# Patient Record
Sex: FEMALE | Race: WHITE | Employment: OTHER | URBAN - METROPOLITAN AREA
[De-identification: names, ages, dates, MRNs, and addresses within clinical notes are randomized per-mention and may not be internally consistent; named-entity substitution may affect disease eponyms.]

---

## 2017-05-12 ENCOUNTER — HOSPITAL ENCOUNTER (OUTPATIENT)
Dept: OTHER | Age: 72
Discharge: HOME | End: 2017-05-12
Attending: FAMILY MEDICINE | Admitting: FAMILY MEDICINE

## 2018-02-28 ENCOUNTER — INITIAL CONSULT (OUTPATIENT)
Dept: SURGERY | Age: 73
End: 2018-02-28

## 2018-02-28 VITALS
WEIGHT: 122 LBS | HEIGHT: 59 IN | BODY MASS INDEX: 24.6 KG/M2 | SYSTOLIC BLOOD PRESSURE: 100 MMHG | DIASTOLIC BLOOD PRESSURE: 60 MMHG

## 2018-02-28 DIAGNOSIS — K80.10 CHRONIC CALCULOUS CHOLECYSTITIS: Primary | ICD-10-CM

## 2018-02-28 PROCEDURE — 1090F PRES/ABSN URINE INCON ASSESS: CPT | Performed by: SURGERY

## 2018-02-28 PROCEDURE — 1123F ACP DISCUSS/DSCN MKR DOCD: CPT | Performed by: SURGERY

## 2018-02-28 PROCEDURE — G8400 PT W/DXA NO RESULTS DOC: HCPCS | Performed by: SURGERY

## 2018-02-28 PROCEDURE — G8484 FLU IMMUNIZE NO ADMIN: HCPCS | Performed by: SURGERY

## 2018-02-28 PROCEDURE — 3014F SCREEN MAMMO DOC REV: CPT | Performed by: SURGERY

## 2018-02-28 PROCEDURE — 1036F TOBACCO NON-USER: CPT | Performed by: SURGERY

## 2018-02-28 PROCEDURE — 4040F PNEUMOC VAC/ADMIN/RCVD: CPT | Performed by: SURGERY

## 2018-02-28 PROCEDURE — G8420 CALC BMI NORM PARAMETERS: HCPCS | Performed by: SURGERY

## 2018-02-28 PROCEDURE — G8427 DOCREV CUR MEDS BY ELIG CLIN: HCPCS | Performed by: SURGERY

## 2018-02-28 PROCEDURE — 99204 OFFICE O/P NEW MOD 45 MIN: CPT | Performed by: SURGERY

## 2018-02-28 PROCEDURE — 3017F COLORECTAL CA SCREEN DOC REV: CPT | Performed by: SURGERY

## 2018-02-28 RX ORDER — PANTOPRAZOLE SODIUM 40 MG/1
40 TABLET, DELAYED RELEASE ORAL DAILY
COMMUNITY

## 2018-03-02 NOTE — PRE-PROCEDURE INSTRUCTIONS
PRE OP INSTRUCTION SHEET   1. Do not eat or drink anything after 12 midnight  prior to surgery. This includes no water, chewing gum or mints. 2. Take the following pills will a small sip of water (see MAR)                                        3. Aspirin, Ibuprofen, Advil, Naproxen, Vitamin E, fish oil and other Anti-inflammatory products should be stopped for 5 days before surgery or as directed by your physician. 4. Check with your Doctor regarding stopping Plavix, Coumadin, Lovenox, Fragmin or other blood thinners   5. Do not smoke, and do not drink any alcoholic beverages 24 hours prior to surgery. This includes NA Beer. 6. You may brush your teeth and gargle the morning of surgery. DO NOT SWALLOW WATER   7. You MUST make arrangements for a responsible adult to take you home after your surgery. You will not be allowed to leave alone or drive yourself home. It is strongly suggested someone stay with you the first 24 hrs. Your surgery will be cancelled if you do not have a ride home. 8. A parent/legal guardian must accompany a child scheduled for surgery and plan to stay at the hospital until the child is discharged. Please do not bring other children with you. 9. Please wear simple, loose fitting clothing to the hospital.  Pauline Grumbling not bring valuables (money, credit cards, checkbooks, etc.) Do not wear any makeup (including no eye makeup) or nail polish on your fingers or toes. 10. DO NOT wear any jewelry or piercings on day of surgery. All body piercing jewelry must be removed. 11. If you have dentures,glasses, or contacts they will be removed before going to the OR; we will provide you a container. 12. Please see your family doctor/and cardiologist for a history & physical and/or concerning medications. Bring any test results/reports from your physician's office. Have history and labs faxed to [phone].  Remember to bring Blood Bank bracelet on the day of surgery. 14. If you have a Living Will and Durable Power of  for Healthcare, please bring in a copy. 13. Notify your Surgeon if you develop any illness between now and surgery  time, cough, cold, fever, sore throat, nausea, vomiting, etc.  Please notify your surgeon if you experience dizziness, shortness of breath or blurred vision between now & the time of your surgery   16. DO NOT shave your operative site 96 hours prior to surgery. For face & neck surgery, men may use an electric razor 48 hours prior to surgery. 17. Shower with _x__Antibacterial soap (x_chlorhexidine for total joint  Pt's) shower two times before surgery.(the morning of and the night before. 18. To provide excellent care visitors will be limited to one in the room at any given time.   Please call pre admission testing if you any further questions [phone] or [phone]

## 2018-03-06 ENCOUNTER — HOSPITAL ENCOUNTER (OUTPATIENT)
Dept: SURGERY | Age: 73
Discharge: HOME | End: 2018-03-06
Attending: SURGERY | Admitting: SURGERY

## 2018-03-06 VITALS
HEIGHT: 59 IN | SYSTOLIC BLOOD PRESSURE: 112 MMHG | DIASTOLIC BLOOD PRESSURE: 46 MMHG | TEMPERATURE: 97.2 F | BODY MASS INDEX: 24.6 KG/M2 | RESPIRATION RATE: 18 BRPM | OXYGEN SATURATION: 95 % | WEIGHT: 122 LBS | HEART RATE: 77 BPM

## 2018-03-06 DIAGNOSIS — K80.10 CHRONIC CALCULOUS CHOLECYSTITIS: Primary | ICD-10-CM

## 2018-03-06 LAB
ALBUMIN SERPL-MCNC: 4 G/DL (ref 3.4–5)
ALP BLD-CCNC: 137 U/L (ref 40–129)
ALT SERPL-CCNC: 9 U/L (ref 10–40)
AST SERPL-CCNC: 18 U/L (ref 15–37)
BILIRUB SERPL-MCNC: <0.2 MG/DL (ref 0–1)
BILIRUBIN DIRECT: <0.2 MG/DL (ref 0–0.3)
BILIRUBIN, INDIRECT: ABNORMAL MG/DL (ref 0–1)
TOTAL PROTEIN: 7.3 G/DL (ref 6.4–8.2)

## 2018-03-06 PROCEDURE — 47562 LAPAROSCOPIC CHOLECYSTECTOMY: CPT | Performed by: SURGERY

## 2018-03-06 RX ORDER — HEPARIN SODIUM 5000 [USP'U]/ML
5000 INJECTION, SOLUTION INTRAVENOUS; SUBCUTANEOUS EVERY 8 HOURS SCHEDULED
Status: DISCONTINUED | OUTPATIENT
Start: 2018-03-06 | End: 2018-03-07 | Stop reason: HOSPADM

## 2018-03-06 RX ORDER — SODIUM CHLORIDE, SODIUM LACTATE, POTASSIUM CHLORIDE, CALCIUM CHLORIDE 600; 310; 30; 20 MG/100ML; MG/100ML; MG/100ML; MG/100ML
INJECTION, SOLUTION INTRAVENOUS CONTINUOUS
Status: DISCONTINUED | OUTPATIENT
Start: 2018-03-06 | End: 2018-03-07 | Stop reason: HOSPADM

## 2018-03-06 RX ORDER — SODIUM CHLORIDE 0.9 % (FLUSH) 0.9 %
10 SYRINGE (ML) INJECTION EVERY 12 HOURS SCHEDULED
Status: DISCONTINUED | OUTPATIENT
Start: 2018-03-06 | End: 2018-03-07 | Stop reason: HOSPADM

## 2018-03-06 RX ORDER — OXYCODONE HYDROCHLORIDE AND ACETAMINOPHEN 5; 325 MG/1; MG/1
2 TABLET ORAL PRN
Status: COMPLETED | OUTPATIENT
Start: 2018-03-06 | End: 2018-03-06

## 2018-03-06 RX ORDER — LABETALOL HYDROCHLORIDE 5 MG/ML
5 INJECTION, SOLUTION INTRAVENOUS
Status: DISCONTINUED | OUTPATIENT
Start: 2018-03-06 | End: 2018-03-07 | Stop reason: HOSPADM

## 2018-03-06 RX ORDER — MEPERIDINE HYDROCHLORIDE 25 MG/ML
12.5 INJECTION INTRAMUSCULAR; INTRAVENOUS; SUBCUTANEOUS EVERY 5 MIN PRN
Status: DISCONTINUED | OUTPATIENT
Start: 2018-03-06 | End: 2018-03-07 | Stop reason: HOSPADM

## 2018-03-06 RX ORDER — HYDROMORPHONE HCL 110MG/55ML
0.5 PATIENT CONTROLLED ANALGESIA SYRINGE INTRAVENOUS EVERY 5 MIN PRN
Status: DISCONTINUED | OUTPATIENT
Start: 2018-03-06 | End: 2018-03-07 | Stop reason: HOSPADM

## 2018-03-06 RX ORDER — AMOXICILLIN 250 MG
2 CAPSULE ORAL DAILY
Qty: 30 TABLET | Refills: 1 | Status: SHIPPED | OUTPATIENT
Start: 2018-03-06

## 2018-03-06 RX ORDER — SODIUM CHLORIDE 0.9 % (FLUSH) 0.9 %
10 SYRINGE (ML) INJECTION PRN
Status: DISCONTINUED | OUTPATIENT
Start: 2018-03-06 | End: 2018-03-07 | Stop reason: HOSPADM

## 2018-03-06 RX ORDER — HYDROMORPHONE HCL 110MG/55ML
0.5 PATIENT CONTROLLED ANALGESIA SYRINGE INTRAVENOUS EVERY 10 MIN PRN
Status: DISCONTINUED | OUTPATIENT
Start: 2018-03-06 | End: 2018-03-07 | Stop reason: HOSPADM

## 2018-03-06 RX ORDER — OXYCODONE HYDROCHLORIDE AND ACETAMINOPHEN 5; 325 MG/1; MG/1
1 TABLET ORAL PRN
Status: COMPLETED | OUTPATIENT
Start: 2018-03-06 | End: 2018-03-06

## 2018-03-06 RX ORDER — DIPHENHYDRAMINE HYDROCHLORIDE 50 MG/ML
6.25 INJECTION INTRAMUSCULAR; INTRAVENOUS
Status: ACTIVE | OUTPATIENT
Start: 2018-03-06 | End: 2018-03-06

## 2018-03-06 RX ORDER — OXYCODONE HYDROCHLORIDE 5 MG/1
5-10 TABLET ORAL EVERY 4 HOURS PRN
Qty: 35 TABLET | Refills: 0 | Status: SHIPPED | OUTPATIENT
Start: 2018-03-06 | End: 2018-03-20

## 2018-03-06 RX ORDER — ONDANSETRON 2 MG/ML
4 INJECTION INTRAMUSCULAR; INTRAVENOUS EVERY 30 MIN PRN
Status: DISCONTINUED | OUTPATIENT
Start: 2018-03-06 | End: 2018-03-07 | Stop reason: HOSPADM

## 2018-03-06 RX ORDER — HYDRALAZINE HYDROCHLORIDE 20 MG/ML
5 INJECTION INTRAMUSCULAR; INTRAVENOUS EVERY 30 MIN PRN
Status: DISCONTINUED | OUTPATIENT
Start: 2018-03-06 | End: 2018-03-07 | Stop reason: HOSPADM

## 2018-03-06 RX ADMIN — SODIUM CHLORIDE, SODIUM LACTATE, POTASSIUM CHLORIDE, CALCIUM CHLORIDE: 600; 310; 30; 20 INJECTION, SOLUTION INTRAVENOUS at 10:49

## 2018-03-06 RX ADMIN — ONDANSETRON 4 MG: 2 INJECTION INTRAMUSCULAR; INTRAVENOUS at 13:14

## 2018-03-06 RX ADMIN — HEPARIN SODIUM 5000 UNITS: 5000 INJECTION, SOLUTION INTRAVENOUS; SUBCUTANEOUS at 10:49

## 2018-03-06 RX ADMIN — OXYCODONE HYDROCHLORIDE AND ACETAMINOPHEN 2 TABLET: 5; 325 TABLET ORAL at 13:14

## 2018-03-06 ASSESSMENT — PAIN - FUNCTIONAL ASSESSMENT: PAIN_FUNCTIONAL_ASSESSMENT: 0-10

## 2018-03-06 ASSESSMENT — PAIN SCALES - GENERAL
PAINLEVEL_OUTOF10: 2
PAINLEVEL_OUTOF10: 3
PAINLEVEL_OUTOF10: 3

## 2018-03-06 NOTE — ANESTHESIA PRE-OP
Department of Anesthesiology  Preprocedure Note       Name:  Guy Matias   Age:  74 y.o.  :  1945                                          MRN:  [id number]         Date:  3/6/2018      Surgeon:    Procedure:    Medications prior to admission:   Prior to Admission medications    Medication Sig Start Date End Date Taking? Authorizing Provider   pantoprazole (PROTONIX) 40 MG tablet Take 40 mg by mouth daily   Yes Historical Provider, MD   atenolol (TENORMIN) 50 MG tablet Take 50 mg by mouth 2 times daily   Yes Historical Provider, MD   B Complex Vitamins (B-COMPLEX/B-12) TABS Take 100 mg by mouth daily   Yes Historical Provider, MD   ondansetron (ZOFRAN) 4 MG tablet Take 1 tablet by mouth every 8 hours as needed for Nausea or Vomiting 18  Yes Swathi Garcia MD   gemfibrozil (LOPID) 600 MG tablet Take 600 mg by mouth 2 times daily (before meals). Yes Historical Provider, MD   paroxetine (PAXIL) 20 MG tablet Take 60 mg by mouth every morning. Yes Historical Provider, MD   Multiple Vitamins-Minerals (COMPLETE MULTIVITAMIN/MINERAL PO) Take  by mouth. Yes Historical Provider, MD       Current medications:    Current Outpatient Prescriptions   Medication Sig Dispense Refill    pantoprazole (PROTONIX) 40 MG tablet Take 40 mg by mouth daily      atenolol (TENORMIN) 50 MG tablet Take 50 mg by mouth 2 times daily      B Complex Vitamins (B-COMPLEX/B-12) TABS Take 100 mg by mouth daily      ondansetron (ZOFRAN) 4 MG tablet Take 1 tablet by mouth every 8 hours as needed for Nausea or Vomiting 12 tablet 0    gemfibrozil (LOPID) 600 MG tablet Take 600 mg by mouth 2 times daily (before meals).  paroxetine (PAXIL) 20 MG tablet Take 60 mg by mouth every morning.  Multiple Vitamins-Minerals (COMPLETE MULTIVITAMIN/MINERAL PO) Take  by mouth.          Current Facility-Administered Medications   Medication Dose Route Frequency Provider Last Rate Last Dose    lactated ringers infusion SURGERY      pt had bladder cancer    CATARACT REMOVAL WITH IMPLANT  8/26/11    RIGHT    OTHER SURGICAL HISTORY  05/08/2015    phacemilsification of cataract with intraocular lens implant left eye       Social History:    Social History   Substance Use Topics    Smoking status: Former Smoker    Smokeless tobacco: Never Used    Alcohol use No                                Counseling given: Not Answered      Vital Signs (Current):   Vitals:    03/02/18 1307 03/06/18 1025   BP:  133/65   Pulse:  57   Resp:  16   Temp:  98.9 °F (37.2 °C)   TempSrc:  Temporal   SpO2:  99%   Weight: 122 lb (55.3 kg)    Height: 4' 11\" (1.499 m)                                               BP Readings from Last 3 Encounters:   03/06/18 133/65   02/28/18 100/60   02/21/18 (!) 137/57       NPO Status: Time of last liquid consumption: 0000                        Time of last solid consumption: 0000                        Date of last liquid consumption: 03/05/18                        Date of last solid food consumption: 03/05/18    BMI:   Wt Readings from Last 3 Encounters:   03/02/18 122 lb (55.3 kg)   02/28/18 122 lb (55.3 kg)   02/21/18 124 lb (56.2 kg)     Body mass index is 24.64 kg/m². Anesthesia Evaluation  Patient summary reviewed and Nursing notes reviewed no history of anesthetic complications:   Airway: Mallampati: II     Neck ROM: full   Dental:          Pulmonary:                              Cardiovascular:    (+) hypertension:,                   Neuro/Psych:               GI/Hepatic/Renal:   (+) GERD:,           Endo/Other:    (+) malignancy/cancer. Abdominal:           Vascular:                                        Anesthesia Plan      general     ASA 3     (Medications & allergies reviewed  All available lab & EKG data reviewed)  Induction: intravenous. Anesthetic plan and risks discussed with patient. Plan discussed with CRNA.                   Hina Harmon MD   3/6/2018

## 2018-03-06 NOTE — PROGRESS NOTES
[facility]    HPI:  Patient is 74y.o. year old female seen at request of Karlee Urrutia MD.  She reports pain in right upper quadrant and midepigastric region. It is pressure-like and stabbing. It is described as severe. Other associated symptoms are bloating/abdominal distension and nausea. These symptoms have been present for  months . She has not been able to eat well. They are  made worse by eating. The pain does radiate to the back. Past Medical History:   Diagnosis Date    Acid reflux     Cancer ([facility] [address]     bladder cancer    Hyperlipidemia     Hypertension        Past Surgical History:   Procedure Laterality Date    BLADDER STONE REMOVAL      BLADDER SURGERY      pt had bladder cancer    CATARACT REMOVAL WITH IMPLANT  8/26/11    RIGHT    OTHER SURGICAL HISTORY  05/08/2015    phacemilsification of cataract with intraocular lens implant left eye       Current Outpatient Prescriptions on File Prior to Visit   Medication Sig Dispense Refill    atenolol (TENORMIN) 50 MG tablet Take 50 mg by mouth 2 times daily      B Complex Vitamins (B-COMPLEX/B-12) TABS Take 100 mg by mouth daily      ondansetron (ZOFRAN) 4 MG tablet Take 1 tablet by mouth every 8 hours as needed for Nausea or Vomiting 12 tablet 0    gemfibrozil (LOPID) 600 MG tablet Take 600 mg by mouth 2 times daily (before meals).  paroxetine (PAXIL) 20 MG tablet Take 60 mg by mouth every morning.  Multiple Vitamins-Minerals (COMPLETE MULTIVITAMIN/MINERAL PO) Take  by mouth. No current facility-administered medications on file prior to visit. Allergies   Allergen Reactions    Ciprofloxacin Hives       Social History     Social History    Marital status:      Spouse name: N/A    Number of children: N/A    Years of education: N/A     Occupational History    Not on file.      Social History Main Topics    Smoking status: Former Smoker    Smokeless tobacco: Never Used    Alcohol

## 2018-03-06 NOTE — ANESTHESIA POST-OP
Postoperative Anesthesia Note    Name:    Brendan King  MRN:      [id number]    Patient Vitals for the past 12 hrs:   BP Temp Temp src Pulse Resp SpO2   03/06/18 1402 (!) 112/46 - - 77 18 95 %   03/06/18 1333 (!) 108/37 97.2 °F (36.2 °C) Temporal 75 21 93 %   03/06/18 1318 (!) 116/55 - - 75 20 90 %   03/06/18 1304 122/65 - - 83 19 94 %   03/06/18 1248 (!) 147/60 - - 84 19 98 %   03/06/18 1243 (!) 143/53 - - 83 20 98 %   03/06/18 1238 (!) 139/50 - - 81 18 98 %   03/06/18 1233 (!) 172/50 97 °F (36.1 °C) Temporal 84 22 98 %   03/06/18 1025 133/65 98.9 °F (37.2 °C) Temporal 57 16 99 %        LABS:    CBC  Lab Results   Component Value Date/Time    WBC 6.5 02/21/2018 10:20 AM    HGB 10.9 (L) 02/21/2018 10:20 AM    HCT 33.7 (L) 02/21/2018 10:20 AM     (H) 02/21/2018 10:20 AM     RENAL  Lab Results   Component Value Date/Time     02/21/2018 10:20 AM    K 4.1 02/21/2018 10:20 AM     02/21/2018 10:20 AM    CO2 18 (L) 02/21/2018 10:20 AM    BUN 22 (H) 02/21/2018 10:20 AM    CREATININE 1.3 (H) 02/21/2018 10:20 AM    GLUCOSE 122 (H) 02/21/2018 10:20 AM     COAGS  Lab Results   Component Value Date/Time    PROTIME 12.1 08/15/2015 04:35 AM    INR 1.11 08/15/2015 04:35 AM       Intake & Output: In: 500 [I.V.:500]  Out: 200 [Urine:200]    Nausea & Vomiting:  No    Level of Consciousness:  Awake    Pain Assessment:  Adequate analgesia    Anesthesia Complications:  No apparent anesthetic complications    SUMMARY      Vital signs stable  OK to discharge from Stage I post anesthesia care.   Care transferred from Anesthesiology department on discharge from perioperative area

## 2018-03-21 ENCOUNTER — OFFICE VISIT (OUTPATIENT)
Dept: SURGERY | Age: 73
End: 2018-03-21

## 2018-03-21 VITALS
BODY MASS INDEX: 25.4 KG/M2 | DIASTOLIC BLOOD PRESSURE: 60 MMHG | WEIGHT: 126 LBS | HEIGHT: 59 IN | SYSTOLIC BLOOD PRESSURE: 130 MMHG

## 2018-03-21 DIAGNOSIS — Z09 SURGERY FOLLOW-UP EXAMINATION: Primary | ICD-10-CM

## 2018-03-21 PROCEDURE — 99024 POSTOP FOLLOW-UP VISIT: CPT | Performed by: SURGERY

## 2018-12-17 ENCOUNTER — APPOINTMENT (OUTPATIENT)
Dept: CT IMAGING | Age: 73
DRG: 699 | End: 2018-12-17
Payer: MEDICARE

## 2018-12-17 ENCOUNTER — HOSPITAL ENCOUNTER (INPATIENT)
Age: 73
LOS: 4 days | Discharge: HOME | DRG: 699 | End: 2018-12-21
Attending: EMERGENCY MEDICINE | Admitting: HOSPITALIST
Payer: MEDICARE

## 2018-12-17 DIAGNOSIS — N10 ACUTE PYELONEPHRITIS: Primary | ICD-10-CM

## 2018-12-17 PROBLEM — N13.30 HYDRONEPHROSIS: Status: ACTIVE | Noted: 2018-12-17

## 2018-12-17 LAB
A/G RATIO: 1.2 (ref 1.1–2.2)
ALBUMIN SERPL-MCNC: 4 G/DL (ref 3.4–5)
ALP BLD-CCNC: 135 U/L (ref 40–129)
ALT SERPL-CCNC: 8 U/L (ref 10–40)
ANION GAP SERPL CALCULATED.3IONS-SCNC: 10 MMOL/L (ref 3–16)
AST SERPL-CCNC: 18 U/L (ref 15–37)
BACTERIA: ABNORMAL /HPF
BASOPHILS ABSOLUTE: 0.1 K/UL (ref 0–0.2)
BASOPHILS RELATIVE PERCENT: 0.6 %
BILIRUB SERPL-MCNC: <0.2 MG/DL (ref 0–1)
BILIRUBIN URINE: ABNORMAL
BLOOD, URINE: ABNORMAL
BUN BLDV-MCNC: 24 MG/DL (ref 7–20)
CALCIUM SERPL-MCNC: 9.7 MG/DL (ref 8.3–10.6)
CHLORIDE BLD-SCNC: 104 MMOL/L (ref 99–110)
CLARITY: ABNORMAL
CO2: 21 MMOL/L (ref 21–32)
COLOR: ABNORMAL
CREAT SERPL-MCNC: 1.3 MG/DL (ref 0.6–1.2)
EOSINOPHILS ABSOLUTE: 0.3 K/UL (ref 0–0.6)
EOSINOPHILS RELATIVE PERCENT: 2.1 %
EPITHELIAL CELLS, UA: ABNORMAL /HPF
GFR AFRICAN AMERICAN: 48
GFR NON-AFRICAN AMERICAN: 40
GLOBULIN: 3.3 G/DL
GLUCOSE BLD-MCNC: 143 MG/DL (ref 70–99)
GLUCOSE URINE: NEGATIVE MG/DL
HCT VFR BLD CALC: 38.4 % (ref 36–48)
HEMOGLOBIN: 12.4 G/DL (ref 12–16)
KETONES, URINE: ABNORMAL MG/DL
LACTIC ACID: 1.6 MMOL/L (ref 0.4–2)
LEUKOCYTE ESTERASE, URINE: ABNORMAL
LIPASE: 90 U/L (ref 13–60)
LYMPHOCYTES ABSOLUTE: 1.1 K/UL (ref 1–5.1)
LYMPHOCYTES RELATIVE PERCENT: 8.8 %
MCH RBC QN AUTO: 29.1 PG (ref 26–34)
MCHC RBC AUTO-ENTMCNC: 32.3 G/DL (ref 31–36)
MCV RBC AUTO: 90.1 FL (ref 80–100)
MICROSCOPIC EXAMINATION: YES
MONOCYTES ABSOLUTE: 0.4 K/UL (ref 0–1.3)
MONOCYTES RELATIVE PERCENT: 3.5 %
NEUTROPHILS ABSOLUTE: 10.9 K/UL (ref 1.7–7.7)
NEUTROPHILS RELATIVE PERCENT: 85 %
NITRITE, URINE: POSITIVE
PDW BLD-RTO: 15 % (ref 12.4–15.4)
PH UA: 8.5
PLATELET # BLD: 322 K/UL (ref 135–450)
PMV BLD AUTO: 7.9 FL (ref 5–10.5)
POTASSIUM REFLEX MAGNESIUM: 4.9 MMOL/L (ref 3.5–5.1)
PROTEIN UA: 100 MG/DL
RBC # BLD: 4.26 M/UL (ref 4–5.2)
RBC UA: ABNORMAL /HPF (ref 0–2)
SODIUM BLD-SCNC: 135 MMOL/L (ref 136–145)
SPECIFIC GRAVITY UA: 1.02
TOTAL PROTEIN: 7.3 G/DL (ref 6.4–8.2)
URINE REFLEX TO CULTURE: YES
URINE TYPE: ABNORMAL
UROBILINOGEN, URINE: 1 E.U./DL
WBC # BLD: 12.9 K/UL (ref 4–11)
WBC UA: ABNORMAL /HPF (ref 0–5)

## 2018-12-17 PROCEDURE — 6360000002 HC RX W HCPCS: Performed by: NURSE PRACTITIONER

## 2018-12-17 PROCEDURE — 1200000000 HC SEMI PRIVATE

## 2018-12-17 PROCEDURE — 2580000003 HC RX 258: Performed by: NURSE PRACTITIONER

## 2018-12-17 PROCEDURE — 6370000000 HC RX 637 (ALT 250 FOR IP): Performed by: INTERNAL MEDICINE

## 2018-12-17 PROCEDURE — 83690 ASSAY OF LIPASE: CPT

## 2018-12-17 PROCEDURE — 6360000002 HC RX W HCPCS: Performed by: PHYSICIAN ASSISTANT

## 2018-12-17 PROCEDURE — 87086 URINE CULTURE/COLONY COUNT: CPT

## 2018-12-17 PROCEDURE — 87040 BLOOD CULTURE FOR BACTERIA: CPT

## 2018-12-17 PROCEDURE — 80053 COMPREHEN METABOLIC PANEL: CPT

## 2018-12-17 PROCEDURE — 81001 URINALYSIS AUTO W/SCOPE: CPT

## 2018-12-17 PROCEDURE — 96365 THER/PROPH/DIAG IV INF INIT: CPT

## 2018-12-17 PROCEDURE — 99285 EMERGENCY DEPT VISIT HI MDM: CPT

## 2018-12-17 PROCEDURE — 87186 SC STD MICRODIL/AGAR DIL: CPT

## 2018-12-17 PROCEDURE — 74176 CT ABD & PELVIS W/O CONTRAST: CPT

## 2018-12-17 PROCEDURE — 87077 CULTURE AEROBIC IDENTIFY: CPT

## 2018-12-17 PROCEDURE — 96361 HYDRATE IV INFUSION ADD-ON: CPT

## 2018-12-17 PROCEDURE — 96375 TX/PRO/DX INJ NEW DRUG ADDON: CPT

## 2018-12-17 PROCEDURE — 85025 COMPLETE CBC W/AUTO DIFF WBC: CPT

## 2018-12-17 PROCEDURE — 2580000003 HC RX 258: Performed by: PHYSICIAN ASSISTANT

## 2018-12-17 PROCEDURE — 6370000000 HC RX 637 (ALT 250 FOR IP): Performed by: PHYSICIAN ASSISTANT

## 2018-12-17 PROCEDURE — 83605 ASSAY OF LACTIC ACID: CPT

## 2018-12-17 RX ORDER — PAROXETINE HYDROCHLORIDE 20 MG/1
60 TABLET, FILM COATED ORAL EVERY MORNING
Status: DISCONTINUED | OUTPATIENT
Start: 2018-12-18 | End: 2018-12-21 | Stop reason: HOSPADM

## 2018-12-17 RX ORDER — PANTOPRAZOLE SODIUM 40 MG/1
40 TABLET, DELAYED RELEASE ORAL DAILY
Status: DISCONTINUED | OUTPATIENT
Start: 2018-12-18 | End: 2018-12-21 | Stop reason: HOSPADM

## 2018-12-17 RX ORDER — GEMFIBROZIL 600 MG/1
600 TABLET, FILM COATED ORAL
Status: DISCONTINUED | OUTPATIENT
Start: 2018-12-18 | End: 2018-12-21 | Stop reason: HOSPADM

## 2018-12-17 RX ORDER — 0.9 % SODIUM CHLORIDE 0.9 %
1000 INTRAVENOUS SOLUTION INTRAVENOUS ONCE
Status: COMPLETED | OUTPATIENT
Start: 2018-12-17 | End: 2018-12-17

## 2018-12-17 RX ORDER — SODIUM CHLORIDE 0.9 % (FLUSH) 0.9 %
10 SYRINGE (ML) INJECTION EVERY 12 HOURS SCHEDULED
Status: DISCONTINUED | OUTPATIENT
Start: 2018-12-17 | End: 2018-12-21 | Stop reason: HOSPADM

## 2018-12-17 RX ORDER — MORPHINE SULFATE 2 MG/ML
4 INJECTION, SOLUTION INTRAMUSCULAR; INTRAVENOUS ONCE
Status: COMPLETED | OUTPATIENT
Start: 2018-12-17 | End: 2018-12-17

## 2018-12-17 RX ORDER — ONDANSETRON 2 MG/ML
4 INJECTION INTRAMUSCULAR; INTRAVENOUS EVERY 6 HOURS PRN
Status: DISCONTINUED | OUTPATIENT
Start: 2018-12-17 | End: 2018-12-21 | Stop reason: HOSPADM

## 2018-12-17 RX ORDER — ONDANSETRON 2 MG/ML
4 INJECTION INTRAMUSCULAR; INTRAVENOUS ONCE
Status: COMPLETED | OUTPATIENT
Start: 2018-12-17 | End: 2018-12-17

## 2018-12-17 RX ORDER — ACETAMINOPHEN 160 MG/5ML
650 SOLUTION ORAL EVERY 6 HOURS PRN
Status: DISCONTINUED | OUTPATIENT
Start: 2018-12-17 | End: 2018-12-20

## 2018-12-17 RX ORDER — SODIUM CHLORIDE 9 MG/ML
INJECTION, SOLUTION INTRAVENOUS CONTINUOUS
Status: DISCONTINUED | OUTPATIENT
Start: 2018-12-17 | End: 2018-12-20

## 2018-12-17 RX ORDER — SODIUM CHLORIDE 0.9 % (FLUSH) 0.9 %
10 SYRINGE (ML) INJECTION PRN
Status: DISCONTINUED | OUTPATIENT
Start: 2018-12-17 | End: 2018-12-21 | Stop reason: HOSPADM

## 2018-12-17 RX ORDER — ATENOLOL 50 MG/1
50 TABLET ORAL 2 TIMES DAILY
Status: DISCONTINUED | OUTPATIENT
Start: 2018-12-17 | End: 2018-12-21 | Stop reason: HOSPADM

## 2018-12-17 RX ADMIN — CEFTRIAXONE SODIUM 1 G: 1 INJECTION, POWDER, FOR SOLUTION INTRAMUSCULAR; INTRAVENOUS at 16:07

## 2018-12-17 RX ADMIN — ENOXAPARIN SODIUM 30 MG: 100 INJECTION SUBCUTANEOUS at 18:47

## 2018-12-17 RX ADMIN — ATENOLOL 50 MG: 50 TABLET ORAL at 22:06

## 2018-12-17 RX ADMIN — SODIUM CHLORIDE 1000 ML: 9 INJECTION, SOLUTION INTRAVENOUS at 14:02

## 2018-12-17 RX ADMIN — ACETAMINOPHEN 650 MG: 650 SOLUTION ORAL at 18:47

## 2018-12-17 RX ADMIN — SODIUM CHLORIDE: 9 INJECTION, SOLUTION INTRAVENOUS at 18:48

## 2018-12-17 RX ADMIN — MORPHINE SULFATE 4 MG: 2 INJECTION, SOLUTION INTRAMUSCULAR; INTRAVENOUS at 14:01

## 2018-12-17 RX ADMIN — ONDANSETRON 4 MG: 2 INJECTION INTRAMUSCULAR; INTRAVENOUS at 14:01

## 2018-12-17 ASSESSMENT — PAIN SCALES - GENERAL: PAINLEVEL_OUTOF10: 8

## 2018-12-17 ASSESSMENT — ENCOUNTER SYMPTOMS
SHORTNESS OF BREATH: 0
DIARRHEA: 0
VOMITING: 0
BACK PAIN: 1
COUGH: 0
ABDOMINAL PAIN: 1
NAUSEA: 1

## 2018-12-17 NOTE — ED PROVIDER NOTES
Temporal 60 16 97 % 4' 8\" (1.422 m) 130 lb (59 kg)       Physical Exam   Constitutional: She appears well-developed and well-nourished. HENT:   Head: Normocephalic and atraumatic. Right Ear: External ear normal.   Left Ear: External ear normal.   Nose: Nose normal.   Eyes: Conjunctivae are normal.   Neck: Normal range of motion. Cardiovascular: Normal rate and regular rhythm. Pulmonary/Chest: Breath sounds normal.   Abdominal: Soft. Normal appearance and bowel sounds are normal. She exhibits no distension. There is tenderness in the right upper quadrant and right lower quadrant. There is CVA tenderness. Musculoskeletal: Normal range of motion. Neurological: She is alert. Skin: Skin is warm and dry. Psychiatric: She has a normal mood and affect. Her behavior is normal.   Nursing note and vitals reviewed.       DIAGNOSTIC RESULTS   LABS:    Labs Reviewed   CBC WITH AUTO DIFFERENTIAL - Abnormal; Notable for the following:        Result Value    WBC 12.9 (*)     Neutrophils # 10.9 (*)     All other components within normal limits    Narrative:     Performed at:  [facility] [place],  [employer] [facility]   Phone [phone]   COMPREHENSIVE METABOLIC PANEL W/ REFLEX TO MG FOR LOW K - Abnormal; Notable for the following:     Sodium 135 (*)     Glucose 143 (*)     BUN 24 (*)     CREATININE 1.3 (*)     GFR Non- 40 (*)     GFR African American 48 (*)     Alkaline Phosphatase 135 (*)     ALT 8 (*)     All other components within normal limits    Narrative:     Performed at:  [facility]) - [place] [address],  [employer] [facility]   Phone [phone]   LIPASE - Abnormal; Notable for the following:     Lipase 90.0 (*)     All other components within normal limits    Narrative:     Performed at:  [facility] [address],  [employer], [facility]   Phone [phone] [address] [facility] Unless otherwise noted below, none     Procedures    CRITICAL CARE TIME   N/A    CONSULTS:  IP CONSULT TO UROLOGY  IP CONSULT TO HOSPITALIST      EMERGENCY DEPARTMENT COURSE and DIFFERENTIALDIAGNOSIS/MDM:   Vitals:    Vitals:    12/17/18 1234 12/17/18 1327   BP: (!) 164/84 (!) 178/99   Pulse: 60 62   Resp: 16    Temp: 97 °F (36.1 °C)    TempSrc: Temporal    SpO2: 97% 97%   Weight: 130 lb (59 kg)    Height: 4' 8\" (1.422 m)        Patient was given thefollowing medications:  Medications   0.9 % sodium chloride bolus (1,000 mLs Intravenous New Bag 12/17/18 1402)   ondansetron (ZOFRAN) injection 4 mg (4 mg Intravenous Given 12/17/18 1401)   morphine (PF) injection 4 mg (4 mg Intravenous Given 12/17/18 1401)   cefTRIAXone (ROCEPHIN) 1 g IVPB in 50 mL D5W minibag (1 g Intravenous New Bag 12/17/18 1607)       Patient presented to the ED with complaints of right flank pain that started this morning. Physical exam did reveal left sided CVA tenderness. She has a history of ileal conduit and bladder cancer. CBC with leukocytosis of 12.9. CMP with elevated BUN of 24, creatinine of 1.3. Lipase was elevated at 90, which does appear to be a chronic elevation. UA was nitrite and leukocyte positive. Microscopic with 20-50 RBCs and 4+ bacteria. CT abdomen and pelvis was read as above. I did speak with urology who recommended admission for IV antibiotics and fluids. I then spoke with hospitalist who was agreeable to admission. I discussed treatment plan with patient, patient is agreeable and denies questions at this time. The patient tolerated their visit well. They were seen and evaluated by the attending physician, Dr. Rina Cat who agreed with the assessment and plan. The patientand / or the family were informed of the results of any tests, a time was given to answer questions, a plan was proposed and they agreed with plan. FINAL IMPRESSION      1.  Acute pyelonephritis          DISPOSITION/PLAN   DISPOSITION Decision To

## 2018-12-17 NOTE — PROGRESS NOTES
Administration of magnesium-containing products to a patient in severe renal failure (CrCl < 30 ml/min) is not recommended. Milk of Magnesia discontinued, other non-renal laxatives may be selected.   ELVIS Pozo.Ph.12/17/20186:10 PM

## 2018-12-17 NOTE — PROGRESS NOTES
Admission questions complete. Pt respirations are e/e. Pt temp is elevated and Dr. Steve Watson has been made aware. Pt denies any further needs.

## 2018-12-18 LAB
ANION GAP SERPL CALCULATED.3IONS-SCNC: 7 MMOL/L (ref 3–16)
BASOPHILS ABSOLUTE: 0 K/UL (ref 0–0.2)
BASOPHILS RELATIVE PERCENT: 0.3 %
BUN BLDV-MCNC: 25 MG/DL (ref 7–20)
CALCIUM SERPL-MCNC: 8.7 MG/DL (ref 8.3–10.6)
CHLORIDE BLD-SCNC: 110 MMOL/L (ref 99–110)
CO2: 20 MMOL/L (ref 21–32)
CREAT SERPL-MCNC: 1.6 MG/DL (ref 0.6–1.2)
EOSINOPHILS ABSOLUTE: 0.2 K/UL (ref 0–0.6)
EOSINOPHILS RELATIVE PERCENT: 1.2 %
GFR AFRICAN AMERICAN: 38
GFR NON-AFRICAN AMERICAN: 31
GLUCOSE BLD-MCNC: 99 MG/DL (ref 70–99)
HCT VFR BLD CALC: 33.2 % (ref 36–48)
HEMOGLOBIN: 10.6 G/DL (ref 12–16)
LYMPHOCYTES ABSOLUTE: 0.7 K/UL (ref 1–5.1)
LYMPHOCYTES RELATIVE PERCENT: 4.5 %
MCH RBC QN AUTO: 29.7 PG (ref 26–34)
MCHC RBC AUTO-ENTMCNC: 31.9 G/DL (ref 31–36)
MCV RBC AUTO: 93.1 FL (ref 80–100)
MONOCYTES ABSOLUTE: 0.8 K/UL (ref 0–1.3)
MONOCYTES RELATIVE PERCENT: 4.7 %
NEUTROPHILS ABSOLUTE: 14.8 K/UL (ref 1.7–7.7)
NEUTROPHILS RELATIVE PERCENT: 89.3 %
PDW BLD-RTO: 14.9 % (ref 12.4–15.4)
PLATELET # BLD: 218 K/UL (ref 135–450)
PMV BLD AUTO: 7.8 FL (ref 5–10.5)
POTASSIUM REFLEX MAGNESIUM: 4.6 MMOL/L (ref 3.5–5.1)
RBC # BLD: 3.57 M/UL (ref 4–5.2)
SODIUM BLD-SCNC: 137 MMOL/L (ref 136–145)
WBC # BLD: 16.5 K/UL (ref 4–11)

## 2018-12-18 PROCEDURE — 3E1K78Z IRRIGATION OF GENITOURINARY TRACT USING IRRIGATING SUBSTANCE, VIA NATURAL OR ARTIFICIAL OPENING: ICD-10-PCS | Performed by: UROLOGY

## 2018-12-18 PROCEDURE — 6370000000 HC RX 637 (ALT 250 FOR IP): Performed by: PHYSICIAN ASSISTANT

## 2018-12-18 PROCEDURE — 0T9B70Z DRAINAGE OF BLADDER WITH DRAINAGE DEVICE, VIA NATURAL OR ARTIFICIAL OPENING: ICD-10-PCS | Performed by: UROLOGY

## 2018-12-18 PROCEDURE — 6370000000 HC RX 637 (ALT 250 FOR IP): Performed by: INTERNAL MEDICINE

## 2018-12-18 PROCEDURE — 2580000003 HC RX 258: Performed by: PHYSICIAN ASSISTANT

## 2018-12-18 PROCEDURE — 36415 COLL VENOUS BLD VENIPUNCTURE: CPT

## 2018-12-18 PROCEDURE — 0T7B7ZZ DILATION OF BLADDER, VIA NATURAL OR ARTIFICIAL OPENING: ICD-10-PCS | Performed by: UROLOGY

## 2018-12-18 PROCEDURE — 99232 SBSQ HOSP IP/OBS MODERATE 35: CPT | Performed by: INTERNAL MEDICINE

## 2018-12-18 PROCEDURE — 6360000002 HC RX W HCPCS: Performed by: PHYSICIAN ASSISTANT

## 2018-12-18 PROCEDURE — 1200000000 HC SEMI PRIVATE

## 2018-12-18 PROCEDURE — 80048 BASIC METABOLIC PNL TOTAL CA: CPT

## 2018-12-18 PROCEDURE — 0TCB7ZZ EXTIRPATION OF MATTER FROM BLADDER, VIA NATURAL OR ARTIFICIAL OPENING: ICD-10-PCS | Performed by: UROLOGY

## 2018-12-18 PROCEDURE — 85025 COMPLETE CBC W/AUTO DIFF WBC: CPT

## 2018-12-18 RX ORDER — ZOLPIDEM TARTRATE 5 MG/1
5 TABLET ORAL NIGHTLY PRN
Status: DISCONTINUED | OUTPATIENT
Start: 2018-12-18 | End: 2018-12-21 | Stop reason: HOSPADM

## 2018-12-18 RX ORDER — OXYMETAZOLINE HYDROCHLORIDE 0.05 G/100ML
2 SPRAY NASAL 2 TIMES DAILY
Status: DISCONTINUED | OUTPATIENT
Start: 2018-12-18 | End: 2018-12-21 | Stop reason: HOSPADM

## 2018-12-18 RX ADMIN — ENOXAPARIN SODIUM 30 MG: 100 INJECTION SUBCUTANEOUS at 17:55

## 2018-12-18 RX ADMIN — CEFTRIAXONE SODIUM 1 G: 1 INJECTION, POWDER, FOR SOLUTION INTRAMUSCULAR; INTRAVENOUS at 17:16

## 2018-12-18 RX ADMIN — ACETAMINOPHEN 650 MG: 650 SOLUTION ORAL at 10:43

## 2018-12-18 RX ADMIN — ATENOLOL 50 MG: 50 TABLET ORAL at 09:11

## 2018-12-18 RX ADMIN — SODIUM CHLORIDE: 9 INJECTION, SOLUTION INTRAVENOUS at 14:22

## 2018-12-18 RX ADMIN — ACETAMINOPHEN 650 MG: 650 SOLUTION ORAL at 04:08

## 2018-12-18 RX ADMIN — Medication 2 SPRAY: at 16:35

## 2018-12-18 RX ADMIN — SODIUM CHLORIDE: 9 INJECTION, SOLUTION INTRAVENOUS at 04:11

## 2018-12-18 ASSESSMENT — PAIN DESCRIPTION - LOCATION: LOCATION: FLANK

## 2018-12-18 ASSESSMENT — PAIN DESCRIPTION - ONSET: ONSET: ON-GOING

## 2018-12-18 ASSESSMENT — PAIN SCALES - GENERAL
PAINLEVEL_OUTOF10: 5
PAINLEVEL_OUTOF10: 3
PAINLEVEL_OUTOF10: 3
PAINLEVEL_OUTOF10: 2

## 2018-12-18 ASSESSMENT — PAIN DESCRIPTION - PROGRESSION
CLINICAL_PROGRESSION: NOT CHANGED
CLINICAL_PROGRESSION: NOT CHANGED

## 2018-12-18 ASSESSMENT — PAIN DESCRIPTION - FREQUENCY: FREQUENCY: CONTINUOUS

## 2018-12-18 ASSESSMENT — PAIN DESCRIPTION - ORIENTATION: ORIENTATION: RIGHT

## 2018-12-18 ASSESSMENT — PAIN DESCRIPTION - PAIN TYPE: TYPE: ACUTE PAIN

## 2018-12-18 NOTE — H&P
palpable, equal bilaterally       Labs:     Recent Labs      12/17/18   1330   WBC  12.9*   HGB  12.4   HCT  38.4   PLT  322     Recent Labs      12/17/18   1330   NA  135*   K  4.9   CL  104   CO2  21   BUN  24*   CREATININE  1.3*   CALCIUM  9.7     Recent Labs      12/17/18   1330   AST  18   ALT  8*   BILITOT  <0.2   ALKPHOS  135*     No results for input(s): INR in the last 72 hours. No results for input(s): Amedeo Carlton in the last 72 hours. Urinalysis:      Lab Results   Component Value Date    NITRU POSITIVE 12/17/2018    WBCUA 3-5 12/17/2018    BACTERIA 4+ 12/17/2018    RBCUA 20-50 12/17/2018    BLOODU LARGE 12/17/2018    SPECGRAV 1.025 12/17/2018    GLUCOSEU Negative 12/17/2018    GLUCOSEU NEGATIVE 11/19/2011       Radiology:          CT ABDOMEN PELVIS WO CONTRAST   Final Result   There is severe bilateral hydroureteronephrosis to the level of an ileal   conduit in the right lower quadrant. This finding is chronic on the left and   progressive on the right, with perinephric stranding on the right that may   indicate concurrent pyelonephritis. In addition, the conduit is distended,   suggesting the possibility of stricture at the ostomy. Anastomotic   strictures are not excluded. ASSESSMENT:    Active Hospital Problems    Diagnosis Date Noted    Hydronephrosis [N13.30] 12/17/2018         PLAN:    1) bilateral hydronephrosis - Urology consulted, currently with urine in bag, pain improved. Repeat renal in am.  2) UTI - rocephin 1 gm IV qday, afebrile. 3) HTN - cpm  4) GERD - continue protonix    DVT Prophylaxis: lovenox  Diet: DIET GENERAL;  Diet NPO, After Midnight  Code Status: Full Code      Dispo - inpatient       Anahy Farah MD    Thank you Gerson Vaughan MD for the opportunity to be involved in this patient's care. If you have any questions or concerns please feel free to contact me at [phone].

## 2018-12-18 NOTE — CONSULTS
10 mL  10 mL Intravenous 2 times per day SRIKANTH Hager   Stopped at 12/18/18 0911    sodium chloride flush 0.9 % injection 10 mL  10 mL Intravenous PRN SRIKANTH Hager        ondansetron [facility]) injection 4 mg  4 mg Intravenous Q6H PRN SRIKANTH Hager        0.9 % sodium chloride infusion   Intravenous Continuous SRIKANTH Hager 100 mL/hr at 12/18/18 1422      pneumococcal 13-valent conjugate (PREVNAR) injection 0.5 mL  0.5 mL Intramuscular Once Iona Pearson MD        enoxaparin (LOVENOX) injection 30 mg  30 mg Subcutaneous Daily SRIKANTH Hager   30 mg at 12/17/18 1847    cefTRIAXone (ROCEPHIN) 1 g IVPB in 50 mL D5W minibag  1 g Intravenous Daily SRIKANTH Hager 100 mL/hr at 12/18/18 1716 1 g at 12/18/18 1716    acetaminophen (TYLENOL) 160 MG/5ML solution 650 mg  650 mg Oral Q6H PRN Iona Pearson MD   650 mg at 12/18/18 1043       Allergies   Allergen Reactions    Ciprofloxacin Hives       Family History   Problem Relation Age of Onset    Heart Disease Mother        Social History   Substance Use Topics    Smoking status: Former Smoker    Smokeless tobacco: Never Used    Alcohol use No         Review of Systems: A 12 point ROS was performed and was unremarkable unless listed in the history of present illness. I/O last 3 completed shifts: In: 7067 [P.O.:120;  I.V.:1285; IV Piggyback:1050]  Out: 9748 [Urine:1375]    Physical Exam:  Patient Vitals for the past 8 hrs:   BP Temp Temp src Pulse Resp SpO2   12/18/18 1323 (!) 125/59 98.6 °F (37 °C) Oral 66 18 92 %     Constitutional: pleasant female in NAD, with a obese body habitus   Eyes: pink conjunctivae, no ptosis  ENT: lips without cyanosis, normal appearance of ears and nose externally  Neck: no masses or lesions, trachea midline  Respiratory: normal respiratory movements without distress, no audible wheezes   Cardiovascular: regular rate and rhythm, lower extremities without edema   Abdomen: soft, NTND, no

## 2018-12-18 NOTE — PROGRESS NOTES
Shift assessment complete. Pt sitting up in bed. No complaints voiced. Illeosotmy continue to drain neftali red urine. Pt denies pain. Abdomen soft and non distended. VSS. No signs of distress noted. Will continue to monitor.

## 2018-12-18 NOTE — PLAN OF CARE
Problem: SAFETY  Goal: Free from accidental physical injury  Outcome: Ongoing      Problem: DAILY CARE  Goal: Daily care needs are met  Outcome: Ongoing      Problem: PAIN  Goal: Patient's pain/discomfort is manageable  Outcome: Ongoing      Problem: KNOWLEDGE DEFICIT  Goal: Patient/S.O. demonstrates understanding of disease process, treatment plan, medications, and discharge instructions.   Outcome: Ongoing

## 2018-12-18 NOTE — PROGRESS NOTES
LIPASE  90.0*   BILITOT  <0.2   ALKPHOS  135*     CULTURES    Urine cx: GN benjamin >100,000    Blood cx x2: pending     RADIOLOGY    CT ABDOMEN PELVIS WO CONTRAST 12/17/2018   Final Result   There is severe bilateral hydroureteronephrosis to the level of an ileal   conduit in the right lower quadrant. This finding is chronic on the left and   progressive on the right, with perinephric stranding on the right that may   indicate concurrent pyelonephritis. In addition, the conduit is distended,   suggesting the possibility of stricture at the ostomy. Anastomotic   strictures are not excluded. Assessment/Plan:    Bilateral hydronephrosis   Hx of Bladder Cancer s/p Radical Cystectomy with Ileal Conduit  DELMER  - Admit to Med Surg  - IVF, PRN pain meds  - Urology consulted, currently with urine in bag, pain improved  - Cr on admission: 1.3 --> repeat 1.6    Leukocytosis  - 12.9  - likely 2/2 above  - repeat 16.5    UTI- possible acute pyelonephritis  - pt w/ ileal conduit  - urine cx  - Rocephin 1 gm IV qday, afebrile. HTN   - stable  - cont Atenolol  - monitor    HLD  - cont Lopid    GERD   - continue protonix    DVT Prophylaxis: Lovenox  Diet: Diet NPO, After Midnight  Code Status: Full Code     Nilay Borjas PA-C 11:04 AM 12/18/2018   1. Bilateral hydronephrosis. Acute kidney injury. History of bladder cancer status post cystectomy and ileal conduit. Urology consult. Continue IV fluids. 2.  Possible UTI. Continue IV Rocephin. WILLY Sprague.

## 2018-12-18 NOTE — PROGRESS NOTES
Dr. Patel Canales at bedside. Orders to place wet to dry dressings over stoma and yates entrance into skin until ostomy nurse can eval tomorrow due to severe excoriation at site.  Orders placed for ostomy eval. Family at bedside with pt

## 2018-12-19 LAB
ANION GAP SERPL CALCULATED.3IONS-SCNC: 9 MMOL/L (ref 3–16)
BASOPHILS ABSOLUTE: 0 K/UL (ref 0–0.2)
BASOPHILS RELATIVE PERCENT: 0.4 %
BUN BLDV-MCNC: 17 MG/DL (ref 7–20)
CALCIUM SERPL-MCNC: 9.3 MG/DL (ref 8.3–10.6)
CHLORIDE BLD-SCNC: 110 MMOL/L (ref 99–110)
CO2: 21 MMOL/L (ref 21–32)
CREAT SERPL-MCNC: 1.1 MG/DL (ref 0.6–1.2)
EOSINOPHILS ABSOLUTE: 0.1 K/UL (ref 0–0.6)
EOSINOPHILS RELATIVE PERCENT: 2.2 %
GFR AFRICAN AMERICAN: 59
GFR NON-AFRICAN AMERICAN: 48
GLUCOSE BLD-MCNC: 98 MG/DL (ref 70–99)
HCT VFR BLD CALC: 33.9 % (ref 36–48)
HEMOGLOBIN: 10.9 G/DL (ref 12–16)
LYMPHOCYTES ABSOLUTE: 0.6 K/UL (ref 1–5.1)
LYMPHOCYTES RELATIVE PERCENT: 10.2 %
MCH RBC QN AUTO: 29.9 PG (ref 26–34)
MCHC RBC AUTO-ENTMCNC: 32.1 G/DL (ref 31–36)
MCV RBC AUTO: 93 FL (ref 80–100)
MONOCYTES ABSOLUTE: 0.6 K/UL (ref 0–1.3)
MONOCYTES RELATIVE PERCENT: 10 %
NEUTROPHILS ABSOLUTE: 4.7 K/UL (ref 1.7–7.7)
NEUTROPHILS RELATIVE PERCENT: 77.2 %
ORGANISM: ABNORMAL
ORGANISM: ABNORMAL
PDW BLD-RTO: 14.3 % (ref 12.4–15.4)
PLATELET # BLD: 200 K/UL (ref 135–450)
PMV BLD AUTO: 7.7 FL (ref 5–10.5)
POTASSIUM SERPL-SCNC: 4.7 MMOL/L (ref 3.5–5.1)
RBC # BLD: 3.64 M/UL (ref 4–5.2)
SODIUM BLD-SCNC: 140 MMOL/L (ref 136–145)
URINE CULTURE, ROUTINE: ABNORMAL
WBC # BLD: 6 K/UL (ref 4–11)

## 2018-12-19 PROCEDURE — 80048 BASIC METABOLIC PNL TOTAL CA: CPT

## 2018-12-19 PROCEDURE — 6370000000 HC RX 637 (ALT 250 FOR IP): Performed by: PHYSICIAN ASSISTANT

## 2018-12-19 PROCEDURE — 36415 COLL VENOUS BLD VENIPUNCTURE: CPT

## 2018-12-19 PROCEDURE — 6370000000 HC RX 637 (ALT 250 FOR IP): Performed by: HOSPITALIST

## 2018-12-19 PROCEDURE — 6370000000 HC RX 637 (ALT 250 FOR IP): Performed by: INTERNAL MEDICINE

## 2018-12-19 PROCEDURE — 99232 SBSQ HOSP IP/OBS MODERATE 35: CPT | Performed by: INTERNAL MEDICINE

## 2018-12-19 PROCEDURE — 85025 COMPLETE CBC W/AUTO DIFF WBC: CPT

## 2018-12-19 PROCEDURE — 1200000000 HC SEMI PRIVATE

## 2018-12-19 PROCEDURE — 2580000003 HC RX 258: Performed by: PHYSICIAN ASSISTANT

## 2018-12-19 PROCEDURE — 6360000002 HC RX W HCPCS: Performed by: PHYSICIAN ASSISTANT

## 2018-12-19 RX ORDER — DOCUSATE SODIUM 100 MG/1
100 CAPSULE, LIQUID FILLED ORAL 2 TIMES DAILY
Status: DISCONTINUED | OUTPATIENT
Start: 2018-12-19 | End: 2018-12-21 | Stop reason: HOSPADM

## 2018-12-19 RX ADMIN — SODIUM CHLORIDE: 9 INJECTION, SOLUTION INTRAVENOUS at 02:20

## 2018-12-19 RX ADMIN — ENOXAPARIN SODIUM 30 MG: 100 INJECTION SUBCUTANEOUS at 17:42

## 2018-12-19 RX ADMIN — Medication 2 SPRAY: at 08:21

## 2018-12-19 RX ADMIN — ATENOLOL 50 MG: 50 TABLET ORAL at 08:15

## 2018-12-19 RX ADMIN — PANTOPRAZOLE SODIUM 40 MG: 40 TABLET, DELAYED RELEASE ORAL at 08:15

## 2018-12-19 RX ADMIN — ZOLPIDEM TARTRATE 5 MG: 5 TABLET ORAL at 20:40

## 2018-12-19 RX ADMIN — Medication 10 ML: at 20:40

## 2018-12-19 RX ADMIN — ATENOLOL 50 MG: 50 TABLET ORAL at 20:40

## 2018-12-19 RX ADMIN — DOCUSATE SODIUM 100 MG: 100 CAPSULE, LIQUID FILLED ORAL at 20:40

## 2018-12-19 RX ADMIN — CEFTRIAXONE SODIUM 1 G: 1 INJECTION, POWDER, FOR SOLUTION INTRAMUSCULAR; INTRAVENOUS at 17:41

## 2018-12-19 RX ADMIN — ZOLPIDEM TARTRATE 5 MG: 5 TABLET ORAL at 01:27

## 2018-12-19 RX ADMIN — PAROXETINE HYDROCHLORIDE 60 MG: 20 TABLET, FILM COATED ORAL at 08:15

## 2018-12-19 ASSESSMENT — PAIN SCALES - GENERAL
PAINLEVEL_OUTOF10: 2
PAINLEVEL_OUTOF10: 0
PAINLEVEL_OUTOF10: 0

## 2018-12-19 ASSESSMENT — PAIN DESCRIPTION - PROGRESSION
CLINICAL_PROGRESSION: NOT CHANGED

## 2018-12-19 ASSESSMENT — PAIN DESCRIPTION - PAIN TYPE: TYPE: ACUTE PAIN

## 2018-12-19 NOTE — CARE COORDINATION
[facility] Wound Ostomy Continence Nurse  Consult Note       NAME:  Martin Vallejo Se RECORD NUMBER:  [id number]  AGE: 74 y.o. GENDER: female  : 1945  TODAY'S DATE:  2018    Subjective   Reason for WOCN Evaluation and Assessment: request to consult on patient with a Urostomy that has been present for 30 years due to Bladder Cancer. Dr Peyton Reyna placed catheter into stoma due to stenosis to assist with drainage. Amor Bruce is a 74 y.o. female referred by:   [x] Physician  [x] Nursing  [] Other:     Wound Identification:  Wound Type: Peristomal associated skin damage due to urinary leakage  Contributing Factors: incontinence of urine    Wound History: Urostomy, created 30 years ago per Dr Joshua Blanc at [facility] for bladder cancer. Stomal opening has stenosed and required Dr Peyton Reyna to insert Catheter into stomal opening to assist with drainage. Peristomal skin damage noted from urinary leakage. Current Wound Care Treatment:  Stoma powder, liquid film barrier, and adhesive wafer, and Urostomy appliance to urostomy area. Catheter through opening in pouch and sealed, catheter to Frank bag for collection.     Patient Goal of Care:  [x] Wound Healing  [] Odor Control  [] Palliative Care  [] Pain Control   [x] Other: Urine containment        PAST MEDICAL HISTORY        Diagnosis Date    Acid reflux     Cancer ([address]     bladder cancer    Hyperlipidemia     Hypertension     Kidney stone        PAST SURGICAL HISTORY    Past Surgical History:   Procedure Laterality Date    BLADDER STONE REMOVAL      BLADDER SURGERY      pt had bladder cancer    CATARACT REMOVAL WITH IMPLANT  11    RIGHT    CHOLECYSTECTOMY  2018    OTHER SURGICAL HISTORY  2015    phacemilsification of cataract with intraocular lens implant left eye       FAMILY HISTORY    Family History   Problem Relation Age of Onset    Heart Disease Mother        SOCIAL HISTORY    Social History   Substance Use Topics    Response to treatment:  Well tolerated by patient. Pain Assessment: denies discomfort      Plan   Plan of Care:     Urinary containment with an ostomy appliance and catheter thru pouch to Yates cath bag. Treatment to peristomal skin open wounds. Cleanse Peristomal skin/wound with NSS and pat dry. Apply stoma powder and dust off excess, seal with dabbing liquid film barrier wipe. Apply Hollihesive square with fenestration to go around catheter. Apply paste ring around catheter to hold in place and seal.  Apply 1 3/4\" ostomy wafer with slits to enlarge opening, slide around catheter. Attach 1 3/4\" ostomy pouch with small opening to feed the catheter thru to the outside of bag. Seal catheter to pouch with paste ring and strips of Adhesive drape like Tegaderm. Empty urine from ostomy pouch and catheter/yates each shift and as needed and document amount.      Specialty Bed Required : N/A   [] Low Air Loss   [x] Pressure Redistribution  [] Fluid Immersion  [] Bariatric  [] Total Pressure Relief  [] Other:     Current Diet: Diet NPO, After Midnight  Dietician consult:  Yes    Discharge Plan:  Placement for patient upon discharge: home with support    Patient appropriate for Outpatient [address]: No    Referrals:  []   [x] [employer] [facility]  [] Supplies  [] Other    Patient/Caregiver Teaching:  Level of patient/caregiver understanding able to:   [x] Indicates understanding       [x] Needs reinforcement  [] Unsuccessful      [] Verbal Understanding  [] Demonstrated understanding       [] No evidence of learning  [] Refused teaching         [] N/A    Will continue to follow,       Electronically signed by Harriett Villalobos RN, CWOCN on 12/19/2018 at 2:22 PM

## 2018-12-19 NOTE — PROGRESS NOTES
Pt requested Dr. Dario Tripp called to ask if NPO necessary for possible procedure. Called Dr Radha Sebastian office spoke with Clementina Favre. Dr. Radha Sebastian in [place] will contact us back. 12/19/2018 1430 Spoke with urology patient may eat, received diet order. Dr. Korey Mendez to round this afternoon on patient. Patient aware and tray ordered for patient.

## 2018-12-19 NOTE — PROGRESS NOTES
Assessment complete. Antonio is alert and oriented. Vital signs are per flowsheet. Respirations are easy and even at rest.  Antonio complains of pain in right flank at 2/10. Non-medication interventions were not necessary. Antonio is up in bed watching television, 2/4 side rails up, bed in lowest position, call light in easy reach, bed alarm on, and she denies any further needs. No other complications are noted, will continue to monitor throughout shift.

## 2018-12-19 NOTE — PLAN OF CARE
Problem: Falls - Risk of:  Goal: Will remain free from falls  Will remain free from falls   Outcome: Ongoing  Medium fall risk and pt using call light appropriately    Problem: Risk for Impaired Skin Integrity  Goal: Tissue integrity - skin and mucous membranes  Structural intactness and normal physiological function of skin and  mucous membranes.    Outcome: Ongoing      Problem: SAFETY  Goal: Free from accidental physical injury  Outcome: Ongoing      Problem: DAILY CARE  Goal: Daily care needs are met  Outcome: Ongoing      Problem: PAIN  Goal: Patient's pain/discomfort is manageable  Outcome: Ongoing      Problem: SKIN INTEGRITY  Goal: Skin integrity is maintained or improved  Outcome: Ongoing  Evaluate excoriation and changed dressing to RLQ abdomen    Problem: Pain:  Goal: Pain level will decrease  Pain level will decrease   Outcome: Ongoing  No c/o pain this AM

## 2018-12-20 ENCOUNTER — APPOINTMENT (OUTPATIENT)
Dept: ULTRASOUND IMAGING | Age: 73
DRG: 699 | End: 2018-12-20
Payer: MEDICARE

## 2018-12-20 LAB
ANION GAP SERPL CALCULATED.3IONS-SCNC: 10 MMOL/L (ref 3–16)
BUN BLDV-MCNC: 13 MG/DL (ref 7–20)
CALCIUM SERPL-MCNC: 8.9 MG/DL (ref 8.3–10.6)
CHLORIDE BLD-SCNC: 112 MMOL/L (ref 99–110)
CO2: 15 MMOL/L (ref 21–32)
CREAT SERPL-MCNC: 1.1 MG/DL (ref 0.6–1.2)
GFR AFRICAN AMERICAN: 59
GFR NON-AFRICAN AMERICAN: 48
GLUCOSE BLD-MCNC: 96 MG/DL (ref 70–99)
POTASSIUM SERPL-SCNC: 4.5 MMOL/L (ref 3.5–5.1)
SODIUM BLD-SCNC: 137 MMOL/L (ref 136–145)

## 2018-12-20 PROCEDURE — G0009 ADMIN PNEUMOCOCCAL VACCINE: HCPCS | Performed by: INTERNAL MEDICINE

## 2018-12-20 PROCEDURE — 6370000000 HC RX 637 (ALT 250 FOR IP): Performed by: INTERNAL MEDICINE

## 2018-12-20 PROCEDURE — 36415 COLL VENOUS BLD VENIPUNCTURE: CPT

## 2018-12-20 PROCEDURE — 76770 US EXAM ABDO BACK WALL COMP: CPT

## 2018-12-20 PROCEDURE — 90670 PCV13 VACCINE IM: CPT | Performed by: INTERNAL MEDICINE

## 2018-12-20 PROCEDURE — 6370000000 HC RX 637 (ALT 250 FOR IP): Performed by: HOSPITALIST

## 2018-12-20 PROCEDURE — 6370000000 HC RX 637 (ALT 250 FOR IP): Performed by: PHYSICIAN ASSISTANT

## 2018-12-20 PROCEDURE — 80048 BASIC METABOLIC PNL TOTAL CA: CPT

## 2018-12-20 PROCEDURE — 6360000002 HC RX W HCPCS: Performed by: PHYSICIAN ASSISTANT

## 2018-12-20 PROCEDURE — 1200000000 HC SEMI PRIVATE

## 2018-12-20 PROCEDURE — 99232 SBSQ HOSP IP/OBS MODERATE 35: CPT | Performed by: INTERNAL MEDICINE

## 2018-12-20 PROCEDURE — 2580000003 HC RX 258: Performed by: PHYSICIAN ASSISTANT

## 2018-12-20 PROCEDURE — 6360000002 HC RX W HCPCS: Performed by: INTERNAL MEDICINE

## 2018-12-20 PROCEDURE — 2500000003 HC RX 250 WO HCPCS: Performed by: INTERNAL MEDICINE

## 2018-12-20 PROCEDURE — 2580000003 HC RX 258: Performed by: INTERNAL MEDICINE

## 2018-12-20 RX ORDER — ACETAMINOPHEN 325 MG/1
650 TABLET ORAL EVERY 6 HOURS PRN
Status: DISCONTINUED | OUTPATIENT
Start: 2018-12-20 | End: 2018-12-21 | Stop reason: HOSPADM

## 2018-12-20 RX ADMIN — ACETAMINOPHEN 650 MG: 325 TABLET ORAL at 19:13

## 2018-12-20 RX ADMIN — Medication 2 SPRAY: at 22:00

## 2018-12-20 RX ADMIN — Medication 10 ML: at 08:23

## 2018-12-20 RX ADMIN — PNEUMOCOCCAL 13-VALENT CONJUGATE VACCINE 0.5 ML: 2.2; 2.2; 2.2; 2.2; 2.2; 4.4; 2.2; 2.2; 2.2; 2.2; 2.2; 2.2; 2.2 INJECTION, SUSPENSION INTRAMUSCULAR at 08:18

## 2018-12-20 RX ADMIN — ZOLPIDEM TARTRATE 5 MG: 5 TABLET ORAL at 22:01

## 2018-12-20 RX ADMIN — CEFTRIAXONE SODIUM 1 G: 1 INJECTION, POWDER, FOR SOLUTION INTRAMUSCULAR; INTRAVENOUS at 17:30

## 2018-12-20 RX ADMIN — ATENOLOL 50 MG: 50 TABLET ORAL at 22:01

## 2018-12-20 RX ADMIN — PAROXETINE HYDROCHLORIDE 60 MG: 20 TABLET, FILM COATED ORAL at 08:17

## 2018-12-20 RX ADMIN — Medication 2 SPRAY: at 08:23

## 2018-12-20 RX ADMIN — ATENOLOL 50 MG: 50 TABLET ORAL at 08:17

## 2018-12-20 RX ADMIN — GEMFIBROZIL 600 MG: 600 TABLET ORAL at 05:53

## 2018-12-20 RX ADMIN — ENOXAPARIN SODIUM 30 MG: 100 INJECTION SUBCUTANEOUS at 17:30

## 2018-12-20 RX ADMIN — SODIUM BICARBONATE: 84 INJECTION, SOLUTION INTRAVENOUS at 13:51

## 2018-12-20 RX ADMIN — DOCUSATE SODIUM 100 MG: 100 CAPSULE, LIQUID FILLED ORAL at 22:01

## 2018-12-20 RX ADMIN — SODIUM CHLORIDE: 9 INJECTION, SOLUTION INTRAVENOUS at 08:17

## 2018-12-20 RX ADMIN — PANTOPRAZOLE SODIUM 40 MG: 40 TABLET, DELAYED RELEASE ORAL at 08:17

## 2018-12-20 RX ADMIN — Medication 10 ML: at 22:01

## 2018-12-20 ASSESSMENT — PAIN SCALES - GENERAL
PAINLEVEL_OUTOF10: 3
PAINLEVEL_OUTOF10: 0

## 2018-12-20 NOTE — PROGRESS NOTES
Progress Note    Admit Date:  12/17/2018    Subjective:  Ms. Laith Perea states her pain is well controlled. No complaints at this time. Objective:   Patient Vitals for the past 4 hrs:   BP Temp Temp src Pulse Resp SpO2   12/20/18 0901 (!) 155/68 98.4 °F (36.9 °C) Oral 77 16 93 %       Intake/Output Summary (Last 24 hours) at 12/20/18 1019  Last data filed at 12/20/18 0212   Gross per 24 hour   Intake              720 ml   Output             1550 ml   Net             -830 ml       Physical Exam:  General appearance:  No apparent distress, appears stated age and cooperative. HEENT:  Normal cephalic, atraumatic without obvious deformity. Conjunctivae/corneas clear. Neck: Supple, with full range of motion. Trachea midline. Respiratory:  Normal respiratory effort. CTA, bilaterally w/o Rales/Wheezes/Rhonchi. RA  Cardiovascular:  Regular rate and rhythm with normal S1/S2 without murmurs, rubs or gallops. Abdomen: Soft, NT, non-distended with normal bowel sounds. RLQ urinary pouch with no clots  MSK:  No clubbing, cyanosis or edema bilaterally. Full ROM without deformity. Skin: Skin color, texture, turgor normal.  No rashes or lesions. Neurologic:  Neurovascularly intact without any focal sensory/motor deficits.  Cranial nerves: II-XII intact, grossly non-focal.  Psychiatric:  Alert and oriented, thought content appropriate, normal insight        Scheduled Meds:   docusate sodium  100 mg Oral BID    oxymetazoline  2 spray Each Nare BID    atenolol  50 mg Oral BID    gemfibrozil  600 mg Oral QAM AC    PARoxetine  60 mg Oral QAM    pantoprazole  40 mg Oral Daily    sodium chloride flush  10 mL Intravenous 2 times per day    enoxaparin  30 mg Subcutaneous Daily    cefTRIAXone (ROCEPHIN) IV  1 g Intravenous Daily       Continuous Infusions:   sodium chloride 100 mL/hr at 12/20/18 0817       PRN Meds:  zolpidem, sodium chloride flush, ondansetron, acetaminophen      Data:  CBC:   Recent Labs      12/17/18   1330 12/17/2018   Final Result   There is severe bilateral hydroureteronephrosis to the level of an ileal   conduit in the right lower quadrant. This finding is chronic on the left and   progressive on the right, with perinephric stranding on the right that may   indicate concurrent pyelonephritis. In addition, the conduit is distended,   suggesting the possibility of stricture at the ostomy. Anastomotic   strictures are not excluded. Assessment/Plan:    Bilateral hydronephrosis   Hx of Bladder Cancer s/p Radical Cystectomy with Ileal Conduit  DELMER  - Admit to Med Surg  - IVF, PRN pain meds  - Urology consulted, currently with urine in bag, pain improved  - Cr on admission: 1.3 --> repeat 1.6  - s/p insertion of indwelling bladder catheter with dilation of ostomy stricture & bladder irrigation  - wound ostomy to eval per urology; repeat labs today to monitor Cr  Repeat renal US today    Leukocytosis  - 12.9  - likely 2/2 above  - repeat 16.5    E. Coli and Klebsiella UTI- possible acute pyelonephritis  - pt w/ ileal conduit  - urine cx: growing Klebsiella and E. Coli >100,000 CFU/mL  - sensitive to Rocephin 1 gm IV qday, afebrile. HTN   - stable  - cont Atenolol  - monitor    HLD  - cont Lopid    GERD   - continue protonix    DVT Prophylaxis: Lovenox  Diet: DIET GENERAL;  Code Status: Full Code         WILLY Sprague.

## 2018-12-21 VITALS
WEIGHT: 137.3 LBS | SYSTOLIC BLOOD PRESSURE: 180 MMHG | HEIGHT: 56 IN | OXYGEN SATURATION: 93 % | HEART RATE: 86 BPM | BODY MASS INDEX: 30.89 KG/M2 | RESPIRATION RATE: 18 BRPM | DIASTOLIC BLOOD PRESSURE: 74 MMHG | TEMPERATURE: 97.6 F

## 2018-12-21 LAB
ANION GAP SERPL CALCULATED.3IONS-SCNC: 11 MMOL/L (ref 3–16)
BUN BLDV-MCNC: 12 MG/DL (ref 7–20)
CALCIUM SERPL-MCNC: 8.8 MG/DL (ref 8.3–10.6)
CHLORIDE BLD-SCNC: 106 MMOL/L (ref 99–110)
CO2: 23 MMOL/L (ref 21–32)
CREAT SERPL-MCNC: 1 MG/DL (ref 0.6–1.2)
GFR AFRICAN AMERICAN: >60
GFR NON-AFRICAN AMERICAN: 54
GLUCOSE BLD-MCNC: 92 MG/DL (ref 70–99)
POTASSIUM SERPL-SCNC: 4.1 MMOL/L (ref 3.5–5.1)
SODIUM BLD-SCNC: 140 MMOL/L (ref 136–145)

## 2018-12-21 PROCEDURE — 6370000000 HC RX 637 (ALT 250 FOR IP): Performed by: INTERNAL MEDICINE

## 2018-12-21 PROCEDURE — 36415 COLL VENOUS BLD VENIPUNCTURE: CPT

## 2018-12-21 PROCEDURE — 6370000000 HC RX 637 (ALT 250 FOR IP): Performed by: PHYSICIAN ASSISTANT

## 2018-12-21 PROCEDURE — 2580000003 HC RX 258: Performed by: PHYSICIAN ASSISTANT

## 2018-12-21 PROCEDURE — 2500000003 HC RX 250 WO HCPCS: Performed by: INTERNAL MEDICINE

## 2018-12-21 PROCEDURE — 2580000003 HC RX 258: Performed by: INTERNAL MEDICINE

## 2018-12-21 PROCEDURE — 99238 HOSP IP/OBS DSCHRG MGMT 30/<: CPT | Performed by: INTERNAL MEDICINE

## 2018-12-21 PROCEDURE — 80048 BASIC METABOLIC PNL TOTAL CA: CPT

## 2018-12-21 RX ORDER — AMOXICILLIN AND CLAVULANATE POTASSIUM 500; 125 MG/1; MG/1
1 TABLET, FILM COATED ORAL 3 TIMES DAILY
Qty: 15 TABLET | Refills: 0 | Status: SHIPPED | OUTPATIENT
Start: 2018-12-21 | End: 2018-12-26

## 2018-12-21 RX ADMIN — PANTOPRAZOLE SODIUM 40 MG: 40 TABLET, DELAYED RELEASE ORAL at 08:47

## 2018-12-21 RX ADMIN — SODIUM BICARBONATE: 84 INJECTION, SOLUTION INTRAVENOUS at 01:25

## 2018-12-21 RX ADMIN — GEMFIBROZIL 600 MG: 600 TABLET ORAL at 06:50

## 2018-12-21 RX ADMIN — Medication 2 SPRAY: at 08:49

## 2018-12-21 RX ADMIN — ATENOLOL 50 MG: 50 TABLET ORAL at 08:47

## 2018-12-21 RX ADMIN — DOCUSATE SODIUM 100 MG: 100 CAPSULE, LIQUID FILLED ORAL at 08:47

## 2018-12-21 RX ADMIN — Medication 10 ML: at 08:47

## 2018-12-21 RX ADMIN — PAROXETINE HYDROCHLORIDE 60 MG: 20 TABLET, FILM COATED ORAL at 08:47

## 2018-12-21 NOTE — PROGRESS NOTES
Progress Note    Admit Date:  12/17/2018    Subjective:  Ms. Steffanie Victor states her pain is well controlled. No complaints at this time. Objective:   No data found. Intake/Output Summary (Last 24 hours) at 12/21/18 0820  Last data filed at 12/21/18 0248   Gross per 24 hour   Intake              480 ml   Output             1777 ml   Net            -1297 ml       Physical Exam:  General appearance:  No apparent distress, appears stated age and cooperative. HEENT:  Normal cephalic, atraumatic without obvious deformity. Conjunctivae/corneas clear. Neck: Supple, with full range of motion. Trachea midline. Respiratory:  Normal respiratory effort. CTA, bilaterally w/o Rales/Wheezes/Rhonchi. RA  Cardiovascular:  Regular rate and rhythm with normal S1/S2 without murmurs, rubs or gallops. Abdomen: Soft, NT, non-distended with normal bowel sounds. RLQ urinary pouch with no clots  MSK:  No clubbing, cyanosis or edema bilaterally. Full ROM without deformity. Skin: Skin color, texture, turgor normal.  No rashes or lesions. Neurologic:  Neurovascularly intact without any focal sensory/motor deficits.  Cranial nerves: II-XII intact, grossly non-focal.  Psychiatric:  Alert and oriented, thought content appropriate, normal insight        Scheduled Meds:   docusate sodium  100 mg Oral BID    oxymetazoline  2 spray Each Nare BID    atenolol  50 mg Oral BID    gemfibrozil  600 mg Oral QAM AC    PARoxetine  60 mg Oral QAM    pantoprazole  40 mg Oral Daily    sodium chloride flush  10 mL Intravenous 2 times per day    enoxaparin  30 mg Subcutaneous Daily    cefTRIAXone (ROCEPHIN) IV  1 g Intravenous Daily       Continuous Infusions:   IV infusion builder 100 mL/hr at 12/21/18 0125       PRN Meds:  acetaminophen, zolpidem, sodium chloride flush, ondansetron      Data:  CBC:   Recent Labs      12/19/18   1201   WBC  6.0   HGB  10.9*   HCT  33.9*   MCV  93.0   PLT  200     BMP:   Recent Labs      12/19/18   1200  12/20/18

## 2018-12-21 NOTE — CARE COORDINATION
follow up calls. Follow Up  No future appointments.     Health Maintenance  Health Maintenance Due   Topic Date Due    Lipid screen  10/01/1983    DEXA (modify frequency per FRAX score)  10/01/2008       Tash Branch RN

## 2018-12-22 ENCOUNTER — CARE COORDINATION (OUTPATIENT)
Dept: CASE MANAGEMENT | Age: 73
End: 2018-12-22

## 2018-12-22 DIAGNOSIS — N13.30 HYDRONEPHROSIS, UNSPECIFIED HYDRONEPHROSIS TYPE: Primary | ICD-10-CM

## 2018-12-22 LAB
BLOOD CULTURE, ROUTINE: NORMAL
CULTURE, BLOOD 2: NORMAL

## 2018-12-22 PROCEDURE — 1111F DSCHRG MED/CURRENT MED MERGE: CPT

## 2019-01-01 ENCOUNTER — HOSPITAL ENCOUNTER (EMERGENCY)
Age: 74
Discharge: HOME | End: 2019-11-26
Attending: EMERGENCY MEDICINE
Payer: MEDICARE

## 2019-01-01 VITALS
HEIGHT: 56 IN | BODY MASS INDEX: 31.49 KG/M2 | SYSTOLIC BLOOD PRESSURE: 191 MMHG | OXYGEN SATURATION: 98 % | HEART RATE: 78 BPM | TEMPERATURE: 97.8 F | RESPIRATION RATE: 20 BRPM | WEIGHT: 140 LBS | DIASTOLIC BLOOD PRESSURE: 85 MMHG

## 2019-01-01 DIAGNOSIS — I10 ESSENTIAL HYPERTENSION: Primary | ICD-10-CM

## 2019-01-01 LAB
A/G RATIO: 0.9 (ref 1.1–2.2)
ALBUMIN SERPL-MCNC: 4 G/DL (ref 3.4–5)
ALP BLD-CCNC: 197 U/L (ref 40–129)
ALT SERPL-CCNC: 8 U/L (ref 10–40)
ANION GAP SERPL CALCULATED.3IONS-SCNC: 13 MMOL/L (ref 3–16)
AST SERPL-CCNC: 25 U/L (ref 15–37)
BASOPHILS ABSOLUTE: 0.1 K/UL (ref 0–0.2)
BASOPHILS RELATIVE PERCENT: 0.9 %
BILIRUB SERPL-MCNC: <0.2 MG/DL (ref 0–1)
BUN BLDV-MCNC: 20 MG/DL (ref 7–20)
CALCIUM SERPL-MCNC: 9.4 MG/DL (ref 8.3–10.6)
CHLORIDE BLD-SCNC: 104 MMOL/L (ref 99–110)
CO2: 21 MMOL/L (ref 21–32)
CREAT SERPL-MCNC: 1.1 MG/DL (ref 0.6–1.2)
EKG ATRIAL RATE: 80 BPM
EKG DIAGNOSIS: NORMAL
EKG P AXIS: 34 DEGREES
EKG P-R INTERVAL: 176 MS
EKG Q-T INTERVAL: 408 MS
EKG QRS DURATION: 120 MS
EKG QTC CALCULATION (BAZETT): 470 MS
EKG R AXIS: 72 DEGREES
EKG T AXIS: 164 DEGREES
EKG VENTRICULAR RATE: 80 BPM
EOSINOPHILS ABSOLUTE: 0.3 K/UL (ref 0–0.6)
EOSINOPHILS RELATIVE PERCENT: 4.7 %
GFR AFRICAN AMERICAN: 58
GFR NON-AFRICAN AMERICAN: 48
GLOBULIN: 4.4 G/DL
GLUCOSE BLD-MCNC: 110 MG/DL (ref 70–99)
HCT VFR BLD CALC: 36.1 % (ref 36–48)
HEMOGLOBIN: 11.6 G/DL (ref 12–16)
LYMPHOCYTES ABSOLUTE: 1.2 K/UL (ref 1–5.1)
LYMPHOCYTES RELATIVE PERCENT: 17.9 %
MCH RBC QN AUTO: 29 PG (ref 26–34)
MCHC RBC AUTO-ENTMCNC: 32 G/DL (ref 31–36)
MCV RBC AUTO: 90.6 FL (ref 80–100)
MONOCYTES ABSOLUTE: 0.7 K/UL (ref 0–1.3)
MONOCYTES RELATIVE PERCENT: 10.8 %
NEUTROPHILS ABSOLUTE: 4.5 K/UL (ref 1.7–7.7)
NEUTROPHILS RELATIVE PERCENT: 65.7 %
PDW BLD-RTO: 15 % (ref 12.4–15.4)
PLATELET # BLD: 321 K/UL (ref 135–450)
PMV BLD AUTO: 7.7 FL (ref 5–10.5)
POTASSIUM SERPL-SCNC: 5.3 MMOL/L (ref 3.5–5.1)
RBC # BLD: 3.99 M/UL (ref 4–5.2)
SODIUM BLD-SCNC: 138 MMOL/L (ref 136–145)
TOTAL PROTEIN: 8.4 G/DL (ref 6.4–8.2)
TROPONIN: <0.01 NG/ML
WBC # BLD: 6.8 K/UL (ref 4–11)

## 2019-01-01 PROCEDURE — 84484 ASSAY OF TROPONIN QUANT: CPT

## 2019-01-01 PROCEDURE — 93005 ELECTROCARDIOGRAM TRACING: CPT | Performed by: EMERGENCY MEDICINE

## 2019-01-01 PROCEDURE — 80053 COMPREHEN METABOLIC PANEL: CPT

## 2019-01-01 PROCEDURE — 99284 EMERGENCY DEPT VISIT MOD MDM: CPT

## 2019-01-01 PROCEDURE — 93010 ELECTROCARDIOGRAM REPORT: CPT | Performed by: INTERNAL MEDICINE

## 2019-01-01 PROCEDURE — 85025 COMPLETE CBC W/AUTO DIFF WBC: CPT

## 2019-01-01 PROCEDURE — 36415 COLL VENOUS BLD VENIPUNCTURE: CPT

## 2019-01-01 RX ORDER — DOXAZOSIN 2 MG/1
2 TABLET ORAL NIGHTLY
Qty: 30 TABLET | Refills: 0 | Status: ON HOLD | OUTPATIENT
Start: 2019-01-01 | End: 2020-01-01 | Stop reason: SDUPTHER

## 2019-01-01 RX ORDER — AMLODIPINE BESYLATE 10 MG/1
10 TABLET ORAL DAILY
Status: ON HOLD | COMMUNITY
End: 2020-01-01 | Stop reason: HOSPADM

## 2019-01-01 RX ORDER — PRIMIDONE 50 MG/1
100 TABLET ORAL DAILY
COMMUNITY

## 2019-01-01 RX ORDER — 0.9 % SODIUM CHLORIDE 0.9 %
1000 INTRAVENOUS SOLUTION INTRAVENOUS ONCE
Status: DISCONTINUED | OUTPATIENT
Start: 2019-01-01 | End: 2019-01-01 | Stop reason: HOSPADM

## 2019-01-01 ASSESSMENT — PAIN DESCRIPTION - PAIN TYPE: TYPE: ACUTE PAIN

## 2019-01-01 ASSESSMENT — PAIN DESCRIPTION - DESCRIPTORS: DESCRIPTORS: ACHING

## 2019-01-01 ASSESSMENT — PAIN SCALES - GENERAL: PAINLEVEL_OUTOF10: 3

## 2019-01-01 ASSESSMENT — PAIN DESCRIPTION - FREQUENCY: FREQUENCY: CONTINUOUS

## 2019-01-01 ASSESSMENT — PAIN DESCRIPTION - ONSET: ONSET: PROGRESSIVE

## 2019-01-01 ASSESSMENT — PAIN DESCRIPTION - ORIENTATION: ORIENTATION: LEFT

## 2019-01-01 ASSESSMENT — PAIN DESCRIPTION - LOCATION: LOCATION: FACE

## 2019-01-12 ENCOUNTER — CARE COORDINATION (OUTPATIENT)
Dept: CASE MANAGEMENT | Age: 74
End: 2019-01-12

## 2019-02-01 ENCOUNTER — CARE COORDINATION (OUTPATIENT)
Dept: CASE MANAGEMENT | Age: 74
End: 2019-02-01

## 2019-02-14 ENCOUNTER — CARE COORDINATION (OUTPATIENT)
Dept: CASE MANAGEMENT | Age: 74
End: 2019-02-14

## 2019-02-15 ENCOUNTER — CARE COORDINATION (OUTPATIENT)
Dept: CASE MANAGEMENT | Age: 74
End: 2019-02-15

## 2019-02-20 ENCOUNTER — APPOINTMENT (OUTPATIENT)
Dept: CT IMAGING | Age: 74
End: 2019-02-20
Payer: MEDICARE

## 2019-02-20 ENCOUNTER — HOSPITAL ENCOUNTER (EMERGENCY)
Age: 74
Discharge: HOME | End: 2019-02-21
Attending: EMERGENCY MEDICINE
Payer: MEDICARE

## 2019-02-20 DIAGNOSIS — N13.30 HYDRONEPHROSIS, BILATERAL: ICD-10-CM

## 2019-02-20 DIAGNOSIS — N39.0 ACUTE URINARY TRACT INFECTION: Primary | ICD-10-CM

## 2019-02-20 LAB
A/G RATIO: 0.9 (ref 1.1–2.2)
ALBUMIN SERPL-MCNC: 3.8 G/DL (ref 3.4–5)
ALP BLD-CCNC: 152 U/L (ref 40–129)
ALT SERPL-CCNC: 7 U/L (ref 10–40)
ANION GAP SERPL CALCULATED.3IONS-SCNC: 10 MMOL/L (ref 3–16)
AST SERPL-CCNC: 14 U/L (ref 15–37)
BACTERIA: ABNORMAL /HPF
BASOPHILS ABSOLUTE: 0.1 K/UL (ref 0–0.2)
BASOPHILS RELATIVE PERCENT: 1 %
BILIRUB SERPL-MCNC: <0.2 MG/DL (ref 0–1)
BILIRUBIN URINE: NEGATIVE
BLOOD, URINE: ABNORMAL
BUN BLDV-MCNC: 28 MG/DL (ref 7–20)
CALCIUM SERPL-MCNC: 10.3 MG/DL (ref 8.3–10.6)
CHLORIDE BLD-SCNC: 104 MMOL/L (ref 99–110)
CLARITY: CLEAR
CO2: 19 MMOL/L (ref 21–32)
COLOR: YELLOW
CREAT SERPL-MCNC: 1.4 MG/DL (ref 0.6–1.2)
EOSINOPHILS ABSOLUTE: 0.3 K/UL (ref 0–0.6)
EOSINOPHILS RELATIVE PERCENT: 3.3 %
GFR AFRICAN AMERICAN: 44
GFR NON-AFRICAN AMERICAN: 37
GLOBULIN: 4.2 G/DL
GLUCOSE BLD-MCNC: 125 MG/DL (ref 70–99)
GLUCOSE URINE: NEGATIVE MG/DL
HCT VFR BLD CALC: 35.8 % (ref 36–48)
HEMOGLOBIN: 11.8 G/DL (ref 12–16)
KETONES, URINE: NEGATIVE MG/DL
LEUKOCYTE ESTERASE, URINE: ABNORMAL
LIPASE: 93 U/L (ref 13–60)
LYMPHOCYTES ABSOLUTE: 1.1 K/UL (ref 1–5.1)
LYMPHOCYTES RELATIVE PERCENT: 11.7 %
MCH RBC QN AUTO: 30.3 PG (ref 26–34)
MCHC RBC AUTO-ENTMCNC: 32.8 G/DL (ref 31–36)
MCV RBC AUTO: 92.3 FL (ref 80–100)
MICROSCOPIC EXAMINATION: YES
MONOCYTES ABSOLUTE: 0.8 K/UL (ref 0–1.3)
MONOCYTES RELATIVE PERCENT: 8.8 %
MUCUS: ABNORMAL /LPF
NEUTROPHILS ABSOLUTE: 7.3 K/UL (ref 1.7–7.7)
NEUTROPHILS RELATIVE PERCENT: 75.2 %
NITRITE, URINE: NEGATIVE
PDW BLD-RTO: 14.3 % (ref 12.4–15.4)
PH UA: 8.5
PLATELET # BLD: 386 K/UL (ref 135–450)
PMV BLD AUTO: 7.8 FL (ref 5–10.5)
POTASSIUM REFLEX MAGNESIUM: 4.4 MMOL/L (ref 3.5–5.1)
PROTEIN UA: 100 MG/DL
RBC # BLD: 3.88 M/UL (ref 4–5.2)
RBC UA: >100 /HPF (ref 0–2)
SODIUM BLD-SCNC: 133 MMOL/L (ref 136–145)
SPECIFIC GRAVITY UA: 1.01
TOTAL PROTEIN: 8 G/DL (ref 6.4–8.2)
URINE REFLEX TO CULTURE: YES
URINE TYPE: ABNORMAL
UROBILINOGEN, URINE: 0.2 E.U./DL
WBC # BLD: 9.7 K/UL (ref 4–11)
WBC UA: ABNORMAL /HPF (ref 0–5)

## 2019-02-20 PROCEDURE — 80053 COMPREHEN METABOLIC PANEL: CPT

## 2019-02-20 PROCEDURE — 81001 URINALYSIS AUTO W/SCOPE: CPT

## 2019-02-20 PROCEDURE — 83690 ASSAY OF LIPASE: CPT

## 2019-02-20 PROCEDURE — 2580000003 HC RX 258: Performed by: PHYSICIAN ASSISTANT

## 2019-02-20 PROCEDURE — 96374 THER/PROPH/DIAG INJ IV PUSH: CPT

## 2019-02-20 PROCEDURE — 96361 HYDRATE IV INFUSION ADD-ON: CPT

## 2019-02-20 PROCEDURE — 6360000002 HC RX W HCPCS: Performed by: PHYSICIAN ASSISTANT

## 2019-02-20 PROCEDURE — 87086 URINE CULTURE/COLONY COUNT: CPT

## 2019-02-20 PROCEDURE — 85025 COMPLETE CBC W/AUTO DIFF WBC: CPT

## 2019-02-20 PROCEDURE — 74176 CT ABD & PELVIS W/O CONTRAST: CPT

## 2019-02-20 PROCEDURE — 99284 EMERGENCY DEPT VISIT MOD MDM: CPT

## 2019-02-20 RX ORDER — KETOROLAC TROMETHAMINE 30 MG/ML
15 INJECTION, SOLUTION INTRAMUSCULAR; INTRAVENOUS ONCE
Status: DISCONTINUED | OUTPATIENT
Start: 2019-02-20 | End: 2019-02-20

## 2019-02-20 RX ORDER — MORPHINE SULFATE 4 MG/ML
2 INJECTION, SOLUTION INTRAMUSCULAR; INTRAVENOUS ONCE
Status: COMPLETED | OUTPATIENT
Start: 2019-02-20 | End: 2019-02-20

## 2019-02-20 RX ORDER — 0.9 % SODIUM CHLORIDE 0.9 %
1000 INTRAVENOUS SOLUTION INTRAVENOUS ONCE
Status: COMPLETED | OUTPATIENT
Start: 2019-02-20 | End: 2019-02-20

## 2019-02-20 RX ADMIN — MORPHINE SULFATE 2 MG: 4 INJECTION INTRAVENOUS at 22:32

## 2019-02-20 RX ADMIN — SODIUM CHLORIDE 1000 ML: 9 INJECTION, SOLUTION INTRAVENOUS at 22:31

## 2019-02-20 ASSESSMENT — PAIN SCALES - GENERAL
PAINLEVEL_OUTOF10: 0
PAINLEVEL_OUTOF10: 8

## 2019-02-21 VITALS
WEIGHT: 130 LBS | TEMPERATURE: 98.4 F | BODY MASS INDEX: 29.25 KG/M2 | OXYGEN SATURATION: 100 % | DIASTOLIC BLOOD PRESSURE: 95 MMHG | HEART RATE: 87 BPM | SYSTOLIC BLOOD PRESSURE: 167 MMHG | HEIGHT: 56 IN | RESPIRATION RATE: 16 BRPM

## 2019-02-21 PROCEDURE — 6370000000 HC RX 637 (ALT 250 FOR IP): Performed by: PHYSICIAN ASSISTANT

## 2019-02-21 RX ORDER — CEFDINIR 300 MG/1
300 CAPSULE ORAL ONCE
Status: COMPLETED | OUTPATIENT
Start: 2019-02-21 | End: 2019-02-21

## 2019-02-21 RX ORDER — CEFDINIR 300 MG/1
300 CAPSULE ORAL 2 TIMES DAILY
Qty: 20 CAPSULE | Refills: 0 | Status: SHIPPED | OUTPATIENT
Start: 2019-02-21 | End: 2019-03-03

## 2019-02-21 RX ORDER — CEFDINIR 300 MG/1
300 CAPSULE ORAL EVERY 12 HOURS SCHEDULED
Status: DISCONTINUED | OUTPATIENT
Start: 2019-02-21 | End: 2019-02-21

## 2019-02-21 RX ADMIN — CEFDINIR 300 MG: 300 CAPSULE ORAL at 00:38

## 2019-02-22 LAB — URINE CULTURE, ROUTINE: NORMAL

## 2019-02-24 ASSESSMENT — ENCOUNTER SYMPTOMS
DIARRHEA: 0
CONSTIPATION: 0
CHEST TIGHTNESS: 0
FACIAL SWELLING: 0
NAUSEA: 0
SORE THROAT: 0
EYE PAIN: 0
BACK PAIN: 0
EYE REDNESS: 0
ABDOMINAL PAIN: 0
COUGH: 0
RHINORRHEA: 0
SHORTNESS OF BREATH: 0

## 2019-03-22 ENCOUNTER — CARE COORDINATION (OUTPATIENT)
Dept: CASE MANAGEMENT | Age: 74
End: 2019-03-22

## 2020-01-01 ENCOUNTER — APPOINTMENT (OUTPATIENT)
Dept: GENERAL RADIOLOGY | Age: 75
DRG: 233 | End: 2020-01-01
Attending: INTERNAL MEDICINE
Payer: MEDICARE

## 2020-01-01 ENCOUNTER — APPOINTMENT (OUTPATIENT)
Dept: GENERAL RADIOLOGY | Age: 75
DRG: 291 | End: 2020-01-01
Payer: MEDICARE

## 2020-01-01 ENCOUNTER — APPOINTMENT (OUTPATIENT)
Dept: CT IMAGING | Age: 75
DRG: 291 | End: 2020-01-01
Payer: MEDICARE

## 2020-01-01 ENCOUNTER — APPOINTMENT (OUTPATIENT)
Dept: INTERVENTIONAL RADIOLOGY/VASCULAR | Age: 75
DRG: 673 | End: 2020-01-01
Payer: MEDICARE

## 2020-01-01 ENCOUNTER — APPOINTMENT (OUTPATIENT)
Dept: CT IMAGING | Age: 75
End: 2020-01-01
Payer: MEDICARE

## 2020-01-01 ENCOUNTER — ANESTHESIA EVENT (OUTPATIENT)
Dept: OPERATING ROOM | Age: 75
DRG: 233 | End: 2020-01-01
Payer: MEDICARE

## 2020-01-01 ENCOUNTER — APPOINTMENT (OUTPATIENT)
Dept: VASCULAR LAB | Age: 75
DRG: 233 | End: 2020-01-01
Attending: INTERNAL MEDICINE
Payer: MEDICARE

## 2020-01-01 ENCOUNTER — APPOINTMENT (OUTPATIENT)
Dept: CT IMAGING | Age: 75
DRG: 233 | End: 2020-01-01
Attending: INTERNAL MEDICINE
Payer: MEDICARE

## 2020-01-01 ENCOUNTER — APPOINTMENT (OUTPATIENT)
Dept: GENERAL RADIOLOGY | Age: 75
DRG: 641 | End: 2020-01-01
Payer: MEDICARE

## 2020-01-01 ENCOUNTER — ANESTHESIA (OUTPATIENT)
Dept: OPERATING ROOM | Age: 75
DRG: 233 | End: 2020-01-01
Payer: MEDICARE

## 2020-01-01 ENCOUNTER — APPOINTMENT (OUTPATIENT)
Dept: GENERAL RADIOLOGY | Age: 75
DRG: 673 | End: 2020-01-01
Payer: MEDICARE

## 2020-01-01 ENCOUNTER — APPOINTMENT (OUTPATIENT)
Dept: ULTRASOUND IMAGING | Age: 75
DRG: 673 | End: 2020-01-01
Payer: MEDICARE

## 2020-01-01 ENCOUNTER — CARE COORDINATION (OUTPATIENT)
Dept: CASE MANAGEMENT | Age: 75
End: 2020-01-01

## 2020-01-01 ENCOUNTER — APPOINTMENT (OUTPATIENT)
Dept: GENERAL RADIOLOGY | Age: 75
DRG: 280 | End: 2020-01-01
Payer: MEDICARE

## 2020-01-01 ENCOUNTER — APPOINTMENT (OUTPATIENT)
Dept: CT IMAGING | Age: 75
DRG: 699 | End: 2020-01-01
Payer: MEDICARE

## 2020-01-01 ENCOUNTER — HOSPITAL ENCOUNTER (INPATIENT)
Dept: CARDIAC CATH/INVASIVE PROCEDURES | Age: 75
LOS: 10 days | DRG: 233 | End: 2020-10-24
Attending: INTERNAL MEDICINE | Admitting: INTERNAL MEDICINE
Payer: MEDICARE

## 2020-01-01 ENCOUNTER — HOSPITAL ENCOUNTER (INPATIENT)
Age: 75
LOS: 4 days | Discharge: HOME | DRG: 291 | End: 2020-09-24
Attending: STUDENT IN AN ORGANIZED HEALTH CARE EDUCATION/TRAINING PROGRAM | Admitting: INTERNAL MEDICINE
Payer: MEDICARE

## 2020-01-01 ENCOUNTER — HOSPITAL ENCOUNTER (EMERGENCY)
Age: 75
Discharge: HOME | End: 2020-06-11
Attending: EMERGENCY MEDICINE
Payer: MEDICARE

## 2020-01-01 ENCOUNTER — APPOINTMENT (OUTPATIENT)
Dept: INTERVENTIONAL RADIOLOGY/VASCULAR | Age: 75
DRG: 699 | End: 2020-01-01
Payer: MEDICARE

## 2020-01-01 ENCOUNTER — HOSPITAL ENCOUNTER (INPATIENT)
Age: 75
LOS: 2 days | Discharge: HOME | DRG: 699 | End: 2020-06-17
Attending: EMERGENCY MEDICINE | Admitting: INTERNAL MEDICINE
Payer: MEDICARE

## 2020-01-01 ENCOUNTER — HOSPITAL ENCOUNTER (INPATIENT)
Age: 75
LOS: 2 days | Discharge: ANOTHER HOSPITAL | DRG: 280 | End: 2020-10-14
Attending: EMERGENCY MEDICINE | Admitting: INTERNAL MEDICINE
Payer: MEDICARE

## 2020-01-01 ENCOUNTER — HOSPITAL ENCOUNTER (INPATIENT)
Age: 75
LOS: 9 days | Discharge: HOSPICE | DRG: 673 | End: 2020-10-09
Attending: EMERGENCY MEDICINE | Admitting: INTERNAL MEDICINE
Payer: MEDICARE

## 2020-01-01 ENCOUNTER — HOSPITAL ENCOUNTER (INPATIENT)
Age: 75
LOS: 1 days | Discharge: HOME | DRG: 641 | End: 2020-07-27
Attending: EMERGENCY MEDICINE | Admitting: INTERNAL MEDICINE
Payer: MEDICARE

## 2020-01-01 VITALS
TEMPERATURE: 98.2 F | HEART RATE: 112 BPM | WEIGHT: 118.83 LBS | OXYGEN SATURATION: 100 % | BODY MASS INDEX: 23.96 KG/M2 | RESPIRATION RATE: 24 BRPM | DIASTOLIC BLOOD PRESSURE: 80 MMHG | HEIGHT: 59 IN | SYSTOLIC BLOOD PRESSURE: 164 MMHG

## 2020-01-01 VITALS
RESPIRATION RATE: 18 BRPM | DIASTOLIC BLOOD PRESSURE: 64 MMHG | SYSTOLIC BLOOD PRESSURE: 134 MMHG | TEMPERATURE: 98.8 F | OXYGEN SATURATION: 95 % | BODY MASS INDEX: 26.93 KG/M2 | HEIGHT: 59 IN | HEART RATE: 97 BPM | WEIGHT: 133.6 LBS

## 2020-01-01 VITALS
RESPIRATION RATE: 15 BRPM | WEIGHT: 129.63 LBS | HEIGHT: 59 IN | TEMPERATURE: 98.5 F | SYSTOLIC BLOOD PRESSURE: 75 MMHG | HEART RATE: 106 BPM | OXYGEN SATURATION: 96 % | BODY MASS INDEX: 26.13 KG/M2 | DIASTOLIC BLOOD PRESSURE: 46 MMHG

## 2020-01-01 VITALS
HEIGHT: 59 IN | BODY MASS INDEX: 29.31 KG/M2 | RESPIRATION RATE: 16 BRPM | DIASTOLIC BLOOD PRESSURE: 76 MMHG | SYSTOLIC BLOOD PRESSURE: 172 MMHG | OXYGEN SATURATION: 93 % | TEMPERATURE: 98.1 F | WEIGHT: 145.4 LBS | HEART RATE: 80 BPM

## 2020-01-01 VITALS — SYSTOLIC BLOOD PRESSURE: 126 MMHG | DIASTOLIC BLOOD PRESSURE: 103 MMHG | OXYGEN SATURATION: 100 %

## 2020-01-01 VITALS
BODY MASS INDEX: 28.83 KG/M2 | WEIGHT: 143 LBS | OXYGEN SATURATION: 99 % | HEART RATE: 68 BPM | SYSTOLIC BLOOD PRESSURE: 128 MMHG | DIASTOLIC BLOOD PRESSURE: 78 MMHG | RESPIRATION RATE: 18 BRPM | HEIGHT: 59 IN | TEMPERATURE: 98.3 F

## 2020-01-01 VITALS
HEART RATE: 100 BPM | WEIGHT: 135.2 LBS | BODY MASS INDEX: 27.26 KG/M2 | RESPIRATION RATE: 18 BRPM | DIASTOLIC BLOOD PRESSURE: 53 MMHG | OXYGEN SATURATION: 97 % | SYSTOLIC BLOOD PRESSURE: 159 MMHG | TEMPERATURE: 97.8 F | HEIGHT: 59 IN

## 2020-01-01 VITALS
HEIGHT: 59 IN | HEART RATE: 84 BPM | WEIGHT: 142.3 LBS | RESPIRATION RATE: 19 BRPM | OXYGEN SATURATION: 99 % | BODY MASS INDEX: 28.69 KG/M2 | DIASTOLIC BLOOD PRESSURE: 71 MMHG | TEMPERATURE: 97.5 F | SYSTOLIC BLOOD PRESSURE: 166 MMHG

## 2020-01-01 VITALS
SYSTOLIC BLOOD PRESSURE: 93 MMHG | RESPIRATION RATE: 2 BRPM | OXYGEN SATURATION: 100 % | DIASTOLIC BLOOD PRESSURE: 46 MMHG

## 2020-01-01 LAB
A/G RATIO: 1 (ref 1.1–2.2)
A/G RATIO: 1 (ref 1.1–2.2)
A/G RATIO: 1.2 (ref 1.1–2.2)
A/G RATIO: 1.4 (ref 1.1–2.2)
A/G RATIO: 1.5 (ref 1.1–2.2)
A/G RATIO: 1.5 (ref 1.1–2.2)
ABO/RH: NORMAL
ACTIVATED CLOTTING TIME: 103 SEC (ref 99–130)
ACTIVATED CLOTTING TIME: 111 SEC (ref 99–130)
ACTIVATED CLOTTING TIME: 472 SEC (ref 99–130)
ACTIVATED CLOTTING TIME: 473 SEC (ref 99–130)
ACTIVATED CLOTTING TIME: 483 SEC (ref 99–130)
ALBUMIN SERPL-MCNC: 3.2 G/DL (ref 3.4–5)
ALBUMIN SERPL-MCNC: 3.3 G/DL (ref 3.4–5)
ALBUMIN SERPL-MCNC: 3.5 G/DL (ref 3.4–5)
ALBUMIN SERPL-MCNC: 3.6 G/DL (ref 3.4–5)
ALBUMIN SERPL-MCNC: 3.6 G/DL (ref 3.4–5)
ALBUMIN SERPL-MCNC: 3.7 G/DL (ref 3.4–5)
ALBUMIN SERPL-MCNC: 3.8 G/DL (ref 3.4–5)
ALBUMIN SERPL-MCNC: 3.9 G/DL (ref 3.4–5)
ALBUMIN SERPL-MCNC: 4 G/DL (ref 3.4–5)
ALBUMIN SERPL-MCNC: 4 G/DL (ref 3.4–5)
ALBUMIN SERPL-MCNC: 4.1 G/DL (ref 3.4–5)
ALBUMIN SERPL-MCNC: 4.4 G/DL (ref 3.4–5)
ALP BLD-CCNC: 103 U/L (ref 40–129)
ALP BLD-CCNC: 104 U/L (ref 40–129)
ALP BLD-CCNC: 115 U/L (ref 40–129)
ALP BLD-CCNC: 117 U/L (ref 40–129)
ALP BLD-CCNC: 120 U/L (ref 40–129)
ALP BLD-CCNC: 126 U/L (ref 40–129)
ALP BLD-CCNC: 130 U/L (ref 40–129)
ALP BLD-CCNC: 143 U/L (ref 40–129)
ALP BLD-CCNC: 97 U/L (ref 40–129)
ALT SERPL-CCNC: 11 U/L (ref 10–40)
ALT SERPL-CCNC: 7 U/L (ref 10–40)
ALT SERPL-CCNC: 9 U/L (ref 10–40)
ALT SERPL-CCNC: 9 U/L (ref 10–40)
ALT SERPL-CCNC: <5 U/L (ref 10–40)
AMORPHOUS: ABNORMAL /HPF
AMORPHOUS: ABNORMAL /HPF
ANION GAP SERPL CALCULATED.3IONS-SCNC: 10 MMOL/L (ref 3–16)
ANION GAP SERPL CALCULATED.3IONS-SCNC: 11 MMOL/L (ref 3–16)
ANION GAP SERPL CALCULATED.3IONS-SCNC: 12 MMOL/L (ref 3–16)
ANION GAP SERPL CALCULATED.3IONS-SCNC: 13 MMOL/L (ref 3–16)
ANION GAP SERPL CALCULATED.3IONS-SCNC: 14 MMOL/L (ref 3–16)
ANION GAP SERPL CALCULATED.3IONS-SCNC: 14 MMOL/L (ref 3–16)
ANION GAP SERPL CALCULATED.3IONS-SCNC: 15 MMOL/L (ref 3–16)
ANION GAP SERPL CALCULATED.3IONS-SCNC: 16 MMOL/L (ref 3–16)
ANION GAP SERPL CALCULATED.3IONS-SCNC: 16 MMOL/L (ref 3–16)
ANION GAP SERPL CALCULATED.3IONS-SCNC: 17 MMOL/L (ref 3–16)
ANION GAP SERPL CALCULATED.3IONS-SCNC: 20 MMOL/L (ref 3–16)
ANION GAP SERPL CALCULATED.3IONS-SCNC: 24 MMOL/L (ref 3–16)
ANION GAP SERPL CALCULATED.3IONS-SCNC: 8 MMOL/L (ref 3–16)
ANION GAP SERPL CALCULATED.3IONS-SCNC: 9 MMOL/L (ref 3–16)
ANISOCYTOSIS: ABNORMAL
ANTIBODY SCREEN: NORMAL
APTT: 105.6 SEC (ref 24.2–36.2)
APTT: 28.2 SEC (ref 24.2–36.2)
APTT: 31.8 SEC (ref 24.2–36.2)
APTT: 35.6 SEC (ref 24.2–36.2)
APTT: 43.1 SEC (ref 24.2–36.2)
AST SERPL-CCNC: 10 U/L (ref 15–37)
AST SERPL-CCNC: 10 U/L (ref 15–37)
AST SERPL-CCNC: 12 U/L (ref 15–37)
AST SERPL-CCNC: 13 U/L (ref 15–37)
AST SERPL-CCNC: 13 U/L (ref 15–37)
AST SERPL-CCNC: 19 U/L (ref 15–37)
AST SERPL-CCNC: 34 U/L (ref 15–37)
AST SERPL-CCNC: 8 U/L (ref 15–37)
AST SERPL-CCNC: 9 U/L (ref 15–37)
BACTERIA: ABNORMAL /HPF
BANDED NEUTROPHILS RELATIVE PERCENT: 56 % (ref 0–7)
BASE EXCESS ARTERIAL: -2 (ref -3–3)
BASE EXCESS ARTERIAL: -3 (ref -3–3)
BASE EXCESS ARTERIAL: -3.2 MMOL/L (ref -3–3)
BASE EXCESS ARTERIAL: -3.5 MMOL/L (ref -3–3)
BASE EXCESS ARTERIAL: -3.9 MMOL/L (ref -3–3)
BASE EXCESS ARTERIAL: -4 (ref -3–3)
BASE EXCESS ARTERIAL: -4 (ref -3–3)
BASE EXCESS ARTERIAL: -4.1 MMOL/L (ref -3–3)
BASE EXCESS ARTERIAL: -5 (ref -3–3)
BASE EXCESS ARTERIAL: -6 MMOL/L (ref -3–3)
BASE EXCESS ARTERIAL: 0 (ref -3–3)
BASE EXCESS ARTERIAL: 0 (ref -3–3)
BASE EXCESS ARTERIAL: 1 (ref -3–3)
BASE EXCESS ARTERIAL: 2 (ref -3–3)
BASE EXCESS ARTERIAL: 3 (ref -3–3)
BASOPHILS ABSOLUTE: 0 K/UL (ref 0–0.2)
BASOPHILS RELATIVE PERCENT: 0 %
BASOPHILS RELATIVE PERCENT: 0.1 %
BASOPHILS RELATIVE PERCENT: 0.2 %
BASOPHILS RELATIVE PERCENT: 0.3 %
BASOPHILS RELATIVE PERCENT: 0.3 %
BASOPHILS RELATIVE PERCENT: 0.4 %
BASOPHILS RELATIVE PERCENT: 0.4 %
BASOPHILS RELATIVE PERCENT: 0.5 %
BASOPHILS RELATIVE PERCENT: 0.6 %
BASOPHILS RELATIVE PERCENT: 0.6 %
BASOPHILS RELATIVE PERCENT: 0.7 %
BASOPHILS RELATIVE PERCENT: 0.7 %
BASOPHILS RELATIVE PERCENT: 0.8 %
BASOPHILS RELATIVE PERCENT: 0.9 %
BILIRUB SERPL-MCNC: <0.2 MG/DL (ref 0–1)
BILIRUBIN DIRECT: <0.2 MG/DL (ref 0–0.3)
BILIRUBIN URINE: ABNORMAL
BILIRUBIN URINE: NEGATIVE
BILIRUBIN, INDIRECT: ABNORMAL MG/DL (ref 0–1)
BLOOD BANK DISPENSE STATUS: NORMAL
BLOOD BANK PRODUCT CODE: NORMAL
BLOOD CULTURE, ROUTINE: ABNORMAL
BLOOD CULTURE, ROUTINE: ABNORMAL
BLOOD, URINE: ABNORMAL
BLOOD, URINE: ABNORMAL
BLOOD, URINE: NEGATIVE
BPU ID: NORMAL
BUN BLDV-MCNC: 10 MG/DL (ref 7–20)
BUN BLDV-MCNC: 100 MG/DL (ref 7–20)
BUN BLDV-MCNC: 11 MG/DL (ref 7–20)
BUN BLDV-MCNC: 12 MG/DL (ref 7–20)
BUN BLDV-MCNC: 13 MG/DL (ref 7–20)
BUN BLDV-MCNC: 13 MG/DL (ref 7–20)
BUN BLDV-MCNC: 14 MG/DL (ref 7–20)
BUN BLDV-MCNC: 14 MG/DL (ref 7–20)
BUN BLDV-MCNC: 15 MG/DL (ref 7–20)
BUN BLDV-MCNC: 17 MG/DL (ref 7–20)
BUN BLDV-MCNC: 17 MG/DL (ref 7–20)
BUN BLDV-MCNC: 18 MG/DL (ref 7–20)
BUN BLDV-MCNC: 19 MG/DL (ref 7–20)
BUN BLDV-MCNC: 21 MG/DL (ref 7–20)
BUN BLDV-MCNC: 22 MG/DL (ref 7–20)
BUN BLDV-MCNC: 22 MG/DL (ref 7–20)
BUN BLDV-MCNC: 23 MG/DL (ref 7–20)
BUN BLDV-MCNC: 24 MG/DL (ref 7–20)
BUN BLDV-MCNC: 24 MG/DL (ref 7–20)
BUN BLDV-MCNC: 29 MG/DL (ref 7–20)
BUN BLDV-MCNC: 30 MG/DL (ref 7–20)
BUN BLDV-MCNC: 31 MG/DL (ref 7–20)
BUN BLDV-MCNC: 32 MG/DL (ref 7–20)
BUN BLDV-MCNC: 34 MG/DL (ref 7–20)
BUN BLDV-MCNC: 40 MG/DL (ref 7–20)
BUN BLDV-MCNC: 42 MG/DL (ref 7–20)
BUN BLDV-MCNC: 43 MG/DL (ref 7–20)
BUN BLDV-MCNC: 7 MG/DL (ref 7–20)
BUN BLDV-MCNC: 9 MG/DL (ref 7–20)
BUN BLDV-MCNC: 9 MG/DL (ref 7–20)
BUN BLDV-MCNC: 92 MG/DL (ref 7–20)
C DIFF TOXIN/ANTIGEN: NORMAL
C DIFF TOXIN/ANTIGEN: NORMAL
CALCIUM IONIZED: 1.05 MMOL/L (ref 1.12–1.32)
CALCIUM IONIZED: 1.05 MMOL/L (ref 1.12–1.32)
CALCIUM IONIZED: 1.06 MMOL/L (ref 1.12–1.32)
CALCIUM IONIZED: 1.1 MMOL/L (ref 1.12–1.32)
CALCIUM IONIZED: 1.3 MMOL/L (ref 1.12–1.32)
CALCIUM IONIZED: 1.51 MMOL/L (ref 1.12–1.32)
CALCIUM IONIZED: 1.58 MMOL/L (ref 1.12–1.32)
CALCIUM SERPL-MCNC: 8 MG/DL (ref 8.3–10.6)
CALCIUM SERPL-MCNC: 8.2 MG/DL (ref 8.3–10.6)
CALCIUM SERPL-MCNC: 8.3 MG/DL (ref 8.3–10.6)
CALCIUM SERPL-MCNC: 8.5 MG/DL (ref 8.3–10.6)
CALCIUM SERPL-MCNC: 8.6 MG/DL (ref 8.3–10.6)
CALCIUM SERPL-MCNC: 8.6 MG/DL (ref 8.3–10.6)
CALCIUM SERPL-MCNC: 8.7 MG/DL (ref 8.3–10.6)
CALCIUM SERPL-MCNC: 8.8 MG/DL (ref 8.3–10.6)
CALCIUM SERPL-MCNC: 8.9 MG/DL (ref 8.3–10.6)
CALCIUM SERPL-MCNC: 9 MG/DL (ref 8.3–10.6)
CALCIUM SERPL-MCNC: 9.1 MG/DL (ref 8.3–10.6)
CALCIUM SERPL-MCNC: 9.1 MG/DL (ref 8.3–10.6)
CALCIUM SERPL-MCNC: 9.2 MG/DL (ref 8.3–10.6)
CALCIUM SERPL-MCNC: 9.3 MG/DL (ref 8.3–10.6)
CALCIUM SERPL-MCNC: 9.4 MG/DL (ref 8.3–10.6)
CALCIUM SERPL-MCNC: 9.4 MG/DL (ref 8.3–10.6)
CALCIUM SERPL-MCNC: 9.5 MG/DL (ref 8.3–10.6)
CALCIUM SERPL-MCNC: 9.6 MG/DL (ref 8.3–10.6)
CALPROTECTIN, FECAL: 109 UG/G
CAMPYLOBACTER JEJUNI/COLI PCR: NOT DETECTED
CAMPYLOBACTER UPSALIENSIS: NOT DETECTED
CARBOXYHEMOGLOBIN ARTERIAL: 0.1 % (ref 0–1.5)
CARBOXYHEMOGLOBIN ARTERIAL: 0.3 % (ref 0–1.5)
CARBOXYHEMOGLOBIN ARTERIAL: 0.3 % (ref 0–1.5)
CHLORIDE BLD-SCNC: 100 MMOL/L (ref 99–110)
CHLORIDE BLD-SCNC: 100 MMOL/L (ref 99–110)
CHLORIDE BLD-SCNC: 101 MMOL/L (ref 99–110)
CHLORIDE BLD-SCNC: 102 MMOL/L (ref 99–110)
CHLORIDE BLD-SCNC: 103 MMOL/L (ref 99–110)
CHLORIDE BLD-SCNC: 104 MMOL/L (ref 99–110)
CHLORIDE BLD-SCNC: 105 MMOL/L (ref 99–110)
CHLORIDE BLD-SCNC: 106 MMOL/L (ref 99–110)
CHLORIDE BLD-SCNC: 107 MMOL/L (ref 99–110)
CHLORIDE BLD-SCNC: 107 MMOL/L (ref 99–110)
CHLORIDE BLD-SCNC: 91 MMOL/L (ref 99–110)
CHLORIDE BLD-SCNC: 91 MMOL/L (ref 99–110)
CHLORIDE BLD-SCNC: 93 MMOL/L (ref 99–110)
CHLORIDE BLD-SCNC: 94 MMOL/L (ref 99–110)
CHLORIDE BLD-SCNC: 95 MMOL/L (ref 99–110)
CHLORIDE BLD-SCNC: 96 MMOL/L (ref 99–110)
CHLORIDE BLD-SCNC: 97 MMOL/L (ref 99–110)
CHLORIDE BLD-SCNC: 98 MMOL/L (ref 99–110)
CHLORIDE BLD-SCNC: 99 MMOL/L (ref 99–110)
CHLORIDE URINE RANDOM: 131 MMOL/L
CHLORIDE URINE RANDOM: 22 MMOL/L
CHOLESTEROL, TOTAL: 104 MG/DL (ref 0–199)
CHOLESTEROL, TOTAL: 145 MG/DL (ref 0–199)
CHP ED QC CHECK: YES
CLARITY: ABNORMAL
CLARITY: CLEAR
CO2: 13 MMOL/L (ref 21–32)
CO2: 14 MMOL/L (ref 21–32)
CO2: 16 MMOL/L (ref 21–32)
CO2: 17 MMOL/L (ref 21–32)
CO2: 18 MMOL/L (ref 21–32)
CO2: 18 MMOL/L (ref 21–32)
CO2: 19 MMOL/L (ref 21–32)
CO2: 20 MMOL/L (ref 21–32)
CO2: 21 MMOL/L (ref 21–32)
CO2: 22 MMOL/L (ref 21–32)
CO2: 23 MMOL/L (ref 21–32)
CO2: 24 MMOL/L (ref 21–32)
CO2: 25 MMOL/L (ref 21–32)
CO2: 27 MMOL/L (ref 21–32)
CO2: 27 MMOL/L (ref 21–32)
CO2: 28 MMOL/L (ref 21–32)
COLOR: ABNORMAL
COLOR: YELLOW
CREAT SERPL-MCNC: 1 MG/DL (ref 0.6–1.2)
CREAT SERPL-MCNC: 1.1 MG/DL (ref 0.6–1.2)
CREAT SERPL-MCNC: 1.3 MG/DL (ref 0.6–1.2)
CREAT SERPL-MCNC: 1.3 MG/DL (ref 0.6–1.2)
CREAT SERPL-MCNC: 1.4 MG/DL (ref 0.6–1.2)
CREAT SERPL-MCNC: 1.5 MG/DL (ref 0.6–1.2)
CREAT SERPL-MCNC: 1.6 MG/DL (ref 0.6–1.2)
CREAT SERPL-MCNC: 1.6 MG/DL (ref 0.6–1.2)
CREAT SERPL-MCNC: 1.7 MG/DL (ref 0.6–1.2)
CREAT SERPL-MCNC: 1.8 MG/DL (ref 0.6–1.2)
CREAT SERPL-MCNC: 1.8 MG/DL (ref 0.6–1.2)
CREAT SERPL-MCNC: 1.9 MG/DL (ref 0.6–1.2)
CREAT SERPL-MCNC: 2 MG/DL (ref 0.6–1.2)
CREAT SERPL-MCNC: 2.1 MG/DL (ref 0.6–1.2)
CREAT SERPL-MCNC: 2.2 MG/DL (ref 0.6–1.2)
CREAT SERPL-MCNC: 2.3 MG/DL (ref 0.6–1.2)
CREAT SERPL-MCNC: 2.3 MG/DL (ref 0.6–1.2)
CREAT SERPL-MCNC: 2.5 MG/DL (ref 0.6–1.2)
CREAT SERPL-MCNC: 2.5 MG/DL (ref 0.6–1.2)
CREAT SERPL-MCNC: 2.8 MG/DL (ref 0.6–1.2)
CREAT SERPL-MCNC: 2.9 MG/DL (ref 0.6–1.2)
CREAT SERPL-MCNC: 3 MG/DL (ref 0.6–1.2)
CREAT SERPL-MCNC: 3.1 MG/DL (ref 0.6–1.2)
CREAT SERPL-MCNC: 3.3 MG/DL (ref 0.6–1.2)
CREAT SERPL-MCNC: 3.4 MG/DL (ref 0.6–1.2)
CREAT SERPL-MCNC: 3.5 MG/DL (ref 0.6–1.2)
CREAT SERPL-MCNC: 3.6 MG/DL (ref 0.6–1.2)
CREAT SERPL-MCNC: 4 MG/DL (ref 0.6–1.2)
CREAT SERPL-MCNC: 4.3 MG/DL (ref 0.6–1.2)
CREAT SERPL-MCNC: 4.9 MG/DL (ref 0.6–1.2)
CREAT SERPL-MCNC: 6.9 MG/DL (ref 0.6–1.2)
CREAT SERPL-MCNC: 7.9 MG/DL (ref 0.6–1.2)
CREATININE URINE: 155.8 MG/DL (ref 28–259)
CREATININE URINE: 276.9 MG/DL (ref 28–259)
CULTURE, BLOOD 2: NORMAL
DESCRIPTION BLOOD BANK: NORMAL
E COLI SHIGELLA/ENTEROINVASIVE PCR: NOT DETECTED
EKG ATRIAL RATE: 107 BPM
EKG ATRIAL RATE: 107 BPM
EKG ATRIAL RATE: 375 BPM
EKG ATRIAL RATE: 66 BPM
EKG ATRIAL RATE: 69 BPM
EKG ATRIAL RATE: 82 BPM
EKG ATRIAL RATE: 86 BPM
EKG ATRIAL RATE: 94 BPM
EKG ATRIAL RATE: 99 BPM
EKG DIAGNOSIS: NORMAL
EKG P AXIS: 31 DEGREES
EKG P AXIS: 43 DEGREES
EKG P AXIS: 66 DEGREES
EKG P AXIS: 76 DEGREES
EKG P AXIS: 78 DEGREES
EKG P AXIS: 96 DEGREES
EKG P-R INTERVAL: 146 MS
EKG P-R INTERVAL: 152 MS
EKG P-R INTERVAL: 176 MS
EKG P-R INTERVAL: 178 MS
EKG P-R INTERVAL: 180 MS
EKG P-R INTERVAL: 182 MS
EKG P-R INTERVAL: 204 MS
EKG Q-T INTERVAL: 148 MS
EKG Q-T INTERVAL: 356 MS
EKG Q-T INTERVAL: 358 MS
EKG Q-T INTERVAL: 370 MS
EKG Q-T INTERVAL: 402 MS
EKG Q-T INTERVAL: 418 MS
EKG Q-T INTERVAL: 422 MS
EKG Q-T INTERVAL: 438 MS
EKG Q-T INTERVAL: 440 MS
EKG QRS DURATION: 126 MS
EKG QRS DURATION: 130 MS
EKG QRS DURATION: 132 MS
EKG QRS DURATION: 136 MS
EKG QRS DURATION: 42 MS
EKG QRS DURATION: 80 MS
EKG QRS DURATION: 84 MS
EKG QRS DURATION: 88 MS
EKG QRS DURATION: 88 MS
EKG QTC CALCULATION (BAZETT): 229 MS
EKG QTC CALCULATION (BAZETT): 459 MS
EKG QTC CALCULATION (BAZETT): 469 MS
EKG QTC CALCULATION (BAZETT): 475 MS
EKG QTC CALCULATION (BAZETT): 478 MS
EKG QTC CALCULATION (BAZETT): 493 MS
EKG QTC CALCULATION (BAZETT): 493 MS
EKG QTC CALCULATION (BAZETT): 500 MS
EKG QTC CALCULATION (BAZETT): 502 MS
EKG R AXIS: -5 DEGREES
EKG R AXIS: 0 DEGREES
EKG R AXIS: 10 DEGREES
EKG R AXIS: 16 DEGREES
EKG R AXIS: 2 DEGREES
EKG R AXIS: 23 DEGREES
EKG R AXIS: 35 DEGREES
EKG R AXIS: 36 DEGREES
EKG R AXIS: 43 DEGREES
EKG T AXIS: -1 DEGREES
EKG T AXIS: -32 DEGREES
EKG T AXIS: 157 DEGREES
EKG T AXIS: 157 DEGREES
EKG T AXIS: 179 DEGREES
EKG T AXIS: 195 DEGREES
EKG T AXIS: 238 DEGREES
EKG T AXIS: 42 DEGREES
EKG T AXIS: 62 DEGREES
EKG VENTRICULAR RATE: 107 BPM
EKG VENTRICULAR RATE: 107 BPM
EKG VENTRICULAR RATE: 145 BPM
EKG VENTRICULAR RATE: 69 BPM
EKG VENTRICULAR RATE: 71 BPM
EKG VENTRICULAR RATE: 82 BPM
EKG VENTRICULAR RATE: 86 BPM
EKG VENTRICULAR RATE: 94 BPM
EKG VENTRICULAR RATE: 99 BPM
EOSINOPHILS ABSOLUTE: 0 K/UL (ref 0–0.6)
EOSINOPHILS ABSOLUTE: 0.1 K/UL (ref 0–0.6)
EOSINOPHILS ABSOLUTE: 0.2 K/UL (ref 0–0.6)
EOSINOPHILS RELATIVE PERCENT: 0 %
EOSINOPHILS RELATIVE PERCENT: 0.1 %
EOSINOPHILS RELATIVE PERCENT: 0.2 %
EOSINOPHILS RELATIVE PERCENT: 1 %
EOSINOPHILS RELATIVE PERCENT: 1.2 %
EOSINOPHILS RELATIVE PERCENT: 1.4 %
EOSINOPHILS RELATIVE PERCENT: 1.6 %
EOSINOPHILS RELATIVE PERCENT: 2.3 %
EOSINOPHILS RELATIVE PERCENT: 2.5 %
EOSINOPHILS RELATIVE PERCENT: 2.5 %
EOSINOPHILS RELATIVE PERCENT: 2.8 %
EOSINOPHILS RELATIVE PERCENT: 3.2 %
EOSINOPHILS RELATIVE PERCENT: 3.5 %
EOSINOPHILS RELATIVE PERCENT: 3.7 %
EOSINOPHILS RELATIVE PERCENT: 3.8 %
EOSINOPHILS RELATIVE PERCENT: 4.1 %
EOSINOPHILS RELATIVE PERCENT: 4.3 %
EPITHELIAL CELLS, UA: ABNORMAL /HPF (ref 0–5)
ESTIMATED AVERAGE GLUCOSE: 116.9 MG/DL
ESTIMATED AVERAGE GLUCOSE: 119.8 MG/DL
FERRITIN: 34.6 NG/ML (ref 15–150)
GFR AFRICAN AMERICAN: 10
GFR AFRICAN AMERICAN: 12
GFR AFRICAN AMERICAN: 13
GFR AFRICAN AMERICAN: 15
GFR AFRICAN AMERICAN: 15
GFR AFRICAN AMERICAN: 16
GFR AFRICAN AMERICAN: 16
GFR AFRICAN AMERICAN: 18
GFR AFRICAN AMERICAN: 18
GFR AFRICAN AMERICAN: 19
GFR AFRICAN AMERICAN: 20
GFR AFRICAN AMERICAN: 23
GFR AFRICAN AMERICAN: 23
GFR AFRICAN AMERICAN: 25
GFR AFRICAN AMERICAN: 25
GFR AFRICAN AMERICAN: 26
GFR AFRICAN AMERICAN: 28
GFR AFRICAN AMERICAN: 29
GFR AFRICAN AMERICAN: 31
GFR AFRICAN AMERICAN: 33
GFR AFRICAN AMERICAN: 33
GFR AFRICAN AMERICAN: 35
GFR AFRICAN AMERICAN: 38
GFR AFRICAN AMERICAN: 38
GFR AFRICAN AMERICAN: 41
GFR AFRICAN AMERICAN: 44
GFR AFRICAN AMERICAN: 48
GFR AFRICAN AMERICAN: 48
GFR AFRICAN AMERICAN: 58
GFR AFRICAN AMERICAN: 6
GFR AFRICAN AMERICAN: 7
GFR AFRICAN AMERICAN: >60
GFR NON-AFRICAN AMERICAN: 10
GFR NON-AFRICAN AMERICAN: 11
GFR NON-AFRICAN AMERICAN: 12
GFR NON-AFRICAN AMERICAN: 13
GFR NON-AFRICAN AMERICAN: 13
GFR NON-AFRICAN AMERICAN: 14
GFR NON-AFRICAN AMERICAN: 15
GFR NON-AFRICAN AMERICAN: 15
GFR NON-AFRICAN AMERICAN: 16
GFR NON-AFRICAN AMERICAN: 19
GFR NON-AFRICAN AMERICAN: 19
GFR NON-AFRICAN AMERICAN: 21
GFR NON-AFRICAN AMERICAN: 21
GFR NON-AFRICAN AMERICAN: 22
GFR NON-AFRICAN AMERICAN: 23
GFR NON-AFRICAN AMERICAN: 24
GFR NON-AFRICAN AMERICAN: 26
GFR NON-AFRICAN AMERICAN: 27
GFR NON-AFRICAN AMERICAN: 27
GFR NON-AFRICAN AMERICAN: 29
GFR NON-AFRICAN AMERICAN: 31
GFR NON-AFRICAN AMERICAN: 31
GFR NON-AFRICAN AMERICAN: 34
GFR NON-AFRICAN AMERICAN: 36
GFR NON-AFRICAN AMERICAN: 40
GFR NON-AFRICAN AMERICAN: 40
GFR NON-AFRICAN AMERICAN: 48
GFR NON-AFRICAN AMERICAN: 5
GFR NON-AFRICAN AMERICAN: 54
GFR NON-AFRICAN AMERICAN: 6
GFR NON-AFRICAN AMERICAN: 9
GLOBULIN: 2.6 G/DL
GLOBULIN: 2.6 G/DL
GLOBULIN: 2.7 G/DL
GLOBULIN: 2.8 G/DL
GLOBULIN: 2.8 G/DL
GLOBULIN: 2.9 G/DL
GLOBULIN: 3.4 G/DL
GLOBULIN: 3.7 G/DL
GLUCOSE BLD-MCNC: 100 MG/DL (ref 70–99)
GLUCOSE BLD-MCNC: 100 MG/DL (ref 70–99)
GLUCOSE BLD-MCNC: 101 MG/DL (ref 70–99)
GLUCOSE BLD-MCNC: 102 MG/DL (ref 70–99)
GLUCOSE BLD-MCNC: 103 MG/DL (ref 70–99)
GLUCOSE BLD-MCNC: 103 MG/DL (ref 70–99)
GLUCOSE BLD-MCNC: 104 MG/DL (ref 70–99)
GLUCOSE BLD-MCNC: 106 MG/DL
GLUCOSE BLD-MCNC: 106 MG/DL (ref 70–99)
GLUCOSE BLD-MCNC: 107 MG/DL (ref 70–99)
GLUCOSE BLD-MCNC: 107 MG/DL (ref 70–99)
GLUCOSE BLD-MCNC: 111 MG/DL (ref 70–99)
GLUCOSE BLD-MCNC: 112 MG/DL (ref 70–99)
GLUCOSE BLD-MCNC: 113 MG/DL (ref 70–99)
GLUCOSE BLD-MCNC: 114 MG/DL (ref 70–99)
GLUCOSE BLD-MCNC: 114 MG/DL (ref 70–99)
GLUCOSE BLD-MCNC: 116 MG/DL (ref 70–99)
GLUCOSE BLD-MCNC: 117 MG/DL (ref 70–99)
GLUCOSE BLD-MCNC: 118 MG/DL (ref 70–99)
GLUCOSE BLD-MCNC: 121 MG/DL (ref 70–99)
GLUCOSE BLD-MCNC: 122 MG/DL (ref 70–99)
GLUCOSE BLD-MCNC: 123 MG/DL (ref 70–99)
GLUCOSE BLD-MCNC: 126 MG/DL (ref 70–99)
GLUCOSE BLD-MCNC: 128 MG/DL (ref 70–99)
GLUCOSE BLD-MCNC: 129 MG/DL (ref 70–99)
GLUCOSE BLD-MCNC: 130 MG/DL (ref 70–99)
GLUCOSE BLD-MCNC: 131 MG/DL (ref 70–99)
GLUCOSE BLD-MCNC: 134 MG/DL (ref 70–99)
GLUCOSE BLD-MCNC: 135 MG/DL (ref 70–99)
GLUCOSE BLD-MCNC: 138 MG/DL (ref 70–99)
GLUCOSE BLD-MCNC: 140 MG/DL (ref 70–99)
GLUCOSE BLD-MCNC: 143 MG/DL (ref 70–99)
GLUCOSE BLD-MCNC: 146 MG/DL (ref 70–99)
GLUCOSE BLD-MCNC: 147 MG/DL (ref 70–99)
GLUCOSE BLD-MCNC: 149 MG/DL (ref 70–99)
GLUCOSE BLD-MCNC: 149 MG/DL (ref 70–99)
GLUCOSE BLD-MCNC: 156 MG/DL (ref 70–99)
GLUCOSE BLD-MCNC: 156 MG/DL (ref 70–99)
GLUCOSE BLD-MCNC: 161 MG/DL (ref 70–99)
GLUCOSE BLD-MCNC: 163 MG/DL (ref 70–99)
GLUCOSE BLD-MCNC: 186 MG/DL (ref 70–99)
GLUCOSE BLD-MCNC: 186 MG/DL (ref 70–99)
GLUCOSE BLD-MCNC: 188 MG/DL (ref 70–99)
GLUCOSE BLD-MCNC: 189 MG/DL (ref 70–99)
GLUCOSE BLD-MCNC: 202 MG/DL (ref 70–99)
GLUCOSE BLD-MCNC: 204 MG/DL (ref 70–99)
GLUCOSE BLD-MCNC: 62 MG/DL (ref 70–99)
GLUCOSE BLD-MCNC: 79 MG/DL (ref 70–99)
GLUCOSE BLD-MCNC: 81 MG/DL (ref 70–99)
GLUCOSE BLD-MCNC: 86 MG/DL (ref 70–99)
GLUCOSE BLD-MCNC: 88 MG/DL (ref 70–99)
GLUCOSE BLD-MCNC: 89 MG/DL (ref 70–99)
GLUCOSE BLD-MCNC: 89 MG/DL (ref 70–99)
GLUCOSE BLD-MCNC: 90 MG/DL (ref 70–99)
GLUCOSE BLD-MCNC: 91 MG/DL (ref 70–99)
GLUCOSE BLD-MCNC: 92 MG/DL (ref 70–99)
GLUCOSE BLD-MCNC: 93 MG/DL (ref 70–99)
GLUCOSE BLD-MCNC: 95 MG/DL (ref 70–99)
GLUCOSE BLD-MCNC: 97 MG/DL (ref 70–99)
GLUCOSE BLD-MCNC: 98 MG/DL (ref 70–99)
GLUCOSE BLD-MCNC: 98 MG/DL (ref 70–99)
GLUCOSE BLD-MCNC: 99 MG/DL (ref 70–99)
GLUCOSE URINE: NEGATIVE MG/DL
HBA1C MFR BLD: 5.7 %
HBA1C MFR BLD: 5.8 %
HBV SURFACE AB TITR SER: <3.5 MIU/ML
HCO3 ARTERIAL: 19.7 MMOL/L (ref 21–29)
HCO3 ARTERIAL: 20.4 MMOL/L (ref 21–29)
HCO3 ARTERIAL: 20.6 MMOL/L (ref 21–29)
HCO3 ARTERIAL: 20.7 MMOL/L (ref 21–29)
HCO3 ARTERIAL: 20.8 MMOL/L (ref 21–29)
HCO3 ARTERIAL: 21.6 MMOL/L (ref 21–29)
HCO3 ARTERIAL: 21.6 MMOL/L (ref 21–29)
HCO3 ARTERIAL: 21.7 MMOL/L (ref 21–29)
HCO3 ARTERIAL: 23 MMOL/L (ref 21–29)
HCO3 ARTERIAL: 24 MMOL/L (ref 21–29)
HCO3 ARTERIAL: 24.2 MMOL/L (ref 21–29)
HCO3 ARTERIAL: 24.7 MMOL/L (ref 21–29)
HCO3 ARTERIAL: 25.8 MMOL/L (ref 21–29)
HCO3 ARTERIAL: 26.8 MMOL/L (ref 21–29)
HCO3 ARTERIAL: 27.4 MMOL/L (ref 21–29)
HCO3 ARTERIAL: 27.7 MMOL/L (ref 21–29)
HCO3 ARTERIAL: 28.4 MMOL/L (ref 21–29)
HCT VFR BLD CALC: 24.1 % (ref 36–48)
HCT VFR BLD CALC: 25.1 % (ref 36–48)
HCT VFR BLD CALC: 25.3 % (ref 36–48)
HCT VFR BLD CALC: 25.9 % (ref 36–48)
HCT VFR BLD CALC: 26.5 % (ref 36–48)
HCT VFR BLD CALC: 26.7 % (ref 36–48)
HCT VFR BLD CALC: 27 % (ref 36–48)
HCT VFR BLD CALC: 27.2 % (ref 36–48)
HCT VFR BLD CALC: 27.3 % (ref 36–48)
HCT VFR BLD CALC: 27.4 % (ref 36–48)
HCT VFR BLD CALC: 27.5 % (ref 36–48)
HCT VFR BLD CALC: 27.5 % (ref 36–48)
HCT VFR BLD CALC: 27.8 % (ref 36–48)
HCT VFR BLD CALC: 27.9 % (ref 36–48)
HCT VFR BLD CALC: 28.1 % (ref 36–48)
HCT VFR BLD CALC: 28.1 % (ref 36–48)
HCT VFR BLD CALC: 28.4 % (ref 36–48)
HCT VFR BLD CALC: 28.6 % (ref 36–48)
HCT VFR BLD CALC: 28.9 % (ref 36–48)
HCT VFR BLD CALC: 29 % (ref 36–48)
HCT VFR BLD CALC: 29.1 % (ref 36–48)
HCT VFR BLD CALC: 29.2 % (ref 36–48)
HCT VFR BLD CALC: 30.3 % (ref 36–48)
HCT VFR BLD CALC: 32.2 % (ref 36–48)
HCT VFR BLD CALC: 32.3 % (ref 36–48)
HCT VFR BLD CALC: 33.2 % (ref 36–48)
HCT VFR BLD CALC: 33.8 % (ref 36–48)
HDLC SERPL-MCNC: 44 MG/DL (ref 40–60)
HDLC SERPL-MCNC: 53 MG/DL (ref 40–60)
HEMOGLOBIN, ART, EXTENDED: 10 G/DL (ref 12–16)
HEMOGLOBIN, ART, EXTENDED: 10.2 G/DL (ref 12–16)
HEMOGLOBIN, ART, EXTENDED: 9.5 G/DL (ref 12–16)
HEMOGLOBIN, ART, EXTENDED: 9.6 G/DL (ref 12–16)
HEMOGLOBIN, ART, EXTENDED: 9.9 G/DL (ref 12–16)
HEMOGLOBIN: 10.1 G/DL (ref 12–16)
HEMOGLOBIN: 10.3 G/DL (ref 12–16)
HEMOGLOBIN: 10.5 G/DL (ref 12–16)
HEMOGLOBIN: 10.8 G/DL (ref 12–16)
HEMOGLOBIN: 6.1 GM/DL (ref 12–16)
HEMOGLOBIN: 6.2 GM/DL (ref 12–16)
HEMOGLOBIN: 6.3 GM/DL (ref 12–16)
HEMOGLOBIN: 6.9 GM/DL (ref 12–16)
HEMOGLOBIN: 7.7 G/DL (ref 12–16)
HEMOGLOBIN: 8.1 G/DL (ref 12–16)
HEMOGLOBIN: 8.2 G/DL (ref 12–16)
HEMOGLOBIN: 8.3 GM/DL (ref 12–16)
HEMOGLOBIN: 8.4 G/DL (ref 12–16)
HEMOGLOBIN: 8.5 G/DL (ref 12–16)
HEMOGLOBIN: 8.5 G/DL (ref 12–16)
HEMOGLOBIN: 8.6 G/DL (ref 12–16)
HEMOGLOBIN: 8.7 G/DL (ref 12–16)
HEMOGLOBIN: 8.7 G/DL (ref 12–16)
HEMOGLOBIN: 8.8 G/DL (ref 12–16)
HEMOGLOBIN: 8.9 G/DL (ref 12–16)
HEMOGLOBIN: 9.1 G/DL (ref 12–16)
HEMOGLOBIN: 9.1 G/DL (ref 12–16)
HEMOGLOBIN: 9.1 GM/DL (ref 12–16)
HEMOGLOBIN: 9.2 G/DL (ref 12–16)
HEMOGLOBIN: 9.2 GM/DL (ref 12–16)
HEMOGLOBIN: 9.3 G/DL (ref 12–16)
HEMOGLOBIN: 9.4 G/DL (ref 12–16)
HEMOGLOBIN: 9.4 G/DL (ref 12–16)
HEMOGLOBIN: 9.5 GM/DL (ref 12–16)
HEMOGLOBIN: 9.9 G/DL (ref 12–16)
HEPATITIS B CORE IGM ANTIBODY: NORMAL
HEPATITIS B SURFACE ANTIGEN INTERPRETATION: NORMAL
INR BLD: 1 (ref 0.86–1.14)
INR BLD: 1.09 (ref 0.86–1.14)
INR BLD: 1.16 (ref 0.86–1.14)
INR BLD: 1.2 (ref 0.86–1.14)
INR BLD: 1.22 (ref 0.86–1.14)
IRON SATURATION: 10 % (ref 15–50)
IRON SATURATION: 13 % (ref 15–50)
IRON: 31 UG/DL (ref 37–145)
IRON: 35 UG/DL (ref 37–145)
KETONES, URINE: ABNORMAL MG/DL
KETONES, URINE: ABNORMAL MG/DL
KETONES, URINE: NEGATIVE MG/DL
LACTATE: 1.05 MMOL/L (ref 0.4–2)
LACTATE: 1.44 MMOL/L (ref 0.4–2)
LACTATE: 1.95 MMOL/L (ref 0.4–2)
LACTATE: 2.02 MMOL/L (ref 0.4–2)
LACTATE: 2.63 MMOL/L (ref 0.4–2)
LACTATE: 3.4 MMOL/L (ref 0.4–2)
LACTATE: 3.5 MMOL/L (ref 0.4–2)
LACTATE: 3.73 MMOL/L (ref 0.4–2)
LACTATE: 3.74 MMOL/L (ref 0.4–2)
LACTIC ACID, SEPSIS: 3.7 MMOL/L (ref 0.4–1.9)
LACTIC ACID: 0.8 MMOL/L (ref 0.4–2)
LACTIC ACID: 3.8 MMOL/L (ref 0.4–2)
LACTIC ACID: 4.2 MMOL/L (ref 0.4–2)
LACTIC ACID: 4.9 MMOL/L (ref 0.4–2)
LACTIC ACID: 6.9 MMOL/L (ref 0.4–2)
LDL CHOLESTEROL CALCULATED: 32 MG/DL
LDL CHOLESTEROL CALCULATED: 68 MG/DL
LEUKOCYTE ESTERASE, URINE: ABNORMAL
LEUKOCYTE ESTERASE, URINE: NEGATIVE
LV EF: 55 %
LVEF MODALITY: NORMAL
LYMPHOCYTES ABSOLUTE: 0.4 K/UL (ref 1–5.1)
LYMPHOCYTES ABSOLUTE: 0.5 K/UL (ref 1–5.1)
LYMPHOCYTES ABSOLUTE: 0.6 K/UL (ref 1–5.1)
LYMPHOCYTES ABSOLUTE: 0.7 K/UL (ref 1–5.1)
LYMPHOCYTES ABSOLUTE: 0.7 K/UL (ref 1–5.1)
LYMPHOCYTES ABSOLUTE: 0.8 K/UL (ref 1–5.1)
LYMPHOCYTES ABSOLUTE: 1 K/UL (ref 1–5.1)
LYMPHOCYTES ABSOLUTE: 1.1 K/UL (ref 1–5.1)
LYMPHOCYTES RELATIVE PERCENT: 10.2 %
LYMPHOCYTES RELATIVE PERCENT: 11.6 %
LYMPHOCYTES RELATIVE PERCENT: 12.7 %
LYMPHOCYTES RELATIVE PERCENT: 13.2 %
LYMPHOCYTES RELATIVE PERCENT: 15.4 %
LYMPHOCYTES RELATIVE PERCENT: 15.8 %
LYMPHOCYTES RELATIVE PERCENT: 17.7 %
LYMPHOCYTES RELATIVE PERCENT: 17.7 %
LYMPHOCYTES RELATIVE PERCENT: 22.2 %
LYMPHOCYTES RELATIVE PERCENT: 22.5 %
LYMPHOCYTES RELATIVE PERCENT: 22.7 %
LYMPHOCYTES RELATIVE PERCENT: 3.5 %
LYMPHOCYTES RELATIVE PERCENT: 4.6 %
LYMPHOCYTES RELATIVE PERCENT: 5.3 %
LYMPHOCYTES RELATIVE PERCENT: 6.3 %
LYMPHOCYTES RELATIVE PERCENT: 8 %
LYMPHOCYTES RELATIVE PERCENT: 8 %
LYMPHOCYTES RELATIVE PERCENT: 8.4 %
LYMPHOCYTES RELATIVE PERCENT: 9.5 %
LYMPHOCYTES RELATIVE PERCENT: 9.7 %
LYMPHOCYTES RELATIVE PERCENT: 9.9 %
MACROCYTES: ABNORMAL
MAGNESIUM: 1.8 MG/DL (ref 1.8–2.4)
MAGNESIUM: 1.9 MG/DL (ref 1.8–2.4)
MAGNESIUM: 2 MG/DL (ref 1.8–2.4)
MAGNESIUM: 2.1 MG/DL (ref 1.8–2.4)
MAGNESIUM: 2.2 MG/DL (ref 1.8–2.4)
MAGNESIUM: 2.3 MG/DL (ref 1.8–2.4)
MAGNESIUM: 2.3 MG/DL (ref 1.8–2.4)
MAGNESIUM: 2.4 MG/DL (ref 1.8–2.4)
MCH RBC QN AUTO: 28.3 PG (ref 26–34)
MCH RBC QN AUTO: 28.4 PG (ref 26–34)
MCH RBC QN AUTO: 28.6 PG (ref 26–34)
MCH RBC QN AUTO: 28.8 PG (ref 26–34)
MCH RBC QN AUTO: 29.1 PG (ref 26–34)
MCH RBC QN AUTO: 29.2 PG (ref 26–34)
MCH RBC QN AUTO: 29.2 PG (ref 26–34)
MCH RBC QN AUTO: 29.3 PG (ref 26–34)
MCH RBC QN AUTO: 29.4 PG (ref 26–34)
MCH RBC QN AUTO: 29.4 PG (ref 26–34)
MCH RBC QN AUTO: 29.5 PG (ref 26–34)
MCH RBC QN AUTO: 29.6 PG (ref 26–34)
MCH RBC QN AUTO: 29.7 PG (ref 26–34)
MCH RBC QN AUTO: 29.8 PG (ref 26–34)
MCH RBC QN AUTO: 29.8 PG (ref 26–34)
MCH RBC QN AUTO: 30 PG (ref 26–34)
MCHC RBC AUTO-ENTMCNC: 31.1 G/DL (ref 31–36)
MCHC RBC AUTO-ENTMCNC: 31.3 G/DL (ref 31–36)
MCHC RBC AUTO-ENTMCNC: 31.5 G/DL (ref 31–36)
MCHC RBC AUTO-ENTMCNC: 31.5 G/DL (ref 31–36)
MCHC RBC AUTO-ENTMCNC: 31.6 G/DL (ref 31–36)
MCHC RBC AUTO-ENTMCNC: 31.8 G/DL (ref 31–36)
MCHC RBC AUTO-ENTMCNC: 31.8 G/DL (ref 31–36)
MCHC RBC AUTO-ENTMCNC: 31.9 G/DL (ref 31–36)
MCHC RBC AUTO-ENTMCNC: 31.9 G/DL (ref 31–36)
MCHC RBC AUTO-ENTMCNC: 32 G/DL (ref 31–36)
MCHC RBC AUTO-ENTMCNC: 32.1 G/DL (ref 31–36)
MCHC RBC AUTO-ENTMCNC: 32.2 G/DL (ref 31–36)
MCHC RBC AUTO-ENTMCNC: 32.3 G/DL (ref 31–36)
MCHC RBC AUTO-ENTMCNC: 32.4 G/DL (ref 31–36)
MCHC RBC AUTO-ENTMCNC: 32.5 G/DL (ref 31–36)
MCHC RBC AUTO-ENTMCNC: 32.6 G/DL (ref 31–36)
MCHC RBC AUTO-ENTMCNC: 32.8 G/DL (ref 31–36)
MCHC RBC AUTO-ENTMCNC: 32.9 G/DL (ref 31–36)
MCHC RBC AUTO-ENTMCNC: 32.9 G/DL (ref 31–36)
MCHC RBC AUTO-ENTMCNC: 33.2 G/DL (ref 31–36)
MCV RBC AUTO: 89.5 FL (ref 80–100)
MCV RBC AUTO: 89.6 FL (ref 80–100)
MCV RBC AUTO: 89.6 FL (ref 80–100)
MCV RBC AUTO: 89.9 FL (ref 80–100)
MCV RBC AUTO: 90.1 FL (ref 80–100)
MCV RBC AUTO: 90.2 FL (ref 80–100)
MCV RBC AUTO: 90.2 FL (ref 80–100)
MCV RBC AUTO: 90.3 FL (ref 80–100)
MCV RBC AUTO: 90.7 FL (ref 80–100)
MCV RBC AUTO: 90.8 FL (ref 80–100)
MCV RBC AUTO: 90.8 FL (ref 80–100)
MCV RBC AUTO: 90.9 FL (ref 80–100)
MCV RBC AUTO: 91 FL (ref 80–100)
MCV RBC AUTO: 91 FL (ref 80–100)
MCV RBC AUTO: 91.1 FL (ref 80–100)
MCV RBC AUTO: 91.3 FL (ref 80–100)
MCV RBC AUTO: 91.3 FL (ref 80–100)
MCV RBC AUTO: 91.4 FL (ref 80–100)
MCV RBC AUTO: 91.5 FL (ref 80–100)
MCV RBC AUTO: 91.7 FL (ref 80–100)
MCV RBC AUTO: 91.7 FL (ref 80–100)
MCV RBC AUTO: 91.8 FL (ref 80–100)
MCV RBC AUTO: 91.8 FL (ref 80–100)
MCV RBC AUTO: 92 FL (ref 80–100)
MCV RBC AUTO: 92 FL (ref 80–100)
MCV RBC AUTO: 92.4 FL (ref 80–100)
MCV RBC AUTO: 92.5 FL (ref 80–100)
MCV RBC AUTO: 92.7 FL (ref 80–100)
MCV RBC AUTO: 92.8 FL (ref 80–100)
METAMYELOCYTES RELATIVE PERCENT: 3 %
METHEMOGLOBIN ARTERIAL: 0.4 %
METHEMOGLOBIN ARTERIAL: 0.5 %
METHEMOGLOBIN ARTERIAL: 0.6 %
METHEMOGLOBIN ARTERIAL: 0.6 %
METHEMOGLOBIN ARTERIAL: 0.8 %
MICROSCOPIC EXAMINATION: YES
MONOCYTES ABSOLUTE: 0.2 K/UL (ref 0–1.3)
MONOCYTES ABSOLUTE: 0.3 K/UL (ref 0–1.3)
MONOCYTES ABSOLUTE: 0.4 K/UL (ref 0–1.3)
MONOCYTES ABSOLUTE: 0.5 K/UL (ref 0–1.3)
MONOCYTES ABSOLUTE: 0.6 K/UL (ref 0–1.3)
MONOCYTES ABSOLUTE: 0.7 K/UL (ref 0–1.3)
MONOCYTES ABSOLUTE: 0.7 K/UL (ref 0–1.3)
MONOCYTES RELATIVE PERCENT: 10.4 %
MONOCYTES RELATIVE PERCENT: 10.5 %
MONOCYTES RELATIVE PERCENT: 10.7 %
MONOCYTES RELATIVE PERCENT: 11.9 %
MONOCYTES RELATIVE PERCENT: 12.8 %
MONOCYTES RELATIVE PERCENT: 14 %
MONOCYTES RELATIVE PERCENT: 14.1 %
MONOCYTES RELATIVE PERCENT: 3.7 %
MONOCYTES RELATIVE PERCENT: 3.8 %
MONOCYTES RELATIVE PERCENT: 4 %
MONOCYTES RELATIVE PERCENT: 5.2 %
MONOCYTES RELATIVE PERCENT: 5.8 %
MONOCYTES RELATIVE PERCENT: 6.8 %
MONOCYTES RELATIVE PERCENT: 7.6 %
MONOCYTES RELATIVE PERCENT: 7.9 %
MONOCYTES RELATIVE PERCENT: 8 %
MONOCYTES RELATIVE PERCENT: 8.2 %
MONOCYTES RELATIVE PERCENT: 8.9 %
MONOCYTES RELATIVE PERCENT: 9.4 %
MONOCYTES RELATIVE PERCENT: 9.5 %
MONOCYTES RELATIVE PERCENT: 9.8 %
MUCUS: ABNORMAL /LPF
MUCUS: ABNORMAL /LPF
NEUTROPHILS ABSOLUTE: 10.8 K/UL (ref 1.7–7.7)
NEUTROPHILS ABSOLUTE: 2.4 K/UL (ref 1.7–7.7)
NEUTROPHILS ABSOLUTE: 2.7 K/UL (ref 1.7–7.7)
NEUTROPHILS ABSOLUTE: 2.7 K/UL (ref 1.7–7.7)
NEUTROPHILS ABSOLUTE: 2.8 K/UL (ref 1.7–7.7)
NEUTROPHILS ABSOLUTE: 2.9 K/UL (ref 1.7–7.7)
NEUTROPHILS ABSOLUTE: 3.1 K/UL (ref 1.7–7.7)
NEUTROPHILS ABSOLUTE: 3.4 K/UL (ref 1.7–7.7)
NEUTROPHILS ABSOLUTE: 3.8 K/UL (ref 1.7–7.7)
NEUTROPHILS ABSOLUTE: 3.8 K/UL (ref 1.7–7.7)
NEUTROPHILS ABSOLUTE: 3.9 K/UL (ref 1.7–7.7)
NEUTROPHILS ABSOLUTE: 4.1 K/UL (ref 1.7–7.7)
NEUTROPHILS ABSOLUTE: 5 K/UL (ref 1.7–7.7)
NEUTROPHILS ABSOLUTE: 5.3 K/UL (ref 1.7–7.7)
NEUTROPHILS ABSOLUTE: 5.9 K/UL (ref 1.7–7.7)
NEUTROPHILS ABSOLUTE: 6.2 K/UL (ref 1.7–7.7)
NEUTROPHILS ABSOLUTE: 6.3 K/UL (ref 1.7–7.7)
NEUTROPHILS ABSOLUTE: 7 K/UL (ref 1.7–7.7)
NEUTROPHILS ABSOLUTE: 8.2 K/UL (ref 1.7–7.7)
NEUTROPHILS RELATIVE PERCENT: 28 %
NEUTROPHILS RELATIVE PERCENT: 62 %
NEUTROPHILS RELATIVE PERCENT: 62.8 %
NEUTROPHILS RELATIVE PERCENT: 64.7 %
NEUTROPHILS RELATIVE PERCENT: 65 %
NEUTROPHILS RELATIVE PERCENT: 67.1 %
NEUTROPHILS RELATIVE PERCENT: 68.4 %
NEUTROPHILS RELATIVE PERCENT: 68.4 %
NEUTROPHILS RELATIVE PERCENT: 70.6 %
NEUTROPHILS RELATIVE PERCENT: 77.1 %
NEUTROPHILS RELATIVE PERCENT: 77.2 %
NEUTROPHILS RELATIVE PERCENT: 78.6 %
NEUTROPHILS RELATIVE PERCENT: 78.8 %
NEUTROPHILS RELATIVE PERCENT: 79.8 %
NEUTROPHILS RELATIVE PERCENT: 80.3 %
NEUTROPHILS RELATIVE PERCENT: 81.5 %
NEUTROPHILS RELATIVE PERCENT: 86 %
NEUTROPHILS RELATIVE PERCENT: 86.3 %
NEUTROPHILS RELATIVE PERCENT: 87.8 %
NEUTROPHILS RELATIVE PERCENT: 91.5 %
NEUTROPHILS RELATIVE PERCENT: 92.6 %
NITRITE, URINE: NEGATIVE
NITRITE, URINE: POSITIVE
NITRITE, URINE: POSITIVE
NUCLEATED RED BLOOD CELLS: 1 /100 WBC
NUCLEATED RED BLOOD CELLS: 1 /100 WBC
O2 CONTENT ARTERIAL: 13 ML/DL
O2 CONTENT ARTERIAL: 14 ML/DL
O2 SAT, ARTERIAL: 100 % (ref 93–100)
O2 SAT, ARTERIAL: 85 % (ref 93–100)
O2 SAT, ARTERIAL: 92 % (ref 93–100)
O2 SAT, ARTERIAL: 93 % (ref 93–100)
O2 SAT, ARTERIAL: 94.4 %
O2 SAT, ARTERIAL: 94.9 %
O2 SAT, ARTERIAL: 94.9 %
O2 SAT, ARTERIAL: 95 %
O2 SAT, ARTERIAL: 97 % (ref 93–100)
O2 SAT, ARTERIAL: 97.9 %
O2 SAT, ARTERIAL: 98 % (ref 93–100)
O2 THERAPY: ABNORMAL
OCCULT BLOOD SCREENING: ABNORMAL
ORGANISM: ABNORMAL
OSMOLALITY URINE: 311 MOSM/KG (ref 390–1070)
OVALOCYTES: ABNORMAL
PCO2 ARTERIAL: 32.4 MM HG (ref 35–45)
PCO2 ARTERIAL: 32.8 MMHG (ref 35–45)
PCO2 ARTERIAL: 34.6 MM HG (ref 35–45)
PCO2 ARTERIAL: 34.9 MMHG (ref 35–45)
PCO2 ARTERIAL: 35.2 MMHG (ref 35–45)
PCO2 ARTERIAL: 39.3 MMHG (ref 35–45)
PCO2 ARTERIAL: 39.5 MM HG (ref 35–45)
PCO2 ARTERIAL: 39.5 MMHG (ref 35–45)
PCO2 ARTERIAL: 39.7 MM HG (ref 35–45)
PCO2 ARTERIAL: 41 MM HG (ref 35–45)
PCO2 ARTERIAL: 41.1 MM HG (ref 35–45)
PCO2 ARTERIAL: 44.7 MM HG (ref 35–45)
PCO2 ARTERIAL: 44.8 MM HG (ref 35–45)
PCO2 ARTERIAL: 48.4 MM HG (ref 35–45)
PCO2 ARTERIAL: 53.8 MM HG (ref 35–45)
PCO2 ARTERIAL: 55.3 MM HG (ref 35–45)
PCO2 ARTERIAL: 55.7 MM HG (ref 35–45)
PDW BLD-RTO: 15.3 % (ref 12.4–15.4)
PDW BLD-RTO: 15.4 % (ref 12.4–15.4)
PDW BLD-RTO: 15.6 % (ref 12.4–15.4)
PDW BLD-RTO: 15.7 % (ref 12.4–15.4)
PDW BLD-RTO: 15.8 % (ref 12.4–15.4)
PDW BLD-RTO: 15.9 % (ref 12.4–15.4)
PDW BLD-RTO: 16 % (ref 12.4–15.4)
PDW BLD-RTO: 16.1 % (ref 12.4–15.4)
PDW BLD-RTO: 16.1 % (ref 12.4–15.4)
PDW BLD-RTO: 16.2 % (ref 12.4–15.4)
PDW BLD-RTO: 16.2 % (ref 12.4–15.4)
PDW BLD-RTO: 16.3 % (ref 12.4–15.4)
PDW BLD-RTO: 16.4 % (ref 12.4–15.4)
PDW BLD-RTO: 16.5 % (ref 12.4–15.4)
PDW BLD-RTO: 16.6 % (ref 12.4–15.4)
PERFORMED ON: ABNORMAL
PERFORMED ON: NORMAL
PH ARTERIAL: 7.28 (ref 7.35–7.45)
PH ARTERIAL: 7.3 (ref 7.35–7.45)
PH ARTERIAL: 7.31 (ref 7.35–7.45)
PH ARTERIAL: 7.32 (ref 7.35–7.45)
PH ARTERIAL: 7.33 (ref 7.35–7.45)
PH ARTERIAL: 7.33 (ref 7.35–7.45)
PH ARTERIAL: 7.34 (ref 7.35–7.45)
PH ARTERIAL: 7.34 (ref 7.35–7.45)
PH ARTERIAL: 7.36 (ref 7.35–7.45)
PH ARTERIAL: 7.38 (ref 7.35–7.45)
PH ARTERIAL: 7.38 (ref 7.35–7.45)
PH ARTERIAL: 7.39 (ref 7.35–7.45)
PH ARTERIAL: 7.39 (ref 7.35–7.45)
PH ARTERIAL: 7.4 (ref 7.35–7.45)
PH ARTERIAL: 7.41 (ref 7.35–7.45)
PH ARTERIAL: 7.42 (ref 7.35–7.45)
PH ARTERIAL: 7.48 (ref 7.35–7.45)
PH UA: 5.5 (ref 5–8)
PH UA: 6 (ref 5–8)
PH UA: 6.5 (ref 5–8)
PHOSPHORUS: 2.5 MG/DL (ref 2.5–4.9)
PHOSPHORUS: 2.7 MG/DL (ref 2.5–4.9)
PHOSPHORUS: 3.2 MG/DL (ref 2.5–4.9)
PHOSPHORUS: 3.5 MG/DL (ref 2.5–4.9)
PHOSPHORUS: 3.7 MG/DL (ref 2.5–4.9)
PHOSPHORUS: 3.8 MG/DL (ref 2.5–4.9)
PHOSPHORUS: 4 MG/DL (ref 2.5–4.9)
PHOSPHORUS: 4 MG/DL (ref 2.5–4.9)
PHOSPHORUS: 4.2 MG/DL (ref 2.5–4.9)
PHOSPHORUS: 4.4 MG/DL (ref 2.5–4.9)
PHOSPHORUS: 4.6 MG/DL (ref 2.5–4.9)
PHOSPHORUS: 5.3 MG/DL (ref 2.5–4.9)
PHOSPHORUS: 5.4 MG/DL (ref 2.5–4.9)
PHOSPHORUS: 6.3 MG/DL (ref 2.5–4.9)
PLATELET # BLD: 132 K/UL (ref 135–450)
PLATELET # BLD: 134 K/UL (ref 135–450)
PLATELET # BLD: 142 K/UL (ref 135–450)
PLATELET # BLD: 160 K/UL (ref 135–450)
PLATELET # BLD: 164 K/UL (ref 135–450)
PLATELET # BLD: 164 K/UL (ref 135–450)
PLATELET # BLD: 167 K/UL (ref 135–450)
PLATELET # BLD: 171 K/UL (ref 135–450)
PLATELET # BLD: 172 K/UL (ref 135–450)
PLATELET # BLD: 173 K/UL (ref 135–450)
PLATELET # BLD: 177 K/UL (ref 135–450)
PLATELET # BLD: 179 K/UL (ref 135–450)
PLATELET # BLD: 184 K/UL (ref 135–450)
PLATELET # BLD: 188 K/UL (ref 135–450)
PLATELET # BLD: 193 K/UL (ref 135–450)
PLATELET # BLD: 193 K/UL (ref 135–450)
PLATELET # BLD: 194 K/UL (ref 135–450)
PLATELET # BLD: 199 K/UL (ref 135–450)
PLATELET # BLD: 200 K/UL (ref 135–450)
PLATELET # BLD: 203 K/UL (ref 135–450)
PLATELET # BLD: 209 K/UL (ref 135–450)
PLATELET # BLD: 211 K/UL (ref 135–450)
PLATELET # BLD: 218 K/UL (ref 135–450)
PLATELET # BLD: 227 K/UL (ref 135–450)
PLATELET # BLD: 227 K/UL (ref 135–450)
PLATELET # BLD: 231 K/UL (ref 135–450)
PLATELET # BLD: 248 K/UL (ref 135–450)
PLATELET # BLD: 262 K/UL (ref 135–450)
PLATELET # BLD: 309 K/UL (ref 135–450)
PLATELET # BLD: 318 K/UL (ref 135–450)
PLATELET # BLD: 325 K/UL (ref 135–450)
PMV BLD AUTO: 7.1 FL (ref 5–10.5)
PMV BLD AUTO: 7.2 FL (ref 5–10.5)
PMV BLD AUTO: 7.3 FL (ref 5–10.5)
PMV BLD AUTO: 7.4 FL (ref 5–10.5)
PMV BLD AUTO: 7.5 FL (ref 5–10.5)
PMV BLD AUTO: 7.6 FL (ref 5–10.5)
PMV BLD AUTO: 7.7 FL (ref 5–10.5)
PMV BLD AUTO: 7.8 FL (ref 5–10.5)
PMV BLD AUTO: 7.8 FL (ref 5–10.5)
PMV BLD AUTO: 7.9 FL (ref 5–10.5)
PMV BLD AUTO: 7.9 FL (ref 5–10.5)
PMV BLD AUTO: 8 FL (ref 5–10.5)
PMV BLD AUTO: 8.1 FL (ref 5–10.5)
PMV BLD AUTO: 8.1 FL (ref 5–10.5)
PMV BLD AUTO: 8.2 FL (ref 5–10.5)
PMV BLD AUTO: 8.2 FL (ref 5–10.5)
PMV BLD AUTO: 8.5 FL (ref 5–10.5)
PMV BLD AUTO: 8.5 FL (ref 5–10.5)
PMV BLD AUTO: 8.8 FL (ref 5–10.5)
PMV BLD AUTO: 8.9 FL (ref 5–10.5)
PMV BLD AUTO: 9 FL (ref 5–10.5)
PMV BLD AUTO: 9.1 FL (ref 5–10.5)
PO2 ARTERIAL: 104.6 MM HG (ref 75–108)
PO2 ARTERIAL: 112.9 MMHG (ref 75–108)
PO2 ARTERIAL: 187.3 MM HG (ref 75–108)
PO2 ARTERIAL: 266.8 MM HG (ref 75–108)
PO2 ARTERIAL: 374.8 MM HG (ref 75–108)
PO2 ARTERIAL: 494.2 MM HG (ref 75–108)
PO2 ARTERIAL: 54.5 MM HG (ref 75–108)
PO2 ARTERIAL: 549.4 MM HG (ref 75–108)
PO2 ARTERIAL: 550.3 MM HG (ref 75–108)
PO2 ARTERIAL: 593.2 MM HG (ref 75–108)
PO2 ARTERIAL: 60.5 MM HG (ref 75–108)
PO2 ARTERIAL: 70.8 MM HG (ref 75–108)
PO2 ARTERIAL: 72.9 MMHG (ref 75–108)
PO2 ARTERIAL: 76 MMHG (ref 75–108)
PO2 ARTERIAL: 78.2 MMHG (ref 75–108)
PO2 ARTERIAL: 79.3 MMHG (ref 75–108)
PO2 ARTERIAL: 85.1 MM HG (ref 75–108)
POC ACT LR: 163 SEC
POC ACT LR: >400 SEC
POC HEMATOCRIT: 18 % (ref 36–48)
POC HEMATOCRIT: 18 % (ref 36–48)
POC HEMATOCRIT: 19 % (ref 36–48)
POC HEMATOCRIT: 20 % (ref 36–48)
POC HEMATOCRIT: 24 % (ref 36–48)
POC HEMATOCRIT: 27 % (ref 36–48)
POC HEMATOCRIT: 27 % (ref 36–48)
POC HEMATOCRIT: 28 % (ref 36–48)
POC POTASSIUM: 2.8 MMOL/L (ref 3.5–5.1)
POC POTASSIUM: 4.2 MMOL/L (ref 3.5–5.1)
POC POTASSIUM: 4.3 MMOL/L (ref 3.5–5.1)
POC POTASSIUM: 5.5 MMOL/L (ref 3.5–5.1)
POC POTASSIUM: 6 MMOL/L (ref 3.5–5.1)
POC POTASSIUM: 6.1 MMOL/L (ref 3.5–5.1)
POC POTASSIUM: 6.2 MMOL/L (ref 3.5–5.1)
POC POTASSIUM: 7.2 MMOL/L (ref 3.5–5.1)
POC SAMPLE TYPE: ABNORMAL
POC SODIUM: 128 MMOL/L (ref 136–145)
POC SODIUM: 128 MMOL/L (ref 136–145)
POC SODIUM: 129 MMOL/L (ref 136–145)
POC SODIUM: 131 MMOL/L (ref 136–145)
POC SODIUM: 131 MMOL/L (ref 136–145)
POC SODIUM: 133 MMOL/L (ref 136–145)
POC SODIUM: 134 MMOL/L (ref 136–145)
POTASSIUM REFLEX MAGNESIUM: 3.4 MMOL/L (ref 3.5–5.1)
POTASSIUM REFLEX MAGNESIUM: 4.4 MMOL/L (ref 3.5–5.1)
POTASSIUM REFLEX MAGNESIUM: 4.4 MMOL/L (ref 3.5–5.1)
POTASSIUM REFLEX MAGNESIUM: 4.5 MMOL/L (ref 3.5–5.1)
POTASSIUM REFLEX MAGNESIUM: 4.6 MMOL/L (ref 3.5–5.1)
POTASSIUM REFLEX MAGNESIUM: 4.9 MMOL/L (ref 3.5–5.1)
POTASSIUM REFLEX MAGNESIUM: 5.4 MMOL/L (ref 3.5–5.1)
POTASSIUM REFLEX MAGNESIUM: 5.6 MMOL/L (ref 3.5–5.1)
POTASSIUM REFLEX MAGNESIUM: 5.7 MMOL/L (ref 3.5–5.1)
POTASSIUM SERPL-SCNC: 3.3 MMOL/L (ref 3.5–5.1)
POTASSIUM SERPL-SCNC: 3.4 MMOL/L (ref 3.5–5.1)
POTASSIUM SERPL-SCNC: 3.4 MMOL/L (ref 3.5–5.1)
POTASSIUM SERPL-SCNC: 3.5 MMOL/L (ref 3.5–5.1)
POTASSIUM SERPL-SCNC: 3.6 MMOL/L (ref 3.5–5.1)
POTASSIUM SERPL-SCNC: 3.7 MMOL/L (ref 3.5–5.1)
POTASSIUM SERPL-SCNC: 3.8 MMOL/L (ref 3.5–5.1)
POTASSIUM SERPL-SCNC: 3.8 MMOL/L (ref 3.5–5.1)
POTASSIUM SERPL-SCNC: 3.9 MMOL/L (ref 3.5–5.1)
POTASSIUM SERPL-SCNC: 4 MMOL/L (ref 3.5–5.1)
POTASSIUM SERPL-SCNC: 4 MMOL/L (ref 3.5–5.1)
POTASSIUM SERPL-SCNC: 4.1 MMOL/L (ref 3.5–5.1)
POTASSIUM SERPL-SCNC: 4.2 MMOL/L (ref 3.5–5.1)
POTASSIUM SERPL-SCNC: 4.2 MMOL/L (ref 3.5–5.1)
POTASSIUM SERPL-SCNC: 4.3 MMOL/L (ref 3.5–5.1)
POTASSIUM SERPL-SCNC: 4.4 MMOL/L (ref 3.5–5.1)
POTASSIUM SERPL-SCNC: 4.5 MMOL/L (ref 3.5–5.1)
POTASSIUM SERPL-SCNC: 4.6 MMOL/L (ref 3.5–5.1)
POTASSIUM SERPL-SCNC: 4.7 MMOL/L (ref 3.5–5.1)
POTASSIUM SERPL-SCNC: 4.8 MMOL/L (ref 3.5–5.1)
POTASSIUM SERPL-SCNC: 4.9 MMOL/L (ref 3.5–5.1)
POTASSIUM SERPL-SCNC: 5.7 MMOL/L (ref 3.5–5.1)
POTASSIUM SERPL-SCNC: 6.1 MMOL/L (ref 3.5–5.1)
POTASSIUM, UR: 12.1 MMOL/L
POTASSIUM, UR: 21 MMOL/L
PRO-BNP: 5200 PG/ML (ref 0–449)
PRO-BNP: ABNORMAL PG/ML (ref 0–449)
PRO-BNP: ABNORMAL PG/ML (ref 0–449)
PROTEIN UA: 100 MG/DL
PROTEIN UA: 30 MG/DL
PROTEIN UA: NEGATIVE MG/DL
PROTHROMBIN TIME: 11.6 SEC (ref 10–13.2)
PROTHROMBIN TIME: 12.7 SEC (ref 10–13.2)
PROTHROMBIN TIME: 13.5 SEC (ref 10–13.2)
PROTHROMBIN TIME: 14 SEC (ref 10–13.2)
PROTHROMBIN TIME: 14.1 SEC (ref 10–13.2)
RBC # BLD: 2.62 M/UL (ref 4–5.2)
RBC # BLD: 2.7 M/UL (ref 4–5.2)
RBC # BLD: 2.8 M/UL (ref 4–5.2)
RBC # BLD: 2.89 M/UL (ref 4–5.2)
RBC # BLD: 2.9 M/UL (ref 4–5.2)
RBC # BLD: 2.92 M/UL (ref 4–5.2)
RBC # BLD: 2.94 M/UL (ref 4–5.2)
RBC # BLD: 2.97 M/UL (ref 4–5.2)
RBC # BLD: 2.99 M/UL (ref 4–5.2)
RBC # BLD: 3 M/UL (ref 4–5.2)
RBC # BLD: 3 M/UL (ref 4–5.2)
RBC # BLD: 3.02 M/UL (ref 4–5.2)
RBC # BLD: 3.04 M/UL (ref 4–5.2)
RBC # BLD: 3.05 M/UL (ref 4–5.2)
RBC # BLD: 3.07 M/UL (ref 4–5.2)
RBC # BLD: 3.11 M/UL (ref 4–5.2)
RBC # BLD: 3.12 M/UL (ref 4–5.2)
RBC # BLD: 3.12 M/UL (ref 4–5.2)
RBC # BLD: 3.14 M/UL (ref 4–5.2)
RBC # BLD: 3.15 M/UL (ref 4–5.2)
RBC # BLD: 3.19 M/UL (ref 4–5.2)
RBC # BLD: 3.2 M/UL (ref 4–5.2)
RBC # BLD: 3.23 M/UL (ref 4–5.2)
RBC # BLD: 3.37 M/UL (ref 4–5.2)
RBC # BLD: 3.55 M/UL (ref 4–5.2)
RBC # BLD: 3.59 M/UL (ref 4–5.2)
RBC # BLD: 3.68 M/UL (ref 4–5.2)
RBC # BLD: 3.7 M/UL (ref 4–5.2)
RBC UA: ABNORMAL /HPF (ref 0–4)
RENAL EPITHELIAL, UA: ABNORMAL /HPF (ref 0–1)
REPORT: NORMAL
SALMONELLA PCR: NOT DETECTED
SARS-COV-2, NAAT: NOT DETECTED
SCHISTOCYTES: ABNORMAL
SHIGA TOXIN I: NOT DETECTED
SHIGA TOXIN II: NOT DETECTED
SODIUM BLD-SCNC: 128 MMOL/L (ref 136–145)
SODIUM BLD-SCNC: 128 MMOL/L (ref 136–145)
SODIUM BLD-SCNC: 130 MMOL/L (ref 136–145)
SODIUM BLD-SCNC: 131 MMOL/L (ref 136–145)
SODIUM BLD-SCNC: 132 MMOL/L (ref 136–145)
SODIUM BLD-SCNC: 133 MMOL/L (ref 136–145)
SODIUM BLD-SCNC: 134 MMOL/L (ref 136–145)
SODIUM BLD-SCNC: 135 MMOL/L (ref 136–145)
SODIUM BLD-SCNC: 136 MMOL/L (ref 136–145)
SODIUM BLD-SCNC: 137 MMOL/L (ref 136–145)
SODIUM BLD-SCNC: 137 MMOL/L (ref 136–145)
SODIUM BLD-SCNC: 139 MMOL/L (ref 136–145)
SODIUM URINE: 126 MMOL/L
SODIUM URINE: 65 MMOL/L
SODIUM URINE: 70 MMOL/L
SPECIFIC GRAVITY UA: 1.01 (ref 1–1.03)
SPECIFIC GRAVITY UA: 1.02 (ref 1–1.03)
SPECIFIC GRAVITY UA: 1.02 (ref 1–1.03)
T4 FREE: 1.2 NG/DL (ref 0.9–1.8)
TCO2 ARTERIAL: 20.9 MMOL/L
TCO2 ARTERIAL: 21.5 MMOL/L
TCO2 ARTERIAL: 21.7 MMOL/L
TCO2 ARTERIAL: 21.7 MMOL/L
TCO2 ARTERIAL: 22 MMOL/L
TCO2 ARTERIAL: 22.9 MMOL/L
TCO2 ARTERIAL: 23 MMOL/L
TCO2 ARTERIAL: 23 MMOL/L
TCO2 ARTERIAL: 25 MMOL/L
TCO2 ARTERIAL: 25 MMOL/L
TCO2 ARTERIAL: 26 MMOL/L
TCO2 ARTERIAL: 26 MMOL/L
TCO2 ARTERIAL: 27 MMOL/L
TCO2 ARTERIAL: 28 MMOL/L
TCO2 ARTERIAL: 29 MMOL/L
TCO2 ARTERIAL: 29 MMOL/L
TCO2 ARTERIAL: 30 MMOL/L
TOTAL IRON BINDING CAPACITY: 244 UG/DL (ref 260–445)
TOTAL IRON BINDING CAPACITY: 348 UG/DL (ref 260–445)
TOTAL PROTEIN: 5.7 G/DL (ref 6.4–8.2)
TOTAL PROTEIN: 6.1 G/DL (ref 6.4–8.2)
TOTAL PROTEIN: 6.3 G/DL (ref 6.4–8.2)
TOTAL PROTEIN: 6.5 G/DL (ref 6.4–8.2)
TOTAL PROTEIN: 6.6 G/DL (ref 6.4–8.2)
TOTAL PROTEIN: 6.7 G/DL (ref 6.4–8.2)
TOTAL PROTEIN: 6.9 G/DL (ref 6.4–8.2)
TOTAL PROTEIN: 7.3 G/DL (ref 6.4–8.2)
TOTAL PROTEIN: 7.5 G/DL (ref 6.4–8.2)
TRIGL SERPL-MCNC: 122 MG/DL (ref 0–150)
TRIGL SERPL-MCNC: 142 MG/DL (ref 0–150)
TROPONIN: 0.32 NG/ML
TROPONIN: 0.38 NG/ML
TROPONIN: 0.54 NG/ML
TROPONIN: <0.01 NG/ML
TSH REFLEX: 1.45 UIU/ML (ref 0.27–4.2)
TSH SERPL DL<=0.05 MIU/L-ACNC: 1.86 UIU/ML (ref 0.27–4.2)
UREA NITROGEN, UR: 112 MG/DL (ref 800–1666)
URINE CULTURE, ROUTINE: ABNORMAL
URINE CULTURE, ROUTINE: NORMAL
URINE REFLEX TO CULTURE: YES
URINE REFLEX TO CULTURE: YES
URINE TYPE: ABNORMAL
UROBILINOGEN, URINE: 0.2 E.U./DL
VANCOMYCIN RANDOM: 10.9 UG/ML
VLDLC SERPL CALC-MCNC: 24 MG/DL
VLDLC SERPL CALC-MCNC: 28 MG/DL
WBC # BLD: 11.3 K/UL (ref 4–11)
WBC # BLD: 11.6 K/UL (ref 4–11)
WBC # BLD: 11.7 K/UL (ref 4–11)
WBC # BLD: 13 K/UL (ref 4–11)
WBC # BLD: 13.8 K/UL (ref 4–11)
WBC # BLD: 13.9 K/UL (ref 4–11)
WBC # BLD: 3.6 K/UL (ref 4–11)
WBC # BLD: 3.7 K/UL (ref 4–11)
WBC # BLD: 3.8 K/UL (ref 4–11)
WBC # BLD: 3.9 K/UL (ref 4–11)
WBC # BLD: 4 K/UL (ref 4–11)
WBC # BLD: 4.2 K/UL (ref 4–11)
WBC # BLD: 4.3 K/UL (ref 4–11)
WBC # BLD: 4.3 K/UL (ref 4–11)
WBC # BLD: 4.4 K/UL (ref 4–11)
WBC # BLD: 4.5 K/UL (ref 4–11)
WBC # BLD: 4.8 K/UL (ref 4–11)
WBC # BLD: 4.9 K/UL (ref 4–11)
WBC # BLD: 5 K/UL (ref 4–11)
WBC # BLD: 5 K/UL (ref 4–11)
WBC # BLD: 6.4 K/UL (ref 4–11)
WBC # BLD: 6.9 K/UL (ref 4–11)
WBC # BLD: 7.2 K/UL (ref 4–11)
WBC # BLD: 7.2 K/UL (ref 4–11)
WBC # BLD: 7.4 K/UL (ref 4–11)
WBC # BLD: 8 K/UL (ref 4–11)
WBC # BLD: 8.9 K/UL (ref 4–11)
WBC UA: >100 /HPF (ref 0–5)
WBC UA: >100 /HPF (ref 0–5)
WBC UA: ABNORMAL /HPF (ref 0–5)
WHITE BLOOD CELLS (WBC), STOOL: NORMAL

## 2020-01-01 PROCEDURE — 2580000003 HC RX 258: Performed by: INTERNAL MEDICINE

## 2020-01-01 PROCEDURE — 93880 EXTRACRANIAL BILAT STUDY: CPT

## 2020-01-01 PROCEDURE — 6370000000 HC RX 637 (ALT 250 FOR IP): Performed by: INTERNAL MEDICINE

## 2020-01-01 PROCEDURE — 6360000002 HC RX W HCPCS

## 2020-01-01 PROCEDURE — 71045 X-RAY EXAM CHEST 1 VIEW: CPT

## 2020-01-01 PROCEDURE — 99232 SBSQ HOSP IP/OBS MODERATE 35: CPT | Performed by: NURSE PRACTITIONER

## 2020-01-01 PROCEDURE — 94660 CPAP INITIATION&MGMT: CPT

## 2020-01-01 PROCEDURE — 6370000000 HC RX 637 (ALT 250 FOR IP): Performed by: THORACIC SURGERY (CARDIOTHORACIC VASCULAR SURGERY)

## 2020-01-01 PROCEDURE — 85027 COMPLETE CBC AUTOMATED: CPT

## 2020-01-01 PROCEDURE — 6360000002 HC RX W HCPCS: Performed by: INTERNAL MEDICINE

## 2020-01-01 PROCEDURE — 99232 SBSQ HOSP IP/OBS MODERATE 35: CPT | Performed by: INTERNAL MEDICINE

## 2020-01-01 PROCEDURE — 93010 ELECTROCARDIOGRAM REPORT: CPT | Performed by: INTERNAL MEDICINE

## 2020-01-01 PROCEDURE — 94761 N-INVAS EAR/PLS OXIMETRY MLT: CPT

## 2020-01-01 PROCEDURE — 87086 URINE CULTURE/COLONY COUNT: CPT

## 2020-01-01 PROCEDURE — 85730 THROMBOPLASTIN TIME PARTIAL: CPT

## 2020-01-01 PROCEDURE — 94669 MECHANICAL CHEST WALL OSCILL: CPT

## 2020-01-01 PROCEDURE — C1729 CATH, DRAINAGE: HCPCS | Performed by: THORACIC SURGERY (CARDIOTHORACIC VASCULAR SURGERY)

## 2020-01-01 PROCEDURE — 6370000000 HC RX 637 (ALT 250 FOR IP): Performed by: NURSE PRACTITIONER

## 2020-01-01 PROCEDURE — 36592 COLLECT BLOOD FROM PICC: CPT

## 2020-01-01 PROCEDURE — 94640 AIRWAY INHALATION TREATMENT: CPT

## 2020-01-01 PROCEDURE — 83630 LACTOFERRIN FECAL (QUAL): CPT

## 2020-01-01 PROCEDURE — 97166 OT EVAL MOD COMPLEX 45 MIN: CPT

## 2020-01-01 PROCEDURE — 36415 COLL VENOUS BLD VENIPUNCTURE: CPT

## 2020-01-01 PROCEDURE — 5A1D70Z PERFORMANCE OF URINARY FILTRATION, INTERMITTENT, LESS THAN 6 HOURS PER DAY: ICD-10-PCS | Performed by: INTERNAL MEDICINE

## 2020-01-01 PROCEDURE — 85025 COMPLETE CBC W/AUTO DIFF WBC: CPT

## 2020-01-01 PROCEDURE — 86706 HEP B SURFACE ANTIBODY: CPT

## 2020-01-01 PROCEDURE — 80053 COMPREHEN METABOLIC PANEL: CPT

## 2020-01-01 PROCEDURE — 80069 RENAL FUNCTION PANEL: CPT

## 2020-01-01 PROCEDURE — 97110 THERAPEUTIC EXERCISES: CPT

## 2020-01-01 PROCEDURE — 93458 L HRT ARTERY/VENTRICLE ANGIO: CPT

## 2020-01-01 PROCEDURE — 84295 ASSAY OF SERUM SODIUM: CPT

## 2020-01-01 PROCEDURE — 6370000000 HC RX 637 (ALT 250 FOR IP): Performed by: PHYSICIAN ASSISTANT

## 2020-01-01 PROCEDURE — 2700000000 HC OXYGEN THERAPY PER DAY

## 2020-01-01 PROCEDURE — 2060000000 HC ICU INTERMEDIATE R&B

## 2020-01-01 PROCEDURE — 3600000018 HC SURGERY OHS ADDTL 15MIN: Performed by: THORACIC SURGERY (CARDIOTHORACIC VASCULAR SURGERY)

## 2020-01-01 PROCEDURE — 2100000000 HC CCU R&B

## 2020-01-01 PROCEDURE — 2500000003 HC RX 250 WO HCPCS: Performed by: THORACIC SURGERY (CARDIOTHORACIC VASCULAR SURGERY)

## 2020-01-01 PROCEDURE — 2580000003 HC RX 258: Performed by: PHYSICIAN ASSISTANT

## 2020-01-01 PROCEDURE — 93971 EXTREMITY STUDY: CPT

## 2020-01-01 PROCEDURE — C1751 CATH, INF, PER/CENT/MIDLINE: HCPCS

## 2020-01-01 PROCEDURE — 6360000002 HC RX W HCPCS: Performed by: THORACIC SURGERY (CARDIOTHORACIC VASCULAR SURGERY)

## 2020-01-01 PROCEDURE — 1200000000 HC SEMI PRIVATE

## 2020-01-01 PROCEDURE — 06BP4ZZ EXCISION OF RIGHT SAPHENOUS VEIN, PERCUTANEOUS ENDOSCOPIC APPROACH: ICD-10-PCS | Performed by: THORACIC SURGERY (CARDIOTHORACIC VASCULAR SURGERY)

## 2020-01-01 PROCEDURE — 80048 BASIC METABOLIC PNL TOTAL CA: CPT

## 2020-01-01 PROCEDURE — 87324 CLOSTRIDIUM AG IA: CPT

## 2020-01-01 PROCEDURE — 74176 CT ABD & PELVIS W/O CONTRAST: CPT

## 2020-01-01 PROCEDURE — 94729 DIFFUSING CAPACITY: CPT

## 2020-01-01 PROCEDURE — 2580000003 HC RX 258: Performed by: THORACIC SURGERY (CARDIOTHORACIC VASCULAR SURGERY)

## 2020-01-01 PROCEDURE — 84484 ASSAY OF TROPONIN QUANT: CPT

## 2020-01-01 PROCEDURE — 6360000002 HC RX W HCPCS: Performed by: HOSPITALIST

## 2020-01-01 PROCEDURE — 33518 CABG ARTERY-VEIN TWO: CPT | Performed by: THORACIC SURGERY (CARDIOTHORACIC VASCULAR SURGERY)

## 2020-01-01 PROCEDURE — 83735 ASSAY OF MAGNESIUM: CPT

## 2020-01-01 PROCEDURE — 6360000002 HC RX W HCPCS: Performed by: PHYSICIAN ASSISTANT

## 2020-01-01 PROCEDURE — 97162 PT EVAL MOD COMPLEX 30 MIN: CPT

## 2020-01-01 PROCEDURE — 33533 CABG ARTERIAL SINGLE: CPT | Performed by: THORACIC SURGERY (CARDIOTHORACIC VASCULAR SURGERY)

## 2020-01-01 PROCEDURE — 2500000003 HC RX 250 WO HCPCS

## 2020-01-01 PROCEDURE — 90935 HEMODIALYSIS ONE EVALUATION: CPT

## 2020-01-01 PROCEDURE — B24BZZ4 ULTRASONOGRAPHY OF HEART WITH AORTA, TRANSESOPHAGEAL: ICD-10-PCS | Performed by: THORACIC SURGERY (CARDIOTHORACIC VASCULAR SURGERY)

## 2020-01-01 PROCEDURE — 82803 BLOOD GASES ANY COMBINATION: CPT

## 2020-01-01 PROCEDURE — 80061 LIPID PANEL: CPT

## 2020-01-01 PROCEDURE — C1769 GUIDE WIRE: HCPCS

## 2020-01-01 PROCEDURE — 87150 DNA/RNA AMPLIFIED PROBE: CPT

## 2020-01-01 PROCEDURE — 93005 ELECTROCARDIOGRAM TRACING: CPT | Performed by: INTERNAL MEDICINE

## 2020-01-01 PROCEDURE — 02HV33Z INSERTION OF INFUSION DEVICE INTO SUPERIOR VENA CAVA, PERCUTANEOUS APPROACH: ICD-10-PCS | Performed by: INTERNAL MEDICINE

## 2020-01-01 PROCEDURE — 99222 1ST HOSP IP/OBS MODERATE 55: CPT | Performed by: SURGERY

## 2020-01-01 PROCEDURE — 82330 ASSAY OF CALCIUM: CPT

## 2020-01-01 PROCEDURE — 82436 ASSAY OF URINE CHLORIDE: CPT

## 2020-01-01 PROCEDURE — C1887 CATHETER, GUIDING: HCPCS

## 2020-01-01 PROCEDURE — 83550 IRON BINDING TEST: CPT

## 2020-01-01 PROCEDURE — 99024 POSTOP FOLLOW-UP VISIT: CPT | Performed by: THORACIC SURGERY (CARDIOTHORACIC VASCULAR SURGERY)

## 2020-01-01 PROCEDURE — 2580000003 HC RX 258: Performed by: HOSPITALIST

## 2020-01-01 PROCEDURE — 84100 ASSAY OF PHOSPHORUS: CPT

## 2020-01-01 PROCEDURE — 99291 CRITICAL CARE FIRST HOUR: CPT | Performed by: SURGERY

## 2020-01-01 PROCEDURE — 05HN33Z INSERTION OF INFUSION DEVICE INTO LEFT INTERNAL JUGULAR VEIN, PERCUTANEOUS APPROACH: ICD-10-PCS | Performed by: THORACIC SURGERY (CARDIOTHORACIC VASCULAR SURGERY)

## 2020-01-01 PROCEDURE — 83880 ASSAY OF NATRIURETIC PEPTIDE: CPT

## 2020-01-01 PROCEDURE — 93306 TTE W/DOPPLER COMPLETE: CPT

## 2020-01-01 PROCEDURE — P9016 RBC LEUKOCYTES REDUCED: HCPCS

## 2020-01-01 PROCEDURE — 99223 1ST HOSP IP/OBS HIGH 75: CPT | Performed by: INTERNAL MEDICINE

## 2020-01-01 PROCEDURE — 02100Z9 BYPASS CORONARY ARTERY, ONE ARTERY FROM LEFT INTERNAL MAMMARY, OPEN APPROACH: ICD-10-PCS | Performed by: THORACIC SURGERY (CARDIOTHORACIC VASCULAR SURGERY)

## 2020-01-01 PROCEDURE — 2580000003 HC RX 258: Performed by: ANESTHESIOLOGY

## 2020-01-01 PROCEDURE — 94726 PLETHYSMOGRAPHY LUNG VOLUMES: CPT

## 2020-01-01 PROCEDURE — 99239 HOSP IP/OBS DSCHRG MGMT >30: CPT | Performed by: INTERNAL MEDICINE

## 2020-01-01 PROCEDURE — 3700000001 HC ADD 15 MINUTES (ANESTHESIA): Performed by: THORACIC SURGERY (CARDIOTHORACIC VASCULAR SURGERY)

## 2020-01-01 PROCEDURE — 82570 ASSAY OF URINE CREATININE: CPT

## 2020-01-01 PROCEDURE — 99222 1ST HOSP IP/OBS MODERATE 55: CPT | Performed by: INTERNAL MEDICINE

## 2020-01-01 PROCEDURE — 99233 SBSQ HOSP IP/OBS HIGH 50: CPT | Performed by: INTERNAL MEDICINE

## 2020-01-01 PROCEDURE — 99221 1ST HOSP IP/OBS SF/LOW 40: CPT | Performed by: INTERNAL MEDICINE

## 2020-01-01 PROCEDURE — P9047 ALBUMIN (HUMAN), 25%, 50ML: HCPCS

## 2020-01-01 PROCEDURE — 93005 ELECTROCARDIOGRAM TRACING: CPT | Performed by: EMERGENCY MEDICINE

## 2020-01-01 PROCEDURE — 6370000000 HC RX 637 (ALT 250 FOR IP): Performed by: EMERGENCY MEDICINE

## 2020-01-01 PROCEDURE — 81001 URINALYSIS AUTO W/SCOPE: CPT

## 2020-01-01 PROCEDURE — 2709999900 HC NON-CHARGEABLE SUPPLY: Performed by: SURGERY

## 2020-01-01 PROCEDURE — 97535 SELF CARE MNGMENT TRAINING: CPT

## 2020-01-01 PROCEDURE — 97530 THERAPEUTIC ACTIVITIES: CPT

## 2020-01-01 PROCEDURE — 97116 GAIT TRAINING THERAPY: CPT

## 2020-01-01 PROCEDURE — 6370000000 HC RX 637 (ALT 250 FOR IP): Performed by: ANESTHESIOLOGY

## 2020-01-01 PROCEDURE — 84133 ASSAY OF URINE POTASSIUM: CPT

## 2020-01-01 PROCEDURE — 87077 CULTURE AEROBIC IDENTIFY: CPT

## 2020-01-01 PROCEDURE — 99285 EMERGENCY DEPT VISIT HI MDM: CPT

## 2020-01-01 PROCEDURE — 87186 SC STD MICRODIL/AGAR DIL: CPT

## 2020-01-01 PROCEDURE — 97168 OT RE-EVAL EST PLAN CARE: CPT

## 2020-01-01 PROCEDURE — C9290 INJ, BUPIVACAINE LIPOSOME: HCPCS | Performed by: THORACIC SURGERY (CARDIOTHORACIC VASCULAR SURGERY)

## 2020-01-01 PROCEDURE — 99238 HOSP IP/OBS DSCHRG MGMT 30/<: CPT | Performed by: INTERNAL MEDICINE

## 2020-01-01 PROCEDURE — 6360000002 HC RX W HCPCS: Performed by: ANESTHESIOLOGY

## 2020-01-01 PROCEDURE — 36556 INSERT NON-TUNNEL CV CATH: CPT

## 2020-01-01 PROCEDURE — 2500000003 HC RX 250 WO HCPCS: Performed by: SURGERY

## 2020-01-01 PROCEDURE — P9035 PLATELET PHERES LEUKOREDUCED: HCPCS

## 2020-01-01 PROCEDURE — 85014 HEMATOCRIT: CPT

## 2020-01-01 PROCEDURE — 96374 THER/PROPH/DIAG INJ IV PUSH: CPT

## 2020-01-01 PROCEDURE — 86850 RBC ANTIBODY SCREEN: CPT

## 2020-01-01 PROCEDURE — 71046 X-RAY EXAM CHEST 2 VIEWS: CPT

## 2020-01-01 PROCEDURE — 86900 BLOOD TYPING SEROLOGIC ABO: CPT

## 2020-01-01 PROCEDURE — 2709999900 HC NON-CHARGEABLE SUPPLY: Performed by: THORACIC SURGERY (CARDIOTHORACIC VASCULAR SURGERY)

## 2020-01-01 PROCEDURE — 2580000003 HC RX 258: Performed by: NURSE PRACTITIONER

## 2020-01-01 PROCEDURE — 99153 MOD SED SAME PHYS/QHP EA: CPT

## 2020-01-01 PROCEDURE — 3600000014 HC SURGERY LEVEL 4 ADDTL 15MIN: Performed by: SURGERY

## 2020-01-01 PROCEDURE — 44005 FREEING OF BOWEL ADHESION: CPT | Performed by: SURGERY

## 2020-01-01 PROCEDURE — 87040 BLOOD CULTURE FOR BACTERIA: CPT

## 2020-01-01 PROCEDURE — 2000000000 HC ICU R&B

## 2020-01-01 PROCEDURE — 021109W BYPASS CORONARY ARTERY, TWO ARTERIES FROM AORTA WITH AUTOLOGOUS VENOUS TISSUE, OPEN APPROACH: ICD-10-PCS | Performed by: THORACIC SURGERY (CARDIOTHORACIC VASCULAR SURGERY)

## 2020-01-01 PROCEDURE — 2720000010 HC SURG SUPPLY STERILE: Performed by: THORACIC SURGERY (CARDIOTHORACIC VASCULAR SURGERY)

## 2020-01-01 PROCEDURE — B2111ZZ FLUOROSCOPY OF MULTIPLE CORONARY ARTERIES USING LOW OSMOLAR CONTRAST: ICD-10-PCS | Performed by: INTERNAL MEDICINE

## 2020-01-01 PROCEDURE — 96375 TX/PRO/DX INJ NEW DRUG ADDON: CPT

## 2020-01-01 PROCEDURE — 6370000000 HC RX 637 (ALT 250 FOR IP): Performed by: HOSPITALIST

## 2020-01-01 PROCEDURE — 83605 ASSAY OF LACTIC ACID: CPT

## 2020-01-01 PROCEDURE — 82947 ASSAY GLUCOSE BLOOD QUANT: CPT

## 2020-01-01 PROCEDURE — 93005 ELECTROCARDIOGRAM TRACING: CPT | Performed by: STUDENT IN AN ORGANIZED HEALTH CARE EDUCATION/TRAINING PROGRAM

## 2020-01-01 PROCEDURE — 94770 HC ETCO2 MONITOR DAILY: CPT

## 2020-01-01 PROCEDURE — 84443 ASSAY THYROID STIM HORMONE: CPT

## 2020-01-01 PROCEDURE — 83935 ASSAY OF URINE OSMOLALITY: CPT

## 2020-01-01 PROCEDURE — U0002 COVID-19 LAB TEST NON-CDC: HCPCS

## 2020-01-01 PROCEDURE — 2580000003 HC RX 258: Performed by: EMERGENCY MEDICINE

## 2020-01-01 PROCEDURE — 2500000003 HC RX 250 WO HCPCS: Performed by: ANESTHESIOLOGY

## 2020-01-01 PROCEDURE — 3600000004 HC SURGERY LEVEL 4 BASE: Performed by: SURGERY

## 2020-01-01 PROCEDURE — 6360000002 HC RX W HCPCS: Performed by: STUDENT IN AN ORGANIZED HEALTH CARE EDUCATION/TRAINING PROGRAM

## 2020-01-01 PROCEDURE — 02HV33Z INSERTION OF INFUSION DEVICE INTO SUPERIOR VENA CAVA, PERCUTANEOUS APPROACH: ICD-10-PCS | Performed by: RADIOLOGY

## 2020-01-01 PROCEDURE — 3700000000 HC ANESTHESIA ATTENDED CARE: Performed by: THORACIC SURGERY (CARDIOTHORACIC VASCULAR SURGERY)

## 2020-01-01 PROCEDURE — 6360000002 HC RX W HCPCS: Performed by: SURGERY

## 2020-01-01 PROCEDURE — 85610 PROTHROMBIN TIME: CPT

## 2020-01-01 PROCEDURE — 84300 ASSAY OF URINE SODIUM: CPT

## 2020-01-01 PROCEDURE — 2580000003 HC RX 258

## 2020-01-01 PROCEDURE — 6360000002 HC RX W HCPCS: Performed by: NURSE PRACTITIONER

## 2020-01-01 PROCEDURE — 77001 FLUOROGUIDE FOR VEIN DEVICE: CPT

## 2020-01-01 PROCEDURE — 94060 EVALUATION OF WHEEZING: CPT

## 2020-01-01 PROCEDURE — 5A1221Z PERFORMANCE OF CARDIAC OUTPUT, CONTINUOUS: ICD-10-PCS | Performed by: THORACIC SURGERY (CARDIOTHORACIC VASCULAR SURGERY)

## 2020-01-01 PROCEDURE — 80076 HEPATIC FUNCTION PANEL: CPT

## 2020-01-01 PROCEDURE — 87449 NOS EACH ORGANISM AG IA: CPT

## 2020-01-01 PROCEDURE — 99284 EMERGENCY DEPT VISIT MOD MDM: CPT

## 2020-01-01 PROCEDURE — 85347 COAGULATION TIME ACTIVATED: CPT

## 2020-01-01 PROCEDURE — 84540 ASSAY OF URINE/UREA-N: CPT

## 2020-01-01 PROCEDURE — 36430 TRANSFUSION BLD/BLD COMPNT: CPT

## 2020-01-01 PROCEDURE — 7100000011 HC PHASE II RECOVERY - ADDTL 15 MIN

## 2020-01-01 PROCEDURE — 99024 POSTOP FOLLOW-UP VISIT: CPT | Performed by: SURGERY

## 2020-01-01 PROCEDURE — 02L70CK OCCLUSION OF LEFT ATRIAL APPENDAGE WITH EXTRALUMINAL DEVICE, OPEN APPROACH: ICD-10-PCS | Performed by: THORACIC SURGERY (CARDIOTHORACIC VASCULAR SURGERY)

## 2020-01-01 PROCEDURE — 92953 TEMPORARY EXTERNAL PACING: CPT

## 2020-01-01 PROCEDURE — 83540 ASSAY OF IRON: CPT

## 2020-01-01 PROCEDURE — 6370000000 HC RX 637 (ALT 250 FOR IP)

## 2020-01-01 PROCEDURE — 36573 INSJ PICC RS&I 5 YR+: CPT

## 2020-01-01 PROCEDURE — 6360000002 HC RX W HCPCS: Performed by: RADIOLOGY

## 2020-01-01 PROCEDURE — P9045 ALBUMIN (HUMAN), 5%, 250 ML: HCPCS | Performed by: THORACIC SURGERY (CARDIOTHORACIC VASCULAR SURGERY)

## 2020-01-01 PROCEDURE — 0JH63XZ INSERTION OF TUNNELED VASCULAR ACCESS DEVICE INTO CHEST SUBCUTANEOUS TISSUE AND FASCIA, PERCUTANEOUS APPROACH: ICD-10-PCS | Performed by: INTERNAL MEDICINE

## 2020-01-01 PROCEDURE — 6370000000 HC RX 637 (ALT 250 FOR IP): Performed by: STUDENT IN AN ORGANIZED HEALTH CARE EDUCATION/TRAINING PROGRAM

## 2020-01-01 PROCEDURE — G0328 FECAL BLOOD SCRN IMMUNOASSAY: HCPCS

## 2020-01-01 PROCEDURE — 3600000008 HC SURGERY OHS BASE: Performed by: THORACIC SURGERY (CARDIOTHORACIC VASCULAR SURGERY)

## 2020-01-01 PROCEDURE — 0DNW0ZZ RELEASE PERITONEUM, OPEN APPROACH: ICD-10-PCS | Performed by: SURGERY

## 2020-01-01 PROCEDURE — 99291 CRITICAL CARE FIRST HOUR: CPT | Performed by: INTERNAL MEDICINE

## 2020-01-01 PROCEDURE — 97164 PT RE-EVAL EST PLAN CARE: CPT

## 2020-01-01 PROCEDURE — 99152 MOD SED SAME PHYS/QHP 5/>YRS: CPT

## 2020-01-01 PROCEDURE — 99231 SBSQ HOSP IP/OBS SF/LOW 25: CPT | Performed by: THORACIC SURGERY (CARDIOTHORACIC VASCULAR SURGERY)

## 2020-01-01 PROCEDURE — 3700000001 HC ADD 15 MINUTES (ANESTHESIA): Performed by: SURGERY

## 2020-01-01 PROCEDURE — 86705 HEP B CORE ANTIBODY IGM: CPT

## 2020-01-01 PROCEDURE — 80202 ASSAY OF VANCOMYCIN: CPT

## 2020-01-01 PROCEDURE — C1894 INTRO/SHEATH, NON-LASER: HCPCS

## 2020-01-01 PROCEDURE — 3700000000 HC ANESTHESIA ATTENDED CARE: Performed by: SURGERY

## 2020-01-01 PROCEDURE — 2500000003 HC RX 250 WO HCPCS: Performed by: INTERNAL MEDICINE

## 2020-01-01 PROCEDURE — 6360000004 HC RX CONTRAST MEDICATION: Performed by: SURGERY

## 2020-01-01 PROCEDURE — 94002 VENT MGMT INPAT INIT DAY: CPT

## 2020-01-01 PROCEDURE — 4A023N8 MEASUREMENT OF CARDIAC SAMPLING AND PRESSURE, BILATERAL, PERCUTANEOUS APPROACH: ICD-10-PCS | Performed by: INTERNAL MEDICINE

## 2020-01-01 PROCEDURE — 2780000010 HC IMPLANT OTHER: Performed by: THORACIC SURGERY (CARDIOTHORACIC VASCULAR SURGERY)

## 2020-01-01 PROCEDURE — 83993 ASSAY FOR CALPROTECTIN FECAL: CPT

## 2020-01-01 PROCEDURE — 86901 BLOOD TYPING SEROLOGIC RH(D): CPT

## 2020-01-01 PROCEDURE — 2580000003 HC RX 258: Performed by: SURGERY

## 2020-01-01 PROCEDURE — 96365 THER/PROPH/DIAG IV INF INIT: CPT

## 2020-01-01 PROCEDURE — 76770 US EXAM ABDO BACK WALL COMP: CPT

## 2020-01-01 PROCEDURE — 99221 1ST HOSP IP/OBS SF/LOW 40: CPT | Performed by: NURSE PRACTITIONER

## 2020-01-01 PROCEDURE — 2709999900 HC NON-CHARGEABLE SUPPLY

## 2020-01-01 PROCEDURE — 2500000003 HC RX 250 WO HCPCS: Performed by: EMERGENCY MEDICINE

## 2020-01-01 PROCEDURE — 74177 CT ABD & PELVIS W/CONTRAST: CPT

## 2020-01-01 PROCEDURE — 93460 R&L HRT ART/VENTRICLE ANGIO: CPT | Performed by: INTERNAL MEDICINE

## 2020-01-01 PROCEDURE — 94750 HC PULMONARY COMPLIANCE STUDY: CPT

## 2020-01-01 PROCEDURE — 82728 ASSAY OF FERRITIN: CPT

## 2020-01-01 PROCEDURE — 87506 IADNA-DNA/RNA PROBE TQ 6-11: CPT

## 2020-01-01 PROCEDURE — 84439 ASSAY OF FREE THYROXINE: CPT

## 2020-01-01 PROCEDURE — 7100000010 HC PHASE II RECOVERY - FIRST 15 MIN

## 2020-01-01 PROCEDURE — 84132 ASSAY OF SERUM POTASSIUM: CPT

## 2020-01-01 PROCEDURE — C1752 CATH,HEMODIALYSIS,SHORT-TERM: HCPCS

## 2020-01-01 PROCEDURE — 33508 ENDOSCOPIC VEIN HARVEST: CPT | Performed by: THORACIC SURGERY (CARDIOTHORACIC VASCULAR SURGERY)

## 2020-01-01 PROCEDURE — 85018 HEMOGLOBIN: CPT

## 2020-01-01 PROCEDURE — 36558 INSERT TUNNELED CV CATH: CPT

## 2020-01-01 PROCEDURE — 83036 HEMOGLOBIN GLYCOSYLATED A1C: CPT

## 2020-01-01 PROCEDURE — 3E0T3BZ INTRODUCTION OF ANESTHETIC AGENT INTO PERIPHERAL NERVES AND PLEXI, PERCUTANEOUS APPROACH: ICD-10-PCS | Performed by: THORACIC SURGERY (CARDIOTHORACIC VASCULAR SURGERY)

## 2020-01-01 PROCEDURE — 99223 1ST HOSP IP/OBS HIGH 75: CPT | Performed by: THORACIC SURGERY (CARDIOTHORACIC VASCULAR SURGERY)

## 2020-01-01 PROCEDURE — 76937 US GUIDE VASCULAR ACCESS: CPT

## 2020-01-01 PROCEDURE — 71250 CT THORAX DX C-: CPT

## 2020-01-01 PROCEDURE — 86923 COMPATIBILITY TEST ELECTRIC: CPT

## 2020-01-01 PROCEDURE — 87340 HEPATITIS B SURFACE AG IA: CPT

## 2020-01-01 DEVICE — ZINACTIVE USE 2540325 DEVICE OCCL CLP L40MM SM FOOTPRINT 1 HND APPL SUTURELESS W/: Type: IMPLANTABLE DEVICE | Site: HEART | Status: FUNCTIONAL

## 2020-01-01 RX ORDER — HYDRALAZINE HYDROCHLORIDE 50 MG/1
100 TABLET, FILM COATED ORAL 3 TIMES DAILY
Status: DISCONTINUED | OUTPATIENT
Start: 2020-01-01 | End: 2020-01-01

## 2020-01-01 RX ORDER — ONDANSETRON 4 MG/1
8 TABLET, ORALLY DISINTEGRATING ORAL ONCE
Status: COMPLETED | OUTPATIENT
Start: 2020-01-01 | End: 2020-01-01

## 2020-01-01 RX ORDER — FUROSEMIDE 10 MG/ML
40 INJECTION INTRAMUSCULAR; INTRAVENOUS 2 TIMES DAILY
Status: DISCONTINUED | OUTPATIENT
Start: 2020-01-01 | End: 2020-01-01

## 2020-01-01 RX ORDER — GEMFIBROZIL 600 MG/1
600 TABLET, FILM COATED ORAL
Status: DISCONTINUED | OUTPATIENT
Start: 2020-01-01 | End: 2020-01-01 | Stop reason: HOSPADM

## 2020-01-01 RX ORDER — ONDANSETRON 2 MG/ML
INJECTION INTRAMUSCULAR; INTRAVENOUS PRN
Status: DISCONTINUED | OUTPATIENT
Start: 2020-01-01 | End: 2020-01-01 | Stop reason: SDUPTHER

## 2020-01-01 RX ORDER — HEPARIN SODIUM 1000 [USP'U]/ML
2600 INJECTION, SOLUTION INTRAVENOUS; SUBCUTANEOUS PRN
Status: DISCONTINUED | OUTPATIENT
Start: 2020-01-01 | End: 2020-01-01

## 2020-01-01 RX ORDER — ACETAMINOPHEN 325 MG/1
650 TABLET ORAL EVERY 6 HOURS PRN
Status: DISCONTINUED | OUTPATIENT
Start: 2020-01-01 | End: 2020-01-01 | Stop reason: HOSPADM

## 2020-01-01 RX ORDER — SENNA PLUS 8.6 MG/1
1 TABLET ORAL DAILY
Status: DISCONTINUED | OUTPATIENT
Start: 2020-01-01 | End: 2020-01-01

## 2020-01-01 RX ORDER — CEFDINIR 300 MG/1
300 CAPSULE ORAL DAILY
Qty: 9 CAPSULE | Refills: 0 | Status: ON HOLD | OUTPATIENT
Start: 2020-01-01 | End: 2020-01-01 | Stop reason: HOSPADM

## 2020-01-01 RX ORDER — HYDROXYZINE HYDROCHLORIDE 10 MG/1
10 TABLET, FILM COATED ORAL ONCE
Status: COMPLETED | OUTPATIENT
Start: 2020-01-01 | End: 2020-01-01

## 2020-01-01 RX ORDER — ACETAMINOPHEN 650 MG/1
650 SUPPOSITORY RECTAL EVERY 6 HOURS PRN
Status: DISCONTINUED | OUTPATIENT
Start: 2020-01-01 | End: 2020-01-01 | Stop reason: HOSPADM

## 2020-01-01 RX ORDER — LAMOTRIGINE 25 MG/1
25 TABLET ORAL 3 TIMES DAILY
Status: DISCONTINUED | OUTPATIENT
Start: 2020-01-01 | End: 2020-01-01 | Stop reason: HOSPADM

## 2020-01-01 RX ORDER — DEXTROSE MONOHYDRATE 50 MG/ML
100 INJECTION, SOLUTION INTRAVENOUS PRN
Status: DISCONTINUED | OUTPATIENT
Start: 2020-01-01 | End: 2020-01-01

## 2020-01-01 RX ORDER — DOXAZOSIN 2 MG/1
1 TABLET ORAL 2 TIMES DAILY
Status: DISCONTINUED | OUTPATIENT
Start: 2020-01-01 | End: 2020-01-01

## 2020-01-01 RX ORDER — SENNA AND DOCUSATE SODIUM 50; 8.6 MG/1; MG/1
1 TABLET, FILM COATED ORAL 2 TIMES DAILY
Status: DISCONTINUED | OUTPATIENT
Start: 2020-01-01 | End: 2020-01-01

## 2020-01-01 RX ORDER — FUROSEMIDE 10 MG/ML
20 INJECTION INTRAMUSCULAR; INTRAVENOUS ONCE
Status: COMPLETED | OUTPATIENT
Start: 2020-01-01 | End: 2020-01-01

## 2020-01-01 RX ORDER — AMLODIPINE BESYLATE 5 MG/1
10 TABLET ORAL DAILY
Status: DISCONTINUED | OUTPATIENT
Start: 2020-01-01 | End: 2020-01-01 | Stop reason: HOSPADM

## 2020-01-01 RX ORDER — PANTOPRAZOLE SODIUM 40 MG/1
40 TABLET, DELAYED RELEASE ORAL
Status: DISCONTINUED | OUTPATIENT
Start: 2020-01-01 | End: 2020-01-01

## 2020-01-01 RX ORDER — IPRATROPIUM BROMIDE AND ALBUTEROL SULFATE 2.5; .5 MG/3ML; MG/3ML
1 SOLUTION RESPIRATORY (INHALATION)
Status: DISCONTINUED | OUTPATIENT
Start: 2020-01-01 | End: 2020-01-01

## 2020-01-01 RX ORDER — FAMOTIDINE 20 MG/1
20 TABLET, FILM COATED ORAL 2 TIMES DAILY
Status: DISCONTINUED | OUTPATIENT
Start: 2020-01-01 | End: 2020-01-01

## 2020-01-01 RX ORDER — SENNA AND DOCUSATE SODIUM 50; 8.6 MG/1; MG/1
2 TABLET, FILM COATED ORAL DAILY
Status: DISCONTINUED | OUTPATIENT
Start: 2020-01-01 | End: 2020-01-01 | Stop reason: HOSPADM

## 2020-01-01 RX ORDER — DOXAZOSIN 2 MG/1
4 TABLET ORAL NIGHTLY
Status: DISCONTINUED | OUTPATIENT
Start: 2020-01-01 | End: 2020-01-01 | Stop reason: HOSPADM

## 2020-01-01 RX ORDER — CHLORHEXIDINE GLUCONATE 4 G/100ML
SOLUTION TOPICAL SEE ADMIN INSTRUCTIONS
Status: DISCONTINUED | OUTPATIENT
Start: 2020-01-01 | End: 2020-01-01

## 2020-01-01 RX ORDER — CEFDINIR 300 MG/1
300 CAPSULE ORAL EVERY 12 HOURS SCHEDULED
Status: DISCONTINUED | OUTPATIENT
Start: 2020-01-01 | End: 2020-01-01 | Stop reason: HOSPADM

## 2020-01-01 RX ORDER — PROCHLORPERAZINE EDISYLATE 5 MG/ML
10 INJECTION INTRAMUSCULAR; INTRAVENOUS EVERY 6 HOURS PRN
Status: DISCONTINUED | OUTPATIENT
Start: 2020-01-01 | End: 2020-01-01 | Stop reason: HOSPADM

## 2020-01-01 RX ORDER — HYDRALAZINE HYDROCHLORIDE 20 MG/ML
5 INJECTION INTRAMUSCULAR; INTRAVENOUS EVERY 5 MIN PRN
Status: DISCONTINUED | OUTPATIENT
Start: 2020-01-01 | End: 2020-01-01

## 2020-01-01 RX ORDER — ISOSORBIDE MONONITRATE 30 MG/1
30 TABLET, EXTENDED RELEASE ORAL DAILY
Status: DISCONTINUED | OUTPATIENT
Start: 2020-01-01 | End: 2020-01-01

## 2020-01-01 RX ORDER — ACETAMINOPHEN 500 MG
500 TABLET ORAL ONCE
Status: COMPLETED | OUTPATIENT
Start: 2020-01-01 | End: 2020-01-01

## 2020-01-01 RX ORDER — ATENOLOL 25 MG/1
25 TABLET ORAL 2 TIMES DAILY
Status: DISCONTINUED | OUTPATIENT
Start: 2020-01-01 | End: 2020-01-01

## 2020-01-01 RX ORDER — HEPARIN SODIUM 1000 [USP'U]/ML
1600 INJECTION, SOLUTION INTRAVENOUS; SUBCUTANEOUS PRN
Status: DISCONTINUED | OUTPATIENT
Start: 2020-01-01 | End: 2020-01-01 | Stop reason: HOSPADM

## 2020-01-01 RX ORDER — 0.9 % SODIUM CHLORIDE 0.9 %
1000 INTRAVENOUS SOLUTION INTRAVENOUS ONCE
Status: COMPLETED | OUTPATIENT
Start: 2020-01-01 | End: 2020-01-01

## 2020-01-01 RX ORDER — LANOLIN ALCOHOL/MO/W.PET/CERES
3 CREAM (GRAM) TOPICAL NIGHTLY PRN
Status: DISCONTINUED | OUTPATIENT
Start: 2020-01-01 | End: 2020-01-01

## 2020-01-01 RX ORDER — ETOMIDATE 2 MG/ML
INJECTION INTRAVENOUS PRN
Status: DISCONTINUED | OUTPATIENT
Start: 2020-01-01 | End: 2020-01-01 | Stop reason: SDUPTHER

## 2020-01-01 RX ORDER — POTASSIUM CHLORIDE 29.8 MG/ML
20 INJECTION INTRAVENOUS PRN
Status: DISCONTINUED | OUTPATIENT
Start: 2020-01-01 | End: 2020-01-01

## 2020-01-01 RX ORDER — ONDANSETRON 2 MG/ML
4 INJECTION INTRAMUSCULAR; INTRAVENOUS EVERY 6 HOURS PRN
Status: DISCONTINUED | OUTPATIENT
Start: 2020-01-01 | End: 2020-01-01 | Stop reason: HOSPADM

## 2020-01-01 RX ORDER — ASPIRIN 81 MG/1
81 TABLET, CHEWABLE ORAL DAILY
Status: DISCONTINUED | OUTPATIENT
Start: 2020-01-01 | End: 2020-01-01

## 2020-01-01 RX ORDER — CHLORHEXIDINE GLUCONATE 0.12 MG/ML
15 RINSE ORAL 2 TIMES DAILY
Status: DISCONTINUED | OUTPATIENT
Start: 2020-01-01 | End: 2020-01-01

## 2020-01-01 RX ORDER — FAMOTIDINE 20 MG/1
20 TABLET, FILM COATED ORAL DAILY
Status: DISCONTINUED | OUTPATIENT
Start: 2020-01-01 | End: 2020-01-01

## 2020-01-01 RX ORDER — CARVEDILOL 6.25 MG/1
12.5 TABLET ORAL 2 TIMES DAILY WITH MEALS
Status: DISCONTINUED | OUTPATIENT
Start: 2020-01-01 | End: 2020-01-01

## 2020-01-01 RX ORDER — ROCURONIUM BROMIDE 10 MG/ML
INJECTION, SOLUTION INTRAVENOUS PRN
Status: DISCONTINUED | OUTPATIENT
Start: 2020-01-01 | End: 2020-01-01 | Stop reason: SDUPTHER

## 2020-01-01 RX ORDER — HEPARIN SODIUM 1000 [USP'U]/ML
INJECTION, SOLUTION INTRAVENOUS; SUBCUTANEOUS
Status: DISPENSED
Start: 2020-01-01 | End: 2020-01-01

## 2020-01-01 RX ORDER — MIDODRINE HYDROCHLORIDE 5 MG/1
10 TABLET ORAL PRN
Status: DISCONTINUED | OUTPATIENT
Start: 2020-01-01 | End: 2020-01-01 | Stop reason: HOSPADM

## 2020-01-01 RX ORDER — HYDRALAZINE HYDROCHLORIDE 25 MG/1
25 TABLET, FILM COATED ORAL EVERY 6 HOURS PRN
Status: DISCONTINUED | OUTPATIENT
Start: 2020-01-01 | End: 2020-01-01 | Stop reason: HOSPADM

## 2020-01-01 RX ORDER — MAGNESIUM SULFATE IN WATER 40 MG/ML
2 INJECTION, SOLUTION INTRAVENOUS PRN
Status: DISCONTINUED | OUTPATIENT
Start: 2020-01-01 | End: 2020-01-01

## 2020-01-01 RX ORDER — HYDRALAZINE HYDROCHLORIDE 100 MG/1
100 TABLET, FILM COATED ORAL 3 TIMES DAILY
Qty: 90 TABLET | Refills: 0 | Status: SHIPPED | OUTPATIENT
Start: 2020-01-01

## 2020-01-01 RX ORDER — DOXAZOSIN 2 MG/1
2 TABLET ORAL NIGHTLY
Status: DISCONTINUED | OUTPATIENT
Start: 2020-01-01 | End: 2020-01-01 | Stop reason: HOSPADM

## 2020-01-01 RX ORDER — ACETAMINOPHEN 325 MG/1
650 TABLET ORAL EVERY 4 HOURS PRN
Status: DISCONTINUED | OUTPATIENT
Start: 2020-01-01 | End: 2020-01-01

## 2020-01-01 RX ORDER — CLONAZEPAM 0.5 MG/1
0.5 TABLET ORAL 3 TIMES DAILY PRN
Status: DISCONTINUED | OUTPATIENT
Start: 2020-01-01 | End: 2020-01-01

## 2020-01-01 RX ORDER — PAROXETINE HYDROCHLORIDE 20 MG/1
40 TABLET, FILM COATED ORAL EVERY MORNING
Qty: 30 TABLET | Refills: 3
Start: 2020-01-01

## 2020-01-01 RX ORDER — HYDRALAZINE HYDROCHLORIDE 50 MG/1
50 TABLET, FILM COATED ORAL EVERY 8 HOURS SCHEDULED
Status: DISCONTINUED | OUTPATIENT
Start: 2020-01-01 | End: 2020-01-01

## 2020-01-01 RX ORDER — ALBUTEROL SULFATE 2.5 MG/3ML
2.5 SOLUTION RESPIRATORY (INHALATION) EVERY 4 HOURS PRN
Status: DISCONTINUED | OUTPATIENT
Start: 2020-01-01 | End: 2020-01-01 | Stop reason: HOSPADM

## 2020-01-01 RX ORDER — FUROSEMIDE 10 MG/ML
60 INJECTION INTRAMUSCULAR; INTRAVENOUS ONCE
Status: COMPLETED | OUTPATIENT
Start: 2020-01-01 | End: 2020-01-01

## 2020-01-01 RX ORDER — CALCIUM CHLORIDE 100 MG/ML
INJECTION INTRAVENOUS; INTRAVENTRICULAR PRN
Status: DISCONTINUED | OUTPATIENT
Start: 2020-01-01 | End: 2020-01-01 | Stop reason: SDUPTHER

## 2020-01-01 RX ORDER — SODIUM CHLORIDE 9 MG/ML
INJECTION, SOLUTION INTRAVENOUS CONTINUOUS
Status: DISCONTINUED | OUTPATIENT
Start: 2020-01-01 | End: 2020-01-01

## 2020-01-01 RX ORDER — PROMETHAZINE HYDROCHLORIDE 25 MG/1
12.5 TABLET ORAL EVERY 6 HOURS PRN
Status: DISCONTINUED | OUTPATIENT
Start: 2020-01-01 | End: 2020-01-01 | Stop reason: HOSPADM

## 2020-01-01 RX ORDER — ONDANSETRON 2 MG/ML
4 INJECTION INTRAMUSCULAR; INTRAVENOUS EVERY 8 HOURS PRN
Status: DISCONTINUED | OUTPATIENT
Start: 2020-01-01 | End: 2020-01-01

## 2020-01-01 RX ORDER — HYDRALAZINE HYDROCHLORIDE 50 MG/1
50 TABLET, FILM COATED ORAL EVERY 8 HOURS SCHEDULED
Status: DISCONTINUED | OUTPATIENT
Start: 2020-01-01 | End: 2020-01-01 | Stop reason: HOSPADM

## 2020-01-01 RX ORDER — PAROXETINE HYDROCHLORIDE 20 MG/1
40 TABLET, FILM COATED ORAL EVERY MORNING
Status: DISCONTINUED | OUTPATIENT
Start: 2020-01-01 | End: 2020-01-01 | Stop reason: HOSPADM

## 2020-01-01 RX ORDER — DOXAZOSIN MESYLATE 4 MG/1
4 TABLET ORAL 2 TIMES DAILY
COMMUNITY

## 2020-01-01 RX ORDER — DEXTROSE MONOHYDRATE 25 G/50ML
12.5 INJECTION, SOLUTION INTRAVENOUS PRN
Status: DISCONTINUED | OUTPATIENT
Start: 2020-01-01 | End: 2020-01-01

## 2020-01-01 RX ORDER — SODIUM CHLORIDE 0.9 % (FLUSH) 0.9 %
10 SYRINGE (ML) INJECTION PRN
Status: DISCONTINUED | OUTPATIENT
Start: 2020-01-01 | End: 2020-01-01 | Stop reason: HOSPADM

## 2020-01-01 RX ORDER — HEPARIN SODIUM 10000 [USP'U]/100ML
5.2 INJECTION, SOLUTION INTRAVENOUS CONTINUOUS
Status: DISCONTINUED | OUTPATIENT
Start: 2020-01-01 | End: 2020-01-01 | Stop reason: HOSPADM

## 2020-01-01 RX ORDER — INSULIN GLARGINE 100 [IU]/ML
0.25 INJECTION, SOLUTION SUBCUTANEOUS NIGHTLY
Status: DISCONTINUED | OUTPATIENT
Start: 2020-01-01 | End: 2020-01-01

## 2020-01-01 RX ORDER — SODIUM CHLORIDE 9 MG/ML
INJECTION, SOLUTION INTRAVENOUS
Status: COMPLETED
Start: 2020-01-01 | End: 2020-01-01

## 2020-01-01 RX ORDER — PRIMIDONE 50 MG/1
100 TABLET ORAL DAILY
Status: DISCONTINUED | OUTPATIENT
Start: 2020-01-01 | End: 2020-01-01 | Stop reason: HOSPADM

## 2020-01-01 RX ORDER — ASPIRIN 81 MG/1
81 TABLET ORAL DAILY
Status: DISCONTINUED | OUTPATIENT
Start: 2020-01-01 | End: 2020-01-01

## 2020-01-01 RX ORDER — SODIUM CHLORIDE 0.9 % (FLUSH) 0.9 %
10 SYRINGE (ML) INJECTION EVERY 12 HOURS SCHEDULED
Status: DISCONTINUED | OUTPATIENT
Start: 2020-01-01 | End: 2020-01-01 | Stop reason: HOSPADM

## 2020-01-01 RX ORDER — ONDANSETRON HYDROCHLORIDE 8 MG/1
8 TABLET, FILM COATED ORAL EVERY 8 HOURS PRN
Qty: 10 TABLET | Refills: 0 | Status: ON HOLD | OUTPATIENT
Start: 2020-01-01 | End: 2020-01-01 | Stop reason: HOSPADM

## 2020-01-01 RX ORDER — ACETAMINOPHEN 325 MG/1
650 TABLET ORAL EVERY 6 HOURS SCHEDULED
Status: DISCONTINUED | OUTPATIENT
Start: 2020-01-01 | End: 2020-01-01

## 2020-01-01 RX ORDER — HEPARIN SODIUM 5000 [USP'U]/ML
5000 INJECTION, SOLUTION INTRAVENOUS; SUBCUTANEOUS EVERY 8 HOURS SCHEDULED
Status: DISCONTINUED | OUTPATIENT
Start: 2020-01-01 | End: 2020-01-01

## 2020-01-01 RX ORDER — ASPIRIN 81 MG/1
81 TABLET ORAL ONCE
Status: COMPLETED | OUTPATIENT
Start: 2020-01-01 | End: 2020-01-01

## 2020-01-01 RX ORDER — DOXAZOSIN 2 MG/1
4 TABLET ORAL 2 TIMES DAILY
Status: DISCONTINUED | OUTPATIENT
Start: 2020-01-01 | End: 2020-01-01 | Stop reason: HOSPADM

## 2020-01-01 RX ORDER — GEMFIBROZIL 600 MG/1
600 TABLET, FILM COATED ORAL
Status: DISCONTINUED | OUTPATIENT
Start: 2020-01-01 | End: 2020-01-01 | Stop reason: DRUGHIGH

## 2020-01-01 RX ORDER — SUCCINYLCHOLINE CHLORIDE 20 MG/ML
INJECTION INTRAMUSCULAR; INTRAVENOUS PRN
Status: DISCONTINUED | OUTPATIENT
Start: 2020-01-01 | End: 2020-01-01 | Stop reason: SDUPTHER

## 2020-01-01 RX ORDER — LAMOTRIGINE 25 MG/1
25 TABLET ORAL 3 TIMES DAILY
COMMUNITY

## 2020-01-01 RX ORDER — IPRATROPIUM BROMIDE AND ALBUTEROL SULFATE 2.5; .5 MG/3ML; MG/3ML
1 SOLUTION RESPIRATORY (INHALATION) EVERY 4 HOURS PRN
Status: DISCONTINUED | OUTPATIENT
Start: 2020-01-01 | End: 2020-01-01 | Stop reason: HOSPADM

## 2020-01-01 RX ORDER — SODIUM CHLORIDE 9 MG/ML
INJECTION, SOLUTION INTRAVENOUS CONTINUOUS
Status: DISCONTINUED | OUTPATIENT
Start: 2020-01-01 | End: 2020-01-01 | Stop reason: HOSPADM

## 2020-01-01 RX ORDER — UBIDECARENONE 75 MG
100 CAPSULE ORAL DAILY
Status: DISCONTINUED | OUTPATIENT
Start: 2020-01-01 | End: 2020-01-01 | Stop reason: HOSPADM

## 2020-01-01 RX ORDER — ONDANSETRON 4 MG/1
4 TABLET, ORALLY DISINTEGRATING ORAL EVERY 8 HOURS PRN
Status: DISCONTINUED | OUTPATIENT
Start: 2020-01-01 | End: 2020-01-01 | Stop reason: HOSPADM

## 2020-01-01 RX ORDER — LAMOTRIGINE 25 MG/1
2 TABLET ORAL DAILY
Status: ON HOLD | COMMUNITY
End: 2020-01-01

## 2020-01-01 RX ORDER — FAMOTIDINE 20 MG/1
20 TABLET, FILM COATED ORAL DAILY PRN
Status: DISCONTINUED | OUTPATIENT
Start: 2020-01-01 | End: 2020-01-01 | Stop reason: HOSPADM

## 2020-01-01 RX ORDER — SODIUM BICARBONATE 650 MG/1
650 TABLET ORAL 2 TIMES DAILY
Status: DISCONTINUED | OUTPATIENT
Start: 2020-01-01 | End: 2020-01-01

## 2020-01-01 RX ORDER — CLONIDINE 0.2 MG/24H
1 PATCH, EXTENDED RELEASE TRANSDERMAL WEEKLY
Qty: 4 PATCH | Refills: 1 | Status: SHIPPED | OUTPATIENT
Start: 2020-01-01

## 2020-01-01 RX ORDER — HEPARIN SODIUM 1000 [USP'U]/ML
3100 INJECTION, SOLUTION INTRAVENOUS; SUBCUTANEOUS ONCE
Status: COMPLETED | OUTPATIENT
Start: 2020-01-01 | End: 2020-01-01

## 2020-01-01 RX ORDER — MORPHINE SULFATE 4 MG/ML
4 INJECTION, SOLUTION INTRAMUSCULAR; INTRAVENOUS
Status: DISCONTINUED | OUTPATIENT
Start: 2020-01-01 | End: 2020-01-01 | Stop reason: HOSPADM

## 2020-01-01 RX ORDER — CARVEDILOL 3.12 MG/1
3.12 TABLET ORAL ONCE
Status: COMPLETED | OUTPATIENT
Start: 2020-01-01 | End: 2020-01-01

## 2020-01-01 RX ORDER — CALCIUM GLUCONATE 94 MG/ML
1 INJECTION, SOLUTION INTRAVENOUS ONCE
Status: COMPLETED | OUTPATIENT
Start: 2020-01-01 | End: 2020-01-01

## 2020-01-01 RX ORDER — CEFDINIR 300 MG/1
300 CAPSULE ORAL 2 TIMES DAILY
Qty: 20 CAPSULE | Refills: 0 | Status: ON HOLD | OUTPATIENT
Start: 2020-01-01 | End: 2020-01-01 | Stop reason: HOSPADM

## 2020-01-01 RX ORDER — POTASSIUM CHLORIDE 20 MEQ/1
40 TABLET, EXTENDED RELEASE ORAL ONCE
Status: COMPLETED | OUTPATIENT
Start: 2020-01-01 | End: 2020-01-01

## 2020-01-01 RX ORDER — HEPARIN SODIUM 1000 [USP'U]/ML
3100 INJECTION, SOLUTION INTRAVENOUS; SUBCUTANEOUS PRN
Status: DISCONTINUED | OUTPATIENT
Start: 2020-01-01 | End: 2020-01-01 | Stop reason: HOSPADM

## 2020-01-01 RX ORDER — POTASSIUM CHLORIDE 750 MG/1
10 TABLET, EXTENDED RELEASE ORAL
Status: DISCONTINUED | OUTPATIENT
Start: 2020-01-01 | End: 2020-01-01

## 2020-01-01 RX ORDER — FENTANYL CITRATE 50 UG/ML
INJECTION, SOLUTION INTRAMUSCULAR; INTRAVENOUS PRN
Status: DISCONTINUED | OUTPATIENT
Start: 2020-01-01 | End: 2020-01-01

## 2020-01-01 RX ORDER — HEPARIN SODIUM 1000 [USP'U]/ML
3800 INJECTION, SOLUTION INTRAVENOUS; SUBCUTANEOUS PRN
Status: DISCONTINUED | OUTPATIENT
Start: 2020-01-01 | End: 2020-01-01

## 2020-01-01 RX ORDER — MIDAZOLAM HYDROCHLORIDE 1 MG/ML
INJECTION INTRAMUSCULAR; INTRAVENOUS PRN
Status: DISCONTINUED | OUTPATIENT
Start: 2020-01-01 | End: 2020-01-01 | Stop reason: SDUPTHER

## 2020-01-01 RX ORDER — HEPARIN SODIUM 1000 [USP'U]/ML
INJECTION, SOLUTION INTRAVENOUS; SUBCUTANEOUS
Status: COMPLETED | OUTPATIENT
Start: 2020-01-01 | End: 2020-01-01

## 2020-01-01 RX ORDER — LAMOTRIGINE 25 MG/1
25 TABLET ORAL 4 TIMES DAILY
Status: DISCONTINUED | OUTPATIENT
Start: 2020-01-01 | End: 2020-01-01 | Stop reason: HOSPADM

## 2020-01-01 RX ORDER — SODIUM CHLORIDE 9 MG/ML
INJECTION, SOLUTION INTRAVENOUS
Status: DISPENSED
Start: 2020-01-01 | End: 2020-01-01

## 2020-01-01 RX ORDER — DEXTROSE MONOHYDRATE 25 G/50ML
25 INJECTION, SOLUTION INTRAVENOUS ONCE
Status: COMPLETED | OUTPATIENT
Start: 2020-01-01 | End: 2020-01-01

## 2020-01-01 RX ORDER — ALBUMIN (HUMAN) 12.5 G/50ML
12.5 SOLUTION INTRAVENOUS PRN
Status: DISCONTINUED | OUTPATIENT
Start: 2020-01-01 | End: 2020-01-01 | Stop reason: HOSPADM

## 2020-01-01 RX ORDER — GEMFIBROZIL 600 MG/1
600 TABLET, FILM COATED ORAL
Status: DISCONTINUED | OUTPATIENT
Start: 2020-01-01 | End: 2020-01-01

## 2020-01-01 RX ORDER — VENLAFAXINE HYDROCHLORIDE 75 MG/1
75 CAPSULE, EXTENDED RELEASE ORAL DAILY
Qty: 30 CAPSULE | Refills: 1 | Status: SHIPPED | OUTPATIENT
Start: 2020-01-01 | End: 2020-01-01

## 2020-01-01 RX ORDER — ACETAMINOPHEN 500 MG
1000 TABLET ORAL ONCE
Status: COMPLETED | OUTPATIENT
Start: 2020-01-01 | End: 2020-01-01

## 2020-01-01 RX ORDER — ALBUTEROL SULFATE 2.5 MG/3ML
2.5 SOLUTION RESPIRATORY (INHALATION)
Status: DISCONTINUED | OUTPATIENT
Start: 2020-01-01 | End: 2020-01-01 | Stop reason: HOSPADM

## 2020-01-01 RX ORDER — LORAZEPAM 1 MG/1
0.5 TABLET ORAL ONCE
Status: COMPLETED | OUTPATIENT
Start: 2020-01-01 | End: 2020-01-01

## 2020-01-01 RX ORDER — ASPIRIN 81 MG/1
324 TABLET, CHEWABLE ORAL ONCE
Status: COMPLETED | OUTPATIENT
Start: 2020-01-01 | End: 2020-01-01

## 2020-01-01 RX ORDER — FLUTICASONE PROPIONATE 50 MCG
1 SPRAY, SUSPENSION (ML) NASAL DAILY
Status: DISCONTINUED | OUTPATIENT
Start: 2020-01-01 | End: 2020-01-01 | Stop reason: HOSPADM

## 2020-01-01 RX ORDER — DOXAZOSIN 2 MG/1
4 TABLET ORAL 2 TIMES DAILY
Status: DISCONTINUED | OUTPATIENT
Start: 2020-01-01 | End: 2020-01-01

## 2020-01-01 RX ORDER — METOPROLOL TARTRATE 5 MG/5ML
2.5 INJECTION INTRAVENOUS EVERY 10 MIN PRN
Status: DISCONTINUED | OUTPATIENT
Start: 2020-01-01 | End: 2020-01-01

## 2020-01-01 RX ORDER — FENTANYL CITRATE 50 UG/ML
INJECTION, SOLUTION INTRAMUSCULAR; INTRAVENOUS PRN
Status: DISCONTINUED | OUTPATIENT
Start: 2020-01-01 | End: 2020-01-01 | Stop reason: SDUPTHER

## 2020-01-01 RX ORDER — CLONAZEPAM 0.5 MG/1
0.5 TABLET ORAL 2 TIMES DAILY PRN
Status: DISCONTINUED | OUTPATIENT
Start: 2020-01-01 | End: 2020-01-01

## 2020-01-01 RX ORDER — POLYETHYLENE GLYCOL 3350 17 G/17G
17 POWDER, FOR SOLUTION ORAL DAILY PRN
Status: DISCONTINUED | OUTPATIENT
Start: 2020-01-01 | End: 2020-01-01 | Stop reason: HOSPADM

## 2020-01-01 RX ORDER — SODIUM CHLORIDE 0.9 % (FLUSH) 0.9 %
10 SYRINGE (ML) INJECTION EVERY 12 HOURS SCHEDULED
Status: DISCONTINUED | OUTPATIENT
Start: 2020-01-01 | End: 2020-01-01

## 2020-01-01 RX ORDER — BISACODYL 10 MG
10 SUPPOSITORY, RECTAL RECTAL ONCE
Status: DISCONTINUED | OUTPATIENT
Start: 2020-01-01 | End: 2020-01-01

## 2020-01-01 RX ORDER — CHOLESTYRAMINE 4 G/9G
1 POWDER, FOR SUSPENSION ORAL 2 TIMES DAILY
Status: DISCONTINUED | OUTPATIENT
Start: 2020-01-01 | End: 2020-01-01

## 2020-01-01 RX ORDER — SODIUM CHLORIDE 9 MG/ML
INJECTION, SOLUTION INTRAVENOUS
Status: DISCONTINUED
Start: 2020-01-01 | End: 2020-01-01

## 2020-01-01 RX ORDER — CLONIDINE HYDROCHLORIDE 0.1 MG/1
0.2 TABLET ORAL 2 TIMES DAILY
Status: DISCONTINUED | OUTPATIENT
Start: 2020-01-01 | End: 2020-01-01

## 2020-01-01 RX ORDER — OXYCODONE HYDROCHLORIDE AND ACETAMINOPHEN 5; 325 MG/1; MG/1
1 TABLET ORAL ONCE
Status: COMPLETED | OUTPATIENT
Start: 2020-01-01 | End: 2020-01-01

## 2020-01-01 RX ORDER — M-VIT,TX,IRON,MINS/CALC/FOLIC 27MG-0.4MG
1 TABLET ORAL DAILY
Status: DISCONTINUED | OUTPATIENT
Start: 2020-01-01 | End: 2020-01-01 | Stop reason: HOSPADM

## 2020-01-01 RX ORDER — PAROXETINE HYDROCHLORIDE 20 MG/1
40 TABLET, FILM COATED ORAL EVERY MORNING
Status: DISCONTINUED | OUTPATIENT
Start: 2020-01-01 | End: 2020-01-01

## 2020-01-01 RX ORDER — MIDAZOLAM HYDROCHLORIDE 5 MG/ML
INJECTION INTRAMUSCULAR; INTRAVENOUS
Status: COMPLETED | OUTPATIENT
Start: 2020-01-01 | End: 2020-01-01

## 2020-01-01 RX ORDER — ATORVASTATIN CALCIUM 40 MG/1
40 TABLET, FILM COATED ORAL NIGHTLY
Status: DISCONTINUED | OUTPATIENT
Start: 2020-01-01 | End: 2020-01-01 | Stop reason: HOSPADM

## 2020-01-01 RX ORDER — SODIUM BICARBONATE 650 MG/1
650 TABLET ORAL 2 TIMES DAILY
Qty: 60 TABLET | Refills: 2 | Status: ON HOLD | OUTPATIENT
Start: 2020-01-01 | End: 2020-01-01 | Stop reason: HOSPADM

## 2020-01-01 RX ORDER — SENNA AND DOCUSATE SODIUM 50; 8.6 MG/1; MG/1
1 TABLET, FILM COATED ORAL DAILY
Status: DISCONTINUED | OUTPATIENT
Start: 2020-01-01 | End: 2020-01-01

## 2020-01-01 RX ORDER — PHENYLEPHRINE HCL IN 0.9% NACL 1 MG/10 ML
SYRINGE (ML) INTRAVENOUS PRN
Status: DISCONTINUED | OUTPATIENT
Start: 2020-01-01 | End: 2020-01-01 | Stop reason: SDUPTHER

## 2020-01-01 RX ORDER — LORAZEPAM 0.5 MG/1
0.5 TABLET ORAL ONCE
Status: COMPLETED | OUTPATIENT
Start: 2020-01-01 | End: 2020-01-01

## 2020-01-01 RX ORDER — 0.9 % SODIUM CHLORIDE 0.9 %
100 INTRAVENOUS SOLUTION INTRAVENOUS PRN
Status: DISCONTINUED | OUTPATIENT
Start: 2020-01-01 | End: 2020-01-01 | Stop reason: HOSPADM

## 2020-01-01 RX ORDER — DOXAZOSIN 2 MG/1
2 TABLET ORAL NIGHTLY
Status: DISCONTINUED | OUTPATIENT
Start: 2020-01-01 | End: 2020-01-01

## 2020-01-01 RX ORDER — ALBUMIN, HUMAN INJ 5% 5 %
25 SOLUTION INTRAVENOUS PRN
Status: DISCONTINUED | OUTPATIENT
Start: 2020-01-01 | End: 2020-01-01

## 2020-01-01 RX ORDER — ISOSORBIDE MONONITRATE 30 MG/1
30 TABLET, EXTENDED RELEASE ORAL DAILY
Qty: 30 TABLET | Refills: 3 | Status: SHIPPED | OUTPATIENT
Start: 2020-01-01

## 2020-01-01 RX ORDER — PAROXETINE HYDROCHLORIDE 20 MG/1
60 TABLET, FILM COATED ORAL EVERY MORNING
Status: DISCONTINUED | OUTPATIENT
Start: 2020-01-01 | End: 2020-01-01 | Stop reason: HOSPADM

## 2020-01-01 RX ORDER — PANTOPRAZOLE SODIUM 40 MG/1
40 TABLET, DELAYED RELEASE ORAL
Status: DISCONTINUED | OUTPATIENT
Start: 2020-01-01 | End: 2020-01-01 | Stop reason: HOSPADM

## 2020-01-01 RX ORDER — CARVEDILOL 6.25 MG/1
12.5 TABLET ORAL 2 TIMES DAILY WITH MEALS
Status: DISCONTINUED | OUTPATIENT
Start: 2020-01-01 | End: 2020-01-01 | Stop reason: HOSPADM

## 2020-01-01 RX ORDER — SODIUM CHLORIDE 9 MG/ML
INJECTION, SOLUTION INTRAVENOUS CONTINUOUS PRN
Status: DISCONTINUED | OUTPATIENT
Start: 2020-01-01 | End: 2020-01-01 | Stop reason: SDUPTHER

## 2020-01-01 RX ORDER — ALBUTEROL SULFATE 2.5 MG/3ML
2.5 SOLUTION RESPIRATORY (INHALATION)
Status: DISCONTINUED | OUTPATIENT
Start: 2020-01-01 | End: 2020-01-01

## 2020-01-01 RX ORDER — ATENOLOL 50 MG/1
50 TABLET ORAL 2 TIMES DAILY
Status: DISCONTINUED | OUTPATIENT
Start: 2020-01-01 | End: 2020-01-01 | Stop reason: HOSPADM

## 2020-01-01 RX ORDER — HEPARIN SODIUM 1000 [USP'U]/ML
3800 INJECTION, SOLUTION INTRAVENOUS; SUBCUTANEOUS PRN
Status: DISCONTINUED | OUTPATIENT
Start: 2020-01-01 | End: 2020-01-01 | Stop reason: HOSPADM

## 2020-01-01 RX ORDER — 0.9 % SODIUM CHLORIDE 0.9 %
20 INTRAVENOUS SOLUTION INTRAVENOUS ONCE
Status: COMPLETED | OUTPATIENT
Start: 2020-01-01 | End: 2020-01-01

## 2020-01-01 RX ORDER — ASPIRIN 325 MG
325 TABLET ORAL ONCE
Status: COMPLETED | OUTPATIENT
Start: 2020-01-01 | End: 2020-01-01

## 2020-01-01 RX ORDER — DOXAZOSIN 2 MG/1
2 TABLET ORAL 2 TIMES DAILY
Status: DISCONTINUED | OUTPATIENT
Start: 2020-01-01 | End: 2020-01-01

## 2020-01-01 RX ORDER — MORPHINE SULFATE 2 MG/ML
2 INJECTION, SOLUTION INTRAMUSCULAR; INTRAVENOUS
Status: DISCONTINUED | OUTPATIENT
Start: 2020-01-01 | End: 2020-01-01 | Stop reason: HOSPADM

## 2020-01-01 RX ORDER — HEPARIN SODIUM 10000 [USP'U]/100ML
620 INJECTION, SOLUTION INTRAVENOUS CONTINUOUS
Status: DISCONTINUED | OUTPATIENT
Start: 2020-01-01 | End: 2020-01-01

## 2020-01-01 RX ORDER — POTASSIUM CHLORIDE 7.45 MG/ML
10 INJECTION INTRAVENOUS PRN
Status: DISCONTINUED | OUTPATIENT
Start: 2020-01-01 | End: 2020-01-01

## 2020-01-01 RX ORDER — HYDRALAZINE HYDROCHLORIDE 50 MG/1
50 TABLET, FILM COATED ORAL 3 TIMES DAILY
Status: DISCONTINUED | OUTPATIENT
Start: 2020-01-01 | End: 2020-01-01

## 2020-01-01 RX ORDER — HYDRALAZINE HYDROCHLORIDE 20 MG/ML
INJECTION INTRAMUSCULAR; INTRAVENOUS
Status: COMPLETED
Start: 2020-01-01 | End: 2020-01-01

## 2020-01-01 RX ORDER — ISOSORBIDE MONONITRATE 30 MG/1
30 TABLET, EXTENDED RELEASE ORAL DAILY
Status: DISCONTINUED | OUTPATIENT
Start: 2020-01-01 | End: 2020-01-01 | Stop reason: HOSPADM

## 2020-01-01 RX ORDER — AMINOCAPROIC ACID 250 MG/ML
INJECTION, SOLUTION INTRAVENOUS PRN
Status: DISCONTINUED | OUTPATIENT
Start: 2020-01-01 | End: 2020-01-01 | Stop reason: SDUPTHER

## 2020-01-01 RX ORDER — ASPIRIN 300 MG/1
300 SUPPOSITORY RECTAL DAILY
Status: ACTIVE | OUTPATIENT
Start: 2020-01-01 | End: 2020-01-01

## 2020-01-01 RX ORDER — HEPARIN SODIUM 5000 [USP'U]/ML
5000 INJECTION, SOLUTION INTRAVENOUS; SUBCUTANEOUS EVERY 8 HOURS SCHEDULED
Status: DISCONTINUED | OUTPATIENT
Start: 2020-01-01 | End: 2020-01-01 | Stop reason: HOSPADM

## 2020-01-01 RX ORDER — NICOTINE POLACRILEX 4 MG
15 LOZENGE BUCCAL PRN
Status: DISCONTINUED | OUTPATIENT
Start: 2020-01-01 | End: 2020-01-01

## 2020-01-01 RX ORDER — ACETAMINOPHEN 325 MG/1
650 TABLET ORAL ONCE
Status: COMPLETED | OUTPATIENT
Start: 2020-01-01 | End: 2020-01-01

## 2020-01-01 RX ORDER — HYDROXYZINE PAMOATE 25 MG/1
25 CAPSULE ORAL EVERY 6 HOURS PRN
Status: DISCONTINUED | OUTPATIENT
Start: 2020-01-01 | End: 2020-01-01 | Stop reason: HOSPADM

## 2020-01-01 RX ORDER — CEFDINIR 300 MG/1
300 CAPSULE ORAL DAILY
Status: DISCONTINUED | OUTPATIENT
Start: 2020-01-01 | End: 2020-01-01 | Stop reason: HOSPADM

## 2020-01-01 RX ORDER — SODIUM BICARBONATE 650 MG/1
650 TABLET ORAL 2 TIMES DAILY
Status: DISCONTINUED | OUTPATIENT
Start: 2020-01-01 | End: 2020-01-01 | Stop reason: HOSPADM

## 2020-01-01 RX ORDER — ALBUTEROL SULFATE 90 UG/1
AEROSOL, METERED RESPIRATORY (INHALATION) PRN
Status: DISCONTINUED | OUTPATIENT
Start: 2020-01-01 | End: 2020-01-01 | Stop reason: SDUPTHER

## 2020-01-01 RX ORDER — CHOLECALCIFEROL (VITAMIN D3) 125 MCG
5 CAPSULE ORAL NIGHTLY PRN
Status: DISCONTINUED | OUTPATIENT
Start: 2020-01-01 | End: 2020-01-01

## 2020-01-01 RX ORDER — DOXAZOSIN MESYLATE 4 MG/1
4 TABLET ORAL NIGHTLY
Qty: 30 TABLET | Refills: 0 | Status: ON HOLD | OUTPATIENT
Start: 2020-01-01 | End: 2020-01-01

## 2020-01-01 RX ORDER — HEPARIN SODIUM 1000 [USP'U]/ML
3100 INJECTION, SOLUTION INTRAVENOUS; SUBCUTANEOUS ONCE
Status: DISCONTINUED | OUTPATIENT
Start: 2020-01-01 | End: 2020-01-01

## 2020-01-01 RX ORDER — PROPOFOL 10 MG/ML
INJECTION, EMULSION INTRAVENOUS PRN
Status: DISCONTINUED | OUTPATIENT
Start: 2020-01-01 | End: 2020-01-01 | Stop reason: SDUPTHER

## 2020-01-01 RX ORDER — IPRATROPIUM BROMIDE AND ALBUTEROL SULFATE 2.5; .5 MG/3ML; MG/3ML
1 SOLUTION RESPIRATORY (INHALATION) 2 TIMES DAILY
Status: DISCONTINUED | OUTPATIENT
Start: 2020-01-01 | End: 2020-01-01 | Stop reason: HOSPADM

## 2020-01-01 RX ORDER — ASPIRIN 325 MG
325 TABLET ORAL DAILY
Status: DISCONTINUED | OUTPATIENT
Start: 2020-01-01 | End: 2020-01-01 | Stop reason: HOSPADM

## 2020-01-01 RX ORDER — PROTAMINE SULFATE 10 MG/ML
50 INJECTION, SOLUTION INTRAVENOUS
Status: ACTIVE | OUTPATIENT
Start: 2020-01-01 | End: 2020-01-01

## 2020-01-01 RX ORDER — ATORVASTATIN CALCIUM 40 MG/1
40 TABLET, FILM COATED ORAL NIGHTLY
Status: DISCONTINUED | OUTPATIENT
Start: 2020-01-01 | End: 2020-01-01

## 2020-01-01 RX ORDER — CARVEDILOL 12.5 MG/1
12.5 TABLET ORAL 2 TIMES DAILY WITH MEALS
Qty: 60 TABLET | Refills: 3 | Status: SHIPPED | OUTPATIENT
Start: 2020-01-01

## 2020-01-01 RX ORDER — ACETAMINOPHEN 650 MG/1
SUPPOSITORY RECTAL PRN
Status: DISCONTINUED | OUTPATIENT
Start: 2020-01-01 | End: 2020-01-01 | Stop reason: ALTCHOICE

## 2020-01-01 RX ORDER — FUROSEMIDE 10 MG/ML
40 INJECTION INTRAMUSCULAR; INTRAVENOUS ONCE
Status: COMPLETED | OUTPATIENT
Start: 2020-01-01 | End: 2020-01-01

## 2020-01-01 RX ORDER — 0.9 % SODIUM CHLORIDE 0.9 %
30 INTRAVENOUS SOLUTION INTRAVENOUS ONCE
Status: COMPLETED | OUTPATIENT
Start: 2020-01-01 | End: 2020-01-01

## 2020-01-01 RX ORDER — FENTANYL CITRATE 50 UG/ML
INJECTION, SOLUTION INTRAMUSCULAR; INTRAVENOUS
Status: COMPLETED | OUTPATIENT
Start: 2020-01-01 | End: 2020-01-01

## 2020-01-01 RX ORDER — PANTOPRAZOLE SODIUM 40 MG/1
40 TABLET, DELAYED RELEASE ORAL DAILY
Status: DISCONTINUED | OUTPATIENT
Start: 2020-01-01 | End: 2020-01-01 | Stop reason: HOSPADM

## 2020-01-01 RX ORDER — VENLAFAXINE HYDROCHLORIDE 75 MG/1
75 CAPSULE, EXTENDED RELEASE ORAL DAILY
Status: DISCONTINUED | OUTPATIENT
Start: 2020-01-01 | End: 2020-01-01 | Stop reason: HOSPADM

## 2020-01-01 RX ORDER — PROTAMINE SULFATE 10 MG/ML
INJECTION, SOLUTION INTRAVENOUS PRN
Status: DISCONTINUED | OUTPATIENT
Start: 2020-01-01 | End: 2020-01-01 | Stop reason: SDUPTHER

## 2020-01-01 RX ORDER — LORAZEPAM 2 MG/ML
0.5 INJECTION INTRAMUSCULAR ONCE
Status: COMPLETED | OUTPATIENT
Start: 2020-01-01 | End: 2020-01-01

## 2020-01-01 RX ORDER — LAMOTRIGINE 25 MG/1
25 TABLET ORAL 4 TIMES DAILY
Status: ON HOLD | COMMUNITY
End: 2020-01-01 | Stop reason: HOSPADM

## 2020-01-01 RX ORDER — FENTANYL CITRATE 0.05 MG/ML
INJECTION, SOLUTION INTRAMUSCULAR; INTRAVENOUS PRN
Status: DISCONTINUED | OUTPATIENT
Start: 2020-01-01 | End: 2020-01-01 | Stop reason: SDUPTHER

## 2020-01-01 RX ORDER — AMLODIPINE BESYLATE 5 MG/1
10 TABLET ORAL DAILY
Status: DISCONTINUED | OUTPATIENT
Start: 2020-01-01 | End: 2020-01-01

## 2020-01-01 RX ORDER — DOXAZOSIN 2 MG/1
8 TABLET ORAL NIGHTLY
Status: DISCONTINUED | OUTPATIENT
Start: 2020-01-01 | End: 2020-01-01

## 2020-01-01 RX ORDER — LISINOPRIL 5 MG/1
5 TABLET ORAL DAILY
Status: DISCONTINUED | OUTPATIENT
Start: 2020-01-01 | End: 2020-01-01

## 2020-01-01 RX ORDER — OXYCODONE HYDROCHLORIDE 5 MG/1
10 TABLET ORAL EVERY 4 HOURS PRN
Status: DISCONTINUED | OUTPATIENT
Start: 2020-01-01 | End: 2020-01-01

## 2020-01-01 RX ORDER — ATENOLOL 50 MG/1
50 TABLET ORAL 2 TIMES DAILY
Status: DISCONTINUED | OUTPATIENT
Start: 2020-01-01 | End: 2020-01-01

## 2020-01-01 RX ORDER — CLOPIDOGREL BISULFATE 75 MG/1
75 TABLET ORAL DAILY
Status: DISCONTINUED | OUTPATIENT
Start: 2020-01-01 | End: 2020-01-01

## 2020-01-01 RX ORDER — HYDRALAZINE HYDROCHLORIDE 25 MG/1
25 TABLET, FILM COATED ORAL EVERY 8 HOURS SCHEDULED
Status: DISCONTINUED | OUTPATIENT
Start: 2020-01-01 | End: 2020-01-01

## 2020-01-01 RX ORDER — LABETALOL HYDROCHLORIDE 5 MG/ML
10 INJECTION, SOLUTION INTRAVENOUS EVERY 6 HOURS PRN
Status: DISCONTINUED | OUTPATIENT
Start: 2020-01-01 | End: 2020-01-01 | Stop reason: HOSPADM

## 2020-01-01 RX ORDER — OXYCODONE HYDROCHLORIDE 5 MG/1
5 TABLET ORAL EVERY 4 HOURS PRN
Status: DISCONTINUED | OUTPATIENT
Start: 2020-01-01 | End: 2020-01-01

## 2020-01-01 RX ORDER — POLYETHYLENE GLYCOL 3350 17 G/17G
17 POWDER, FOR SOLUTION ORAL DAILY
Status: DISCONTINUED | OUTPATIENT
Start: 2020-01-01 | End: 2020-01-01

## 2020-01-01 RX ORDER — FUROSEMIDE 10 MG/ML
40 INJECTION INTRAMUSCULAR; INTRAVENOUS 2 TIMES DAILY
Status: DISCONTINUED | OUTPATIENT
Start: 2020-01-01 | End: 2020-01-01 | Stop reason: HOSPADM

## 2020-01-01 RX ORDER — ACETAMINOPHEN 650 MG/1
650 SUPPOSITORY RECTAL EVERY 4 HOURS PRN
Status: DISCONTINUED | OUTPATIENT
Start: 2020-01-01 | End: 2020-01-01

## 2020-01-01 RX ORDER — ATENOLOL 50 MG/1
50 TABLET ORAL DAILY
Status: DISCONTINUED | OUTPATIENT
Start: 2020-01-01 | End: 2020-01-01 | Stop reason: HOSPADM

## 2020-01-01 RX ORDER — HYDRALAZINE HYDROCHLORIDE 25 MG/1
25 TABLET, FILM COATED ORAL ONCE
Status: COMPLETED | OUTPATIENT
Start: 2020-01-01 | End: 2020-01-01

## 2020-01-01 RX ORDER — DEXTROSE AND SODIUM CHLORIDE 5; .45 G/100ML; G/100ML
INJECTION, SOLUTION INTRAVENOUS CONTINUOUS
Status: DISCONTINUED | OUTPATIENT
Start: 2020-01-01 | End: 2020-01-01

## 2020-01-01 RX ORDER — ALBUTEROL SULFATE 90 UG/1
4 AEROSOL, METERED RESPIRATORY (INHALATION) ONCE
Status: COMPLETED | OUTPATIENT
Start: 2020-01-01 | End: 2020-01-01

## 2020-01-01 RX ORDER — CEFDINIR 300 MG/1
300 CAPSULE ORAL DAILY
Status: DISCONTINUED | OUTPATIENT
Start: 2020-01-01 | End: 2020-01-01

## 2020-01-01 RX ORDER — PAROXETINE HYDROCHLORIDE 20 MG/1
20 TABLET, FILM COATED ORAL ONCE
Status: COMPLETED | OUTPATIENT
Start: 2020-01-01 | End: 2020-01-01

## 2020-01-01 RX ORDER — CHLORHEXIDINE GLUCONATE 0.12 MG/ML
15 RINSE ORAL ONCE
Status: COMPLETED | OUTPATIENT
Start: 2020-01-01 | End: 2020-01-01

## 2020-01-01 RX ORDER — SODIUM CHLORIDE 0.9 % (FLUSH) 0.9 %
10 SYRINGE (ML) INJECTION PRN
Status: DISCONTINUED | OUTPATIENT
Start: 2020-01-01 | End: 2020-01-01

## 2020-01-01 RX ORDER — DIMETHICONE, OXYBENZONE, AND PADIMATE O 2; 2.5; 6.6 G/100G; G/100G; G/100G
STICK TOPICAL
Status: COMPLETED
Start: 2020-01-01 | End: 2020-01-01

## 2020-01-01 RX ORDER — LORAZEPAM 0.5 MG/1
0.5 TABLET ORAL EVERY 8 HOURS PRN
Qty: 15 TABLET | Refills: 0 | Status: SHIPPED | OUTPATIENT
Start: 2020-01-01 | End: 2020-01-01

## 2020-01-01 RX ORDER — SODIUM CHLORIDE 9 MG/ML
INJECTION, SOLUTION INTRAVENOUS CONTINUOUS
Status: ACTIVE | OUTPATIENT
Start: 2020-01-01 | End: 2020-01-01

## 2020-01-01 RX ORDER — VENLAFAXINE 75 MG/1
75 TABLET ORAL DAILY
Status: ON HOLD | COMMUNITY
End: 2020-01-01 | Stop reason: HOSPADM

## 2020-01-01 RX ORDER — HEPARIN SODIUM 1000 [USP'U]/ML
INJECTION, SOLUTION INTRAVENOUS; SUBCUTANEOUS PRN
Status: DISCONTINUED | OUTPATIENT
Start: 2020-01-01 | End: 2020-01-01 | Stop reason: SDUPTHER

## 2020-01-01 RX ORDER — HYDRALAZINE HYDROCHLORIDE 20 MG/ML
10 INJECTION INTRAMUSCULAR; INTRAVENOUS EVERY 10 MIN PRN
Status: DISCONTINUED | OUTPATIENT
Start: 2020-01-01 | End: 2020-01-01

## 2020-01-01 RX ORDER — DOXAZOSIN 2 MG/1
4 TABLET ORAL EVERY 12 HOURS SCHEDULED
Status: DISCONTINUED | OUTPATIENT
Start: 2020-01-01 | End: 2020-01-01 | Stop reason: HOSPADM

## 2020-01-01 RX ORDER — IPRATROPIUM BROMIDE AND ALBUTEROL SULFATE 2.5; .5 MG/3ML; MG/3ML
1 SOLUTION RESPIRATORY (INHALATION) ONCE
Status: COMPLETED | OUTPATIENT
Start: 2020-01-01 | End: 2020-01-01

## 2020-01-01 RX ORDER — LANOLIN ALCOHOL/MO/W.PET/CERES
3 CREAM (GRAM) TOPICAL NIGHTLY PRN
Status: DISCONTINUED | OUTPATIENT
Start: 2020-01-01 | End: 2020-01-01 | Stop reason: HOSPADM

## 2020-01-01 RX ORDER — CHOLECALCIFEROL (VITAMIN D3) 10 MCG
1 TABLET ORAL DAILY
Status: DISCONTINUED | OUTPATIENT
Start: 2020-01-01 | End: 2020-01-01 | Stop reason: HOSPADM

## 2020-01-01 RX ORDER — FUROSEMIDE 10 MG/ML
20 INJECTION INTRAMUSCULAR; INTRAVENOUS 4 TIMES DAILY
Status: DISCONTINUED | OUTPATIENT
Start: 2020-01-01 | End: 2020-01-01

## 2020-01-01 RX ORDER — HYDRALAZINE HYDROCHLORIDE 20 MG/ML
20 INJECTION INTRAMUSCULAR; INTRAVENOUS ONCE
Status: COMPLETED | OUTPATIENT
Start: 2020-01-01 | End: 2020-01-01

## 2020-01-01 RX ORDER — CLONAZEPAM 0.5 MG/1
0.5 TABLET ORAL NIGHTLY PRN
Status: DISCONTINUED | OUTPATIENT
Start: 2020-01-01 | End: 2020-01-01 | Stop reason: HOSPADM

## 2020-01-01 RX ORDER — SODIUM CHLORIDE 0.9 % (FLUSH) 0.9 %
10 SYRINGE (ML) INJECTION PRN
Status: DISCONTINUED | OUTPATIENT
Start: 2020-01-01 | End: 2020-01-01 | Stop reason: SDUPTHER

## 2020-01-01 RX ORDER — HYDRALAZINE HYDROCHLORIDE 50 MG/1
100 TABLET, FILM COATED ORAL EVERY 8 HOURS SCHEDULED
Status: DISCONTINUED | OUTPATIENT
Start: 2020-01-01 | End: 2020-01-01 | Stop reason: HOSPADM

## 2020-01-01 RX ORDER — ASPIRIN 81 MG/1
81 TABLET, CHEWABLE ORAL DAILY
Status: DISCONTINUED | OUTPATIENT
Start: 2020-01-01 | End: 2020-01-01 | Stop reason: HOSPADM

## 2020-01-01 RX ORDER — MIDAZOLAM HYDROCHLORIDE 1 MG/ML
1 INJECTION INTRAMUSCULAR; INTRAVENOUS
Status: DISCONTINUED | OUTPATIENT
Start: 2020-01-01 | End: 2020-01-01

## 2020-01-01 RX ORDER — CALCIUM CARBONATE 200(500)MG
1000 TABLET,CHEWABLE ORAL 3 TIMES DAILY PRN
Status: DISCONTINUED | OUTPATIENT
Start: 2020-01-01 | End: 2020-01-01

## 2020-01-01 RX ORDER — CLONIDINE HYDROCHLORIDE 0.1 MG/1
0.1 TABLET ORAL 2 TIMES DAILY
Status: DISCONTINUED | OUTPATIENT
Start: 2020-01-01 | End: 2020-01-01

## 2020-01-01 RX ORDER — ALPRAZOLAM 0.25 MG/1
0.5 TABLET ORAL ONCE
Status: COMPLETED | OUTPATIENT
Start: 2020-01-01 | End: 2020-01-01

## 2020-01-01 RX ORDER — SODIUM CHLORIDE 0.9 % (FLUSH) 0.9 %
10 SYRINGE (ML) INJECTION EVERY 12 HOURS SCHEDULED
Status: DISCONTINUED | OUTPATIENT
Start: 2020-01-01 | End: 2020-01-01 | Stop reason: SDUPTHER

## 2020-01-01 RX ORDER — CLONIDINE 0.2 MG/24H
1 PATCH, EXTENDED RELEASE TRANSDERMAL WEEKLY
Status: DISCONTINUED | OUTPATIENT
Start: 2020-01-01 | End: 2020-01-01 | Stop reason: HOSPADM

## 2020-01-01 RX ORDER — LIDOCAINE HYDROCHLORIDE 10 MG/ML
5 INJECTION, SOLUTION INFILTRATION; PERINEURAL ONCE
Status: DISCONTINUED | OUTPATIENT
Start: 2020-01-01 | End: 2020-01-01 | Stop reason: HOSPADM

## 2020-01-01 RX ORDER — HYDRALAZINE HYDROCHLORIDE 50 MG/1
50 TABLET, FILM COATED ORAL EVERY 8 HOURS SCHEDULED
Qty: 90 TABLET | Refills: 3 | Status: ON HOLD | OUTPATIENT
Start: 2020-01-01 | End: 2020-01-01 | Stop reason: SDUPTHER

## 2020-01-01 RX ADMIN — FUROSEMIDE 20 MG: 10 INJECTION, SOLUTION INTRAVENOUS at 12:01

## 2020-01-01 RX ADMIN — SODIUM BICARBONATE 650 MG: 650 TABLET ORAL at 10:38

## 2020-01-01 RX ADMIN — CEFEPIME 1 G: 1 INJECTION, POWDER, FOR SOLUTION INTRAMUSCULAR; INTRAVENOUS at 00:51

## 2020-01-01 RX ADMIN — CEFAZOLIN SODIUM 1 G: 1 INJECTION, POWDER, FOR SOLUTION INTRAMUSCULAR; INTRAVENOUS at 22:11

## 2020-01-01 RX ADMIN — ACETAMINOPHEN 650 MG: 325 TABLET ORAL at 21:40

## 2020-01-01 RX ADMIN — ACETAMINOPHEN 500 MG: 500 TABLET ORAL at 15:10

## 2020-01-01 RX ADMIN — Medication 100 MCG: at 09:11

## 2020-01-01 RX ADMIN — ATENOLOL 50 MG: 50 TABLET ORAL at 08:37

## 2020-01-01 RX ADMIN — HYDRALAZINE HYDROCHLORIDE 50 MG: 50 TABLET, FILM COATED ORAL at 05:40

## 2020-01-01 RX ADMIN — ACETAMINOPHEN 650 MG: 325 TABLET ORAL at 07:28

## 2020-01-01 RX ADMIN — INSULIN LISPRO 1 UNITS: 100 INJECTION, SOLUTION INTRAVENOUS; SUBCUTANEOUS at 13:15

## 2020-01-01 RX ADMIN — HEPARIN SODIUM 5000 UNITS: 5000 INJECTION INTRAVENOUS; SUBCUTANEOUS at 20:52

## 2020-01-01 RX ADMIN — VASOPRESSIN 0.04 UNITS/MIN: 20 INJECTION INTRAVENOUS at 06:07

## 2020-01-01 RX ADMIN — HEPARIN SODIUM 3800 UNITS: 1000 INJECTION INTRAVENOUS; SUBCUTANEOUS at 11:49

## 2020-01-01 RX ADMIN — ACETAMINOPHEN 650 MG: 325 TABLET ORAL at 22:23

## 2020-01-01 RX ADMIN — ISOSORBIDE MONONITRATE 30 MG: 30 TABLET, EXTENDED RELEASE ORAL at 10:45

## 2020-01-01 RX ADMIN — GEMFIBROZIL 600 MG: 600 TABLET ORAL at 05:40

## 2020-01-01 RX ADMIN — HYDRALAZINE HYDROCHLORIDE 100 MG: 50 TABLET, FILM COATED ORAL at 21:54

## 2020-01-01 RX ADMIN — DOXAZOSIN 4 MG: 2 TABLET ORAL at 20:12

## 2020-01-01 RX ADMIN — GEMFIBROZIL 600 MG: 600 TABLET ORAL at 05:58

## 2020-01-01 RX ADMIN — ANTACID TABLETS 1000 MG: 500 TABLET, CHEWABLE ORAL at 15:42

## 2020-01-01 RX ADMIN — SODIUM CHLORIDE 250 ML: 9 INJECTION, SOLUTION INTRAVENOUS at 11:10

## 2020-01-01 RX ADMIN — ASPIRIN 325 MG: 325 TABLET, FILM COATED ORAL at 02:02

## 2020-01-01 RX ADMIN — ACETAMINOPHEN 650 MG: 325 TABLET ORAL at 14:09

## 2020-01-01 RX ADMIN — ROCURONIUM BROMIDE 50 MG: 10 SOLUTION INTRAVENOUS at 07:48

## 2020-01-01 RX ADMIN — ACETAMINOPHEN 650 MG: 325 TABLET ORAL at 20:50

## 2020-01-01 RX ADMIN — MORPHINE SULFATE 4 MG: 4 INJECTION, SOLUTION INTRAMUSCULAR; INTRAVENOUS at 00:11

## 2020-01-01 RX ADMIN — Medication 10 ML: at 22:47

## 2020-01-01 RX ADMIN — CARVEDILOL 12.5 MG: 6.25 TABLET, FILM COATED ORAL at 17:17

## 2020-01-01 RX ADMIN — FUROSEMIDE 40 MG: 10 INJECTION, SOLUTION INTRAMUSCULAR; INTRAVENOUS at 00:11

## 2020-01-01 RX ADMIN — CEFEPIME 2 G: 2 INJECTION, POWDER, FOR SOLUTION INTRAVENOUS at 15:01

## 2020-01-01 RX ADMIN — ASPIRIN 81 MG: 81 TABLET, CHEWABLE ORAL at 09:39

## 2020-01-01 RX ADMIN — SODIUM CHLORIDE 1000 ML: 9 INJECTION, SOLUTION INTRAVENOUS at 16:00

## 2020-01-01 RX ADMIN — HYDRALAZINE HYDROCHLORIDE 25 MG: 25 TABLET, FILM COATED ORAL at 02:20

## 2020-01-01 RX ADMIN — ATENOLOL 50 MG: 50 TABLET ORAL at 09:11

## 2020-01-01 RX ADMIN — PANTOPRAZOLE SODIUM 40 MG: 40 TABLET, DELAYED RELEASE ORAL at 05:24

## 2020-01-01 RX ADMIN — VENLAFAXINE HYDROCHLORIDE 75 MG: 75 CAPSULE, EXTENDED RELEASE ORAL at 09:39

## 2020-01-01 RX ADMIN — ASPIRIN 81 MG: 81 TABLET, CHEWABLE ORAL at 11:05

## 2020-01-01 RX ADMIN — SODIUM BICARBONATE 50 MEQ: 84 INJECTION, SOLUTION INTRAVENOUS at 06:32

## 2020-01-01 RX ADMIN — Medication 10 ML: at 20:20

## 2020-01-01 RX ADMIN — HYDRALAZINE HYDROCHLORIDE 100 MG: 50 TABLET, FILM COATED ORAL at 22:34

## 2020-01-01 RX ADMIN — PAROXETINE HYDROCHLORIDE 20 MG: 20 TABLET, FILM COATED ORAL at 13:29

## 2020-01-01 RX ADMIN — ENOXAPARIN SODIUM 30 MG: 30 INJECTION SUBCUTANEOUS at 08:39

## 2020-01-01 RX ADMIN — HYDRALAZINE HYDROCHLORIDE 100 MG: 50 TABLET, FILM COATED ORAL at 22:47

## 2020-01-01 RX ADMIN — ATENOLOL 25 MG: 25 TABLET ORAL at 20:05

## 2020-01-01 RX ADMIN — CALCIUM CHLORIDE 1 G: 100 INJECTION, SOLUTION INTRAVENOUS at 06:25

## 2020-01-01 RX ADMIN — LAMOTRIGINE 25 MG: 25 TABLET ORAL at 17:22

## 2020-01-01 RX ADMIN — DEXTROSE AND SODIUM CHLORIDE: 5; 450 INJECTION, SOLUTION INTRAVENOUS at 13:08

## 2020-01-01 RX ADMIN — HEPARIN SODIUM 5000 UNITS: 5000 INJECTION INTRAVENOUS; SUBCUTANEOUS at 05:44

## 2020-01-01 RX ADMIN — HYDRALAZINE HYDROCHLORIDE 50 MG: 50 TABLET, FILM COATED ORAL at 20:51

## 2020-01-01 RX ADMIN — Medication 4 PUFF: at 16:40

## 2020-01-01 RX ADMIN — LISINOPRIL 5 MG: 5 TABLET ORAL at 11:05

## 2020-01-01 RX ADMIN — DOXAZOSIN 4 MG: 2 TABLET ORAL at 17:32

## 2020-01-01 RX ADMIN — SENNOSIDES AND DOCUSATE SODIUM 2 TABLET: 8.6; 5 TABLET ORAL at 09:10

## 2020-01-01 RX ADMIN — DARBEPOETIN ALFA 40 MCG: 40 INJECTION, SOLUTION INTRAVENOUS; SUBCUTANEOUS at 10:36

## 2020-01-01 RX ADMIN — FENTANYL CITRATE 150 MCG: 50 INJECTION, SOLUTION INTRAMUSCULAR; INTRAVENOUS at 09:10

## 2020-01-01 RX ADMIN — CEFEPIME 1 G: 1 INJECTION, POWDER, FOR SOLUTION INTRAMUSCULAR; INTRAVENOUS at 13:03

## 2020-01-01 RX ADMIN — LAMOTRIGINE 25 MG: 25 TABLET ORAL at 22:47

## 2020-01-01 RX ADMIN — SENNOSIDES AND DOCUSATE SODIUM 2 TABLET: 8.6; 5 TABLET ORAL at 16:47

## 2020-01-01 RX ADMIN — Medication 10 ML: at 09:52

## 2020-01-01 RX ADMIN — DOXAZOSIN 4 MG: 2 TABLET ORAL at 20:28

## 2020-01-01 RX ADMIN — LAMOTRIGINE 25 MG: 25 TABLET ORAL at 08:11

## 2020-01-01 RX ADMIN — PAROXETINE HYDROCHLORIDE 40 MG: 20 TABLET, FILM COATED ORAL at 09:05

## 2020-01-01 RX ADMIN — DOXAZOSIN 1 MG: 2 TABLET ORAL at 20:20

## 2020-01-01 RX ADMIN — NITROGLYCERIN 0.5 INCH: 20 OINTMENT TOPICAL at 05:29

## 2020-01-01 RX ADMIN — ACETAMINOPHEN 650 MG: 325 TABLET ORAL at 16:08

## 2020-01-01 RX ADMIN — GEMFIBROZIL 600 MG: 600 TABLET ORAL at 08:16

## 2020-01-01 RX ADMIN — CARVEDILOL 12.5 MG: 6.25 TABLET, FILM COATED ORAL at 09:42

## 2020-01-01 RX ADMIN — LAMOTRIGINE 25 MG: 25 TABLET ORAL at 21:23

## 2020-01-01 RX ADMIN — CARVEDILOL 12.5 MG: 6.25 TABLET, FILM COATED ORAL at 16:58

## 2020-01-01 RX ADMIN — Medication 10 ML: at 21:54

## 2020-01-01 RX ADMIN — DOXAZOSIN 4 MG: 2 TABLET ORAL at 20:18

## 2020-01-01 RX ADMIN — PAROXETINE HYDROCHLORIDE 40 MG: 20 TABLET, FILM COATED ORAL at 11:05

## 2020-01-01 RX ADMIN — PANTOPRAZOLE SODIUM 40 MG: 40 TABLET, DELAYED RELEASE ORAL at 05:09

## 2020-01-01 RX ADMIN — HEPARIN SODIUM 5000 UNITS: 5000 INJECTION INTRAVENOUS; SUBCUTANEOUS at 05:28

## 2020-01-01 RX ADMIN — SODIUM CHLORIDE 1947 ML: 9 INJECTION, SOLUTION INTRAVENOUS at 15:00

## 2020-01-01 RX ADMIN — LAMOTRIGINE 25 MG: 25 TABLET ORAL at 20:13

## 2020-01-01 RX ADMIN — IPRATROPIUM BROMIDE AND ALBUTEROL SULFATE 1 AMPULE: .5; 3 SOLUTION RESPIRATORY (INHALATION) at 08:22

## 2020-01-01 RX ADMIN — HYDROXYZINE PAMOATE 25 MG: 25 CAPSULE ORAL at 05:14

## 2020-01-01 RX ADMIN — ETOMIDATE 10 MG: 2 INJECTION INTRAVENOUS at 07:51

## 2020-01-01 RX ADMIN — HYDROXYZINE PAMOATE 25 MG: 25 CAPSULE ORAL at 11:47

## 2020-01-01 RX ADMIN — HEPARIN SODIUM 5000 UNITS: 5000 INJECTION INTRAVENOUS; SUBCUTANEOUS at 21:32

## 2020-01-01 RX ADMIN — HEPARIN SODIUM AND DEXTROSE 6.2 ML/HR: 10000; 5 INJECTION INTRAVENOUS at 02:03

## 2020-01-01 RX ADMIN — LAMOTRIGINE 25 MG: 25 TABLET ORAL at 09:51

## 2020-01-01 RX ADMIN — ATENOLOL 50 MG: 50 TABLET ORAL at 09:39

## 2020-01-01 RX ADMIN — HYDRALAZINE HYDROCHLORIDE 100 MG: 50 TABLET, FILM COATED ORAL at 21:19

## 2020-01-01 RX ADMIN — LAMOTRIGINE 25 MG: 25 TABLET ORAL at 20:09

## 2020-01-01 RX ADMIN — CARVEDILOL 12.5 MG: 6.25 TABLET, FILM COATED ORAL at 17:00

## 2020-01-01 RX ADMIN — LISINOPRIL 5 MG: 5 TABLET ORAL at 11:42

## 2020-01-01 RX ADMIN — ALBUTEROL SULFATE 2.5 MG: 2.5 SOLUTION RESPIRATORY (INHALATION) at 15:28

## 2020-01-01 RX ADMIN — PRIMIDONE 100 MG: 50 TABLET ORAL at 08:54

## 2020-01-01 RX ADMIN — HYDRALAZINE HYDROCHLORIDE 100 MG: 50 TABLET, FILM COATED ORAL at 05:34

## 2020-01-01 RX ADMIN — ACETAMINOPHEN 650 MG: 325 TABLET ORAL at 22:56

## 2020-01-01 RX ADMIN — PAROXETINE HYDROCHLORIDE 40 MG: 20 TABLET, FILM COATED ORAL at 09:49

## 2020-01-01 RX ADMIN — HEPARIN SODIUM 5000 UNITS: 5000 INJECTION INTRAVENOUS; SUBCUTANEOUS at 20:10

## 2020-01-01 RX ADMIN — ACETAMINOPHEN 650 MG: 325 TABLET ORAL at 21:32

## 2020-01-01 RX ADMIN — ROCURONIUM BROMIDE 50 MG: 10 SOLUTION INTRAVENOUS at 09:57

## 2020-01-01 RX ADMIN — DOXAZOSIN 2 MG: 2 TABLET ORAL at 20:09

## 2020-01-01 RX ADMIN — Medication 15 ML: at 08:46

## 2020-01-01 RX ADMIN — ALBUTEROL SULFATE 2.5 MG: 2.5 SOLUTION RESPIRATORY (INHALATION) at 21:45

## 2020-01-01 RX ADMIN — MIDAZOLAM HYDROCHLORIDE 2 MG: 2 INJECTION, SOLUTION INTRAMUSCULAR; INTRAVENOUS at 09:57

## 2020-01-01 RX ADMIN — INSULIN LISPRO 1 UNITS: 100 INJECTION, SOLUTION INTRAVENOUS; SUBCUTANEOUS at 23:10

## 2020-01-01 RX ADMIN — CEFTRIAXONE SODIUM 1 G: 1 INJECTION, POWDER, FOR SOLUTION INTRAMUSCULAR; INTRAVENOUS at 09:49

## 2020-01-01 RX ADMIN — LAMOTRIGINE 25 MG: 25 TABLET ORAL at 08:16

## 2020-01-01 RX ADMIN — CARVEDILOL 12.5 MG: 6.25 TABLET, FILM COATED ORAL at 17:43

## 2020-01-01 RX ADMIN — PHENYLEPHRINE HYDROCHLORIDE 100 MCG: 10 INJECTION INTRAVENOUS at 07:45

## 2020-01-01 RX ADMIN — ACETAMINOPHEN 650 MG: 325 TABLET ORAL at 06:35

## 2020-01-01 RX ADMIN — Medication 10 ML: at 20:19

## 2020-01-01 RX ADMIN — INSULIN HUMAN 10 UNITS: 100 INJECTION, SOLUTION PARENTERAL at 21:21

## 2020-01-01 RX ADMIN — IPRATROPIUM BROMIDE AND ALBUTEROL SULFATE 1 AMPULE: .5; 3 SOLUTION RESPIRATORY (INHALATION) at 19:09

## 2020-01-01 RX ADMIN — DOXAZOSIN 4 MG: 2 TABLET ORAL at 08:10

## 2020-01-01 RX ADMIN — ATENOLOL 50 MG: 50 TABLET ORAL at 20:54

## 2020-01-01 RX ADMIN — Medication 10 ML: at 13:03

## 2020-01-01 RX ADMIN — SODIUM BICARBONATE 50 MEQ: 84 INJECTION, SOLUTION INTRAVENOUS at 06:28

## 2020-01-01 RX ADMIN — PAROXETINE HYDROCHLORIDE 40 MG: 20 TABLET, FILM COATED ORAL at 11:06

## 2020-01-01 RX ADMIN — MIDAZOLAM HYDROCHLORIDE 2 MG: 5 INJECTION INTRAMUSCULAR; INTRAVENOUS at 13:15

## 2020-01-01 RX ADMIN — LAMOTRIGINE 25 MG: 25 TABLET ORAL at 17:14

## 2020-01-01 RX ADMIN — Medication 2 G: at 09:59

## 2020-01-01 RX ADMIN — GEMFIBROZIL 600 MG: 600 TABLET ORAL at 17:38

## 2020-01-01 RX ADMIN — SODIUM BICARBONATE 50 MEQ: 84 INJECTION, SOLUTION INTRAVENOUS at 06:24

## 2020-01-01 RX ADMIN — NITROGLYCERIN 0.5 INCH: 20 OINTMENT TOPICAL at 13:00

## 2020-01-01 RX ADMIN — HYDRALAZINE HYDROCHLORIDE 50 MG: 50 TABLET, FILM COATED ORAL at 20:58

## 2020-01-01 RX ADMIN — ISOSORBIDE MONONITRATE 30 MG: 30 TABLET ORAL at 08:18

## 2020-01-01 RX ADMIN — DOXAZOSIN 2 MG: 2 TABLET ORAL at 20:54

## 2020-01-01 RX ADMIN — ATENOLOL 50 MG: 50 TABLET ORAL at 09:22

## 2020-01-01 RX ADMIN — HEPARIN SODIUM 5000 UNITS: 5000 INJECTION INTRAVENOUS; SUBCUTANEOUS at 06:12

## 2020-01-01 RX ADMIN — DARBEPOETIN ALFA 60 MCG: 60 INJECTION, SOLUTION INTRAVENOUS; SUBCUTANEOUS at 12:00

## 2020-01-01 RX ADMIN — Medication 10 ML: at 09:40

## 2020-01-01 RX ADMIN — MUPIROCIN: 20 OINTMENT TOPICAL at 09:00

## 2020-01-01 RX ADMIN — PAROXETINE HYDROCHLORIDE 40 MG: 20 TABLET, FILM COATED ORAL at 08:54

## 2020-01-01 RX ADMIN — ACETAMINOPHEN 650 MG: 325 TABLET ORAL at 15:33

## 2020-01-01 RX ADMIN — ACETAMINOPHEN 650 MG: 325 TABLET ORAL at 16:56

## 2020-01-01 RX ADMIN — HEPARIN SODIUM 3800 UNITS: 1000 INJECTION INTRAVENOUS; SUBCUTANEOUS at 10:06

## 2020-01-01 RX ADMIN — VASOPRESSIN 0.01 UNITS/MIN: 20 INJECTION INTRAVENOUS at 04:01

## 2020-01-01 RX ADMIN — PANTOPRAZOLE SODIUM 40 MG: 40 TABLET, DELAYED RELEASE ORAL at 08:16

## 2020-01-01 RX ADMIN — LAMOTRIGINE 25 MG: 25 TABLET ORAL at 20:50

## 2020-01-01 RX ADMIN — CLONIDINE HYDROCHLORIDE 0.2 MG: 0.1 TABLET ORAL at 20:58

## 2020-01-01 RX ADMIN — ATORVASTATIN CALCIUM 40 MG: 40 TABLET, FILM COATED ORAL at 20:27

## 2020-01-01 RX ADMIN — ACETAMINOPHEN 650 MG: 325 TABLET ORAL at 09:52

## 2020-01-01 RX ADMIN — IPRATROPIUM BROMIDE AND ALBUTEROL SULFATE 1 AMPULE: .5; 3 SOLUTION RESPIRATORY (INHALATION) at 12:42

## 2020-01-01 RX ADMIN — DOXAZOSIN 4 MG: 2 TABLET ORAL at 08:38

## 2020-01-01 RX ADMIN — ISOSORBIDE MONONITRATE 30 MG: 30 TABLET, EXTENDED RELEASE ORAL at 21:23

## 2020-01-01 RX ADMIN — CLONAZEPAM 0.5 MG: 0.5 TABLET ORAL at 20:56

## 2020-01-01 RX ADMIN — ASCORBIC ACID, THIAMINE MONONITRATE,RIBOFLAVIN, NIACINAMIDE, PYRIDOXINE HYDROCHLORIDE, FOLIC ACID, CYANOCOBALAMIN, BIOTIN, CALCIUM PANTOTHENATE, 1 MG: 100; 1.5; 1.7; 20; 10; 1; 6000; 150000; 5 CAPSULE, LIQUID FILLED ORAL at 13:42

## 2020-01-01 RX ADMIN — ASPIRIN 81 MG: 81 TABLET, CHEWABLE ORAL at 11:43

## 2020-01-01 RX ADMIN — Medication 10 ML: at 20:28

## 2020-01-01 RX ADMIN — PRIMIDONE 100 MG: 50 TABLET ORAL at 08:01

## 2020-01-01 RX ADMIN — LAMOTRIGINE 25 MG: 25 TABLET ORAL at 20:28

## 2020-01-01 RX ADMIN — HEPARIN SODIUM 5000 UNITS: 5000 INJECTION INTRAVENOUS; SUBCUTANEOUS at 20:13

## 2020-01-01 RX ADMIN — PRIMIDONE 100 MG: 50 TABLET ORAL at 16:47

## 2020-01-01 RX ADMIN — LAMOTRIGINE 25 MG: 25 TABLET ORAL at 13:00

## 2020-01-01 RX ADMIN — IPRATROPIUM BROMIDE AND ALBUTEROL SULFATE 1 AMPULE: .5; 3 SOLUTION RESPIRATORY (INHALATION) at 07:03

## 2020-01-01 RX ADMIN — MUPIROCIN: 20 OINTMENT TOPICAL at 22:55

## 2020-01-01 RX ADMIN — ATENOLOL 50 MG: 50 TABLET ORAL at 09:06

## 2020-01-01 RX ADMIN — IPRATROPIUM BROMIDE AND ALBUTEROL SULFATE 1 AMPULE: .5; 3 SOLUTION RESPIRATORY (INHALATION) at 07:55

## 2020-01-01 RX ADMIN — SODIUM BICARBONATE 650 MG: 650 TABLET ORAL at 09:40

## 2020-01-01 RX ADMIN — PHENYLEPHRINE HYDROCHLORIDE 100 MCG: 10 INJECTION INTRAVENOUS at 08:03

## 2020-01-01 RX ADMIN — ASCORBIC ACID, THIAMINE MONONITRATE,RIBOFLAVIN, NIACINAMIDE, PYRIDOXINE HYDROCHLORIDE, FOLIC ACID, CYANOCOBALAMIN, BIOTIN, CALCIUM PANTOTHENATE, 1 MG: 100; 1.5; 1.7; 20; 10; 1; 6000; 150000; 5 CAPSULE, LIQUID FILLED ORAL at 08:01

## 2020-01-01 RX ADMIN — PANTOPRAZOLE SODIUM 40 MG: 40 TABLET, DELAYED RELEASE ORAL at 05:22

## 2020-01-01 RX ADMIN — MELATONIN TAB 5 MG 5 MG: 5 TAB at 20:58

## 2020-01-01 RX ADMIN — DIMETHICONE, OXYBENZONE, AND PADIMATE O: 2; 2.5; 6.6 STICK TOPICAL at 22:23

## 2020-01-01 RX ADMIN — LISINOPRIL 5 MG: 5 TABLET ORAL at 11:07

## 2020-01-01 RX ADMIN — HEPARIN SODIUM 5000 UNITS: 5000 INJECTION INTRAVENOUS; SUBCUTANEOUS at 22:47

## 2020-01-01 RX ADMIN — HEPARIN SODIUM 5000 UNITS: 5000 INJECTION INTRAVENOUS; SUBCUTANEOUS at 20:21

## 2020-01-01 RX ADMIN — DOXAZOSIN 4 MG: 2 TABLET ORAL at 22:47

## 2020-01-01 RX ADMIN — ACETAMINOPHEN 1000 MG: 500 TABLET ORAL at 14:51

## 2020-01-01 RX ADMIN — GEMFIBROZIL 600 MG: 600 TABLET ORAL at 10:38

## 2020-01-01 RX ADMIN — CEFAZOLIN SODIUM 2 G: 10 INJECTION, POWDER, FOR SOLUTION INTRAVENOUS at 07:32

## 2020-01-01 RX ADMIN — LORAZEPAM 0.5 MG: 2 INJECTION INTRAMUSCULAR; INTRAVENOUS at 08:36

## 2020-01-01 RX ADMIN — ACETAMINOPHEN 650 MG: 325 TABLET ORAL at 22:48

## 2020-01-01 RX ADMIN — FLUTICASONE PROPIONATE 1 SPRAY: 50 SPRAY, METERED NASAL at 15:25

## 2020-01-01 RX ADMIN — HEPARIN SODIUM 5000 UNITS: 5000 INJECTION INTRAVENOUS; SUBCUTANEOUS at 22:34

## 2020-01-01 RX ADMIN — DOXAZOSIN 4 MG: 2 TABLET ORAL at 09:23

## 2020-01-01 RX ADMIN — DEXTROSE MONOHYDRATE 2.5 MG/HR: 50 INJECTION, SOLUTION INTRAVENOUS at 16:35

## 2020-01-01 RX ADMIN — CEFTRIAXONE SODIUM 1 G: 1 INJECTION, POWDER, FOR SOLUTION INTRAMUSCULAR; INTRAVENOUS at 12:48

## 2020-01-01 RX ADMIN — FENTANYL CITRATE 25 MCG: 50 INJECTION INTRAMUSCULAR; INTRAVENOUS at 09:38

## 2020-01-01 RX ADMIN — NITROGLYCERIN 0.5 INCH: 20 OINTMENT TOPICAL at 12:49

## 2020-01-01 RX ADMIN — ISOSORBIDE MONONITRATE 30 MG: 30 TABLET ORAL at 08:54

## 2020-01-01 RX ADMIN — OXYCODONE 10 MG: 5 TABLET ORAL at 14:23

## 2020-01-01 RX ADMIN — HYDRALAZINE HYDROCHLORIDE 100 MG: 50 TABLET, FILM COATED ORAL at 23:00

## 2020-01-01 RX ADMIN — ALBUTEROL SULFATE 2.5 MG: 2.5 SOLUTION RESPIRATORY (INHALATION) at 20:17

## 2020-01-01 RX ADMIN — HYDRALAZINE HYDROCHLORIDE 100 MG: 50 TABLET, FILM COATED ORAL at 14:51

## 2020-01-01 RX ADMIN — FENTANYL CITRATE 25 MCG: 50 INJECTION, SOLUTION INTRAMUSCULAR; INTRAVENOUS at 13:15

## 2020-01-01 RX ADMIN — POTASSIUM CHLORIDE 40 MEQ: 1500 TABLET, EXTENDED RELEASE ORAL at 12:48

## 2020-01-01 RX ADMIN — FAMOTIDINE 20 MG: 20 TABLET ORAL at 09:00

## 2020-01-01 RX ADMIN — Medication 10 ML: at 13:42

## 2020-01-01 RX ADMIN — ENOXAPARIN SODIUM 40 MG: 40 INJECTION SUBCUTANEOUS at 09:06

## 2020-01-01 RX ADMIN — CLONAZEPAM 0.5 MG: 0.5 TABLET ORAL at 13:59

## 2020-01-01 RX ADMIN — HYDROXYZINE HYDROCHLORIDE 10 MG: 10 TABLET, FILM COATED ORAL at 20:19

## 2020-01-01 RX ADMIN — OXYCODONE 10 MG: 5 TABLET ORAL at 04:15

## 2020-01-01 RX ADMIN — NITROGLYCERIN 0.5 INCH: 20 OINTMENT TOPICAL at 18:41

## 2020-01-01 RX ADMIN — HYDRALAZINE HYDROCHLORIDE 25 MG: 25 TABLET, FILM COATED ORAL at 06:59

## 2020-01-01 RX ADMIN — Medication 10 ML: at 20:07

## 2020-01-01 RX ADMIN — HYDRALAZINE HYDROCHLORIDE 50 MG: 50 TABLET, FILM COATED ORAL at 14:43

## 2020-01-01 RX ADMIN — ENOXAPARIN SODIUM 30 MG: 30 INJECTION SUBCUTANEOUS at 08:16

## 2020-01-01 RX ADMIN — Medication 10 ML: at 20:59

## 2020-01-01 RX ADMIN — HEPARIN SODIUM 5000 UNITS: 5000 INJECTION INTRAVENOUS; SUBCUTANEOUS at 14:42

## 2020-01-01 RX ADMIN — PRIMIDONE 100 MG: 50 TABLET ORAL at 10:42

## 2020-01-01 RX ADMIN — HYDRALAZINE HYDROCHLORIDE 100 MG: 50 TABLET, FILM COATED ORAL at 05:52

## 2020-01-01 RX ADMIN — ASPIRIN 324 MG: 81 TABLET, CHEWABLE ORAL at 21:16

## 2020-01-01 RX ADMIN — Medication 10 ML: at 20:16

## 2020-01-01 RX ADMIN — PRIMIDONE 100 MG: 50 TABLET ORAL at 08:38

## 2020-01-01 RX ADMIN — LAMOTRIGINE 25 MG: 25 TABLET ORAL at 16:29

## 2020-01-01 RX ADMIN — LAMOTRIGINE 25 MG: 25 TABLET ORAL at 09:46

## 2020-01-01 RX ADMIN — HYDRALAZINE HYDROCHLORIDE 50 MG: 50 TABLET, FILM COATED ORAL at 16:59

## 2020-01-01 RX ADMIN — PRIMIDONE 100 MG: 50 TABLET ORAL at 09:18

## 2020-01-01 RX ADMIN — DEXTROSE MONOHYDRATE 25 G: 25 INJECTION, SOLUTION INTRAVENOUS at 21:21

## 2020-01-01 RX ADMIN — HEPARIN SODIUM 5000 UNITS: 5000 INJECTION INTRAVENOUS; SUBCUTANEOUS at 05:04

## 2020-01-01 RX ADMIN — DOXAZOSIN 4 MG: 2 TABLET ORAL at 20:50

## 2020-01-01 RX ADMIN — DOXAZOSIN 4 MG: 2 TABLET ORAL at 12:27

## 2020-01-01 RX ADMIN — DOXAZOSIN 2 MG: 2 TABLET ORAL at 22:58

## 2020-01-01 RX ADMIN — MUPIROCIN: 20 OINTMENT TOPICAL at 22:12

## 2020-01-01 RX ADMIN — HEPARIN SODIUM 5000 UNITS: 5000 INJECTION INTRAVENOUS; SUBCUTANEOUS at 20:51

## 2020-01-01 RX ADMIN — ALBUTEROL SULFATE 2.5 MG: 2.5 SOLUTION RESPIRATORY (INHALATION) at 11:38

## 2020-01-01 RX ADMIN — Medication 200 MCG: at 09:05

## 2020-01-01 RX ADMIN — GEMFIBROZIL 600 MG: 600 TABLET ORAL at 16:08

## 2020-01-01 RX ADMIN — HEPARIN SODIUM 5000 UNITS: 5000 INJECTION INTRAVENOUS; SUBCUTANEOUS at 05:35

## 2020-01-01 RX ADMIN — ATORVASTATIN CALCIUM 40 MG: 40 TABLET, FILM COATED ORAL at 20:31

## 2020-01-01 RX ADMIN — MULTIPLE VITAMINS W/ MINERALS TAB 1 TABLET: TAB at 09:39

## 2020-01-01 RX ADMIN — Medication 100 MCG: at 09:05

## 2020-01-01 RX ADMIN — ATENOLOL 50 MG: 50 TABLET ORAL at 08:16

## 2020-01-01 RX ADMIN — LAMOTRIGINE 25 MG: 25 TABLET ORAL at 08:36

## 2020-01-01 RX ADMIN — FENTANYL CITRATE 25 MCG: 50 INJECTION INTRAMUSCULAR; INTRAVENOUS at 09:41

## 2020-01-01 RX ADMIN — ACETAMINOPHEN 650 MG: 325 TABLET ORAL at 01:30

## 2020-01-01 RX ADMIN — ROCURONIUM BROMIDE 50 MG: 10 SOLUTION INTRAVENOUS at 10:47

## 2020-01-01 RX ADMIN — HEPARIN SODIUM 5000 UNITS: 5000 INJECTION INTRAVENOUS; SUBCUTANEOUS at 23:00

## 2020-01-01 RX ADMIN — FAMOTIDINE 20 MG: 10 INJECTION INTRAVENOUS at 00:12

## 2020-01-01 RX ADMIN — SODIUM CHLORIDE: 9 INJECTION, SOLUTION INTRAVENOUS at 00:26

## 2020-01-01 RX ADMIN — SENNOSIDES AND DOCUSATE SODIUM 2 TABLET: 8.6; 5 TABLET ORAL at 10:40

## 2020-01-01 RX ADMIN — PAROXETINE HYDROCHLORIDE 40 MG: 20 TABLET, FILM COATED ORAL at 08:01

## 2020-01-01 RX ADMIN — DOXAZOSIN 2 MG: 2 TABLET ORAL at 08:02

## 2020-01-01 RX ADMIN — FENTANYL CITRATE 100 MCG: 50 INJECTION, SOLUTION INTRAMUSCULAR; INTRAVENOUS at 06:40

## 2020-01-01 RX ADMIN — ACETAMINOPHEN 650 MG: 325 TABLET ORAL at 11:55

## 2020-01-01 RX ADMIN — HYDRALAZINE HYDROCHLORIDE 25 MG: 25 TABLET, FILM COATED ORAL at 20:05

## 2020-01-01 RX ADMIN — CEFTRIAXONE SODIUM 1 G: 1 INJECTION, POWDER, FOR SOLUTION INTRAMUSCULAR; INTRAVENOUS at 11:55

## 2020-01-01 RX ADMIN — Medication 10 ML: at 00:09

## 2020-01-01 RX ADMIN — FUROSEMIDE 60 MG: 10 INJECTION, SOLUTION INTRAMUSCULAR; INTRAVENOUS at 13:42

## 2020-01-01 RX ADMIN — HEPARIN SODIUM 5000 UNITS: 5000 INJECTION INTRAVENOUS; SUBCUTANEOUS at 05:24

## 2020-01-01 RX ADMIN — LAMOTRIGINE 25 MG: 25 TABLET ORAL at 08:31

## 2020-01-01 RX ADMIN — DOXAZOSIN 2 MG: 2 TABLET ORAL at 21:54

## 2020-01-01 RX ADMIN — ALBUTEROL SULFATE 2.5 MG: 2.5 SOLUTION RESPIRATORY (INHALATION) at 12:29

## 2020-01-01 RX ADMIN — LAMOTRIGINE 25 MG: 25 TABLET ORAL at 15:59

## 2020-01-01 RX ADMIN — PAROXETINE HYDROCHLORIDE 40 MG: 20 TABLET, FILM COATED ORAL at 14:51

## 2020-01-01 RX ADMIN — MUPIROCIN: 20 OINTMENT TOPICAL at 20:33

## 2020-01-01 RX ADMIN — LAMOTRIGINE 25 MG: 25 TABLET ORAL at 20:56

## 2020-01-01 RX ADMIN — AMINOCAPROIC ACID 1 G/HR: 250 INJECTION, SOLUTION INTRAVENOUS at 08:30

## 2020-01-01 RX ADMIN — HYDRALAZINE HYDROCHLORIDE 50 MG: 50 TABLET, FILM COATED ORAL at 10:45

## 2020-01-01 RX ADMIN — LAMOTRIGINE 25 MG: 25 TABLET ORAL at 16:59

## 2020-01-01 RX ADMIN — CLONAZEPAM 0.5 MG: 0.5 TABLET ORAL at 20:59

## 2020-01-01 RX ADMIN — Medication 10 ML: at 08:02

## 2020-01-01 RX ADMIN — PAROXETINE HYDROCHLORIDE 40 MG: 20 TABLET, FILM COATED ORAL at 17:44

## 2020-01-01 RX ADMIN — HYDRALAZINE HYDROCHLORIDE 100 MG: 50 TABLET, FILM COATED ORAL at 14:13

## 2020-01-01 RX ADMIN — HYDRALAZINE HYDROCHLORIDE 25 MG: 25 TABLET, FILM COATED ORAL at 01:17

## 2020-01-01 RX ADMIN — LISINOPRIL 5 MG: 5 TABLET ORAL at 09:36

## 2020-01-01 RX ADMIN — PANTOPRAZOLE SODIUM 40 MG: 40 TABLET, DELAYED RELEASE ORAL at 05:02

## 2020-01-01 RX ADMIN — MELATONIN TAB 5 MG 5 MG: 5 TAB at 21:32

## 2020-01-01 RX ADMIN — SODIUM BICARBONATE 50 MEQ: 84 INJECTION, SOLUTION INTRAVENOUS at 11:22

## 2020-01-01 RX ADMIN — IPRATROPIUM BROMIDE AND ALBUTEROL SULFATE 1 AMPULE: .5; 3 SOLUTION RESPIRATORY (INHALATION) at 19:34

## 2020-01-01 RX ADMIN — PRIMIDONE 100 MG: 50 TABLET ORAL at 10:25

## 2020-01-01 RX ADMIN — ACETAMINOPHEN 650 MG: 325 TABLET ORAL at 20:22

## 2020-01-01 RX ADMIN — SENNOSIDES 8.6 MG: 8.6 TABLET, FILM COATED ORAL at 10:45

## 2020-01-01 RX ADMIN — MUPIROCIN: 20 OINTMENT TOPICAL at 19:47

## 2020-01-01 RX ADMIN — VANCOMYCIN HYDROCHLORIDE 750 MG: 750 INJECTION, POWDER, LYOPHILIZED, FOR SOLUTION INTRAVENOUS at 23:50

## 2020-01-01 RX ADMIN — HYDRALAZINE HYDROCHLORIDE 25 MG: 25 TABLET, FILM COATED ORAL at 03:41

## 2020-01-01 RX ADMIN — MIDAZOLAM HYDROCHLORIDE 2 MG: 2 INJECTION, SOLUTION INTRAMUSCULAR; INTRAVENOUS at 06:32

## 2020-01-01 RX ADMIN — Medication 10 ML: at 09:06

## 2020-01-01 RX ADMIN — ATORVASTATIN CALCIUM 40 MG: 40 TABLET, FILM COATED ORAL at 20:51

## 2020-01-01 RX ADMIN — DOXAZOSIN 4 MG: 2 TABLET ORAL at 21:46

## 2020-01-01 RX ADMIN — LAMOTRIGINE 25 MG: 25 TABLET ORAL at 17:01

## 2020-01-01 RX ADMIN — SODIUM BICARBONATE: 84 INJECTION, SOLUTION INTRAVENOUS at 10:42

## 2020-01-01 RX ADMIN — DOXAZOSIN 4 MG: 2 TABLET ORAL at 20:14

## 2020-01-01 RX ADMIN — MEROPENEM 500 MG: 500 INJECTION, POWDER, FOR SOLUTION INTRAVENOUS at 22:54

## 2020-01-01 RX ADMIN — PANTOPRAZOLE SODIUM 40 MG: 40 TABLET, DELAYED RELEASE ORAL at 16:47

## 2020-01-01 RX ADMIN — AMLODIPINE BESYLATE 10 MG: 5 TABLET ORAL at 11:45

## 2020-01-01 RX ADMIN — MORPHINE SULFATE 2 MG: 2 INJECTION, SOLUTION INTRAMUSCULAR; INTRAVENOUS at 23:09

## 2020-01-01 RX ADMIN — ACETAMINOPHEN 650 MG: 325 TABLET ORAL at 06:58

## 2020-01-01 RX ADMIN — Medication 10 ML: at 08:35

## 2020-01-01 RX ADMIN — HYDRALAZINE HYDROCHLORIDE 100 MG: 50 TABLET, FILM COATED ORAL at 05:29

## 2020-01-01 RX ADMIN — METOPROLOL TARTRATE 12.5 MG: 25 TABLET, FILM COATED ORAL at 22:58

## 2020-01-01 RX ADMIN — PANTOPRAZOLE SODIUM 40 MG: 40 TABLET, DELAYED RELEASE ORAL at 06:13

## 2020-01-01 RX ADMIN — GEMFIBROZIL 600 MG: 600 TABLET ORAL at 06:15

## 2020-01-01 RX ADMIN — PANTOPRAZOLE SODIUM 40 MG: 40 TABLET, DELAYED RELEASE ORAL at 10:38

## 2020-01-01 RX ADMIN — AMLODIPINE BESYLATE 10 MG: 5 TABLET ORAL at 10:38

## 2020-01-01 RX ADMIN — GEMFIBROZIL 600 MG: 600 TABLET ORAL at 16:56

## 2020-01-01 RX ADMIN — LAMOTRIGINE 25 MG: 25 TABLET ORAL at 13:40

## 2020-01-01 RX ADMIN — LAMOTRIGINE 25 MG: 25 TABLET ORAL at 16:35

## 2020-01-01 RX ADMIN — ATENOLOL 50 MG: 50 TABLET ORAL at 09:51

## 2020-01-01 RX ADMIN — Medication 100 MCG: at 09:25

## 2020-01-01 RX ADMIN — PRIMIDONE 100 MG: 50 TABLET ORAL at 08:27

## 2020-01-01 RX ADMIN — PROTAMINE SULFATE 300 MG: 10 INJECTION, SOLUTION INTRAVENOUS at 10:54

## 2020-01-01 RX ADMIN — PRIMIDONE 100 MG: 50 TABLET ORAL at 08:10

## 2020-01-01 RX ADMIN — ASCORBIC ACID, THIAMINE MONONITRATE,RIBOFLAVIN, NIACINAMIDE, PYRIDOXINE HYDROCHLORIDE, FOLIC ACID, CYANOCOBALAMIN, BIOTIN, CALCIUM PANTOTHENATE, 1 MG: 100; 1.5; 1.7; 20; 10; 1; 6000; 150000; 5 CAPSULE, LIQUID FILLED ORAL at 08:11

## 2020-01-01 RX ADMIN — CARVEDILOL 3.12 MG: 3.12 TABLET, FILM COATED ORAL at 04:57

## 2020-01-01 RX ADMIN — HEPARIN SODIUM 3800 UNITS: 1000 INJECTION INTRAVENOUS; SUBCUTANEOUS at 17:45

## 2020-01-01 RX ADMIN — HYDRALAZINE HYDROCHLORIDE 50 MG: 50 TABLET, FILM COATED ORAL at 05:39

## 2020-01-01 RX ADMIN — PAROXETINE HYDROCHLORIDE 40 MG: 20 TABLET, FILM COATED ORAL at 08:11

## 2020-01-01 RX ADMIN — ALPRAZOLAM 0.5 MG: 0.25 TABLET ORAL at 11:54

## 2020-01-01 RX ADMIN — PAROXETINE HYDROCHLORIDE 60 MG: 20 TABLET, FILM COATED ORAL at 10:39

## 2020-01-01 RX ADMIN — MORPHINE SULFATE 2 MG: 2 INJECTION, SOLUTION INTRAMUSCULAR; INTRAVENOUS at 15:26

## 2020-01-01 RX ADMIN — PANTOPRAZOLE SODIUM 40 MG: 40 TABLET, DELAYED RELEASE ORAL at 05:21

## 2020-01-01 RX ADMIN — PANTOPRAZOLE SODIUM 40 MG: 40 TABLET, DELAYED RELEASE ORAL at 05:34

## 2020-01-01 RX ADMIN — MIDAZOLAM HYDROCHLORIDE 3 MG: 2 INJECTION, SOLUTION INTRAMUSCULAR; INTRAVENOUS at 06:42

## 2020-01-01 RX ADMIN — ACETAMINOPHEN 650 MG: 325 TABLET ORAL at 09:55

## 2020-01-01 RX ADMIN — B-COMPLEX W/ C & FOLIC ACID TAB 1 TABLET: TAB at 16:53

## 2020-01-01 RX ADMIN — ROCURONIUM BROMIDE 50 MG: 10 SOLUTION INTRAVENOUS at 06:32

## 2020-01-01 RX ADMIN — SODIUM BICARBONATE 650 MG: 650 TABLET ORAL at 20:50

## 2020-01-01 RX ADMIN — LAMOTRIGINE 25 MG: 25 TABLET ORAL at 09:39

## 2020-01-01 RX ADMIN — HEPARIN SODIUM 5000 UNITS: 5000 INJECTION INTRAVENOUS; SUBCUTANEOUS at 05:02

## 2020-01-01 RX ADMIN — ALBUMIN (HUMAN) 25 G: 12.5 INJECTION, SOLUTION INTRAVENOUS at 03:04

## 2020-01-01 RX ADMIN — SODIUM BICARBONATE 50 MEQ: 84 INJECTION, SOLUTION INTRAVENOUS at 13:42

## 2020-01-01 RX ADMIN — SODIUM BICARBONATE 50 MEQ: 84 INJECTION, SOLUTION INTRAVENOUS at 12:20

## 2020-01-01 RX ADMIN — LAMOTRIGINE 25 MG: 25 TABLET ORAL at 13:29

## 2020-01-01 RX ADMIN — SODIUM CHLORIDE, PRESERVATIVE FREE 10 ML: 5 INJECTION INTRAVENOUS at 09:01

## 2020-01-01 RX ADMIN — AMLODIPINE BESYLATE 10 MG: 5 TABLET ORAL at 09:50

## 2020-01-01 RX ADMIN — CARVEDILOL 12.5 MG: 6.25 TABLET, FILM COATED ORAL at 10:25

## 2020-01-01 RX ADMIN — FLUTICASONE PROPIONATE 1 SPRAY: 50 SPRAY, METERED NASAL at 13:00

## 2020-01-01 RX ADMIN — SODIUM CHLORIDE 1000 ML: 9 INJECTION, SOLUTION INTRAVENOUS at 21:17

## 2020-01-01 RX ADMIN — NITROGLYCERIN 0.5 INCH: 20 OINTMENT TOPICAL at 05:31

## 2020-01-01 RX ADMIN — ALBUTEROL SULFATE 2.5 MG: 2.5 SOLUTION RESPIRATORY (INHALATION) at 15:24

## 2020-01-01 RX ADMIN — DOXAZOSIN 4 MG: 2 TABLET ORAL at 09:52

## 2020-01-01 RX ADMIN — SODIUM CHLORIDE 20 ML: 9 INJECTION, SOLUTION INTRAVENOUS at 08:47

## 2020-01-01 RX ADMIN — DOCUSATE SODIUM 50MG AND SENNOSIDES 8.6MG 2 TABLET: 8.6; 5 TABLET, FILM COATED ORAL at 10:25

## 2020-01-01 RX ADMIN — PAROXETINE HYDROCHLORIDE 40 MG: 20 TABLET, FILM COATED ORAL at 09:10

## 2020-01-01 RX ADMIN — Medication 100 MCG: at 09:30

## 2020-01-01 RX ADMIN — ONDANSETRON 4 MG: 2 INJECTION INTRAMUSCULAR; INTRAVENOUS at 11:26

## 2020-01-01 RX ADMIN — PANTOPRAZOLE SODIUM 40 MG: 40 TABLET, DELAYED RELEASE ORAL at 05:28

## 2020-01-01 RX ADMIN — HEPARIN SODIUM 5000 UNITS: 5000 INJECTION INTRAVENOUS; SUBCUTANEOUS at 15:43

## 2020-01-01 RX ADMIN — CLONIDINE HYDROCHLORIDE 0.2 MG: 0.1 TABLET ORAL at 11:05

## 2020-01-01 RX ADMIN — ACETAMINOPHEN 650 MG: 325 TABLET ORAL at 22:17

## 2020-01-01 RX ADMIN — SODIUM CHLORIDE, PRESERVATIVE FREE 10 ML: 5 INJECTION INTRAVENOUS at 22:12

## 2020-01-01 RX ADMIN — HEPARIN SODIUM 5000 UNITS: 5000 INJECTION INTRAVENOUS; SUBCUTANEOUS at 13:49

## 2020-01-01 RX ADMIN — ASPIRIN 81 MG: 81 TABLET, CHEWABLE ORAL at 09:36

## 2020-01-01 RX ADMIN — VASOPRESSIN 0.04 UNITS/MIN: 20 INJECTION INTRAVENOUS at 01:11

## 2020-01-01 RX ADMIN — CALCIUM CHLORIDE 0.5 G: 100 INJECTION, SOLUTION INTRAVENOUS at 10:57

## 2020-01-01 RX ADMIN — CALCIUM CHLORIDE 0.25 G: 100 INJECTION, SOLUTION INTRAVENOUS at 11:07

## 2020-01-01 RX ADMIN — ALBUTEROL SULFATE 2.5 MG: 2.5 SOLUTION RESPIRATORY (INHALATION) at 07:55

## 2020-01-01 RX ADMIN — SODIUM CHLORIDE, PRESERVATIVE FREE 10 ML: 5 INJECTION INTRAVENOUS at 22:55

## 2020-01-01 RX ADMIN — GEMFIBROZIL 600 MG: 600 TABLET ORAL at 05:14

## 2020-01-01 RX ADMIN — LAMOTRIGINE 25 MG: 25 TABLET ORAL at 08:27

## 2020-01-01 RX ADMIN — HEPARIN SODIUM 5000 UNITS: 5000 INJECTION INTRAVENOUS; SUBCUTANEOUS at 13:10

## 2020-01-01 RX ADMIN — Medication 10 ML: at 11:25

## 2020-01-01 RX ADMIN — HEPARIN SODIUM 3100 UNITS: 1000 INJECTION INTRAVENOUS; SUBCUTANEOUS at 08:49

## 2020-01-01 RX ADMIN — LAMOTRIGINE 25 MG: 25 TABLET ORAL at 09:23

## 2020-01-01 RX ADMIN — CARVEDILOL 12.5 MG: 6.25 TABLET, FILM COATED ORAL at 18:59

## 2020-01-01 RX ADMIN — CARVEDILOL 12.5 MG: 6.25 TABLET, FILM COATED ORAL at 11:07

## 2020-01-01 RX ADMIN — ALBUTEROL SULFATE 2.5 MG: 2.5 SOLUTION RESPIRATORY (INHALATION) at 16:17

## 2020-01-01 RX ADMIN — PAROXETINE HYDROCHLORIDE 40 MG: 20 TABLET, FILM COATED ORAL at 09:36

## 2020-01-01 RX ADMIN — GEMFIBROZIL 600 MG: 600 TABLET ORAL at 05:02

## 2020-01-01 RX ADMIN — PAROXETINE HYDROCHLORIDE 40 MG: 20 TABLET, FILM COATED ORAL at 08:31

## 2020-01-01 RX ADMIN — GEMFIBROZIL 600 MG: 600 TABLET ORAL at 15:59

## 2020-01-01 RX ADMIN — LAMOTRIGINE 25 MG: 25 TABLET ORAL at 22:58

## 2020-01-01 RX ADMIN — MELATONIN 3 MG ORAL TABLET 3 MG: 3 TABLET ORAL at 22:22

## 2020-01-01 RX ADMIN — MELATONIN TAB 5 MG 5 MG: 5 TAB at 23:56

## 2020-01-01 RX ADMIN — ASCORBIC ACID, THIAMINE MONONITRATE,RIBOFLAVIN, NIACINAMIDE, PYRIDOXINE HYDROCHLORIDE, FOLIC ACID, CYANOCOBALAMIN, BIOTIN, CALCIUM PANTOTHENATE, 1 MG: 100; 1.5; 1.7; 20; 10; 1; 6000; 150000; 5 CAPSULE, LIQUID FILLED ORAL at 09:50

## 2020-01-01 RX ADMIN — PAROXETINE HYDROCHLORIDE 40 MG: 20 TABLET, FILM COATED ORAL at 08:15

## 2020-01-01 RX ADMIN — ISOSORBIDE MONONITRATE 30 MG: 30 TABLET, EXTENDED RELEASE ORAL at 17:43

## 2020-01-01 RX ADMIN — LAMOTRIGINE 25 MG: 25 TABLET ORAL at 17:17

## 2020-01-01 RX ADMIN — ISOSORBIDE MONONITRATE 30 MG: 30 TABLET, EXTENDED RELEASE ORAL at 09:36

## 2020-01-01 RX ADMIN — Medication 10 ML: at 09:00

## 2020-01-01 RX ADMIN — HEPARIN SODIUM 3800 UNITS: 1000 INJECTION, SOLUTION INTRAVENOUS; SUBCUTANEOUS at 14:05

## 2020-01-01 RX ADMIN — VASOPRESSIN 0.01 UNITS: 20 INJECTION INTRAVENOUS at 09:56

## 2020-01-01 RX ADMIN — CARVEDILOL 12.5 MG: 6.25 TABLET, FILM COATED ORAL at 08:02

## 2020-01-01 RX ADMIN — ATORVASTATIN CALCIUM 40 MG: 40 TABLET, FILM COATED ORAL at 20:58

## 2020-01-01 RX ADMIN — OXYCODONE 5 MG: 5 TABLET ORAL at 10:14

## 2020-01-01 RX ADMIN — CARVEDILOL 12.5 MG: 6.25 TABLET, FILM COATED ORAL at 08:10

## 2020-01-01 RX ADMIN — DOXAZOSIN 4 MG: 2 TABLET ORAL at 08:18

## 2020-01-01 RX ADMIN — ACETAMINOPHEN 650 MG: 325 TABLET ORAL at 20:13

## 2020-01-01 RX ADMIN — Medication 10 ML: at 08:18

## 2020-01-01 RX ADMIN — CLONAZEPAM 0.5 MG: 0.5 TABLET ORAL at 20:58

## 2020-01-01 RX ADMIN — VASOPRESSIN 0.01 UNITS/MIN: 20 INJECTION INTRAVENOUS at 06:42

## 2020-01-01 RX ADMIN — HEPARIN SODIUM AND DEXTROSE 8.3 ML/HR: 10000; 5 INJECTION INTRAVENOUS at 08:51

## 2020-01-01 RX ADMIN — PRIMIDONE 100 MG: 50 TABLET ORAL at 09:46

## 2020-01-01 RX ADMIN — HEPARIN SODIUM 5000 UNITS: 5000 INJECTION INTRAVENOUS; SUBCUTANEOUS at 14:51

## 2020-01-01 RX ADMIN — LAMOTRIGINE 25 MG: 25 TABLET ORAL at 21:54

## 2020-01-01 RX ADMIN — HYDRALAZINE HYDROCHLORIDE 100 MG: 50 TABLET, FILM COATED ORAL at 14:42

## 2020-01-01 RX ADMIN — DOCUSATE SODIUM 50MG AND SENNOSIDES 8.6MG 2 TABLET: 8.6; 5 TABLET, FILM COATED ORAL at 08:18

## 2020-01-01 RX ADMIN — ATENOLOL 50 MG: 50 TABLET ORAL at 22:58

## 2020-01-01 RX ADMIN — HEPARIN SODIUM 5000 UNITS: 5000 INJECTION INTRAVENOUS; SUBCUTANEOUS at 20:17

## 2020-01-01 RX ADMIN — Medication 10 ML: at 08:12

## 2020-01-01 RX ADMIN — METRONIDAZOLE 500 MG: 500 INJECTION, SOLUTION INTRAVENOUS at 22:33

## 2020-01-01 RX ADMIN — AMLODIPINE BESYLATE 10 MG: 5 TABLET ORAL at 08:36

## 2020-01-01 RX ADMIN — ACETAMINOPHEN 650 MG: 325 TABLET ORAL at 23:50

## 2020-01-01 RX ADMIN — LAMOTRIGINE 25 MG: 25 TABLET ORAL at 08:02

## 2020-01-01 RX ADMIN — ONDANSETRON 4 MG: 2 INJECTION INTRAMUSCULAR; INTRAVENOUS at 15:26

## 2020-01-01 RX ADMIN — B-COMPLEX W/ C & FOLIC ACID TAB 1 TABLET: TAB at 08:19

## 2020-01-01 RX ADMIN — EPOETIN ALFA-EPBX 10000 UNITS: 10000 INJECTION, SOLUTION INTRAVENOUS; SUBCUTANEOUS at 10:02

## 2020-01-01 RX ADMIN — Medication 10 ML: at 09:56

## 2020-01-01 RX ADMIN — LAMOTRIGINE 25 MG: 25 TABLET ORAL at 10:38

## 2020-01-01 RX ADMIN — HYDRALAZINE HYDROCHLORIDE 100 MG: 50 TABLET, FILM COATED ORAL at 20:12

## 2020-01-01 RX ADMIN — HYDRALAZINE HYDROCHLORIDE 50 MG: 50 TABLET, FILM COATED ORAL at 20:50

## 2020-01-01 RX ADMIN — CLONAZEPAM 0.5 MG: 0.5 TABLET ORAL at 21:46

## 2020-01-01 RX ADMIN — PHENYLEPHRINE HYDROCHLORIDE 100 MCG: 10 INJECTION INTRAVENOUS at 08:08

## 2020-01-01 RX ADMIN — IOPAMIDOL 75 ML: 755 INJECTION, SOLUTION INTRAVENOUS at 18:24

## 2020-01-01 RX ADMIN — LORAZEPAM 0.5 MG: 1 TABLET ORAL at 20:16

## 2020-01-01 RX ADMIN — Medication 10 ML: at 20:13

## 2020-01-01 RX ADMIN — HEPARIN SODIUM 5000 UNITS: 5000 INJECTION INTRAVENOUS; SUBCUTANEOUS at 15:25

## 2020-01-01 RX ADMIN — Medication 10 ML: at 20:32

## 2020-01-01 RX ADMIN — B-COMPLEX W/ C & FOLIC ACID TAB 1 TABLET: TAB at 08:24

## 2020-01-01 RX ADMIN — MORPHINE SULFATE 4 MG: 4 INJECTION, SOLUTION INTRAMUSCULAR; INTRAVENOUS at 21:49

## 2020-01-01 RX ADMIN — FENTANYL CITRATE 50 MCG: 50 INJECTION, SOLUTION INTRAMUSCULAR; INTRAVENOUS at 08:33

## 2020-01-01 RX ADMIN — DOXAZOSIN 1 MG: 2 TABLET ORAL at 09:46

## 2020-01-01 RX ADMIN — LAMOTRIGINE 25 MG: 25 TABLET ORAL at 20:15

## 2020-01-01 RX ADMIN — MUPIROCIN: 20 OINTMENT TOPICAL at 08:46

## 2020-01-01 RX ADMIN — SODIUM CHLORIDE: 9 INJECTION, SOLUTION INTRAVENOUS at 22:58

## 2020-01-01 RX ADMIN — SODIUM BICARBONATE: 84 INJECTION, SOLUTION INTRAVENOUS at 09:50

## 2020-01-01 RX ADMIN — SENNOSIDES 8.6 MG: 8.6 TABLET, FILM COATED ORAL at 11:43

## 2020-01-01 RX ADMIN — METRONIDAZOLE 500 MG: 500 INJECTION, SOLUTION INTRAVENOUS at 05:30

## 2020-01-01 RX ADMIN — AMLODIPINE BESYLATE 10 MG: 5 TABLET ORAL at 09:39

## 2020-01-01 RX ADMIN — LAMOTRIGINE 25 MG: 25 TABLET ORAL at 17:38

## 2020-01-01 RX ADMIN — SODIUM CHLORIDE, PRESERVATIVE FREE 10 ML: 5 INJECTION INTRAVENOUS at 19:48

## 2020-01-01 RX ADMIN — LAMOTRIGINE 25 MG: 25 TABLET ORAL at 16:56

## 2020-01-01 RX ADMIN — ENOXAPARIN SODIUM 30 MG: 30 INJECTION SUBCUTANEOUS at 16:53

## 2020-01-01 RX ADMIN — ISOSORBIDE MONONITRATE 30 MG: 30 TABLET, EXTENDED RELEASE ORAL at 11:06

## 2020-01-01 RX ADMIN — LAMOTRIGINE 25 MG: 25 TABLET ORAL at 08:54

## 2020-01-01 RX ADMIN — HEPARIN SODIUM 5000 UNITS: 5000 INJECTION INTRAVENOUS; SUBCUTANEOUS at 21:54

## 2020-01-01 RX ADMIN — LAMOTRIGINE 25 MG: 25 TABLET ORAL at 09:06

## 2020-01-01 RX ADMIN — POLYETHYLENE GLYCOL 3350 17 G: 17 POWDER, FOR SOLUTION ORAL at 08:46

## 2020-01-01 RX ADMIN — SENNOSIDES AND DOCUSATE SODIUM 2 TABLET: 8.6; 5 TABLET ORAL at 09:38

## 2020-01-01 RX ADMIN — LAMOTRIGINE 25 MG: 25 TABLET ORAL at 20:20

## 2020-01-01 RX ADMIN — FENTANYL CITRATE 200 MCG: 50 INJECTION, SOLUTION INTRAMUSCULAR; INTRAVENOUS at 07:51

## 2020-01-01 RX ADMIN — ACETAMINOPHEN 650 MG: 325 TABLET ORAL at 09:31

## 2020-01-01 RX ADMIN — LAMOTRIGINE 25 MG: 25 TABLET ORAL at 12:27

## 2020-01-01 RX ADMIN — SODIUM CHLORIDE 1000 ML: 9 INJECTION, SOLUTION INTRAVENOUS at 15:19

## 2020-01-01 RX ADMIN — LAMOTRIGINE 25 MG: 25 TABLET ORAL at 14:13

## 2020-01-01 RX ADMIN — HEPARIN SODIUM 3000 UNITS: 1000 INJECTION, SOLUTION INTRAVENOUS; SUBCUTANEOUS at 13:24

## 2020-01-01 RX ADMIN — DOXAZOSIN 2 MG: 2 TABLET ORAL at 00:26

## 2020-01-01 RX ADMIN — HYDRALAZINE HYDROCHLORIDE 50 MG: 50 TABLET, FILM COATED ORAL at 19:58

## 2020-01-01 RX ADMIN — SODIUM BICARBONATE 50 MEQ: 84 INJECTION, SOLUTION INTRAVENOUS at 21:21

## 2020-01-01 RX ADMIN — Medication 10 ML: at 08:19

## 2020-01-01 RX ADMIN — CEFAZOLIN SODIUM 1 G: 1 INJECTION, POWDER, FOR SOLUTION INTRAMUSCULAR; INTRAVENOUS at 21:22

## 2020-01-01 RX ADMIN — LAMOTRIGINE 25 MG: 25 TABLET ORAL at 20:19

## 2020-01-01 RX ADMIN — ISOSORBIDE MONONITRATE 30 MG: 30 TABLET ORAL at 08:19

## 2020-01-01 RX ADMIN — ONDANSETRON 8 MG: 4 TABLET, ORALLY DISINTEGRATING ORAL at 19:09

## 2020-01-01 RX ADMIN — ALBUTEROL SULFATE 2.5 MG: 2.5 SOLUTION RESPIRATORY (INHALATION) at 20:35

## 2020-01-01 RX ADMIN — Medication 10 ML: at 21:23

## 2020-01-01 RX ADMIN — PROMETHAZINE HYDROCHLORIDE 12.5 MG: 25 TABLET ORAL at 20:05

## 2020-01-01 RX ADMIN — PAROXETINE HYDROCHLORIDE 40 MG: 20 TABLET, FILM COATED ORAL at 11:43

## 2020-01-01 RX ADMIN — ACETAMINOPHEN 650 MG: 325 TABLET ORAL at 13:00

## 2020-01-01 RX ADMIN — CALCIUM GLUCONATE 1 G: 98 INJECTION, SOLUTION INTRAVENOUS at 02:44

## 2020-01-01 RX ADMIN — IPRATROPIUM BROMIDE AND ALBUTEROL SULFATE 1 AMPULE: .5; 3 SOLUTION RESPIRATORY (INHALATION) at 19:55

## 2020-01-01 RX ADMIN — HEPARIN SODIUM 5000 UNITS: 5000 INJECTION INTRAVENOUS; SUBCUTANEOUS at 13:50

## 2020-01-01 RX ADMIN — FENTANYL CITRATE 100 MCG: 50 INJECTION, SOLUTION INTRAMUSCULAR; INTRAVENOUS at 08:55

## 2020-01-01 RX ADMIN — Medication 325 MG: at 08:23

## 2020-01-01 RX ADMIN — DOXAZOSIN 2 MG: 2 TABLET ORAL at 09:54

## 2020-01-01 RX ADMIN — PRIMIDONE 100 MG: 50 TABLET ORAL at 09:22

## 2020-01-01 RX ADMIN — ASPIRIN 81 MG: 81 TABLET, CHEWABLE ORAL at 17:44

## 2020-01-01 RX ADMIN — DOCUSATE SODIUM 50 MG AND SENNOSIDES 8.6 MG 1 TABLET: 8.6; 5 TABLET, FILM COATED ORAL at 11:07

## 2020-01-01 RX ADMIN — AMLODIPINE BESYLATE 10 MG: 5 TABLET ORAL at 09:05

## 2020-01-01 RX ADMIN — ACETAMINOPHEN 650 MG: 325 TABLET ORAL at 21:41

## 2020-01-01 RX ADMIN — CLONIDINE HYDROCHLORIDE 0.2 MG: 0.1 TABLET ORAL at 14:43

## 2020-01-01 RX ADMIN — Medication 15 ML: at 19:47

## 2020-01-01 RX ADMIN — Medication 15 ML: at 04:58

## 2020-01-01 RX ADMIN — HYDRALAZINE HYDROCHLORIDE 100 MG: 50 TABLET, FILM COATED ORAL at 17:43

## 2020-01-01 RX ADMIN — CLOPIDOGREL BISULFATE 75 MG: 75 TABLET ORAL at 09:00

## 2020-01-01 RX ADMIN — ACETAMINOPHEN 650 MG: 325 TABLET ORAL at 21:23

## 2020-01-01 RX ADMIN — FUROSEMIDE 40 MG: 10 INJECTION, SOLUTION INTRAMUSCULAR; INTRAVENOUS at 17:32

## 2020-01-01 RX ADMIN — HEPARIN SODIUM 3800 UNITS: 1000 INJECTION INTRAVENOUS; SUBCUTANEOUS at 10:20

## 2020-01-01 RX ADMIN — FUROSEMIDE 40 MG: 10 INJECTION, SOLUTION INTRAMUSCULAR; INTRAVENOUS at 18:27

## 2020-01-01 RX ADMIN — PROPOFOL 50 MG: 10 INJECTION, EMULSION INTRAVENOUS at 06:32

## 2020-01-01 RX ADMIN — VANCOMYCIN HYDROCHLORIDE 750 MG: 750 INJECTION, POWDER, LYOPHILIZED, FOR SOLUTION INTRAVENOUS at 23:12

## 2020-01-01 RX ADMIN — ASCORBIC ACID, THIAMINE MONONITRATE,RIBOFLAVIN, NIACINAMIDE, PYRIDOXINE HYDROCHLORIDE, FOLIC ACID, CYANOCOBALAMIN, BIOTIN, CALCIUM PANTOTHENATE, 1 MG: 100; 1.5; 1.7; 20; 10; 1; 6000; 150000; 5 CAPSULE, LIQUID FILLED ORAL at 08:54

## 2020-01-01 RX ADMIN — ATORVASTATIN CALCIUM 40 MG: 40 TABLET, FILM COATED ORAL at 19:58

## 2020-01-01 RX ADMIN — HYDRALAZINE HYDROCHLORIDE 100 MG: 50 TABLET, FILM COATED ORAL at 05:09

## 2020-01-01 RX ADMIN — Medication 10 ML: at 08:46

## 2020-01-01 RX ADMIN — LAMOTRIGINE 25 MG: 25 TABLET ORAL at 14:51

## 2020-01-01 RX ADMIN — HEPARIN SODIUM 5000 UNITS: 5000 INJECTION INTRAVENOUS; SUBCUTANEOUS at 05:31

## 2020-01-01 RX ADMIN — GEMFIBROZIL 600 MG: 600 TABLET ORAL at 16:47

## 2020-01-01 RX ADMIN — FUROSEMIDE 40 MG: 10 INJECTION, SOLUTION INTRAMUSCULAR; INTRAVENOUS at 17:38

## 2020-01-01 RX ADMIN — Medication 10 ML: at 20:09

## 2020-01-01 RX ADMIN — INSULIN GLARGINE 14 UNITS: 100 INJECTION, SOLUTION SUBCUTANEOUS at 19:47

## 2020-01-01 RX ADMIN — OXYCODONE HYDROCHLORIDE AND ACETAMINOPHEN 1 TABLET: 5; 325 TABLET ORAL at 23:00

## 2020-01-01 RX ADMIN — MIDAZOLAM HYDROCHLORIDE 2 MG: 2 INJECTION, SOLUTION INTRAMUSCULAR; INTRAVENOUS at 10:47

## 2020-01-01 RX ADMIN — ACETAMINOPHEN 650 MG: 325 TABLET ORAL at 20:05

## 2020-01-01 RX ADMIN — CEFDINIR 300 MG: 300 CAPSULE ORAL at 10:38

## 2020-01-01 RX ADMIN — LAMOTRIGINE 25 MG: 25 TABLET ORAL at 17:53

## 2020-01-01 RX ADMIN — ATORVASTATIN CALCIUM 40 MG: 40 TABLET, FILM COATED ORAL at 20:17

## 2020-01-01 RX ADMIN — LAMOTRIGINE 25 MG: 25 TABLET ORAL at 09:53

## 2020-01-01 RX ADMIN — LAMOTRIGINE 25 MG: 25 TABLET ORAL at 14:53

## 2020-01-01 RX ADMIN — HEPARIN SODIUM 5000 UNITS: 5000 INJECTION INTRAVENOUS; SUBCUTANEOUS at 04:58

## 2020-01-01 RX ADMIN — MELATONIN TAB 5 MG 5 MG: 5 TAB at 22:17

## 2020-01-01 RX ADMIN — LAMOTRIGINE 25 MG: 25 TABLET ORAL at 13:03

## 2020-01-01 RX ADMIN — LAMOTRIGINE 25 MG: 25 TABLET ORAL at 20:31

## 2020-01-01 RX ADMIN — LAMOTRIGINE 25 MG: 25 TABLET ORAL at 12:25

## 2020-01-01 RX ADMIN — ATENOLOL 50 MG: 50 TABLET ORAL at 00:26

## 2020-01-01 RX ADMIN — PAROXETINE HYDROCHLORIDE 60 MG: 20 TABLET, FILM COATED ORAL at 08:38

## 2020-01-01 RX ADMIN — SODIUM CHLORIDE, PRESERVATIVE FREE 10 ML: 5 INJECTION INTRAVENOUS at 09:00

## 2020-01-01 RX ADMIN — PAROXETINE HYDROCHLORIDE 40 MG: 20 TABLET, FILM COATED ORAL at 13:09

## 2020-01-01 RX ADMIN — ACETAMINOPHEN 650 MG: 325 TABLET ORAL at 19:46

## 2020-01-01 RX ADMIN — Medication 200 MCG: at 09:00

## 2020-01-01 RX ADMIN — LAMOTRIGINE 25 MG: 25 TABLET ORAL at 20:05

## 2020-01-01 RX ADMIN — MORPHINE SULFATE 2 MG: 2 INJECTION, SOLUTION INTRAMUSCULAR; INTRAVENOUS at 16:47

## 2020-01-01 RX ADMIN — CARVEDILOL 12.5 MG: 6.25 TABLET, FILM COATED ORAL at 17:14

## 2020-01-01 RX ADMIN — GEMFIBROZIL 600 MG: 600 TABLET ORAL at 05:22

## 2020-01-01 RX ADMIN — HEPARIN SODIUM 25000 UNITS: 1000 INJECTION, SOLUTION INTRAVENOUS; SUBCUTANEOUS at 09:28

## 2020-01-01 RX ADMIN — SODIUM CHLORIDE: 9 INJECTION, SOLUTION INTRAVENOUS at 07:49

## 2020-01-01 RX ADMIN — CARVEDILOL 12.5 MG: 6.25 TABLET, FILM COATED ORAL at 11:05

## 2020-01-01 RX ADMIN — CARVEDILOL 12.5 MG: 6.25 TABLET, FILM COATED ORAL at 17:22

## 2020-01-01 RX ADMIN — Medication 10 ML: at 17:32

## 2020-01-01 RX ADMIN — CEFDINIR 300 MG: 300 CAPSULE ORAL at 19:09

## 2020-01-01 RX ADMIN — ACETAMINOPHEN 650 MG: 325 TABLET ORAL at 15:47

## 2020-01-01 RX ADMIN — PANTOPRAZOLE SODIUM 40 MG: 40 TABLET, DELAYED RELEASE ORAL at 05:31

## 2020-01-01 RX ADMIN — NITROGLYCERIN 0.5 INCH: 20 OINTMENT TOPICAL at 00:26

## 2020-01-01 RX ADMIN — B-COMPLEX W/ C & FOLIC ACID TAB 1 TABLET: TAB at 09:29

## 2020-01-01 RX ADMIN — ISOSORBIDE MONONITRATE 30 MG: 30 TABLET, EXTENDED RELEASE ORAL at 11:05

## 2020-01-01 RX ADMIN — NITROGLYCERIN 0.5 INCH: 20 OINTMENT TOPICAL at 01:37

## 2020-01-01 RX ADMIN — PANTOPRAZOLE SODIUM 40 MG: 40 TABLET, DELAYED RELEASE ORAL at 08:38

## 2020-01-01 RX ADMIN — DOXAZOSIN 1 MG: 2 TABLET ORAL at 20:06

## 2020-01-01 RX ADMIN — Medication 15 ML: at 09:01

## 2020-01-01 RX ADMIN — SALINE NASAL SPRAY 1 SPRAY: 1.5 SOLUTION NASAL at 17:53

## 2020-01-01 RX ADMIN — SODIUM ZIRCONIUM CYCLOSILICATE 10 G: 10 POWDER, FOR SUSPENSION ORAL at 12:32

## 2020-01-01 RX ADMIN — HEPARIN SODIUM 5000 UNITS: 5000 INJECTION INTRAVENOUS; SUBCUTANEOUS at 16:59

## 2020-01-01 RX ADMIN — DOXAZOSIN 4 MG: 2 TABLET ORAL at 08:15

## 2020-01-01 RX ADMIN — ATENOLOL 50 MG: 50 TABLET ORAL at 16:47

## 2020-01-01 RX ADMIN — INSULIN GLARGINE 14 UNITS: 100 INJECTION, SOLUTION SUBCUTANEOUS at 23:09

## 2020-01-01 RX ADMIN — SENNOSIDES AND DOCUSATE SODIUM 2 TABLET: 8.6; 5 TABLET ORAL at 09:05

## 2020-01-01 RX ADMIN — PANTOPRAZOLE SODIUM 40 MG: 40 TABLET, DELAYED RELEASE ORAL at 06:35

## 2020-01-01 RX ADMIN — PANTOPRAZOLE SODIUM 40 MG: 40 TABLET, DELAYED RELEASE ORAL at 05:04

## 2020-01-01 RX ADMIN — SUGAMMADEX 200 MG: 100 INJECTION, SOLUTION INTRAVENOUS at 11:36

## 2020-01-01 RX ADMIN — PRIMIDONE 100 MG: 50 TABLET ORAL at 09:39

## 2020-01-01 RX ADMIN — GEMFIBROZIL 600 MG: 600 TABLET ORAL at 09:11

## 2020-01-01 RX ADMIN — CARVEDILOL 12.5 MG: 6.25 TABLET, FILM COATED ORAL at 21:23

## 2020-01-01 RX ADMIN — HYDRALAZINE HYDROCHLORIDE 50 MG: 50 TABLET, FILM COATED ORAL at 20:09

## 2020-01-01 RX ADMIN — PANTOPRAZOLE SODIUM 40 MG: 40 TABLET, DELAYED RELEASE ORAL at 09:23

## 2020-01-01 RX ADMIN — SODIUM BICARBONATE: 84 INJECTION, SOLUTION INTRAVENOUS at 20:51

## 2020-01-01 RX ADMIN — ATORVASTATIN CALCIUM 40 MG: 40 TABLET, FILM COATED ORAL at 21:20

## 2020-01-01 RX ADMIN — HEPARIN SODIUM 5000 UNITS: 5000 INJECTION INTRAVENOUS; SUBCUTANEOUS at 20:58

## 2020-01-01 RX ADMIN — PANTOPRAZOLE SODIUM 40 MG: 40 TABLET, DELAYED RELEASE ORAL at 05:14

## 2020-01-01 RX ADMIN — PAROXETINE HYDROCHLORIDE 40 MG: 20 TABLET, FILM COATED ORAL at 08:19

## 2020-01-01 RX ADMIN — MELATONIN TAB 5 MG 5 MG: 5 TAB at 20:22

## 2020-01-01 RX ADMIN — ASPIRIN 81 MG: 81 TABLET, CHEWABLE ORAL at 10:45

## 2020-01-01 RX ADMIN — LAMOTRIGINE 25 MG: 25 TABLET ORAL at 12:48

## 2020-01-01 RX ADMIN — B-COMPLEX W/ C & FOLIC ACID TAB 1 TABLET: TAB at 08:53

## 2020-01-01 RX ADMIN — HYDRALAZINE HYDROCHLORIDE 100 MG: 50 TABLET, FILM COATED ORAL at 13:08

## 2020-01-01 RX ADMIN — SUCCINYLCHOLINE CHLORIDE 100 MG: 20 INJECTION, SOLUTION INTRAMUSCULAR; INTRAVENOUS at 07:51

## 2020-01-01 RX ADMIN — ASPIRIN 81 MG: 81 TABLET, CHEWABLE ORAL at 11:07

## 2020-01-01 RX ADMIN — LAMOTRIGINE 25 MG: 25 TABLET ORAL at 12:32

## 2020-01-01 RX ADMIN — HYDROXYZINE PAMOATE 25 MG: 25 CAPSULE ORAL at 22:58

## 2020-01-01 RX ADMIN — CLONIDINE HYDROCHLORIDE 0.2 MG: 0.1 TABLET ORAL at 20:17

## 2020-01-01 RX ADMIN — ROCURONIUM BROMIDE 100 MG: 10 SOLUTION INTRAVENOUS at 07:54

## 2020-01-01 RX ADMIN — Medication 10 PUFF: at 10:52

## 2020-01-01 RX ADMIN — Medication 10 ML: at 20:50

## 2020-01-01 RX ADMIN — FUROSEMIDE 40 MG: 10 INJECTION, SOLUTION INTRAMUSCULAR; INTRAVENOUS at 08:18

## 2020-01-01 RX ADMIN — NITROGLYCERIN 0.5 INCH: 20 OINTMENT TOPICAL at 17:14

## 2020-01-01 RX ADMIN — FENTANYL CITRATE 100 MCG: 50 INJECTION, SOLUTION INTRAMUSCULAR; INTRAVENOUS at 06:47

## 2020-01-01 RX ADMIN — PAROXETINE HYDROCHLORIDE 40 MG: 20 TABLET, FILM COATED ORAL at 09:22

## 2020-01-01 RX ADMIN — MORPHINE SULFATE 2 MG: 2 INJECTION, SOLUTION INTRAMUSCULAR; INTRAVENOUS at 17:24

## 2020-01-01 RX ADMIN — HEPARIN SODIUM 5000 UNITS: 5000 INJECTION INTRAVENOUS; SUBCUTANEOUS at 21:47

## 2020-01-01 RX ADMIN — LAMOTRIGINE 25 MG: 25 TABLET ORAL at 12:04

## 2020-01-01 RX ADMIN — Medication 10 ML: at 09:23

## 2020-01-01 RX ADMIN — MORPHINE SULFATE 2 MG: 2 INJECTION, SOLUTION INTRAMUSCULAR; INTRAVENOUS at 19:22

## 2020-01-01 RX ADMIN — B-COMPLEX W/ C & FOLIC ACID TAB 1 TABLET: TAB at 10:39

## 2020-01-01 RX ADMIN — CLONAZEPAM 0.5 MG: 0.5 TABLET ORAL at 08:34

## 2020-01-01 RX ADMIN — ACETAMINOPHEN 650 MG: 325 TABLET ORAL at 20:56

## 2020-01-01 RX ADMIN — PRIMIDONE 100 MG: 50 TABLET ORAL at 08:31

## 2020-01-01 RX ADMIN — PRIMIDONE 100 MG: 50 TABLET ORAL at 08:15

## 2020-01-01 RX ADMIN — HEPARIN SODIUM 5000 UNITS: 5000 INJECTION INTRAVENOUS; SUBCUTANEOUS at 14:00

## 2020-01-01 RX ADMIN — CARVEDILOL 12.5 MG: 6.25 TABLET, FILM COATED ORAL at 17:15

## 2020-01-01 RX ADMIN — HYDRALAZINE HYDROCHLORIDE 25 MG: 25 TABLET, FILM COATED ORAL at 20:20

## 2020-01-01 RX ADMIN — CARVEDILOL 12.5 MG: 6.25 TABLET, FILM COATED ORAL at 10:13

## 2020-01-01 RX ADMIN — INSULIN LISPRO 1 UNITS: 100 INJECTION, SOLUTION INTRAVENOUS; SUBCUTANEOUS at 09:03

## 2020-01-01 RX ADMIN — SALINE NASAL SPRAY 1 SPRAY: 1.5 SOLUTION NASAL at 08:24

## 2020-01-01 RX ADMIN — CARVEDILOL 12.5 MG: 6.25 TABLET, FILM COATED ORAL at 18:18

## 2020-01-01 RX ADMIN — HYDRALAZINE HYDROCHLORIDE 100 MG: 50 TABLET, FILM COATED ORAL at 21:46

## 2020-01-01 RX ADMIN — HYDRALAZINE HYDROCHLORIDE 50 MG: 50 TABLET, FILM COATED ORAL at 05:04

## 2020-01-01 RX ADMIN — DOCUSATE SODIUM 50 MG AND SENNOSIDES 8.6 MG 1 TABLET: 8.6; 5 TABLET, FILM COATED ORAL at 11:05

## 2020-01-01 RX ADMIN — HYDRALAZINE HYDROCHLORIDE 100 MG: 50 TABLET, FILM COATED ORAL at 13:48

## 2020-01-01 RX ADMIN — NITROGLYCERIN 0.5 INCH: 20 OINTMENT TOPICAL at 17:22

## 2020-01-01 RX ADMIN — HYDRALAZINE HYDROCHLORIDE 50 MG: 50 TABLET, FILM COATED ORAL at 11:07

## 2020-01-01 RX ADMIN — Medication 10 ML: at 00:27

## 2020-01-01 RX ADMIN — FENTANYL CITRATE 250 MCG: 50 INJECTION, SOLUTION INTRAMUSCULAR; INTRAVENOUS at 09:57

## 2020-01-01 RX ADMIN — HYDRALAZINE HYDROCHLORIDE 25 MG: 25 TABLET, FILM COATED ORAL at 08:46

## 2020-01-01 RX ADMIN — HYDRALAZINE HYDROCHLORIDE 50 MG: 50 TABLET, FILM COATED ORAL at 20:17

## 2020-01-01 RX ADMIN — IRON SUCROSE 100 MG: 20 INJECTION, SOLUTION INTRAVENOUS at 08:18

## 2020-01-01 RX ADMIN — CEFTRIAXONE SODIUM 1 G: 1 INJECTION, POWDER, FOR SOLUTION INTRAMUSCULAR; INTRAVENOUS at 11:09

## 2020-01-01 RX ADMIN — ACETAMINOPHEN 650 MG: 325 TABLET ORAL at 23:31

## 2020-01-01 RX ADMIN — HYDRALAZINE HYDROCHLORIDE 5 MG: 20 INJECTION INTRAMUSCULAR; INTRAVENOUS at 16:29

## 2020-01-01 RX ADMIN — CEFAZOLIN SODIUM 2 G: 10 INJECTION, POWDER, FOR SOLUTION INTRAVENOUS at 11:32

## 2020-01-01 RX ADMIN — PRIMIDONE 100 MG: 50 TABLET ORAL at 09:51

## 2020-01-01 RX ADMIN — ATENOLOL 50 MG: 50 TABLET ORAL at 20:50

## 2020-01-01 RX ADMIN — SODIUM CHLORIDE 1000 ML: 9 INJECTION, SOLUTION INTRAVENOUS at 12:00

## 2020-01-01 RX ADMIN — HEPARIN SODIUM 5000 UNITS: 5000 INJECTION INTRAVENOUS; SUBCUTANEOUS at 17:44

## 2020-01-01 RX ADMIN — DOXAZOSIN 4 MG: 2 TABLET ORAL at 10:38

## 2020-01-01 RX ADMIN — ACETAMINOPHEN 650 MG: 325 TABLET ORAL at 12:48

## 2020-01-01 RX ADMIN — ALBUTEROL SULFATE 2.5 MG: 2.5 SOLUTION RESPIRATORY (INHALATION) at 07:45

## 2020-01-01 RX ADMIN — SODIUM CHLORIDE: 9 INJECTION, SOLUTION INTRAVENOUS at 07:22

## 2020-01-01 RX ADMIN — PRIMIDONE 100 MG: 50 TABLET ORAL at 09:05

## 2020-01-01 RX ADMIN — OXYCODONE 10 MG: 5 TABLET ORAL at 19:47

## 2020-01-01 RX ADMIN — Medication 15 ML: at 23:14

## 2020-01-01 RX ADMIN — ASPIRIN 81 MG: 81 TABLET ORAL at 04:56

## 2020-01-01 RX ADMIN — CLOPIDOGREL BISULFATE 75 MG: 75 TABLET ORAL at 08:46

## 2020-01-01 RX ADMIN — HYDRALAZINE HYDROCHLORIDE 25 MG: 25 TABLET, FILM COATED ORAL at 13:42

## 2020-01-01 RX ADMIN — CEFDINIR 300 MG: 300 CAPSULE ORAL at 14:21

## 2020-01-01 RX ADMIN — HYDRALAZINE HYDROCHLORIDE 20 MG: 20 INJECTION INTRAMUSCULAR; INTRAVENOUS at 12:32

## 2020-01-01 RX ADMIN — HEPARIN SODIUM 5000 UNITS: 5000 INJECTION INTRAVENOUS; SUBCUTANEOUS at 05:21

## 2020-01-01 RX ADMIN — PAROXETINE HYDROCHLORIDE 40 MG: 20 TABLET, FILM COATED ORAL at 09:51

## 2020-01-01 RX ADMIN — CLONAZEPAM 0.5 MG: 0.5 TABLET ORAL at 20:14

## 2020-01-01 RX ADMIN — METOPROLOL TARTRATE 12.5 MG: 25 TABLET, FILM COATED ORAL at 10:41

## 2020-01-01 RX ADMIN — CLONAZEPAM 0.5 MG: 0.5 TABLET ORAL at 18:13

## 2020-01-01 RX ADMIN — FENTANYL CITRATE 50 MCG: 50 INJECTION, SOLUTION INTRAMUSCULAR; INTRAVENOUS at 06:34

## 2020-01-01 RX ADMIN — B-COMPLEX W/ C & FOLIC ACID TAB 1 TABLET: TAB at 09:40

## 2020-01-01 RX ADMIN — ACETAMINOPHEN 650 MG: 325 TABLET ORAL at 01:58

## 2020-01-01 RX ADMIN — CEFTRIAXONE SODIUM 1 G: 1 INJECTION, POWDER, FOR SOLUTION INTRAMUSCULAR; INTRAVENOUS at 11:18

## 2020-01-01 RX ADMIN — LORAZEPAM 0.5 MG: 0.5 TABLET ORAL at 15:25

## 2020-01-01 RX ADMIN — HYDRALAZINE HYDROCHLORIDE 50 MG: 50 TABLET, FILM COATED ORAL at 11:06

## 2020-01-01 RX ADMIN — HYDRALAZINE HYDROCHLORIDE 25 MG: 25 TABLET, FILM COATED ORAL at 20:18

## 2020-01-01 RX ADMIN — ATENOLOL 50 MG: 50 TABLET ORAL at 10:37

## 2020-01-01 RX ADMIN — LISINOPRIL 5 MG: 5 TABLET ORAL at 10:45

## 2020-01-01 RX ADMIN — SODIUM CHLORIDE: 9 INJECTION, SOLUTION INTRAVENOUS at 04:57

## 2020-01-01 RX ADMIN — HEPARIN SODIUM 5000 UNITS: 5000 INJECTION INTRAVENOUS; SUBCUTANEOUS at 14:21

## 2020-01-01 RX ADMIN — CEFTRIAXONE SODIUM 1 G: 1 INJECTION, POWDER, FOR SOLUTION INTRAMUSCULAR; INTRAVENOUS at 09:55

## 2020-01-01 RX ADMIN — LAMOTRIGINE 25 MG: 25 TABLET ORAL at 21:46

## 2020-01-01 RX ADMIN — CARVEDILOL 12.5 MG: 6.25 TABLET, FILM COATED ORAL at 10:45

## 2020-01-01 RX ADMIN — SODIUM CHLORIDE: 9 INJECTION, SOLUTION INTRAVENOUS at 22:35

## 2020-01-01 RX ADMIN — Medication 10 ML: at 08:37

## 2020-01-01 RX ADMIN — DOXAZOSIN 4 MG: 2 TABLET ORAL at 20:31

## 2020-01-01 RX ADMIN — ASPIRIN 81 MG: 81 TABLET, CHEWABLE ORAL at 21:23

## 2020-01-01 RX ADMIN — HYDRALAZINE HYDROCHLORIDE 100 MG: 50 TABLET, FILM COATED ORAL at 05:31

## 2020-01-01 RX ADMIN — HEPARIN SODIUM 5000 UNITS: 5000 INJECTION INTRAVENOUS; SUBCUTANEOUS at 05:16

## 2020-01-01 RX ADMIN — LAMOTRIGINE 25 MG: 25 TABLET ORAL at 13:48

## 2020-01-01 RX ADMIN — IPRATROPIUM BROMIDE AND ALBUTEROL SULFATE 1 AMPULE: .5; 3 SOLUTION RESPIRATORY (INHALATION) at 19:37

## 2020-01-01 RX ADMIN — SODIUM CHLORIDE: 9 INJECTION, SOLUTION INTRAVENOUS at 15:59

## 2020-01-01 RX ADMIN — ACETAMINOPHEN 650 MG: 325 TABLET ORAL at 01:03

## 2020-01-01 RX ADMIN — GEMFIBROZIL 600 MG: 600 TABLET ORAL at 16:29

## 2020-01-01 RX ADMIN — CEFEPIME 1 G: 1 INJECTION, POWDER, FOR SOLUTION INTRAMUSCULAR; INTRAVENOUS at 13:27

## 2020-01-01 RX ADMIN — METOPROLOL TARTRATE 12.5 MG: 25 TABLET, FILM COATED ORAL at 19:47

## 2020-01-01 RX ADMIN — LAMOTRIGINE 25 MG: 25 TABLET ORAL at 20:53

## 2020-01-01 RX ADMIN — ASPIRIN 81 MG: 81 TABLET ORAL at 09:01

## 2020-01-01 RX ADMIN — CARVEDILOL 12.5 MG: 6.25 TABLET, FILM COATED ORAL at 17:42

## 2020-01-01 RX ADMIN — ACETAMINOPHEN 650 MG: 325 TABLET ORAL at 11:42

## 2020-01-01 RX ADMIN — HEPARIN SODIUM 5000 UNITS: 5000 INJECTION INTRAVENOUS; SUBCUTANEOUS at 05:38

## 2020-01-01 RX ADMIN — ISOSORBIDE MONONITRATE 30 MG: 30 TABLET ORAL at 10:43

## 2020-01-01 RX ADMIN — ACETAMINOPHEN 650 MG: 325 TABLET ORAL at 23:56

## 2020-01-01 RX ADMIN — HYDRALAZINE HYDROCHLORIDE 50 MG: 50 TABLET, FILM COATED ORAL at 11:43

## 2020-01-01 RX ADMIN — FENTANYL CITRATE 250 MCG: 50 INJECTION, SOLUTION INTRAMUSCULAR; INTRAVENOUS at 10:47

## 2020-01-01 RX ADMIN — MIDAZOLAM HYDROCHLORIDE 0.5 MG: 5 INJECTION INTRAMUSCULAR; INTRAVENOUS at 09:38

## 2020-01-01 RX ADMIN — CALCIUM CHLORIDE 0.25 G: 100 INJECTION, SOLUTION INTRAVENOUS at 11:12

## 2020-01-01 RX ADMIN — PANTOPRAZOLE SODIUM 40 MG: 40 TABLET, DELAYED RELEASE ORAL at 05:40

## 2020-01-01 RX ADMIN — PAROXETINE HYDROCHLORIDE 40 MG: 20 TABLET, FILM COATED ORAL at 09:46

## 2020-01-01 RX ADMIN — AMLODIPINE BESYLATE 10 MG: 5 TABLET ORAL at 08:16

## 2020-01-01 RX ADMIN — DOXAZOSIN 2 MG: 2 TABLET ORAL at 20:55

## 2020-01-01 RX ADMIN — HEPARIN SODIUM 2600 UNITS: 1000 INJECTION, SOLUTION INTRAVENOUS; SUBCUTANEOUS at 11:10

## 2020-01-01 RX ADMIN — LAMOTRIGINE 25 MG: 25 TABLET ORAL at 17:41

## 2020-01-01 RX ADMIN — ACETAMINOPHEN 650 MG: 325 TABLET ORAL at 02:04

## 2020-01-01 RX ADMIN — PAROXETINE HYDROCHLORIDE 40 MG: 20 TABLET, FILM COATED ORAL at 10:48

## 2020-01-01 RX ADMIN — HYDRALAZINE HYDROCHLORIDE 50 MG: 50 TABLET, FILM COATED ORAL at 14:22

## 2020-01-01 RX ADMIN — CLONIDINE HYDROCHLORIDE 0.1 MG: 0.1 TABLET ORAL at 20:51

## 2020-01-01 RX ADMIN — LAMOTRIGINE 25 MG: 25 TABLET ORAL at 16:47

## 2020-01-01 RX ADMIN — AMLODIPINE BESYLATE 10 MG: 5 TABLET ORAL at 09:12

## 2020-01-01 RX ADMIN — NITROGLYCERIN 0.5 INCH: 20 OINTMENT TOPICAL at 17:30

## 2020-01-01 RX ADMIN — CARVEDILOL 12.5 MG: 6.25 TABLET, FILM COATED ORAL at 08:54

## 2020-01-01 RX ADMIN — Medication 10 ML: at 09:12

## 2020-01-01 RX ADMIN — SODIUM CHLORIDE: 9 INJECTION, SOLUTION INTRAVENOUS at 05:32

## 2020-01-01 RX ADMIN — CHOLESTYRAMINE 4 G: 4 POWDER, FOR SUSPENSION ORAL at 15:29

## 2020-01-01 RX ADMIN — ISOSORBIDE MONONITRATE 30 MG: 30 TABLET, EXTENDED RELEASE ORAL at 11:42

## 2020-01-01 RX ADMIN — ATORVASTATIN CALCIUM 40 MG: 40 TABLET, FILM COATED ORAL at 19:46

## 2020-01-01 RX ADMIN — HYDRALAZINE HYDROCHLORIDE 50 MG: 50 TABLET, FILM COATED ORAL at 22:21

## 2020-01-01 RX ADMIN — CARVEDILOL 12.5 MG: 6.25 TABLET, FILM COATED ORAL at 08:18

## 2020-01-01 RX ADMIN — HEPARIN SODIUM 5000 UNITS: 5000 INJECTION INTRAVENOUS; SUBCUTANEOUS at 05:41

## 2020-01-01 RX ADMIN — MUPIROCIN: 20 OINTMENT TOPICAL at 20:52

## 2020-01-01 RX ADMIN — METOPROLOL TARTRATE 12.5 MG: 25 TABLET, FILM COATED ORAL at 09:01

## 2020-01-01 RX ADMIN — AMINOCAPROIC ACID 5000 MG: 250 INJECTION, SOLUTION INTRAVENOUS at 08:13

## 2020-01-01 RX ADMIN — HEPARIN SODIUM 5000 UNITS: 5000 INJECTION INTRAVENOUS; SUBCUTANEOUS at 20:08

## 2020-01-01 RX ADMIN — GEMFIBROZIL 600 MG: 600 TABLET ORAL at 09:22

## 2020-01-01 RX ADMIN — FUROSEMIDE 40 MG: 10 INJECTION, SOLUTION INTRAMUSCULAR; INTRAVENOUS at 09:23

## 2020-01-01 RX ADMIN — FAMOTIDINE 20 MG: 20 TABLET ORAL at 08:46

## 2020-01-01 RX ADMIN — PANTOPRAZOLE SODIUM 40 MG: 40 TABLET, DELAYED RELEASE ORAL at 08:19

## 2020-01-01 RX ADMIN — SODIUM BICARBONATE 50 MEQ: 84 INJECTION, SOLUTION INTRAVENOUS at 06:22

## 2020-01-01 RX ADMIN — HYDRALAZINE HYDROCHLORIDE 25 MG: 25 TABLET, FILM COATED ORAL at 06:13

## 2020-01-01 RX ADMIN — IPRATROPIUM BROMIDE AND ALBUTEROL SULFATE 1 AMPULE: .5; 3 SOLUTION RESPIRATORY (INHALATION) at 07:21

## 2020-01-01 RX ADMIN — HEPARIN SODIUM 5000 UNITS: 5000 INJECTION INTRAVENOUS; SUBCUTANEOUS at 06:35

## 2020-01-01 RX ADMIN — LAMOTRIGINE 25 MG: 25 TABLET ORAL at 18:09

## 2020-01-01 RX ADMIN — PAROXETINE HYDROCHLORIDE 40 MG: 20 TABLET, FILM COATED ORAL at 17:30

## 2020-01-01 RX ADMIN — MIDAZOLAM HYDROCHLORIDE 0.5 MG: 5 INJECTION INTRAMUSCULAR; INTRAVENOUS at 09:41

## 2020-01-01 RX ADMIN — HYDRALAZINE HYDROCHLORIDE 100 MG: 50 TABLET, FILM COATED ORAL at 09:36

## 2020-01-01 RX ADMIN — ASPIRIN 81 MG: 81 TABLET ORAL at 08:46

## 2020-01-01 RX ADMIN — GEMFIBROZIL 600 MG: 600 TABLET ORAL at 08:35

## 2020-01-01 RX ADMIN — HYDRALAZINE HYDROCHLORIDE 25 MG: 25 TABLET, FILM COATED ORAL at 09:55

## 2020-01-01 RX ADMIN — ACETAMINOPHEN 650 MG: 325 TABLET ORAL at 14:21

## 2020-01-01 RX ADMIN — ENOXAPARIN SODIUM 40 MG: 40 INJECTION SUBCUTANEOUS at 09:12

## 2020-01-01 RX ADMIN — ENOXAPARIN SODIUM 30 MG: 30 INJECTION SUBCUTANEOUS at 09:23

## 2020-01-01 RX ADMIN — CLONAZEPAM 0.5 MG: 0.5 TABLET ORAL at 07:19

## 2020-01-01 RX ADMIN — LAMOTRIGINE 25 MG: 25 TABLET ORAL at 16:58

## 2020-01-01 ASSESSMENT — PULMONARY FUNCTION TESTS
PIF_VALUE: 20
PIF_VALUE: 2
PIF_VALUE: 46
PIF_VALUE: 26
PIF_VALUE: 43
PIF_VALUE: 0
PIF_VALUE: 20
PIF_VALUE: 0
PIF_VALUE: 22
PIF_VALUE: 47
PIF_VALUE: 0
PIF_VALUE: 2
PIF_VALUE: 44
PIF_VALUE: 22
PIF_VALUE: 23
PIF_VALUE: 22
PIF_VALUE: 42
PIF_VALUE: 41
PIF_VALUE: 24
PIF_VALUE: 22
PIF_VALUE: 21
PIF_VALUE: 17
PIF_VALUE: 22
PIF_VALUE: 22
PIF_VALUE: 19
PIF_VALUE: 1
PIF_VALUE: 2
PIF_VALUE: 30
PIF_VALUE: 50
PIF_VALUE: 0
PIF_VALUE: 22
PIF_VALUE: 22
PIF_VALUE: 46
PIF_VALUE: 0
PIF_VALUE: 0
PIF_VALUE: 20
PIF_VALUE: 21
PIF_VALUE: 22
PIF_VALUE: 0
PIF_VALUE: 0
PIF_VALUE: 46
PIF_VALUE: 22
PIF_VALUE: 42
PIF_VALUE: 21
PIF_VALUE: 1
PIF_VALUE: 20
PIF_VALUE: 0
PIF_VALUE: 42
PIF_VALUE: 0
PIF_VALUE: 17
PIF_VALUE: 47
PIF_VALUE: 19
PIF_VALUE: 21
PIF_VALUE: 17
PIF_VALUE: 0
PIF_VALUE: 22
PIF_VALUE: 0
PIF_VALUE: 22
PIF_VALUE: 0
PIF_VALUE: 19
PIF_VALUE: 48
PIF_VALUE: 0
PIF_VALUE: 0
PIF_VALUE: 44
PIF_VALUE: 19
PIF_VALUE: 22
PIF_VALUE: 30
PIF_VALUE: 18
PIF_VALUE: 19
PIF_VALUE: 43
PIF_VALUE: 0
PIF_VALUE: 22
PIF_VALUE: 18
PIF_VALUE: 41
PIF_VALUE: 43
PIF_VALUE: 20
PIF_VALUE: 0
PIF_VALUE: 42
PIF_VALUE: 0
PIF_VALUE: 0
PIF_VALUE: 2
PIF_VALUE: 40
PIF_VALUE: 22
PIF_VALUE: 0
PIF_VALUE: 42
PIF_VALUE: 19
PIF_VALUE: 22
PIF_VALUE: 0
PIF_VALUE: 21
PIF_VALUE: 17
PIF_VALUE: 0
PIF_VALUE: 22
PIF_VALUE: 21
PIF_VALUE: 22
PIF_VALUE: 22
PIF_VALUE: 42
PIF_VALUE: 42
PIF_VALUE: 0
PIF_VALUE: 0
PIF_VALUE: 21
PIF_VALUE: 0
PIF_VALUE: 0
PIF_VALUE: 39
PIF_VALUE: 30
PIF_VALUE: 3
PIF_VALUE: 22
PIF_VALUE: 26
PIF_VALUE: 0
PIF_VALUE: 42
PIF_VALUE: 0
PIF_VALUE: 22
PIF_VALUE: 22
PIF_VALUE: 47
PIF_VALUE: 22
PIF_VALUE: 0
PIF_VALUE: 0
PIF_VALUE: 51
PIF_VALUE: 0
PIF_VALUE: 0
PIF_VALUE: 22
PIF_VALUE: 2
PIF_VALUE: 0
PIF_VALUE: 19
PIF_VALUE: 19
PIF_VALUE: 2
PIF_VALUE: 27
PIF_VALUE: 0
PIF_VALUE: 22
PIF_VALUE: 1
PIF_VALUE: 28
PIF_VALUE: 22
PIF_VALUE: 0
PIF_VALUE: 19
PIF_VALUE: 19
PIF_VALUE: 0
PIF_VALUE: 22
PIF_VALUE: 0
PIF_VALUE: 21
PIF_VALUE: 22
PIF_VALUE: 43
PIF_VALUE: 0
PIF_VALUE: 0
PIF_VALUE: 21
PIF_VALUE: 22
PIF_VALUE: 42
PIF_VALUE: 22
PIF_VALUE: 22
PIF_VALUE: 44
PIF_VALUE: 23
PIF_VALUE: 1
PIF_VALUE: 48
PIF_VALUE: 42
PIF_VALUE: 0
PIF_VALUE: 0
PIF_VALUE: 22
PIF_VALUE: 43
PIF_VALUE: 45
PIF_VALUE: 22
PIF_VALUE: 23
PIF_VALUE: 21
PIF_VALUE: 22
PIF_VALUE: 21
PIF_VALUE: 21
PIF_VALUE: 42
PIF_VALUE: 19
PIF_VALUE: 20
PIF_VALUE: 0
PIF_VALUE: 19
PIF_VALUE: 0
PIF_VALUE: 23
PIF_VALUE: 22
PIF_VALUE: 23
PIF_VALUE: 0
PIF_VALUE: 22
PIF_VALUE: 0
PIF_VALUE: 19
PIF_VALUE: 22
PIF_VALUE: 0
PIF_VALUE: 47
PIF_VALUE: 21
PIF_VALUE: 22
PIF_VALUE: 2
PIF_VALUE: 17
PIF_VALUE: 2
PIF_VALUE: 22
PIF_VALUE: 0
PIF_VALUE: 42
PIF_VALUE: 0
PIF_VALUE: 47
PIF_VALUE: 2
PIF_VALUE: 13
PIF_VALUE: 22
PIF_VALUE: 0
PIF_VALUE: 25
PIF_VALUE: 0
PIF_VALUE: 44
PIF_VALUE: 41
PIF_VALUE: 27
PIF_VALUE: 0
PIF_VALUE: 20
PIF_VALUE: 0
PIF_VALUE: 44
PIF_VALUE: 45
PIF_VALUE: 34
PIF_VALUE: 0
PIF_VALUE: 43
PIF_VALUE: 1
PIF_VALUE: 0
PIF_VALUE: 19
PIF_VALUE: 0
PIF_VALUE: 2
PIF_VALUE: 0
PIF_VALUE: 0
PIF_VALUE: 42
PIF_VALUE: 2
PIF_VALUE: 0
PIF_VALUE: 0
PIF_VALUE: 20
PIF_VALUE: 0
PIF_VALUE: 1
PIF_VALUE: 46
PIF_VALUE: 0
PIF_VALUE: 20
PIF_VALUE: 2
PIF_VALUE: 2
PIF_VALUE: 22
PIF_VALUE: 44
PIF_VALUE: 23
PIF_VALUE: 24
PIF_VALUE: 44
PIF_VALUE: 21
PIF_VALUE: 1
PIF_VALUE: 19
PIF_VALUE: 22
PIF_VALUE: 43
PIF_VALUE: 0
PIF_VALUE: 41
PIF_VALUE: 0
PIF_VALUE: 21
PIF_VALUE: 0
PIF_VALUE: 48
PIF_VALUE: 30
PIF_VALUE: 22
PIF_VALUE: 2
PIF_VALUE: 20
PIF_VALUE: 23
PIF_VALUE: 50
PIF_VALUE: 0
PIF_VALUE: 22
PIF_VALUE: 20
PIF_VALUE: 50
PIF_VALUE: 20
PIF_VALUE: 22
PIF_VALUE: 43
PIF_VALUE: 24
PIF_VALUE: 44
PIF_VALUE: 2
PIF_VALUE: 15
PIF_VALUE: 0
PIF_VALUE: 43
PIF_VALUE: 22
PIF_VALUE: 0
PIF_VALUE: 0
PIF_VALUE: 22
PIF_VALUE: 20
PIF_VALUE: 0
PIF_VALUE: 23
PIF_VALUE: 21
PIF_VALUE: 20
PIF_VALUE: 42
PIF_VALUE: 22
PIF_VALUE: 22
PIF_VALUE: 0
PIF_VALUE: 42
PIF_VALUE: 0
PIF_VALUE: 21
PIF_VALUE: 2
PIF_VALUE: 39
PIF_VALUE: 51
PIF_VALUE: 22
PIF_VALUE: 41
PIF_VALUE: 17
PIF_VALUE: 0
PIF_VALUE: 22
PIF_VALUE: 43
PIF_VALUE: 0
PIF_VALUE: 20
PIF_VALUE: 2
PIF_VALUE: 0
PIF_VALUE: 21
PIF_VALUE: 22
PIF_VALUE: 20
PIF_VALUE: 22
PIF_VALUE: 19
PIF_VALUE: 21
PIF_VALUE: 0
PIF_VALUE: 45
PIF_VALUE: 48
PIF_VALUE: 18
PIF_VALUE: 47
PIF_VALUE: 21
PIF_VALUE: 21
PIF_VALUE: 24
PIF_VALUE: 21
PIF_VALUE: 1
PIF_VALUE: 1
PIF_VALUE: 42
PIF_VALUE: 20
PIF_VALUE: 0
PIF_VALUE: 22
PIF_VALUE: 1
PIF_VALUE: 0
PIF_VALUE: 0
PIF_VALUE: 30
PIF_VALUE: 44
PIF_VALUE: 0
PIF_VALUE: 41
PIF_VALUE: 0
PIF_VALUE: 20
PIF_VALUE: 41
PIF_VALUE: 47
PIF_VALUE: 0
PIF_VALUE: 21
PIF_VALUE: 31
PIF_VALUE: 41
PIF_VALUE: 19
PIF_VALUE: 19
PIF_VALUE: 20
PIF_VALUE: 43
PIF_VALUE: 44
PIF_VALUE: 17
PIF_VALUE: 21
PIF_VALUE: 18
PIF_VALUE: 22
PIF_VALUE: 22
PIF_VALUE: 19
PIF_VALUE: 21
PIF_VALUE: 43
PIF_VALUE: 25
PIF_VALUE: 0
PIF_VALUE: 21
PIF_VALUE: 42
PIF_VALUE: 1
PIF_VALUE: 30
PIF_VALUE: 42
PIF_VALUE: 23
PIF_VALUE: 20
PIF_VALUE: 0
PIF_VALUE: 22
PIF_VALUE: 42
PIF_VALUE: 0
PIF_VALUE: 1
PIF_VALUE: 0
PIF_VALUE: 22
PIF_VALUE: 25
PIF_VALUE: 42
PIF_VALUE: 0
PIF_VALUE: 18
PIF_VALUE: 22
PIF_VALUE: 0
PIF_VALUE: 2
PIF_VALUE: 20
PIF_VALUE: 21
PIF_VALUE: 2
PIF_VALUE: 43
PIF_VALUE: 0
PIF_VALUE: 22
PIF_VALUE: 43
PIF_VALUE: 47
PIF_VALUE: 1
PIF_VALUE: 0
PIF_VALUE: 26
PIF_VALUE: 0
PIF_VALUE: 22
PIF_VALUE: 0
PIF_VALUE: 43
PIF_VALUE: 22
PIF_VALUE: 0
PIF_VALUE: 0
PIF_VALUE: 43
PIF_VALUE: 22
PIF_VALUE: 0
PIF_VALUE: 22
PIF_VALUE: 0
PIF_VALUE: 22
PIF_VALUE: 0
PIF_VALUE: 1
PIF_VALUE: 20
PIF_VALUE: 21
PIF_VALUE: 0
PIF_VALUE: 42
PIF_VALUE: 19
PIF_VALUE: 2
PIF_VALUE: 20
PIF_VALUE: 21
PIF_VALUE: 43
PIF_VALUE: 0
PIF_VALUE: 0
PIF_VALUE: 42
PIF_VALUE: 18
PIF_VALUE: 0

## 2020-01-01 ASSESSMENT — PAIN SCALES - GENERAL
PAINLEVEL_OUTOF10: 0
PAINLEVEL_OUTOF10: 0
PAINLEVEL_OUTOF10: 2
PAINLEVEL_OUTOF10: 3
PAINLEVEL_OUTOF10: 0
PAINLEVEL_OUTOF10: 6
PAINLEVEL_OUTOF10: 8
PAINLEVEL_OUTOF10: 0
PAINLEVEL_OUTOF10: 0
PAINLEVEL_OUTOF10: 3
PAINLEVEL_OUTOF10: 9
PAINLEVEL_OUTOF10: 9
PAINLEVEL_OUTOF10: 0
PAINLEVEL_OUTOF10: 0
PAINLEVEL_OUTOF10: 4
PAINLEVEL_OUTOF10: 0
PAINLEVEL_OUTOF10: 3
PAINLEVEL_OUTOF10: 3
PAINLEVEL_OUTOF10: 10
PAINLEVEL_OUTOF10: 0
PAINLEVEL_OUTOF10: 6
PAINLEVEL_OUTOF10: 0
PAINLEVEL_OUTOF10: 0
PAINLEVEL_OUTOF10: 5
PAINLEVEL_OUTOF10: 8
PAINLEVEL_OUTOF10: 3
PAINLEVEL_OUTOF10: 7
PAINLEVEL_OUTOF10: 5
PAINLEVEL_OUTOF10: 10
PAINLEVEL_OUTOF10: 3
PAINLEVEL_OUTOF10: 0
PAINLEVEL_OUTOF10: 4
PAINLEVEL_OUTOF10: 1
PAINLEVEL_OUTOF10: 8
PAINLEVEL_OUTOF10: 3
PAINLEVEL_OUTOF10: 0
PAINLEVEL_OUTOF10: 0
PAINLEVEL_OUTOF10: 5
PAINLEVEL_OUTOF10: 4
PAINLEVEL_OUTOF10: 3
PAINLEVEL_OUTOF10: 0
PAINLEVEL_OUTOF10: 0
PAINLEVEL_OUTOF10: 7
PAINLEVEL_OUTOF10: 2
PAINLEVEL_OUTOF10: 7
PAINLEVEL_OUTOF10: 0
PAINLEVEL_OUTOF10: 0
PAINLEVEL_OUTOF10: 8
PAINLEVEL_OUTOF10: 10
PAINLEVEL_OUTOF10: 0
PAINLEVEL_OUTOF10: 6
PAINLEVEL_OUTOF10: 3
PAINLEVEL_OUTOF10: 2
PAINLEVEL_OUTOF10: 0
PAINLEVEL_OUTOF10: 4
PAINLEVEL_OUTOF10: 4
PAINLEVEL_OUTOF10: 3
PAINLEVEL_OUTOF10: 8
PAINLEVEL_OUTOF10: 0
PAINLEVEL_OUTOF10: 4
PAINLEVEL_OUTOF10: 3
PAINLEVEL_OUTOF10: 0
PAINLEVEL_OUTOF10: 8
PAINLEVEL_OUTOF10: 9
PAINLEVEL_OUTOF10: 0
PAINLEVEL_OUTOF10: 4
PAINLEVEL_OUTOF10: 3
PAINLEVEL_OUTOF10: 6
PAINLEVEL_OUTOF10: 4
PAINLEVEL_OUTOF10: 0
PAINLEVEL_OUTOF10: 10
PAINLEVEL_OUTOF10: 3
PAINLEVEL_OUTOF10: 3
PAINLEVEL_OUTOF10: 6
PAINLEVEL_OUTOF10: 6
PAINLEVEL_OUTOF10: 0
PAINLEVEL_OUTOF10: 3
PAINLEVEL_OUTOF10: 6
PAINLEVEL_OUTOF10: 3
PAINLEVEL_OUTOF10: 0
PAINLEVEL_OUTOF10: 10
PAINLEVEL_OUTOF10: 3
PAINLEVEL_OUTOF10: 0
PAINLEVEL_OUTOF10: 8
PAINLEVEL_OUTOF10: 3
PAINLEVEL_OUTOF10: 0
PAINLEVEL_OUTOF10: 0
PAINLEVEL_OUTOF10: 6
PAINLEVEL_OUTOF10: 0
PAINLEVEL_OUTOF10: 0
PAINLEVEL_OUTOF10: 7
PAINLEVEL_OUTOF10: 0
PAINLEVEL_OUTOF10: 3
PAINLEVEL_OUTOF10: 3
PAINLEVEL_OUTOF10: 8
PAINLEVEL_OUTOF10: 0

## 2020-01-01 ASSESSMENT — PAIN DESCRIPTION - PROGRESSION
CLINICAL_PROGRESSION: NOT CHANGED
CLINICAL_PROGRESSION: NOT CHANGED
CLINICAL_PROGRESSION: GRADUALLY WORSENING
CLINICAL_PROGRESSION: GRADUALLY IMPROVING
CLINICAL_PROGRESSION: GRADUALLY WORSENING
CLINICAL_PROGRESSION: NOT CHANGED
CLINICAL_PROGRESSION: GRADUALLY WORSENING
CLINICAL_PROGRESSION: GRADUALLY WORSENING
CLINICAL_PROGRESSION: NOT CHANGED
CLINICAL_PROGRESSION: GRADUALLY WORSENING
CLINICAL_PROGRESSION: NOT CHANGED
CLINICAL_PROGRESSION: GRADUALLY WORSENING
CLINICAL_PROGRESSION: NOT CHANGED
CLINICAL_PROGRESSION: GRADUALLY WORSENING

## 2020-01-01 ASSESSMENT — PAIN DESCRIPTION - DIRECTION
RADIATING_TOWARDS: NON

## 2020-01-01 ASSESSMENT — PAIN DESCRIPTION - PAIN TYPE
TYPE: ACUTE PAIN
TYPE: ACUTE PAIN
TYPE: SURGICAL PAIN
TYPE: ACUTE PAIN
TYPE: CHRONIC PAIN
TYPE: ACUTE PAIN;CHRONIC PAIN
TYPE: ACUTE PAIN
TYPE: SURGICAL PAIN
TYPE: ACUTE PAIN

## 2020-01-01 ASSESSMENT — PAIN DESCRIPTION - ORIENTATION
ORIENTATION: RIGHT
ORIENTATION: LOWER
ORIENTATION: MID
ORIENTATION: RIGHT;LEFT
ORIENTATION: MID
ORIENTATION: RIGHT
ORIENTATION: LEFT
ORIENTATION: ANTERIOR;RIGHT;LEFT
ORIENTATION: MID
ORIENTATION: MID
ORIENTATION: RIGHT;LEFT
ORIENTATION: MID
ORIENTATION: RIGHT
ORIENTATION: RIGHT;LEFT
ORIENTATION: LEFT;LOWER
ORIENTATION: RIGHT;LEFT
ORIENTATION: MID

## 2020-01-01 ASSESSMENT — PAIN DESCRIPTION - FREQUENCY
FREQUENCY: INTERMITTENT
FREQUENCY: INTERMITTENT
FREQUENCY: CONTINUOUS
FREQUENCY: INTERMITTENT
FREQUENCY: CONTINUOUS
FREQUENCY: INTERMITTENT
FREQUENCY: INTERMITTENT
FREQUENCY: CONTINUOUS
FREQUENCY: INTERMITTENT
FREQUENCY: INTERMITTENT
FREQUENCY: CONTINUOUS

## 2020-01-01 ASSESSMENT — ENCOUNTER SYMPTOMS
GASTROINTESTINAL NEGATIVE: 1
COUGH: 0
PHOTOPHOBIA: 0
CONSTIPATION: 0
STRIDOR: 0
SHORTNESS OF BREATH: 1
GASTROINTESTINAL NEGATIVE: 1
VOMITING: 0
RESPIRATORY NEGATIVE: 1
VOMITING: 1
SORE THROAT: 0
ABDOMINAL DISTENTION: 0
BACK PAIN: 0
DIARRHEA: 0
ABDOMINAL PAIN: 0
RESPIRATORY NEGATIVE: 1
ABDOMINAL PAIN: 0
FACIAL SWELLING: 0
RESPIRATORY NEGATIVE: 1
NAUSEA: 0
GASTROINTESTINAL NEGATIVE: 1
GASTROINTESTINAL NEGATIVE: 1
VOICE CHANGE: 0
STRIDOR: 0
SHORTNESS OF BREATH: 1
RESPIRATORY NEGATIVE: 1
VOMITING: 0
CHEST TIGHTNESS: 1
GASTROINTESTINAL NEGATIVE: 1
RHINORRHEA: 0
RECTAL PAIN: 0
TACHYPNEA: 1
GASTROINTESTINAL NEGATIVE: 1
NAUSEA: 1
SHORTNESS OF BREATH: 1
TROUBLE SWALLOWING: 0
CHEST TIGHTNESS: 0
RESPIRATORY NEGATIVE: 1
WHEEZING: 0
COLOR CHANGE: 0
BACK PAIN: 0
ABDOMINAL PAIN: 0
NAUSEA: 0

## 2020-01-01 ASSESSMENT — PAIN DESCRIPTION - LOCATION
LOCATION: LEG
LOCATION: ABDOMEN
LOCATION: HEAD
LOCATION: ABDOMEN
LOCATION: LEG
LOCATION: CHEST
LOCATION: HEAD
LOCATION: LEG;BACK
LOCATION: HEAD
LOCATION: HEAD;NECK
LOCATION: BACK;COCCYX
LOCATION: LEG
LOCATION: HEAD
LOCATION: BACK;COCCYX
LOCATION: LEG
LOCATION: BACK
LOCATION: HEAD;OTHER (COMMENT)
LOCATION: ABDOMEN
LOCATION: HEAD
LOCATION: ABDOMEN
LOCATION: ABDOMEN
LOCATION: CHEST
LOCATION: NECK
LOCATION: ABDOMEN
LOCATION: HEAD
LOCATION: CHEST

## 2020-01-01 ASSESSMENT — PAIN - FUNCTIONAL ASSESSMENT

## 2020-01-01 ASSESSMENT — PAIN DESCRIPTION - DESCRIPTORS
DESCRIPTORS: SHARP;STABBING
DESCRIPTORS: ACHING
DESCRIPTORS: HEADACHE
DESCRIPTORS: HEADACHE
DESCRIPTORS: SHARP;STABBING
DESCRIPTORS: ACHING
DESCRIPTORS: ACHING;DISCOMFORT
DESCRIPTORS: SHARP;STABBING
DESCRIPTORS: ACHING
DESCRIPTORS: CONSTANT
DESCRIPTORS: HEADACHE
DESCRIPTORS: ACHING
DESCRIPTORS: STABBING
DESCRIPTORS: SHARP
DESCRIPTORS: ACHING
DESCRIPTORS: CONSTANT;DISCOMFORT;HEADACHE
DESCRIPTORS: ACHING;CONSTANT
DESCRIPTORS: HEADACHE;PRESSURE
DESCRIPTORS: SHARP;STABBING

## 2020-01-01 ASSESSMENT — PAIN DESCRIPTION - ONSET
ONSET: ON-GOING
ONSET: PROGRESSIVE
ONSET: ON-GOING
ONSET: SUDDEN

## 2020-01-01 ASSESSMENT — PAIN SCALES - WONG BAKER: WONGBAKER_NUMERICALRESPONSE: 2

## 2020-06-11 NOTE — ED PROVIDER NOTES
systems reviewed and are negative. Except as noted above the remainder of the review of systems was reviewed and negative. PAST MEDICAL HISTORY     Past Medical History:   Diagnosis Date    Acid reflux     Cancer ([address]     bladder cancer    Hyperlipidemia     Hypertension     Kidney stone          SURGICAL HISTORY       Past Surgical History:   Procedure Laterality Date    BLADDER STONE REMOVAL      BLADDER SURGERY      pt had bladder cancer    CATARACT REMOVAL WITH IMPLANT  8/26/11    RIGHT    CHOLECYSTECTOMY  03/06/2018    OTHER SURGICAL HISTORY  05/08/2015    phacemilsification of cataract with intraocular lens implant left eye         CURRENT MEDICATIONS       Previous Medications    AMLODIPINE (NORVASC) 10 MG TABLET    Take 10 mg by mouth daily    ATENOLOL (TENORMIN) 50 MG TABLET    Take 50 mg by mouth 2 times daily    B COMPLEX VITAMINS (B-COMPLEX/B-12) TABS    Take 100 mg by mouth daily    DOXAZOSIN (CARDURA) 2 MG TABLET    Take 1 tablet by mouth nightly    GEMFIBROZIL (LOPID) 600 MG TABLET    Take 600 mg by mouth 2 times daily (before meals). MULTIPLE VITAMINS-MINERALS (COMPLETE MULTIVITAMIN/MINERAL PO)    Take  by mouth. ONDANSETRON (ZOFRAN) 4 MG TABLET    Take 1 tablet by mouth every 8 hours as needed for Nausea or Vomiting    PANTOPRAZOLE (PROTONIX) 40 MG TABLET    Take 40 mg by mouth daily    PAROXETINE (PAXIL) 20 MG TABLET    Take 60 mg by mouth every morning.      PRIMIDONE (MYSOLINE) 50 MG TABLET    Take 100 mg by mouth daily    SENNA-DOCUSATE (SENOKOT S) 8.6-50 MG PER TABLET    Take 2 tablets by mouth daily       ALLERGIES     Ciprofloxacin    FAMILY HISTORY       Family History   Problem Relation Age of Onset    Heart Disease Mother           SOCIAL HISTORY       Social History     Socioeconomic History    Marital status:      Spouse name: None    Number of children: None    Years of education: None    Highest education level: None   Occupational History    distress. Breath sounds: Normal breath sounds. Abdominal:      General: Abdomen is flat. Bowel sounds are normal.          Comments: Patient does have a bladder pouch on the right side of her abdomen   Musculoskeletal: Normal range of motion. Skin:     General: Skin is warm. Neurological:      General: No focal deficit present. Mental Status: She is alert. DIAGNOSTIC RESULTS     EKG: All EKG's are interpreted by the Emergency Department Physician who either signs or Co-signs this chart in the absence of a cardiologist.        RADIOLOGY:   Non-plain film images such as CT, Ultrasound and MRI are read by the radiologist. Plain radiographic images are visualized and preliminarily interpreted by the emergency physician with the below findings:        Interpretation per the Radiologist below, if available at the time of this note:    CT ABDOMEN PELVIS WO CONTRAST   Final Result   1. Unchanged moderate to severe left hydroureteronephrosis to the level of   the anastomosis with worsening left periureteral inflammation likely due to   urinary tract infection; correlate with urinalysis. 2. Unchanged 3 mm solid left lower lobe pulmonary nodule bears attention on   the next imaging cycle.                  LABS:  Results for orders placed or performed during the hospital encounter of 06/11/20   CBC Auto Differential   Result Value Ref Range    WBC 6.9 4.0 - 11.0 K/uL    RBC 3.70 (L) 4.00 - 5.20 M/uL    Hemoglobin 10.5 (L) 12.0 - 16.0 g/dL    Hematocrit 33.2 (L) 36.0 - 48.0 %    MCV 89.6 80.0 - 100.0 fL    MCH 28.3 26.0 - 34.0 pg    MCHC 31.6 31.0 - 36.0 g/dL    RDW 15.7 (H) 12.4 - 15.4 %    Platelets 919 447 - 552 K/uL    MPV 8.2 5.0 - 10.5 fL    Neutrophils % 77.1 %    Lymphocytes % 11.6 %    Monocytes % 9.8 %    Eosinophils % 1.2 %    Basophils % 0.3 %    Neutrophils Absolute 5.3 1.7 - 7.7 K/uL    Lymphocytes Absolute 0.8 (L) 1.0 - 5.1 K/uL    Monocytes Absolute 0.7 0.0 - 1.3 K/uL    Eosinophils Absolute 0.1 0.0 - 0.6 K/uL    Basophils Absolute 0.0 0.0 - 0.2 K/uL   Comprehensive Metabolic Panel   Result Value Ref Range    Sodium 139 136 - 145 mmol/L    Potassium 4.8 3.5 - 5.1 mmol/L    Chloride 104 99 - 110 mmol/L    CO2 20 (L) 21 - 32 mmol/L    Anion Gap 15 3 - 16    Glucose 99 70 - 99 mg/dL    BUN 43 (H) 7 - 20 mg/dL    CREATININE 1.7 (H) 0.6 - 1.2 mg/dL    GFR Non-African American 29 (A) >60    GFR  35 (A) >60    Calcium 9.6 8.3 - 10.6 mg/dL    Total Protein 7.5 6.4 - 8.2 g/dL    Alb 3.8 3.4 - 5.0 g/dL    Albumin/Globulin Ratio 1.0 (L) 1.1 - 2.2    Total Bilirubin <0.2 0.0 - 1.0 mg/dL    Alkaline Phosphatase 126 40 - 129 U/L    ALT <5 (L) 10 - 40 U/L    AST 10 (L) 15 - 37 U/L    Globulin 3.7 g/dL   Protime-INR   Result Value Ref Range    Protime 14.1 (H) 10.0 - 13.2 sec    INR 1.22 (H) 0.86 - 1.14   Urinalysis Reflex to Culture   Result Value Ref Range    Color, UA Yellow Straw/Yellow    Clarity, UA SL CLOUDY (A) Clear    Glucose, Ur Negative Negative mg/dL    Bilirubin Urine Negative Negative    Ketones, Urine Negative Negative mg/dL    Specific Gravity, UA 1.015 1.005 - 1.030    Blood, Urine LARGE (A) Negative    pH, UA 6.5 5.0 - 8.0    Protein,  (A) Negative mg/dL    Urobilinogen, Urine 0.2 <2.0 E.U./dL    Nitrite, Urine POSITIVE (A) Negative    Leukocyte Esterase, Urine LARGE (A) Negative    Microscopic Examination YES     Urine Type NotGiven     Urine Reflex to Culture Yes    Microscopic Urinalysis   Result Value Ref Range    WBC, UA >100 (A) 0 - 5 /HPF    RBC, UA 3-4 0 - 4 /HPF    Epithelial Cells, UA 0-1 0 - 5 /HPF    Bacteria, UA 1+ (A) None Seen /HPF            EMERGENCY DEPARTMENT COURSE and DIFFERENTIAL DIAGNOSIS/MDM:     Vitals:    06/11/20 1540 06/11/20 1650 06/11/20 1750 06/11/20 1850   BP: 138/78 118/74 118/74 128/78   Pulse: 80 72 78 68   Resp: 18 20 18 18   Temp: 98.3 °F (36.8 °C) 98.3 °F (36.8 °C)     TempSrc: Oral Oral     SpO2: 99% 99% 99% 99%   Weight:

## 2020-06-15 PROBLEM — N39.0 COMPLICATED UTI (URINARY TRACT INFECTION): Status: ACTIVE | Noted: 2020-01-01

## 2020-06-15 PROBLEM — A41.9 SEPSIS (HCC): Status: ACTIVE | Noted: 2020-01-01

## 2020-06-15 NOTE — ED PROVIDER NOTES
[address] ED  EMERGENCY DEPARTMENT ENCOUNTER        Pt Name: Amanda Turner  MRN: [id number]  [place] 1945  Date of evaluation: 6/15/2020  Provider: SRIKANTH Valadez  PCP: Yasmine Malave MD    This patient was seen and evaluated by the attending physician Linda Tristan, [address] [place] [address],[address]       Chief Complaint   Patient presents with    Flank Pain     left flank pain, was dx with kidney infection at [address] [place] [address] ED on Thursday, was told if not any better to come back for follow up       HISTORY OF PRESENT ILLNESS   (Location/Symptom, Timing/Onset, Context/Setting, Quality, Duration, Modifying Factors, Severity)  Note limiting factors. Amanda Turner is a 66 y.o. female who presents via private vehicle from her home for evaluation of flank pain. Patient was seen and evaluated on June 11 for 3-day history of dysuria and flank pain. She has a history of bladder cancer about 35 years ago and is status post cystectomy and ileal conduit creation in 1985. She has CKD, she sees [facility] nephrology. She presented on the 11th, left-sided flank pain. At that time she had a CAT scan which revealed unchanged moderate to severe left hydroureteronephrosis to the left of the anastomosis with worsening left periureteral inflammation likely due to UTI and she had unchanged 3 mm solid left lower pulmonary nodule. .  She had no acute leukocytosis at that time, her renal function was worse but maintained, BUN 43 creatinine 1.7. She was started on cefdinir and was instructed to follow-up. She presents today noting that since being on the Ceftin ear she notes no improvement in her symptoms rather she actually feels like she is getting worse. She notes worsening left-sided flank pain. She also notes that she started having chills and rigors today. She denies any nausea or vomiting. She denies the pain moving elsewhere. No chest pain shortness of breath difficulty breathing. mouth daily         ALLERGIES     Ciprofloxacin    FAMILYHISTORY       Family History   Problem Relation Age of Onset    Heart Disease Mother           SOCIAL HISTORY       Social History     Socioeconomic History    Marital status:      Spouse name: None    Number of children: None    Years of education: None    Highest education level: None   Occupational History    None   Social Needs    Financial resource strain: None    Food insecurity     Worry: None     Inability: None    Transportation needs     Medical: None     Non-medical: None   Tobacco Use    Smoking status: Former Smoker    Smokeless tobacco: Never Used   Substance and Sexual Activity    Alcohol use: No    Drug use: No    Sexual activity: Never   Lifestyle    Physical activity     Days per week: None     Minutes per session: None    Stress: None   Relationships    Social connections     Talks on phone: None     Gets together: None     Attends Hindu service: None     Active member of club or organization: None     Attends meetings of clubs or organizations: None     Relationship status: None    Intimate partner violence     Fear of current or ex partner: None     Emotionally abused: None     Physically abused: None     Forced sexual activity: None   Other Topics Concern    None   Social History Narrative    None       SCREENINGS    Jagdeep Coma Scale  Eye Opening: Spontaneous  Best Verbal Response: Oriented  Best Motor Response: Obeys commands  Jagdeep Coma Scale Score: 15        PHYSICAL EXAM    (up to 7 for level 4, 8 or more for level 5)     ED Triage Vitals [06/15/20 1308]   BP Temp Temp Source Pulse Resp SpO2 Height Weight   (!) 149/69 98.3 °F (36.8 °C) Oral 92 19 92 % 4' 11\" (1.499 m) 143 lb (64.9 kg)       Physical Exam  PHYSICAL EXAM  BP (!) 151/66   Pulse 76   Temp 102 °F (38.9 °C) (Rectal)   Resp 29   Ht 4' 11\" (1.499 m)   Wt 143 lb (64.9 kg)   SpO2 93%   BMI 28.88 kg/m²   GENERAL APPEARANCE: Awake and [address],  [place], [facility]   Phone [phone]   CULTURE, URINE   CULTURE, BLOOD 1   CULTURE, BLOOD 2   LACTIC ACID, PLASMA    Narrative:     Performed at:  [facility] [address],  [place], [facility]   Phone [phone]   URINALYSIS    Narrative:     Performed at:  [facility]) [facility] [address],  [place], [facility]   Phone [phone]       All other labs were within normal range or not returned as of this dictation. EKG: All EKG's are interpreted by the Emergency Department Physician who either signs orCo-signs this chart in the absence of a cardiologist.  Please see their note for interpretation of EKG. RADIOLOGY:   Non-plain film images such as CT, Ultrasound and MRI are read by the radiologist. Plain radiographic images are visualized andpreliminarily interpreted by the  ED Provider with the below findings:        Interpretation perthe Radiologist below, if available at the time of this note:    CT ABDOMEN PELVIS WO CONTRAST Additional Contrast? None   Final Result   Persistent severe chronic left hydroureteronephrosis to the level of a right   lower quadrant ileal conduit, most likely due to anastomotic stricture. The   presence of perinephric, peripelvic, and periureteral fat stranding may   indicate the presence of concurrent pyelonephritis and is similar to the   comparison exam           No results found.        PROCEDURES   Unless otherwise noted below, none     Procedures    CRITICAL CARE TIME   N/A    CONSULTS:  None      EMERGENCY DEPARTMENT COURSE and DIFFERENTIALDIAGNOSIS/MDM:   Vitals:    Vitals:    06/15/20 1308 06/15/20 1435 06/15/20 1439   BP: (!) 149/69 (!) 151/66    Pulse: 92 76    Resp: 19 29    Temp: 98.3 °F (36.8 °C)  102 °F (38.9 °C)   TempSrc: Oral  Rectal   SpO2: 92% 93%    Weight: 143 lb (64.9 kg)     Height: 4' 11\" (1.499 m)         Patient was given thefollowing medications:  Medications believe patient warrants inpatient evaluation for acute pyelonephritis with failed outpatient management and acute septicemia. All information including ED workup, results, treatment, diagnosis has been reviewed and discussed with ED attending physician and directly discussed with Hospitalist who is the admitting physician. Pt will be admitted in stable condition. Pt advised of admission and is in full agreement. FINAL IMPRESSION      1. Pyelonephritis    2. Septicemia ([facility] [address]          DISPOSITION/PLAN   DISPOSITION        PATIENT REFERREDTO:  No follow-up provider specified.     DISCHARGE MEDICATIONS:  New Prescriptions    No medications on file       DISCONTINUED MEDICATIONS:  Discontinued Medications    No medications on file              (Please note that portions ofthis note were completed with a voice recognition program.  Efforts were made to edit the dictations but occasionally words are mis-transcribed.)    Angie Valadez (electronically signed)        Angie Valadez  06/15/20 Lynn Carrillo

## 2020-06-15 NOTE — ED PROVIDER NOTES
Emergency Department Provider Note     Location: [address] [place] [address] [address] [place] MEDICAL-SURGICAL  6/15/2020    I independently performed a history and physical on Shayna Husbands. All diagnostic, treatment, and disposition decisions were made by myself in conjunction with the mid-level provider. Briefly, this is a 66 y.o. female here for persistent flank pain. Patient was diagnosed with a kidney infection at [facility] on Thursday. Patient has been taking her antibiotics at home but did not feel better so came back in for reevaluation. Patient has a history of ileal conduit. .      ED Triage Vitals [06/15/20 1308]   BP Temp Temp Source Pulse Resp SpO2 Height Weight   (!) 149/69 98.3 °F (36.8 °C) Oral 92 19 92 % 4' 11\" (1.499 m) 143 lb (64.9 kg)        Patient resting comfortably in no acute distress. Heart is regular rate and rhythm. Lungs clear to auscultation bilaterally. Abdomen is soft, nondistended, and nontender. No peripheral edema noted. Patient seen and examined. Vital signs notable for febrile. Physical exam as documented above. Lab work-up consistent with ongoing pyelonephritis. Patient started on antibiotics and will be admitted for further management. Ct Abdomen Pelvis Wo Contrast Additional Contrast? None    Result Date: 6/15/2020  EXAMINATION: CT OF THE ABDOMEN AND PELVIS WITHOUT CONTRAST 6/15/2020 3:03 pm TECHNIQUE: CT of the abdomen and pelvis was performed without the administration of intravenous contrast. Multiplanar reformatted images are provided for review. Dose modulation, iterative reconstruction, and/or weight based adjustment of the mA/kV was utilized to reduce the radiation dose to as low as reasonably achievable. COMPARISON: 07/11/2020, 02/20/2019 HISTORY: ORDERING SYSTEM PROVIDED HISTORY: flank pain, pyelo TECHNOLOGIST PROVIDED HISTORY: If patient is on cardiac monitor and/or pulse ox, they may be taken off cardiac monitor and pulse ox, left on O2 if currently on.  All monitors [facility] emergency department encounter, please see Marissa Heath documentation. This chart was generated in part by using Dragon Dictation system and may contain errors related to that system including errors in grammar, punctuation, and spelling, as well as words and phrases that may be inappropriate. If there are any questions or concerns please feel free to contact the dictating provider for clarification.                  Dontrell Siegel MD  06/15/20 3676

## 2020-06-16 PROBLEM — A41.9 SEPSIS (HCC): Status: RESOLVED | Noted: 2020-01-01 | Resolved: 2020-01-01

## 2020-06-16 PROBLEM — K21.9 GERD (GASTROESOPHAGEAL REFLUX DISEASE): Status: ACTIVE | Noted: 2020-01-01

## 2020-06-16 NOTE — PROGRESS NOTES
Patient is not able to demonstrated the ability to move from a reclining position to an upright position within the recliner.  however patient is alert, oriented and able to provide informed consent

## 2020-06-16 NOTE — CONSULTS
6/16/2020  Verena Jaimes    Reason for Consult:  Left flank pain  Requesting Physician:  Mike Fuentes      History Obtained From:  patient, electronic medical record    HISTORY OF PRESENT ILLNESS:                The patient is a 66 y.o. female who presents with left flank pain. 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 Her CT in the ER shows marked hydro and inflammatory stranding, similar to a scan 4 days ago. She has been admitted for possible sepsis.     Past Medical History:        Diagnosis Date    Acid reflux     Cancer ([address]     bladder cancer    Hyperlipidemia     Hypertension     Kidney stone      Past Surgical History:        Procedure Laterality Date    BLADDER STONE REMOVAL      BLADDER SURGERY      pt had bladder cancer    CATARACT REMOVAL WITH IMPLANT  8/26/11    RIGHT    CHOLECYSTECTOMY  03/06/2018    OTHER SURGICAL HISTORY  05/08/2015    phacemilsification of cataract with intraocular lens implant left eye     Current Medications:   Current Facility-Administered Medications: gemfibrozil (LOPID) tablet 600 mg, 600 mg, Oral, BID AC  PARoxetine (PAXIL) tablet 40 mg, 40 mg, Oral, QAM  atenolol (TENORMIN) tablet 50 mg, 50 mg, Oral, BID  vitamin B-12 (CYANOCOBALAMIN) tablet 100 mcg, 100 mcg, Oral, Daily  ondansetron (ZOFRAN-ODT) disintegrating tablet 4 mg, 4 mg, Oral, Q8H PRN  pantoprazole (PROTONIX) tablet 40 mg, 40 mg, Oral, QAM AC  sennosides-docusate sodium (SENOKOT-S) 8.6-50 MG tablet 2 tablet, 2 tablet, Oral, Daily  amLODIPine (NORVASC) tablet 10 mg, 10 mg, Oral, Daily  primidone (MYSOLINE) tablet 100 mg, 100 mg, Oral, Daily  doxazosin (CARDURA) tablet 2 mg, 2 mg, Oral, Nightly  sodium chloride flush 0.9 % injection 10 mL, 10 mL, Intravenous, 2 times per day  sodium chloride flush 0.9 % injection 10 mL, 10 mL, Intravenous, PRN  acetaminophen (TYLENOL) tablet 650 mg,

## 2020-06-16 NOTE — H&P
Hospital Medicine History & Physical      PCP: Yasmine Malave MD    Date of Admission: 6/15/2020    Date of Service: Pt seen/examined on 6/16/2020     Chief Complaint:    Chief Complaint   Patient presents with    Flank Pain     left flank pain, was dx with kidney infection at [address] [place] [address] ED on Thursday, was told if not any better to come back for follow up         History Of Present Illness: The patient is a 66 y.o. female with a PMH of HLD, HTN and hx of bladder cancer s/p cystectomy with ileal conduit who presented to the ED with complaint of left flank pain and cloudy urine. Pt denied any fevers although fevers were noted in the ED. Pt does report that she has been having diarrhea x the last week. She has been on abx for the last week and has been on & off abx in the past couple of months for UTI. Denies abdominal cramping or personal hx of c diff. Pt was admitted to Med Surg and urology was consulted. Past Medical History:        Diagnosis Date    Acid reflux     Cancer ([address]     bladder cancer    Hyperlipidemia     Hypertension     Kidney stone        Past Surgical History:        Procedure Laterality Date    BLADDER STONE REMOVAL      BLADDER SURGERY      pt had bladder cancer    CATARACT REMOVAL WITH IMPLANT  8/26/11    RIGHT    CHOLECYSTECTOMY  03/06/2018    OTHER SURGICAL HISTORY  05/08/2015    phacemilsification of cataract with intraocular lens implant left eye       Medications Prior to Admission:    Prior to Admission medications    Medication Sig Start Date End Date Taking?  Authorizing Provider   cefdinir (OMNICEF) 300 MG capsule Take 1 capsule by mouth 2 times daily for 10 days 6/11/20 6/21/20 Yes Fabian Cortez MD   ondansetron [facility]) 8 MG tablet Take 1 tablet by mouth every 8 hours as needed for Nausea 6/11/20   Fabian Cortez MD   amLODIPine (NORVASC) 10 MG tablet Take 10 mg by mouth daily    Historical Provider, MD   primidone (MYSOLINE) 50 MG tablet Take 100 mg by mouth BMI 29.37 kg/m²   Gen: No distress. Alert. Elderly  female, appears chronically ill  Eyes:  No sclera icterus. No conjunctival injection. Neck:  Trachea midline. Resp: No accessory muscle use. No crackles. No wheezes. No rhonchi. On RA  CV: Regular rate. Regular rhythm. No murmur. No rub. No edema. GI: soft, Non-tender. Non-distended. : minimal left flank tenderness   Skin: Warm and dry. No rash on exposed extremities. Vitiligo noted on back  Neuro: Awake. Grossly nonfocal, resting tremor   Psych: Oriented x 3. No anxiety or agitation. CBC:   Recent Labs     06/15/20  1350 06/16/20  0524   WBC 7.4 6.4   HGB 9.2* 8.5*   HCT 29.1* 27.3*   MCV 89.9 91.1    309     BMP:   Recent Labs     06/15/20  1350 06/16/20  0524   * 134*   K 4.4 4.6    107   CO2 18* 17*   BUN 24* 23*   CREATININE 1.8* 1.6*     LIVER PROFILE:   Recent Labs     06/15/20  1350   AST 8*   ALT <5*   BILITOT <0.2   ALKPHOS 117     UA:  Recent Labs     06/15/20  1428   COLORU Yellow   PHUR 6.0   WBCUA >100*   RBCUA 3-4   BACTERIA 2+*   CLARITYU CLOUDY*   SPECGRAV 1.025   LEUKOCYTESUR MODERATE*   UROBILINOGEN 0.2   BILIRUBINUR Negative   BLOODU MODERATE*   GLUCOSEU Negative     U/A:    Lab Results   Component Value Date    COLORU Yellow 06/15/2020    WBCUA >100 06/15/2020    RBCUA 3-4 06/15/2020    MUCUS 1+ 02/20/2019    BACTERIA 2+ 06/15/2020    CLARITYU CLOUDY 06/15/2020    SPECGRAV 1.025 06/15/2020    LEUKOCYTESUR MODERATE 06/15/2020    BLOODU MODERATE 06/15/2020    GLUCOSEU Negative 06/15/2020    GLUCOSEU NEGATIVE 11/19/2011    AMORPHOUS 1+ 08/13/2015     CULTURES    C diff: pending    Blood cx x2: pending    Urine cx: pending    RADIOLOGY    CT ABDOMEN PELVIS WO CONTRAST Additional Contrast? None   Final Result   Persistent severe chronic left hydroureteronephrosis to the level of a right   lower quadrant ileal conduit, most likely due to anastomotic stricture.   The   presence of perinephric, peripelvic, and

## 2020-06-16 NOTE — ED NOTES
Hand-off report given to Hannah Gutierrez RN. Patient transported to floor via wheelchair, with nurse.      Apurva Torrez RN  06/15/20 8764

## 2020-06-17 NOTE — PLAN OF CARE
Problem: Falls - Risk of:  Goal: Will remain free from falls  Description: Will remain free from falls  6/17/2020 0937 by Kevin Bray RN  Outcome: Ongoing     Problem: Falls - Risk of:  Goal: Absence of physical injury  Description: Absence of physical injury  6/17/2020 0937 by Kevin Bray RN  Outcome: Ongoing     Problem: Infection:  Goal: Will remain free from infection  Description: Will remain free from infection  6/17/2020 0937 by Kevin Bray RN  Outcome: Ongoing     Problem: Safety:  Goal: Free from accidental physical injury  Description: Free from accidental physical injury  6/17/2020 0937 by Kevin Bray RN  Outcome: Ongoing     Problem: Safety:  Goal: Free from intentional harm  Description: Free from intentional harm  6/17/2020 0937 by Kevin Bray RN  Outcome: Ongoing     Problem: Daily Care:  Goal: Daily care needs are met  Description: Daily care needs are met  6/17/2020 0937 by Kevin Bray RN  Outcome: Ongoing

## 2020-06-17 NOTE — DISCHARGE SUMMARY
medications from any pharmacy    Bring a paper prescription for each of these medications  · cefepime  infusion           Discharged in stable condition to home. Follow Up: Follow up with PCP in 1 week.       More than 30 mts spent      [address] 6/17/2020 4:48 PM

## 2020-07-26 PROBLEM — E87.5 HYPERKALEMIA: Status: ACTIVE | Noted: 2020-01-01

## 2020-07-26 NOTE — ED PROVIDER NOTES
by mouth 2 times daily    B COMPLEX VITAMINS (B-COMPLEX/B-12) TABS    Take 100 mg by mouth daily    DOXAZOSIN (CARDURA) 2 MG TABLET    Take 1 tablet by mouth nightly    GEMFIBROZIL (LOPID) 600 MG TABLET    Take 600 mg by mouth 2 times daily (before meals). LAMOTRIGINE (LAMICTAL) 25 MG TABLET    Take 25 mg by mouth 4 times daily    MULTIPLE VITAMINS-MINERALS (COMPLETE MULTIVITAMIN/MINERAL PO)    Take  by mouth. ONDANSETRON (ZOFRAN) 8 MG TABLET    Take 1 tablet by mouth every 8 hours as needed for Nausea    PANTOPRAZOLE (PROTONIX) 40 MG TABLET    Take 40 mg by mouth daily    PAROXETINE (PAXIL) 20 MG TABLET    Take 60 mg by mouth every morning.      PRIMIDONE (MYSOLINE) 50 MG TABLET    Take 100 mg by mouth daily    SENNA-DOCUSATE (SENOKOT S) 8.6-50 MG PER TABLET    Take 2 tablets by mouth daily    SODIUM BICARBONATE 650 MG TABLET    Take 1 tablet by mouth 2 times daily    VENLAFAXINE (EFFEXOR) 75 MG TABLET    Take 75 mg by mouth daily       ALLERGIES     Ciprofloxacin    FAMILY HISTORY       Family History   Problem Relation Age of Onset    Heart Disease Mother           SOCIAL HISTORY       Social History     Socioeconomic History    Marital status:      Spouse name: None    Number of children: None    Years of education: None    Highest education level: None   Occupational History    None   Social Needs    Financial resource strain: None    Food insecurity     Worry: None     Inability: None    Transportation needs     Medical: None     Non-medical: None   Tobacco Use    Smoking status: Former Smoker    Smokeless tobacco: Never Used   Substance and Sexual Activity    Alcohol use: No    Drug use: No    Sexual activity: Never   Lifestyle    Physical activity     Days per week: None     Minutes per session: None    Stress: None   Relationships    Social connections     Talks on phone: None     Gets together: None     Attends [employer] service: None     Active member of club or GFR African American 58 (*)     Alkaline Phosphatase 130 (*)     ALT <5 (*)     AST 13 (*)     All other components within normal limits    Narrative:     Performed at:  [facility] - [facility],  [place], [facility]   Phone [phone]   POTASSIUM - Abnormal; Notable for the following components:    Potassium 5.7 (*)     All other components within normal limits    Narrative:     Performed at:  [facility],  [place], [facility]   Phone [phone]   TROPONIN    Narrative:     Performed at:  [facility]) - [facility],  [place], [facility]   Phone [phone]       All other labs were within normal range or not returned as of this dictation. EMERGENCY DEPARTMENT COURSE and DIFFERENTIAL DIAGNOSIS/MDM:   Vitals:    Vitals:    07/26/20 1845 07/26/20 1933 07/26/20 1947 07/26/20 2003   BP: (!) 186/75 (!) 192/85 (!) 189/81 (!) 180/84   Pulse: 83 84 83 83   Resp: 20 23 22 18   Temp: 98.7 °F (37.1 °C)      TempSrc: Oral      SpO2: 96% 98% 98% 98%   Weight: 143 lb (64.9 kg)      Height: 4' 11\" (1.499 m)          Medications   aspirin chewable tablet 324 mg (has no administration in time range)   0.9 % sodium chloride bolus (has no administration in time range)   insulin regular (HUMULIN R;NOVOLIN R) injection 10 Units (has no administration in time range)   sodium bicarbonate 8.4 % injection 50 mEq (has no administration in time range)   dextrose 50 % IV solution (has no administration in time range)   LORazepam (ATIVAN) tablet 0.5 mg (0.5 mg Oral Given 7/26/20 2016)       MDM. Cardiac work-up initiated. Potassium is elevated so this will be repeated. Delta troponin not indicated given symptom onset for 2 weeks. Ativan was given for possible anxiety. Heart score is 3-low risk. Therefore admission not currently indicated. Repeat potassium is 5.7.   Therefore recommend admission,

## 2020-07-27 PROBLEM — N18.30 CHRONIC KIDNEY DISEASE, STAGE 3 (HCC): Status: ACTIVE | Noted: 2020-01-01

## 2020-07-27 PROBLEM — I10 ESSENTIAL HYPERTENSION: Status: ACTIVE | Noted: 2020-01-01

## 2020-07-27 NOTE — ED NOTES
2104- perfect serve to Dr. Suraj Ramos for admission consult per Dr. Lul Medeiros. Allena Goodell  07/26/20 2105 2124- Dr. Suraj Ramos returned page spoke with Dr. Lul Medeiros.      Allena Goodell  07/26/20 2125

## 2020-07-27 NOTE — PROGRESS NOTES
Discharge paperwork and instructions reviewed with patient and daughter. Verbalized understanding. New medications use and side effects reviewed. PIV and tele monitor removed. Pt belongings sent with pt. Stable condition at d/c.

## 2020-07-27 NOTE — DISCHARGE SUMMARY
Name:  Carol Nelson  Room:  /0043-78  MRN:    [id number]    Discharge Summary      This discharge summary is in conjunction with a complete physical exam done on the day of discharge. Discharging Physician: Dr. Almazan Linker: 7/26/2020  Discharge:  7/27/2020    HPI taken from admission H&P:    The patient is a 66 y.o. female with PMH below, presents with CP, anxiety. Pt reports that she was recently changed from Paxil to Effexor 2 weeks ago and she does not think its working. She reports increased anxiety. She was given Ativan in the ED w/ some improvement in her anxiety. She reports substernal chest pressure which started earlier today. She was also found to have an elevated potassium. Diagnoses this Admission and Hospital Course   Hyperkalemia  -Due to chronic kidney disease. She has been seeing a nephrologist as an outpatient.  - K+ on admission 5.7-->5.4  - Given Bicarb, insulin/D50, 1 L IVFs  - improved to 5.4. This is close to her baseline.     Chest pain  - monitor on tele  -serial troponins negative. Patient's daughter states mom gets very anxious and complains of chest pain. No prior cardiac history. No need for further cardiac work-up.     Anxiety  - Hydroxyzine prn  - Start Ativan and Effexor on discharge. She is taking Effexor 75 mg once a day. Change to Effexor XR 75 mg 1 p.o. daily so that it works better. Hypertension  - uncontrolled. - continue home medication regimen: Atenolol, Amlodipine, Cardura  - Labetalol prn     Hyperlipidemia  - on Lopid.      GERD  - on PPI.      H/o Bladder cancer  - has urostomy.      Psy  - continue home regimen: Lamictal, Effexor XR     Tremor  - continue Primidone.      Procedures (Please Review Full Report for Details)  None     Consults    None     Physical Exam at Discharge:    BP (!) 166/71   Pulse 84   Temp 97.5 °F (36.4 °C) (Oral)   Resp 19   Ht 4' 11\" (1.499 m)   Wt 142 lb 4.8 oz (64.5 kg)   SpO2 99%   BMI 28.74 kg/m²     Gen: No distress. Alert. Eyes: PERRL. No sclera icterus. No conjunctival injection. ENT: No discharge. Pharynx clear. Neck: Trachea midline. Resp: No accessory muscle use. No crackles. No wheezes. No rhonchi. CV: Regular rate. Regular rhythm. No murmur. No rub. 2+ BLE edema. Capillary Refill: Brisk,< 3 seconds   Peripheral Pulses: +2 palpable, equal bilaterally   GI: Non-tender. Non-distended. Normal bowel sounds. No hernia. Urostomy  Skin: Warm and dry. No nodule on exposed extremities. No rash on exposed extremities. M/S: No cyanosis. No joint deformity. No clubbing. Neuro: Awake. Grossly nonfocal    Psych: Oriented x 3. Appears anxious    CBC:   Recent Labs     07/26/20 1850 07/27/20  0638   WBC 4.5 4.8   HGB 10.3* 10.1*   HCT 32.2* 32.3*   MCV 89.6 91.0    171     BMP:   Recent Labs     07/26/20 1850 07/26/20 2020 07/27/20  0638   *  --  136   K 5.7* 5.7* 5.4*     --  105   CO2 23  --  21   BUN 17  --  15   CREATININE 1.1  --  1.0     LIVER PROFILE:   Recent Labs     07/26/20 1850 07/27/20  0638   AST 13* 9*   ALT <5* <5*   BILITOT <0.2 <0.2   ALKPHOS 130* 120     CULTURES  None     RADIOLOGY  XR CHEST PORTABLE   Final Result   Mild cardiomegaly with increased interstitial prominence possibly   representing mild pulmonary edema. Discharge Medications     Medication List      START taking these medications    LORazepam 0.5 MG tablet  Commonly known as:  Ativan  Take 1 tablet by mouth every 8 hours as needed for Anxiety for up to 5 days.      venlafaxine 75 MG extended release capsule  Commonly known as:  EFFEXOR XR  Take 1 capsule by mouth daily  Start taking on:  July 28, 2020  Replaces:  venlafaxine 75 MG tablet        CHANGE how you take these medications    doxazosin 4 MG tablet  Commonly known as:  CARDURA  Take 1 tablet by mouth nightly  What changed:    · medication strength  · how much to take        CONTINUE taking these medications amLODIPine 10 MG tablet  Commonly known as:  NORVASC     atenolol 50 MG tablet  Commonly known as:  TENORMIN     B-Complex/B-12 Tabs     COMPLETE MULTIVITAMIN/MINERAL PO     gemfibrozil 600 MG tablet  Commonly known as:  LOPID     lamoTRIgine 25 MG tablet  Commonly known as:  LAMICTAL     ondansetron 8 MG tablet  Commonly known as:  Zofran  Take 1 tablet by mouth every 8 hours as needed for Nausea     pantoprazole 40 MG tablet  Commonly known as:  PROTONIX     PARoxetine 20 MG tablet  Commonly known as:  PAXIL     primidone 50 MG tablet  Commonly known as: MYSOLINE     senna-docusate 8.6-50 MG per tablet  Commonly known as:  Senokot S  Take 2 tablets by mouth daily     sodium bicarbonate 650 MG tablet  Take 1 tablet by mouth 2 times daily        STOP taking these medications    venlafaxine 75 MG tablet  Commonly known as:  EFFEXOR  Replaced by:  venlafaxine 75 MG extended release capsule           Where to Get Your Medications      These medications were sent to [address] [place] [address] [phone]  [place] [address]    Phone:  [phone]   · doxazosin 4 MG tablet  · venlafaxine 75 MG extended release capsule     You can get these medications from any pharmacy    Bring a paper prescription for each of these medications  · LORazepam 0.5 MG tablet       Discharged in stable condition to home    Follow Up:   Follow up with FAUSTO Graff 7/29/2020 4:35 PM

## 2020-07-27 NOTE — PROGRESS NOTES
Patient complaining of feeling short of breath. RN in to assess, patient ambulated with O2 saturations of 96-98% on room air. Respirations are easy and unlabored. Patient states she feels like she \"just can't breathe\" and that \"something is wrong. \" [facility] NP notified and will be in to see patient.

## 2020-07-27 NOTE — PROGRESS NOTES
Nephrology consult called to Dr. Lizbeth Garcia on call. Spoke with [address] [id number].     Shannon Khan PCA/MT  07/27/2020

## 2020-07-27 NOTE — PROGRESS NOTES
Patient admitted to room 304 from ED. Patient oriented to room, call light, bed rails, phone, lights and bathroom. Patient instructed about the schedule of the day including: vital sign frequency, lab draws, possible tests, frequency of MD and staff rounds, daily weights, I &O's and prescribed diet. Bed alarm in place, patient aware of placement and reason. Telemetry box in place, patient aware of placement and reason. Bed locked, in lowest position, side rails up 2/4, call light within reach. Recliner Assessment  Patient is not able to demonstrated the ability to move from a reclining position to an upright position within the recliner. however patient is alert, oriented and able to provide informed consent    4 Eyes Skin Assessment     The patient is being assess for   Admission    I agree that 2 RN's have performed a thorough Head to Toe Skin Assessment on the patient. ALL assessment sites listed below have been assessed. Areas assessed by both nurses:   [x]   Head, Face, and Ears   [x]   Shoulders, Back, and Chest, Abdomen  [x]   Arms, Elbows, and Hands   [x]   Coccyx, Sacrum, and Ischium  [x]   Legs, Feet, and Heels        Scattered abrasions. **SHARE this note so that the co-signing nurse is able to place an eSignature**    Co-signer eSignature: {Esignature:[id number]}    Does the Patient have Skin Breakdown?   No          [facility] Prevention initiated:  Yes   Wound Care Orders initiated:  NA      [facility] nurse consulted for Pressure Injury (Stage 3,4, Unstageable, DTI, NWPT, Complex wounds)and New or Established Ostomies:  NA      Primary Nurse eSignature: Electronically signed by Willy Smith RN on 7/26/20 at 10:30 PM EDT

## 2020-07-27 NOTE — H&P
Hospital Medicine History & Physical      PCP: Deedra Schwab, MD    Date of Service: Pt seen/examined on 7/26/20 and admitted on 7/26/20 to Inpatient. Chief Complaint   Patient presents with    Anxiety     states anxiety meds changed from paxil to effexor last week. states it is not helping, c/o headache. History Of Present Illness: The patient is a 66 y.o. female with PMH below, presents with CP, anxiety. Pt reports that she was recently changed from Paxil to Effexor 2 weeks ago and she does not think its working. She reports increased anxiety. She was given Ativan in the ED w/ some improvement in her anxiety. She reports substernal chest pressure which started earlier today. She was also found to have an elevated potassium. Past Medical History:        Diagnosis Date    Acid reflux     Cancer ([address]     bladder cancer    Hyperlipidemia     Hypertension     Kidney stone        Past Surgical History:        Procedure Laterality Date    BLADDER STONE REMOVAL      BLADDER SURGERY      pt had bladder cancer    CATARACT REMOVAL WITH IMPLANT  8/26/11    RIGHT    CHOLECYSTECTOMY  03/06/2018    OTHER SURGICAL HISTORY  05/08/2015    phacemilsification of cataract with intraocular lens implant left eye       Medications Prior to Admission:    Prior to Admission medications    Medication Sig Start Date End Date Taking?  Authorizing Provider   venlafaxine (EFFEXOR) 75 MG tablet Take 75 mg by mouth daily   Yes Historical Provider, MD   sodium bicarbonate 650 MG tablet Take 1 tablet by mouth 2 times daily 6/17/20 6/17/21 Yes Sunny Perez MD   lamoTRIgine (LAMICTAL) 25 MG tablet Take 25 mg by mouth 4 times daily   Yes Historical Provider, MD   ondansetron (ZOFRAN) 8 MG tablet Take 1 tablet by mouth every 8 hours as needed for Nausea 6/11/20  Yes Enriqueta Aguilar MD   amLODIPine (NORVASC) 10 MG tablet Take 10 mg by mouth daily   Yes Historical Provider, MD   primidone (MYSOLINE) 50 MG tablet Take 100 mg by mouth daily   Yes Historical Provider, MD   doxazosin (CARDURA) 2 MG tablet Take 1 tablet by mouth nightly 11/26/19  Yes Jose F Montero MD   senna-docusate (SENOKOT S) 8.6-50 MG per tablet Take 2 tablets by mouth daily 3/6/18  Yes Anish Christensen MD   pantoprazole (PROTONIX) 40 MG tablet Take 40 mg by mouth daily   Yes Historical Provider, MD   atenolol (TENORMIN) 50 MG tablet Take 50 mg by mouth 2 times daily   Yes Historical Provider, MD   B Complex Vitamins (B-COMPLEX/B-12) TABS Take 100 mg by mouth daily   Yes Historical Provider, MD   gemfibrozil (LOPID) 600 MG tablet Take 600 mg by mouth 2 times daily (before meals). Yes Historical Provider, MD   Multiple Vitamins-Minerals (COMPLETE MULTIVITAMIN/MINERAL PO) Take  by mouth. Yes Historical Provider, MD   paroxetine (PAXIL) 20 MG tablet Take 60 mg by mouth every morning. Historical Provider, MD       Allergies:  Ciprofloxacin    Social History:    TOBACCO:   reports that she has quit smoking. She has never used smokeless tobacco.  ETOH:   reports no history of alcohol use. Family History:  Reviewed in detail and negative for DM, Early CAD, Cancer (except as below). Positive as follows:        Problem Relation Age of Onset    Heart Disease Mother        REVIEW OF SYSTEMS:   Pertinent positives/negatives as follows: CP, anxiety, and as discussed in HPI, otherwise a complete ROS performed and all other systems are negative  PHYSICAL EXAM PERFORMED:    BP (!) 160/55   Pulse 79   Temp 98.7 °F (37.1 °C) (Oral)   Resp 23   Ht 4' 11\" (1.499 m)   Wt 143 lb (64.9 kg)   SpO2 96%   BMI 28.88 kg/m²     GEN:  A&Ox3, NAD. HEENT:  NC/AT,EOMI, MMM, no erythema/exudates or visible masses. CVS:  Normal S1,S2. RRR. Without M/G/R.   LUNG:   CTA-B. no wheezes, rales or rhonchi  ABD:  Soft, ND/NT, BS+ x4. Without G/R.  EXT: 2+ pulses, no c/c/e. Brisk cap refill. PSY:  Thought process intact, affect anxious.   SYDNIE: CN III-XII intact, moves all 4 spontaneously, sensory grossly intact. SKIN: No rash or lesions on visible skin. Chart review shows recent radiographs:  Xr Chest Portable    Result Date: 7/26/2020  EXAMINATION: ONE XRAY VIEW OF THE CHEST 7/26/2020 7:56 pm COMPARISON: 12/30/2017 HISTORY: Acute chest pain and anxiety. Recent change in medications. FINDINGS: Mild cardiomegaly. Atherosclerotic calcification of the aortic arch. Increased interstitial prominence, particularly on the right. No consolidation, significant pleural effusion, or visible pneumothorax. Mild cardiomegaly with increased interstitial prominence possibly representing mild pulmonary edema. EKG 12 Lead [[id number]]      Collected: 07/26/20 1902     Updated: 07/26/20 1947      Ventricular Rate  82  BPM     Atrial Rate  82  BPM     P-R Interval  204  ms     QRS Duration  130  ms     Q-T Interval  422  ms     QTc Calculation (Bazett)  493  ms     P Axis  31  degrees     R Axis  2  degrees     T Axis  157  degrees     Diagnosis  Normal sinus rhythmLeft bundle branch blockAbnormal ECGWhen compared with ECG of 26-NOV-2019 14:58,Left bundle branch block is now PresentCriteria for Septal infarct are no longer Present      CBC:  Recent Labs     07/26/20 1850   WBC 4.5   HGB 10.3*   HCT 32.2*           RENAL  Recent Labs     07/26/20 1850 07/26/20 2020   *  --    K 5.7* 5.7*     --    CO2 23  --    BUN 17  --    CREATININE 1.1  --    GLUCOSE 97  --      LFT'S:  Recent Labs     07/26/20 1850   AST 13*   ALT <5*   BILITOT <0.2   ALKPHOS 130*     CARDIAC ENZYMES:   Recent Labs     07/26/20 1850   TROPONINI <0.01     Lab Results   Component Value Date    PROBNP 1,463 (H) 58/23/9873     PHYSICIAN CERTIFICATION  I certify that Maggi Peterson is expected to be hospitalized for 2 midnights based on the following assessment and plan:    ASSESSMENT/PLAN:  1. Hyperkalemia, 5.7, unclear etiology.   Given bicarb, insulin/D50, and a 1L bolus in the ED. 2. Chest pain, possibly related to anxiety, at least some concern for ACS, start ASA. Tele, chk serial troponins. Morphine for recurrent CP. Nuc stress. NS @ 75cc/h.   3. HTN, poorly controlled, cont home regimen. PRN IV labetalol. 4. Prolonged QTc, 493 ms, avoid QT prolonging agents as able. 5. Anxiety, PRN hydroxyzine. 6. Psy, cont home regimen. DVT Prophylaxis: Lx  Diet: renal  Code Status: Full Code   PT/OT Eval Status: Will order if needed and as patient condition allows  Dispo - Admit to inpatient PCU w/ tele. Karyle Milroy, MD    Thank you Starla Manriquez MD for the opportunity to be involved in this patient's care. If you have any questions or concerns please feel free to contact me via the [employer] at [phone]. This chart was generated using the [address] [place] [address] dictation system. I created this record but it may contain dictation errors given the limitations of this technology.

## 2020-07-27 NOTE — CARE COORDINATION
Case Management Assessment  Initial Evaluation and Discharge Note    Date/Time of Evaluation: 7/27/2020 9:39 AM  Assessment Completed by: Calos Chin    Patient Name: Leticia Munoz  YOB: 1945  Diagnosis: Hyperkalemia [E87.5]  Date / Time: 7/26/2020  6:42 PM  Admission status/Date:inpt  Chart Reviewed: Yes      Patient Interviewed: Yes   Family Interviewed:  No      Hospitalization in the last 30 days:  No    Contacts  :     Relationship to Patient:   Phone Number:    Alternate Contact:     Relationship to Patient:     Phone Number:    Met with:    Current PCP  Lali Tovar MD      Financial  Medicare  Precert required for SNF : Y, N        3 night stay required - Y, N    ADLS  Support Systems: Spouse/Significant Other, Children  Transportation: family    Meal Preparation: self    Housing  Home Environment: home with spouse  Steps: one  Plans to Return to Present Housing: Yes  Other Identified Issues: no    Home Care Information  Currently active with [address] : Yes - [facility]  Type of Home Care Services: [facility]  Passport/Waiver : Yes - passport  :                      Phone Number:    Passport/Waiver Services: Ghanshyam Fraser          Durable Medical Equipment   DME Provider:   Equipment: Walker_x__Cane___RTS___ BSC___Shower Chair___  02__ at ____Liter(s)---Uses________HHN___ CPAP___ BiPap___ Hospital Bed___W/C____Other________      Has Home O2 in place on admit:  No    If above answer is No ---is pt presently on O2 during hospitalization:  No  if yes CM to follow for potential DC O2 need  Informed of need to bring portable home O2 tank on day of discharge for nursing to connect prior to leaving:   Not Indicated  Verbalized agreement/Understanding:   Not Indicated    Community Service Affiliation  Dialysis:  No    · Name:  · Location  · Dialysis Schedule:  · Phone:   · Fax:     Outpatient PT/OT: No    Cancer Center: No     CHF Clinic: No Pulmonary Rehab: No  Pain Clinic: No  [facility] Mental Health: No    Wound Clinic: No     COVID SCREENING: No       Other: no    The Plan for Transition of Care is related to the following treatment goals: home    The Patient and/or patient representative  was provided with a choice of provider and agrees   with the discharge plan. [x] Yes [] No      DISCHARGE PLAN: Reviewed Chart. Met with the pt. Role of dcp explained. Pt from home with spouse and plan return. Pt active with [employer] for HHA 4 days/week. Follow for possible hc.    12:46 PM Discharge order noted. Met with the pt and family at bedside. Pt declines hhc. Discharge timeout done at the bedside with [facility]. All discharge needs and concerns addressed. Explained Case Management role/services. emotional support provided to patient and family

## 2020-07-27 NOTE — PROGRESS NOTES
Assessment complete. Vital signs stable on room air. Patient very shaky, blood pressure elevated with systolic of 876. Clarita Robertson NP notified. Call light within reach. ID band and tele intact. Medications given per MAR. Denies any needs at this time. Will continue to monitor.

## 2020-09-20 PROBLEM — I50.43 CHF (CONGESTIVE HEART FAILURE), NYHA CLASS I, ACUTE ON CHRONIC, COMBINED (HCC): Status: ACTIVE | Noted: 2020-01-01

## 2020-09-20 NOTE — ED NOTES
Assessment/Plan:    Fernando Adams was seen today for diabetes. Diagnoses and all orders for this visit:    Other diabetic neurological complication associated with type 2 diabetes mellitus ([address]  -     pioglitazone-metFORMIN (ACTOPLUS MET)  mg per tablet; TAKE 1 TABLET BY MOUTH TWICE DAILY WITH MEALS  -      DIABETES FOOT EXAM  -     REFERRAL TO OPHTHALMOLOGY    Essential hypertension  -     losartan (COZAAR) 25 mg tablet; TAKE 1 TABLET BY MOUTH DAILY    Pure hypercholesterolemia  -     simvastatin (ZOCOR) 40 mg tablet; TAKE 1 TABLET BY MOUTH EVERY EVENING    Hypothyroidism due to acquired atrophy of thyroid  -     levothyroxine (SYNTHROID) 75 mcg tablet; TAKE 1 TABLET BY MOUTH DAILY BEFORE BREAKFAST    Neck pain  -     XR SPINE CERV PA LAT ODONT 3 V MAX; Future        Will contact pt on results. Will try Motrin 600 mg TID x 10 days for neck pain. If not better, she will notify me. Will consider PT referral.    F/u 4 months. The plan was discussed with the patient. The patient verbalized understanding and is in agreement with the plan. All medication potential side effects were discussed with the patient.    -------------------------------------------------------------------------------------------------------------------        David Alvares is a 59 y.o. female and presents with Diabetes (4 months follow up)         Subjective:  Here for f/u. DM: awaiting results of BW. HTN: well controlled. Having left side neck pain x months. Started after a fall month sago, has been intermittent but bearable. Has not been treating it with  anything specific. ROS:  Constitutional: No recent weight change. No weakness/fatigue. No f/c. Skin: No rashes, change in nails/hair, itching   HENT: No HA, dizziness. No hearing loss/tinnitus. No nasal congestion/discharge. Eyes: No change in vision, double/blurred vision or eye pain/redness. Cardiovascular: No CP/palpitations.   No Pt off unit for x-ray at this time.       Juan C Oneill RN  09/20/20 1119 LIANG/orthopnea/PND. Respiratory: No cough/sputum, dyspnea, wheezing. Gastointestinal: No dysphagia, reflux. No n/v. No constipation/diarrhea. No melena/rectal bleeding. Genitourinary: No dysuria, urinary hesitancy, nocturia, hematuria. No incontinence. Musculoskeletal: No joint pain/stiffness. No muscle pain/tenderness. Endo: No heat/cold intolerance, no polyuria/polydypsia. Heme: No h/o anemia. No easy bleeding/bruising. Allergy/Immunology: No seasonal rhinitis. Denies frequent colds, sinus/ear infections. Neurological: No seizures/numbness/weakness. No paresthesias. Psychiatric:  No depression, anxiety. The problem list was updated as a part of today's visit. Patient Active Problem List   Diagnosis Code    HTN (hypertension) I10    Hyperlipidemia E78.5    Goiter E04.9    Sleep apnea G47.30    GERD (gastroesophageal reflux disease) K21.9    Macular degeneration H35.30    Fatty liver K76.0    Allergic rhinitis J30.9    Family history of colon cancer Z80.0    Peripheral neuropathy ([facility] G62.9    Hypothyroidism E03.9    Microalbuminuria due to type 2 diabetes mellitus ([facility] E11.29, R80.9    Diabetic neuropathy ([facility] E11.40       The PSH, FH were reviewed. SH:  Social History   Substance Use Topics    Smoking status: Former Smoker     Packs/day: 0.10     Years: 0.50     Types: Cigarettes     Quit date: 1/1/1975    Smokeless tobacco: Never Used    Alcohol use Yes      Comment: 3 glasses of wine per year       Medications/Allergies:  Current Outpatient Prescriptions on File Prior to Visit   Medication Sig Dispense Refill    gabapentin (NEURONTIN) 300 mg capsule TAKE 2 CAPSULES BY MOUTH THREE TIMES DAILY 180 Cap 5    OMEPRAZOLE (PRILOSEC PO) Take  by mouth.  cyanocobalamin (VITAMIN B-12) 2,500 mcg sublingual tablet Take 2,500 mcg by mouth daily.  TURMERIC ROOT EXTRACT PO Take  by mouth.  cinnamon bark (CINNAMON) 500 mg cap Take  by mouth.       albuterol (PROVENTIL HFA, VENTOLIN HFA, PROAIR HFA) 90 mcg/actuation inhaler Take 2 Puffs by inhalation every six (6) hours as needed for Wheezing. 2 Inhaler 3    albuterol (PROAIR HFA) 90 mcg/actuation inhaler Take 2 Puffs by inhalation every six (6) hours as needed for Wheezing or Shortness of Breath. 1 Inhaler 3    furosemide (LASIX) 20 mg tablet Take 1 Tab by mouth daily as needed. 60 Tab 2    aspirin 81 mg tablet Take  by mouth daily. No current facility-administered medications on file prior to visit. Allergies   Allergen Reactions    Ace Inhibitors Anaphylaxis    Anectine [Succinylcholine Chloride] Shortness of Breath     With rash    Iodinated Contrast Media - Oral And Iv Dye Hives    Vicodin [Hydrocodone-Acetaminophen] Itching         Health Maintenance:   Health Maintenance   Topic Date Due    Hepatitis C Screening  1958    FOOT EXAM Q1  06/23/2016    ZOSTER VACCINE AGE 60>  12/12/2016    EYE EXAM RETINAL OR DILATED Q1  03/16/2017    HEMOGLOBIN A1C Q6M  05/03/2017    BREAST CANCER SCRN MAMMOGRAM  08/14/2017    MICROALBUMIN Q1  11/03/2017    LIPID PANEL Q1  11/03/2017    PAP AKA CERVICAL CYTOLOGY  10/05/2019    COLONOSCOPY  08/23/2023    DTaP/Tdap/Td series (2 - Td) 06/23/2025    Pneumococcal 19-64 Medium Risk  Completed    INFLUENZA AGE 9 TO ADULT  Completed       Objective:  Visit Vitals    /80 (BP 1 Location: Right arm, BP Patient Position: Sitting)    Pulse 81    Temp 97.2 °F (36.2 °C) (Temporal)    Resp 20    Ht 5' 4\" (1.626 m)    Wt 168 lb 3.2 oz (76.3 kg)    LMP 10/16/2010    SpO2 99%    BMI 28.87 kg/m2          Nurses notes and VS reviewed.       Physical Examination: General appearance - alert, well appearing, and in no distress  Chest - clear to auscultation, no wheezes, rales or rhonchi, symmetric air entry  Heart - normal rate, regular rhythm, normal S1, S2, no murmurs, rubs, clicks or gallops  Musculoskeletal - TTP along LT side traps, some restriction with leftward turning of head 2/2 pain. Diabetic foot exam:     Left:    Filament test absent sensation with micro filament   Pulse DP: 1+ (weak)   Pulse PT: 1+ (weak)   Deformities: None  Right:    Filament test absent sensation with micro filament   Pulse DP: 1+ (weak)   Pulse PT: 1+ (weak)   Deformities: None          Labwork and Ancillary Studies:    CBC w/Diff  Lab Results   Component Value Date/Time    WBC 8.9 11/03/2016 07:12 AM    HGB 11.3 11/03/2016 07:12 AM    PLATELET 955 69/44/8217 07:12 AM         Basic Metabolic Profile  Lab Results   Component Value Date/Time    Sodium 138 11/03/2016 07:12 AM    Potassium 4.5 11/03/2016 07:12 AM    Chloride 98 11/03/2016 07:12 AM    CO2 25 11/03/2016 07:12 AM    Anion gap 15.0 11/03/2016 07:12 AM    Glucose 95 11/03/2016 07:12 AM    BUN 10 11/03/2016 07:12 AM    Creatinine 0.6 11/03/2016 07:12 AM    Calcium 9.6 11/03/2016 07:12 AM         LFT  Lab Results   Component Value Date/Time    ALT (SGPT) 15 11/03/2016 07:12 AM    AST (SGOT) 19 11/03/2016 07:12 AM    Alk.  phosphatase 77 11/03/2016 07:12 AM    Bilirubin, direct <0.2 11/03/2016 07:12 AM    Bilirubin, total 0.3 11/03/2016 07:12 AM         Cholesterol  Lab Results   Component Value Date/Time    Cholesterol, total 174 11/03/2016 07:12 AM    HDL Cholesterol 65 11/03/2016 07:12 AM    LDL, calculated 82 11/03/2016 07:12 AM    Triglyceride 137 11/03/2016 07:12 AM

## 2020-09-20 NOTE — H&P
Hospital Medicine History & Physical      PCP: Starla Manriquez MD    Date of Admission: 9/20/2020    Date of Service: Pt seen/examined on 9/20/2020 and Admitted to Inpatient    Chief Complaint:  SOB. History Of Present Illness: The patient is a 66 y.o. female w remote hx of bladder Ca s/p ileal conduit, prior Hx of nephrostomy, Hx of anxiety, who presents to [facility] with dyspnea. Pt reports over the past 1-2 weeks she noted gradually worsening dyspnea accompanied with orthopnea and PND. She denies cough, no chest pain and no fever to be associated with her dyspnea. Denies palpitations. She reports associated worsening b/l LE edema as well. In ED work up concerning for acute CHF - no prior Hx of CHF. Pt denies prior Hx of CAD. She is now requiring 2-3l/min O2 - she is on RA at baseline. Past Medical History:        Diagnosis Date    Acid reflux     Cancer ([address]     bladder cancer    CHF (congestive heart failure) (HCC)     Hyperlipidemia     Hypertension     Kidney stone        Past Surgical History:        Procedure Laterality Date    ABDOMEN SURGERY      APPENDECTOMY      BLADDER STONE REMOVAL      BLADDER SURGERY      pt had bladder cancer    CATARACT REMOVAL WITH IMPLANT  8/26/11    RIGHT    CHOLECYSTECTOMY  03/06/2018    EYE SURGERY      HYSTERECTOMY      OTHER SURGICAL HISTORY  05/08/2015    phacemilsification of cataract with intraocular lens implant left eye       Medications Prior to Admission:    Prior to Admission medications    Medication Sig Start Date End Date Taking?  Authorizing Provider   doxazosin (CARDURA) 4 MG tablet Take 4 mg by mouth 2 times daily   Yes Historical Provider, MD   sodium bicarbonate 650 MG tablet Take 1 tablet by mouth 2 times daily 6/17/20 6/17/21 Yes Tammie Whitlock MD lamoTRIgine (LAMICTAL) 25 MG tablet Take 25 mg by mouth 4 times daily   Yes Historical Provider, MD   amLODIPine (NORVASC) 10 MG tablet Take 10 mg by mouth daily   Yes Historical Provider, MD   primidone (MYSOLINE) 50 MG tablet Take 100 mg by mouth daily   Yes Historical Provider, MD   pantoprazole (PROTONIX) 40 MG tablet Take 40 mg by mouth daily   Yes Historical Provider, MD   atenolol (TENORMIN) 50 MG tablet Take 50 mg by mouth 2 times daily   Yes Historical Provider, MD   B Complex Vitamins (B-COMPLEX/B-12) TABS Take 100 mg by mouth daily   Yes Historical Provider, MD   gemfibrozil (LOPID) 600 MG tablet Take 600 mg by mouth 2 times daily (before meals). Yes Historical Provider, MD   paroxetine (PAXIL) 20 MG tablet Take 60 mg by mouth every morning. Yes Historical Provider, MD   ondansetron (ZOFRAN) 8 MG tablet Take 1 tablet by mouth every 8 hours as needed for Nausea 6/11/20   Cyndie Betancourt MD   [facility] S) 8.6-50 MG per tablet Take 2 tablets by mouth daily 3/6/18   Candice Lopez MD       Allergies:  Ciprofloxacin    Social History:  The patient currently lives at home. TOBACCO:   reports that she has quit smoking. She has never used smokeless tobacco.  ETOH:   reports no history of alcohol use. Family History:  Reviewed in detail and negative for DM, Early CAD, Cancer, CVA. Positive as follows:        Problem Relation Age of Onset    Heart Disease Mother        REVIEW OF SYSTEMS:   As noted in the HPI. All other systems reviewed and negative. PHYSICAL EXAM:    BP (!) 191/69   Pulse 98   Temp 98 °F (36.7 °C) (Oral)   Resp 20   Ht 4' 11\" (1.499 m)   Wt 138 lb (62.6 kg)   SpO2 98%   BMI 27.87 kg/m²     General appearance: anxious, increased work of breathing. HEENT Normal cephalic, atraumatic without obvious deformity. Pupils equal, round, and reactive to light. Extra ocular muscles intact. Conjunctivae/corneas clear.   Neck: Supple, No jugular venous distention/bruits. Trachea midline without thyromegaly or adenopathy with full range of motion. Lungs: rales to mid lung level bilateral.   Heart: Regular rate and rhythm with Normal S1/S2 without murmurs, rubs or gallops, point of maximum impulse non-displaced  Abdomen: Soft, non-tender or non-distended without rigidity or guarding and positive bowel sounds all four quadrants. Extremities: 2+ pitting edema b/l - tense. Skin: Skin color, texture, turgor normal.  No rashes or lesions. Neurologic: Alert and oriented X 3, neurovascularly intact with sensory/motor intact upper extremities/lower extremities, bilaterally. Cranial nerves: II-XII intact, grossly non-focal.  Mental status: Alert, oriented, thought content appropriate. Capillary Refill: Acceptable  < 3 seconds  Peripheral Pulses: +3 Easily felt, not easily obliterated with pressure        CBC   Recent Labs     09/20/20  1052   WBC 5.0   HGB 10.8*   HCT 33.8*         RENAL  Recent Labs     09/20/20  1052      K 4.5      CO2 19*   BUN 30*   CREATININE 1.4*     LFT'S  Recent Labs     09/20/20  1052   AST 10*   ALT <5*   BILITOT <0.2   ALKPHOS 143*     COAG  No results for input(s): INR in the last 72 hours.   CARDIAC ENZYMES  Recent Labs     09/20/20  1052 09/20/20  1509   TROPONINI <0.01 <0.01       U/A:    Lab Results   Component Value Date    COLORU Yellow 09/20/2020    WBCUA 3-5 09/20/2020    RBCUA None seen 09/20/2020    MUCUS 1+ 02/20/2019    BACTERIA 2+ 09/20/2020    CLARITYU Clear 09/20/2020    SPECGRAV 1.010 09/20/2020    LEUKOCYTESUR MODERATE 09/20/2020    BLOODU Negative 09/20/2020    GLUCOSEU Negative 09/20/2020    GLUCOSEU NEGATIVE 11/19/2011    AMORPHOUS 1+ 09/20/2020       ABG  No results found for: LJI9HSL, BEART, U8SVYDBC, PHART, THGBART, XPL8KXO, PO2ART, RTE3CBO        Active Hospital Problems    Diagnosis Date Noted    CHF (congestive heart failure), NYHA class I, acute on chronic, combined ([facility] [address] [I50.43] 09/20/2020 PHYSICIANS CERTIFICATION:    I certify that Meagan Teixeira is expected to be hospitalized for more than 2 midnights based on the following assessment and plan:      ASSESSMENT/PLAN:      Acute CHF EF to be determined. No prior hx. Symptoms gradually worsening over past week. Plan:    - lasix 40Iv BID.    - Nitro BID.    - decreased dose BB due to acute CHF.    - ECHO to follow. - trop negative. - monitor telemetry    - daily electrolytes. Acute Pulm Edema with pleural effusions - suspect due to above. Acute Hypox Resp Failure - due to above. 2-3l/min NC now. Her baseline is RA. Wean o2 as tolerates. Anxiety - paxil dose adjusted to 40 pending improved renal function. Has Hx of bad anxiety - may need valium until able ot go back on home dose paxil. Severe HTN - suspect due to above. Atenolol. NitroBId. Lasix IV.   cardura BID      Mild DELMER  Remote Hx of Bladder Ca s/p ileal conduit. CT this admit - does not suggest obstructive uropathy. Monitor Cr closely with lasix. DVT Prophylaxis: lovenox  Diet: DIET RENAL;  Code Status: Full Code     Dispo - PCU. Narendra Rodrigues MD    Thank you Binh Dye MD for the opportunity to be involved in this patient's care. If you have any questions or concerns please feel free to contact me at [phone].

## 2020-09-20 NOTE — PROGRESS NOTES
RESPIRATORY THERAPY ASSESSMENT    Name:  Kaiden Eden [place] Record Number:  [id number]  Age: 66 y.o. Gender: female  : 1945  Today's Date:  2020  Room:  /0313-02    Assessment     Is the patient being admitted for a COPD or Asthma exacerbation? No   (If yes the patient will be seen every 4 hours for the first 24 hours and then reassessed)    Patient Admission Diagnosis      Allergies  Allergies   Allergen Reactions    Ciprofloxacin Hives       Minimum Predicted Vital Capacity:     n/a          Actual Vital Capacity:      n/a              Pulmonary History:CHF/Pulmonary Edema  Home Oxygen Therapy:  room air  Home Respiratory Therapy:None   Current Respiratory Therapy:  Duoneb Q4H w/a          Respiratory Severity Index(RSI)   Patients with orders for inhalation medications, oxygen, or any therapeutic treatment modality will be placed on Respiratory Protocol. They will be assessed with the first treatment and at least every 72 hours thereafter. The following severity scale will be used to determine frequency of treatment intervention.     Smoking History: Pulmonary Disease or Smoking History, Greater than 15 pack year = 2    Social History  Social History     Tobacco Use    Smoking status: Former Smoker    Smokeless tobacco: Never Used    Tobacco comment: quit 25 years ago   Substance Use Topics    Alcohol use: No    Drug use: No       Recent Surgical History: None = 0  Past Surgical History  Past Surgical History:   Procedure Laterality Date    ABDOMEN SURGERY      APPENDECTOMY      BLADDER STONE REMOVAL      BLADDER SURGERY      pt had bladder cancer    CATARACT REMOVAL WITH IMPLANT  11    RIGHT    CHOLECYSTECTOMY  2018    EYE SURGERY      HYSTERECTOMY      OTHER SURGICAL HISTORY  2015    phacemilsification of cataract with intraocular lens implant left eye       Level of Consciousness: Alert, Oriented, and Cooperative = 0    Level of Activity: Walking unassisted = 0    Respiratory Pattern: Dyspnea with exertion;Irregular pattern;or RR less than 6 = 2    Breath Sounds: Diminshed bilaterally and/or crackles = 2    Sputum   ,  ,    Cough: Strong, spontaneous, non-productive = 0    Vital Signs   BP (!) 191/69   Pulse 98   Temp 98 °F (36.7 °C) (Oral)   Resp 20   Ht 4' 11\" (1.499 m)   Wt 138 lb (62.6 kg)   SpO2 98%   BMI 27.87 kg/m²   SPO2 (COPD values may differ): 88-89% on room air or greater than 92% on FiO2 28- 35% = 2    Peak Flow (asthma only): not applicable = 0    RSI: 7-8 = BID and Q4HPRN (every four hours as needed) for dyspnea        Plan       Goals: medication delivery and improve oxygenation    Patient/caregiver was educated on the proper method of use for Respiratory Care Devices:  Yes      Level of patient/caregiver understanding able to:   ? Verbalize understanding   ? Demonstrate understanding       ? Teach back        ? Needs reinforcement       ? No available caregiver               ? Other:     Response to education:  Excellent     Is patient being placed on Home Treatment Regimen? No     Does the patient have everything they need prior to discharge? Yes     Comments: pt assessed, interviewed/chart reviewed    Plan of Care: Duoneb BID and Q4H prn    Electronically signed by Shaista Mancia RCP on 9/20/2020 at 5:08 PM    Respiratory Protocol Guidelines     1. Assessment and treatment by Respiratory Therapy will be initiated for medication and therapeutic interventions upon initiation of aerosolized medication. 2. Physician will be contacted for respiratory rate (RR) greater than 35 breaths per minute. Therapy will be held for heart rate (HR) greater than 140 beats per minute, pending direction from physician. 3. Bronchodilators will be administered via Metered Dose Inhaler (MDI) with spacer when the following criteria are met:  a.  Alert and cooperative     b. HR < 140 bpm  c. RR < 30 bpm                d. Can demonstrate a 2-3 second inspiratory hold  4. Bronchodilators will be administered via Hand Held Nebulizer [facility]) to patients when ANY of the following criteria are met  a. Incognizant or uncooperative          b. Patients treated with HHN at Home        c. Unable to demonstrate proper use of MDI with spacer     d. RR > 30 bpm   5. Bronchodilators will be delivered via Metered Dose Inhaler (MDI), HHN, Aerogen to intubated patients on mechanical ventilation. 6. Inhalation medication orders will be delivered and/or substituted as outlined below. Aerosolized Medications Ordering and Administration Guidelines:    1. All Medications will be ordered by a physician, and their frequency and/or modality will be adjusted as defined by the patients Respiratory Severity Index (RSI) score. 2. If the patient does not have documented COPD, consider discontinuing anticholinergics when RSI is less than 9.  3. If the bronchospasm worsens (increased RSI), then the bronchodilator frequency can be increased to a maximum of every 4 hours. If greater than every 4 hours is required, the physician will be contacted. 4. If the bronchospasm improves, the frequency of the bronchodilator can be decreased, based on the patient's RSI, but not less than home treatment regimen frequency. 5. Bronchodilator(s) will be discontinued if patient has a RSI less than 9 and has received no scheduled or as needed treatment for 72  Hrs. Patients Ordered on a Mucolytic Agent:    1. Must always be administered with a bronchodilator. 2. Discontinue if patient experiences worsened bronchospasm, or secretions have lessened to the point that the patient is able to clear them with a cough. Anti-inflammatory and Combination Medications:    1. If the patient lacks prior history of lung disease, is not using inhaled anti-inflammatory medication at home, and lacks wheezing by examination or by history for at least 24 hours, contact physician for possible discontinuation.

## 2020-09-20 NOTE — ED PROVIDER NOTES
[address] ED  EMERGENCY DEPARTMENT ENCOUNTER      Pt Name: Pedro Luis English  MRN: [id number]  [place] 1945  Date of evaluation: 9/20/2020  Provider: Hermann Vergara, [address] [place] [address]       Chief Complaint   Patient presents with    Shortness of Breath     pt reports increasing SOB over 1x week. Reports leg swelling over 2 days. HISTORY OF PRESENT ILLNESS   (Location/Symptom, Timing/Onset, Context/Setting, Quality, Duration, Modifying Factors, Severity)  Note limiting factors. Pedro Luis English is a 66 y.o. female with history of hyperlipidemia, hypertension who presents to the emergency department complaining of shortness of breath, leg swelling. Patient presenting with roughly 1 week history of grossly worsening shortness of breath, worsened with exertion, no cough no fevers chills. Denies history of CHF, COPD or asthma. Denies ill exposure. Patient also complaining of bilateral symmetric lower extremity swelling, swelling has been ongoing for the last several months however notes that over last several days she believes it is worsened. Denies history of cardiac disease. Denies history of DVT PE no recent travel, immobilization, surgeries, denies posterior calf pain. Patient does have a chronic history of CKD with creatinine at baseline in the 1.1--1.5. Not on dialysis does follow with nephrology. Nursing Notes were reviewed.     PAST MEDICAL HISTORY     Past Medical History:   Diagnosis Date    Acid reflux     Cancer ([address]     bladder cancer    Hyperlipidemia     Hypertension     Kidney stone          SURGICAL HISTORY       Past Surgical History:   Procedure Laterality Date    BLADDER STONE REMOVAL      BLADDER SURGERY      pt had bladder cancer    CATARACT REMOVAL WITH IMPLANT  8/26/11    RIGHT    CHOLECYSTECTOMY  03/06/2018    OTHER SURGICAL HISTORY  05/08/2015    phacemilsification of cataract with intraocular lens implant left eye         CURRENT MEDICATIONS       Previous Medications    AMLODIPINE (NORVASC) 10 MG TABLET    Take 10 mg by mouth daily    ATENOLOL (TENORMIN) 50 MG TABLET    Take 50 mg by mouth 2 times daily    B COMPLEX VITAMINS (B-COMPLEX/B-12) TABS    Take 100 mg by mouth daily    DOXAZOSIN (CARDURA) 4 MG TABLET    Take 1 tablet by mouth nightly    GEMFIBROZIL (LOPID) 600 MG TABLET    Take 600 mg by mouth 2 times daily (before meals). LAMOTRIGINE (LAMICTAL) 25 MG TABLET    Take 25 mg by mouth 4 times daily    ONDANSETRON (ZOFRAN) 8 MG TABLET    Take 1 tablet by mouth every 8 hours as needed for Nausea    PANTOPRAZOLE (PROTONIX) 40 MG TABLET    Take 40 mg by mouth daily    PAROXETINE (PAXIL) 20 MG TABLET    Take 60 mg by mouth every morning.      PRIMIDONE (MYSOLINE) 50 MG TABLET    Take 100 mg by mouth daily    SENNA-DOCUSATE (SENOKOT S) 8.6-50 MG PER TABLET    Take 2 tablets by mouth daily    SODIUM BICARBONATE 650 MG TABLET    Take 1 tablet by mouth 2 times daily       ALLERGIES     Ciprofloxacin    FAMILY HISTORY       Family History   Problem Relation Age of Onset    Heart Disease Mother           SOCIAL HISTORY       Social History     Socioeconomic History    Marital status:      Spouse name: None    Number of children: None    Years of education: None    Highest education level: None   Occupational History    None   Social Needs    Financial resource strain: None    Food insecurity     Worry: None     Inability: None    Transportation needs     Medical: None     Non-medical: None   Tobacco Use    Smoking status: Former Smoker    Smokeless tobacco: Never Used   Substance and Sexual Activity    Alcohol use: No    Drug use: No    Sexual activity: Not Currently   Lifestyle    Physical activity     Days per week: None     Minutes per session: None    Stress: None   Relationships    Social connections     Talks on phone: None     Gets together: None     Attends [employer] service: None     Active member of club or organization: None     Attends meetings of clubs or organizations: None     Relationship status: None    Intimate partner violence     Fear of current or ex partner: None     Emotionally abused: None     Physically abused: None     Forced sexual activity: None   Other Topics Concern    None   Social History Narrative    None       SCREENINGS                            REVIEW OF SYSTEMS    (2-9 systems for level 4, 10 or more for level 5)   Review of Systems   Constitutional: Negative for chills and fever. HENT: Negative for congestion, rhinorrhea and sore throat. Eyes: Negative for photophobia and visual disturbance. Respiratory: Positive for chest tightness and shortness of breath. Negative for cough and stridor. Cardiovascular: Positive for chest pain and leg swelling. Negative for palpitations. Gastrointestinal: Negative for abdominal pain, nausea and vomiting. Genitourinary: Negative for decreased urine volume. Musculoskeletal: Negative for back pain. Skin: Negative for rash. Allergic/Immunologic: Negative for environmental allergies. Hematological: Negative for adenopathy. Psychiatric/Behavioral: Negative for confusion. PHYSICAL EXAM    (up to 7 for level 4, 8 or more for level 5)     ED Triage Vitals   BP Temp Temp src Pulse Resp SpO2 Height Weight   -- -- -- -- -- -- -- --       Physical Exam  Constitutional:       General: She is not in acute distress. Appearance: She is not diaphoretic. HENT:      Head: Normocephalic and atraumatic. Eyes:      Pupils: Pupils are equal, round, and reactive to light. Neck:      Musculoskeletal: Normal range of motion and neck supple. Trachea: No tracheal deviation. Cardiovascular:      Rate and Rhythm: Normal rate and regular rhythm. Pulmonary:      Effort: Pulmonary effort is normal. No respiratory distress. Breath sounds: Decreased breath sounds present. No wheezing or rhonchi.    Abdominal:      General: There is no distension. Palpations: Abdomen is soft. Musculoskeletal: Normal range of motion. Right lower leg: Edema present. Left lower leg: Edema present. Skin:     General: Skin is warm. Neurological:      Mental Status: She is oriented to person, place, and time. DIAGNOSTIC RESULTS     EKG: All EKG's are interpreted by the Emergency Department Physician who either signs or Co-signs this chart in the absence of a cardiologist.      The Ekg interpreted by me shows  normal sinus rhythm with a rate of 86  Axis is   Normal  QTc is  500  LBBB     ST Segments: Patient with abnormal biphasic T waves with subtle depression seen in lead I 2, aVL, lead V5-V6 with    No significant change from prior EKG dated and review of EKG compared to July 27, 2020 there is new biphasic T waves seen in lead I to aVL V5 V6 with new left bundle branch block, review of multiple EKGs in July 26 showing similar findings today's EKG. RADIOLOGY:   Non-plain film images such as CT, Ultrasound and MRI are read by the radiologist. Plain radiographic images are visualized and preliminarily interpreted by the emergency physician.     Interpretation per the Radiologist below, if available at the time of this note:    XR CHEST (2 VW)   Final Result   Increased interstitial markings in both lungs could represent pulmonary edema   or atypical pneumonia               LABS:  Labs Reviewed   CBC WITH AUTO DIFFERENTIAL - Abnormal; Notable for the following components:       Result Value    RBC 3.68 (*)     Hemoglobin 10.8 (*)     Hematocrit 33.8 (*)     RDW 16.5 (*)     Lymphocytes Absolute 0.6 (*)     All other components within normal limits    Narrative:     Performed at:  [facility]    [facility]   Phone [phone]   COMPREHENSIVE METABOLIC PANEL W/ REFLEX TO MG FOR LOW K - Abnormal; Notable for the following components:    CO2 19 (*)     Glucose 128 (*)     BUN 30 further work-up and management. CRITICAL CARE TIME   Total Critical Care time was at least  minutes, excluding separately reportable procedures. There was a high probability of clinically significant/life threatening deterioration in the patient's condition which required my urgent intervention. Clinical concern   Intervention     CONSULTS:  IP CONSULT TO HOSPITALIST    PROCEDURES:  Unless otherwise noted below, none     Procedures        FINAL IMPRESSION      1. Congestive heart failure, unspecified HF chronicity, unspecified heart failure type ([facility] [address]          DISPOSITION/PLAN   DISPOSITION  admit      PATIENT REFERRED TO:  No follow-up provider specified. DISCHARGE MEDICATIONS:  New Prescriptions    No medications on file     Controlled Substances Monitoring:     No flowsheet data found.     (Please note that portions of this note were completed with a voice recognition program.  Efforts were made to edit the dictations but occasionally words are mis-transcribed.)    Tete Galloway DO (electronically signed)  Attending Emergency Physician            Tete Galloway DO  09/20/20 [id number] Earl Garcia DO  09/20/20 [id number]

## 2020-09-21 PROBLEM — J96.01 ACUTE RESPIRATORY FAILURE WITH HYPOXIA (HCC): Status: ACTIVE | Noted: 2020-01-01

## 2020-09-21 PROBLEM — I50.31 ACUTE DIASTOLIC CHF (CONGESTIVE HEART FAILURE) (HCC): Status: ACTIVE | Noted: 2020-01-01

## 2020-09-21 NOTE — PROGRESS NOTES
Patient with c/o headache. Comfort measures provided. Recheck of vitals reveals BP to be elevated at 178/84. HR 78 and NSR on cardiac monitor. No PRN anti-hypertensive medication ordered. Perfectserve to Dr. Evon Corcoran, noctjessicaist at this time. Orders received for PRN hydralazine- see MAR for administration details.

## 2020-09-21 NOTE — CONSULTS
[address] [facility] [place] 1945    History:  Past Medical History:   Diagnosis Date    Acid reflux     Cancer ([address]     bladder cancer    CHF (congestive heart failure) (HCC)     Hyperlipidemia     Hypertension     Kidney stone      Comorbidities Reviewed Yes    ECHO/EF%:  55%, mild PAH, gr 3 DD    ACE/ARB/ARNI: none on MAR    BB:  Atenolol    Diuretic therapy: lasix education given- pt denies taking diuretic at home prior to admission    Discharge plans: home, lives with spouse     Family Present: No    Advanced Directives/Goals of Medical Care Discussion: patient does not have advance directives and requests assistance completing them at this time - consult made to spiritual care    Patient's stated goal of care: better understand heart failure and disease management and be more comfortable prior to discharge. Long term goal: to call her doctor much sooner with her symptoms    Lifestyle goal discussed: begin daily morning weights and to measure/track her fluid intake and eat less sodium     Progressive Mobility Assessment: staff to assess. Patient Vitals for the past 96 hrs (Last 3 readings):   Weight   09/21/20 0531 132 lb 11.2 oz (60.2 kg)   09/20/20 1540 138 lb (62.6 kg)   09/20/20 1042 142 lb (64.4 kg)         Intake/Output Summary (Last 24 hours) at 9/21/2020 1310  Last data filed at 9/21/2020 1246  Gross per 24 hour   Intake 1070 ml   Output 2600 ml   Net -1530 ml       Pt/Caregiver subjective information: They state she lives at home with her spouse and daughter Fidelia Barraza takes her to appointments. Mentioned her diet largely consists of eating sandwiches and convenient foods and she also mentions she adds salt to her foods. States she drinks \"fluid all day\" but is not measuring this amount. States she takes all her home medications as prescribed. Patient has a scale at home. Denies concerns paying for medications.  Echo results of gr 2 DD and mild PAH Patient contacted Patient is asking for orders for blood test  She has an appointment next week with you See orders placed this encounter educated to pt and emphasized the need to maintain a consistent fluid po intake along with taking a diuretic. She was not taking lasix at home she states. Advised to call either her PCP or her kidney doctor if she swells (no cardiology consult noted and pt does not follow with a outpatient cardiologist). She agrees to CHF nurse calling either spouse or daughter to also provide self mgmt education to them also. Notified patient/caregiver of scheduled hospital follow-up appointment with Melchor Salinas MD / Aria Bumpers CNP for 9/29 at 10 AM.     Advised to call the doctor post discharge if she experiences shortness of breath, chest pain, swelling, cough, or weight gain of 2-3 pounds in a day / 5 pounds in a week. Also notified to call the doctor if she feels dizzy, increased fatigue, decreased or difficulty urinating. Reviewed the red, yellow, green zones of heart failure self-management. Encouraged to call the MD with early signs of an acute heart failure exacerbation or when in the yellow zone. Provided with both written and verbal education on CHF signs/symptoms, causes, discharge medications, daily weights, low sodium diet, activity, fluid restriction, and follow-up. Reviewed CHF medications currently prescribed and strongly encouraged close outpatient heart failure follow up care. Educated on diuretic medications. PATIENT/CAREGIVER TEACHING:    Level of patient/caregiver understanding able to:   [ X ] Verbalize understanding [ ] Demonstrate understanding [ ] Teach back [ X ] Needs reinforcement [ ] Other:      Education Time: 40 minutes     Recommendations:   1. Encourage follow-up appointment compliance. Next appointment: 9/29 PCP  2. Educate further on fluid restriction of <64 oz during inpatient stay so they can understand how to measure intake at home. 3. Review sodium restrictions. Encouraged to not add table salt to their foods and avoid foods that are high in sodium.  Advised on impaired balance/decreased strength pain/decreased strength/impaired postural control/impaired balance

## 2020-09-21 NOTE — CONSULTS
pantoprazole (PROTONIX) 40 MG tablet Take 40 mg by mouth daily      atenolol (TENORMIN) 50 MG tablet Take 50 mg by mouth 2 times daily      B Complex Vitamins (B-COMPLEX/B-12) TABS Take 100 mg by mouth daily      gemfibrozil (LOPID) 600 MG tablet Take 600 mg by mouth 2 times daily (before meals).  paroxetine (PAXIL) 20 MG tablet Take 60 mg by mouth every morning.        ondansetron (ZOFRAN) 8 MG tablet Take 1 tablet by mouth every 8 hours as needed for Nausea 10 tablet 0    senna-docusate (SENOKOT S) 8.6-50 MG per tablet Take 2 tablets by mouth daily 30 tablet 1       Allergies:  Ciprofloxacin    Social History:    Social History     Socioeconomic History    Marital status:      Spouse name: Not on file    Number of children: Not on file    Years of education: Not on file    Highest education level: Not on file   Occupational History    Not on file   Social Needs    Financial resource strain: Not on file    Food insecurity     Worry: Not on file     Inability: Not on file   [employer] needs     Medical: Not on file     Non-medical: Not on file   Tobacco Use    Smoking status: Former Smoker    Smokeless tobacco: Never Used    Tobacco comment: quit 25 years ago   Substance and Sexual Activity    Alcohol use: No    Drug use: No    Sexual activity: Not Currently   Lifestyle    Physical activity     Days per week: Not on file     Minutes per session: Not on file    Stress: Not on file   Relationships    Social connections     Talks on phone: Not on file     Gets together: Not on file     Attends Adventism service: Not on file     Active member of club or organization: Not on file     Attends meetings of clubs or organizations: Not on file     Relationship status: Not on file    Intimate partner violence     Fear of current or ex partner: Not on file     Emotionally abused: Not on file     Physically abused: Not on file     Forced sexual activity: Not on file   Other Topics Concern  Not on file   Social History Narrative    Not on file       Family History:       Problem Relation Age of Onset    Heart Disease Mother        REVIEW OF SYSTEMS:    Review of Systems   Constitutional: Negative. HENT: Negative. Respiratory: Positive for shortness of breath. Negative for chest tightness. Cardiovascular: Positive for leg swelling. Negative for chest pain and palpitations. Gastrointestinal: Negative. Musculoskeletal: Negative. Neurological: Negative. PHYSICAL EXAM:    Vitals:    BP (!) 175/69   Pulse 87   Temp 98.1 °F (36.7 °C) (Oral)   Resp 20   Ht 4' 11\" (1.499 m)   Wt 132 lb 11.2 oz (60.2 kg)   SpO2 97%   BMI 26.80 kg/m²   I/O last 3 completed shifts: In: 80 [P.O.:660; I.V.:30]  Out: 2050 [Urine:2050]  I/O this shift:  In: 120 [P.O.:120]  Out: 550 [Urine:550]    Physical Exam:  Physical Exam  Vitals signs reviewed. Constitutional:       General: She is not in acute distress. Appearance: Normal appearance. HENT:      Head: Normocephalic and atraumatic. Mouth/Throat:      Mouth: Mucous membranes are moist.   Eyes:      General: No scleral icterus. Conjunctiva/sclera: Conjunctivae normal.   Cardiovascular:      Rate and Rhythm: Normal rate. Heart sounds: No friction rub. Pulmonary:      Effort: No respiratory distress. Breath sounds: Rales present. No wheezing. Abdominal:      General: Bowel sounds are normal. There is no distension. Tenderness: There is no abdominal tenderness. Musculoskeletal:      Right lower leg: Edema present. Left lower leg: Edema present. Neurological:      Mental Status: She is alert.          DATA:    Recent Labs     09/20/20  1052 09/21/20  0432   WBC 5.0 4.3   HGB 10.8* 9.9*   HCT 33.8* 30.3*   MCV 91.7 90.1    227     Recent Labs     09/20/20  1052 09/21/20  0432    137   K 4.5 4.6    106   CO2 19* 19*   GLUCOSE 128* 103*   PHOS  --  3.7   BUN 30* 31*   CREATININE 1.4* 1.3*   LABGLOM 36* 40*   GFRAA 44* 48*           IMPRESSION/RECOMMENDATIONS:      CKD Stage 3.  - From obstructive nephropathy.  - Follows with Dr. Micheline Rucker in the office.  - Baseline serum creatinine of 1.2-1.4mg/dL. Kidney function is at baseline but will monitor with diuresis. History of Bladder Cancer.  - S/P cystectomy and ileal conduit in 1985. Hypertension.  - On Doxazosin and Atenolol.  - Restart Amlodipine (home med). - On Lasix IV.  - Will consider BANDAR work-up if BP remains difficult to control. SOB. - From pulmonary edema. Elevated BP.  - Improving with diuresis. Metabolic Acidosis. - From CKD. - Holding sodium bicarb PO in the setting of fluid overload. Thank you for allowing me to participate in the care of this patient. Please do not hesitate to contact me at [phone] if with questions.     Angelena Buerger, MD  9/21/2020  [facility]

## 2020-09-21 NOTE — PROGRESS NOTES
Assessment complete as per flow sheets. VSS, though BP is on high side, 162/80. Patient is A/O x 4. Unlabored breathing at rest on 3L NC. Normal sinus rhythm on cardiac monitor. Bowel sounds + x4 quads. Patient c/o 3/10 headache type pain. Comfort measures provided and PRN tylenol adminsitered. Patient voids per urostomy. Site appears WNL. Urine is clear and yellow. PIV appears WNL. Dressing is C/D/I. Discussed POC, labs, testing, medical equipment and unit routine. Answered questions at this time. Meds as per MAR. Safety measures in place and will continue to monitor.

## 2020-09-21 NOTE — CARE COORDINATION
Case Management Assessment  Initial Evaluation      Patient Name: Charleen Winn  YOB: 1945  Diagnosis: CHF (congestive heart failure), NYHA class I, acute on chronic, combined ([facility] [I50.43]  Date / Time: 9/20/2020 10:34 AM    Admission status/Date:9/20/2020 inpt  Chart Reviewed: Yes      Patient Interviewed: Yes   Family Interviewed:  No      Hospitalization in the last 30 days:  No    Lizbeth Wheatley Maker:No    Met with: pt  Interview conducted  (bedside/phone):bedside    Current PCP:   [address] [place] [address] required for SNF : N          3 night stay required - N-waived    ADLS  Support Systems/Care Needs: Family Members, Home Care Staff([facility]  Transportation: DTR    Meal Preparation: self/spouse    Housing  Living Arrangements: lives with spouse in 1 story house  Steps: 1  Intent for return to present living arrangements: Yes  Identified Issues:     [address] with [address] : No Agency:(Services)  Type of Home Care Services: Aide Services  Passport/Waiver : Yes - Passport  :   Marie Man                   Phone Number:  [phone]  Fax [phone]  Passport/Waiver Services: [facility] 4 days per week          Durable Medical Equiptment   DME Provider: none  Equipment:   Walker_x__Cane___RTS___ BSC___Shower Chair___Hospital Bed___W/C____Other________  02 at ____Liter(s)---wears(frequency)_______ [facility] ___ CPAP___ BiPap___   N/A____      Home O2 Use :  No    If No for home O2---if presently on O2 during hospitalization:  Yes  if yes CM to follow for potential DC O2 need      Community Service Affiliation  Dialysis:  No    · Agency:  · Location:  · Dialysis Schedule:  · Phone:   · Fax: Other Community Services:none (ex:PT/OT,Mental Health,Wound Clinic, Cardio/Pul [address])    DISCHARGE PLAN: Explained Case Management role/services.  Chart reviewed, met with pt at bedside. Pt is from house with spouse and plans to return. Pt is active with Passport for BRANDEE. BRYANT for [place] Espinoza  to make her aware pt is admitted. Please fax AVS to her at [place] to [phone]. Will follow for possible home O2 needs, spouse uses [facility] if needed.

## 2020-09-21 NOTE — PROGRESS NOTES
Progress Note    Admit Date:  9/20/2020    66 y.o. female w remote hx of bladder Ca s/p ileal conduit, prior Hx of nephrostomy, Hx of anxiety, who presents to [facility] with dyspnea. Admitted to PCU on tele for CHF. Subjective:  Ms. Sandee Smith states feeling improved. She is tremulous. Objective:   Patient Vitals for the past 4 hrs:   BP Pulse   09/21/20 1218 (!) 165/62 81            Intake/Output Summary (Last 24 hours) at 9/21/2020 1405  Last data filed at 9/21/2020 1246  Gross per 24 hour   Intake 1070 ml   Output 2600 ml   Net -1530 ml       Physical Exam:  Gen: No distress. Alert. Eyes: PERRL. No sclera icterus. No conjunctival injection. ENT: No discharge. Pharynx clear. Neck: Trachea midline. Resp: No accessory muscle use. + RLL, LLL crackles. No wheezes. No rhonchi. CV: Regular rate. Regular rhythm. No murmur. No rub. 1+ BLE edema. GI: Non-tender. Non-distended. Normal bowel sounds. No hernia. Skin: Warm and dry. No nodule on exposed extremities. No rash on exposed extremities. M/S: No cyanosis. No joint deformity. No clubbing. Neuro: Awake. Grossly nonfocal    Psych: Oriented x 3. No anxiety or agitation      Data:  CBC:   Recent Labs     09/20/20  1052 09/21/20  0432   WBC 5.0 4.3   HGB 10.8* 9.9*   HCT 33.8* 30.3*   MCV 91.7 90.1    227     BMP:   Recent Labs     09/20/20  1052 09/21/20  0432    137   K 4.5 4.6    106   CO2 19* 19*   PHOS  --  3.7   BUN 30* 31*   CREATININE 1.4* 1.3*     LIVER PROFILE:   Recent Labs     09/20/20  1052   AST 10*   ALT <5*   BILITOT <0.2   ALKPHOS 143*     PT/INR: No results for input(s): PROTIME, INR in the last 72 hours. CULTURES  N/A    RADIOLOGY  CT ABDOMEN PELVIS WO CONTRAST Additional Contrast? None   Final Result   1. Bilateral pleural effusions and dense peripheral airspace opacities. Pattern may represent pulmonary edema or developing pneumonitis.    2. Prior surgical change of cystectomy with an ileal loop urinary conduit. Physiologic mild hydronephrosis on the right. Chronic left hydronephrosis   and cortical atrophy likely due to longstanding obstruction. 3. No other significant findings in the abdomen or pelvis. XR CHEST (2 VW)   Final Result   Increased interstitial markings in both lungs could represent pulmonary edema   or atypical pneumonia           Echo 9/21/2020   Summary    LV systolic function is normal with EF estimated at 55%.    No regional wall motion abnormalities are noted.    There is mild concentric left ventricular hypertrophy.    Grade II diastolic dysfunction with elevated filing pressure.    The left atrium is mildly dilated.    Mild mitral annular calcification is present.    Mild mitral, aortic, tricuspid, and pulmonic regurgitation.    Systolic pulmonary artery pressure (SPAP) estimated at 41 mmHg (RA pressure    8 mmHg) c/w mild pulm HTN. Zia Connelly have reviewed the chart on [facility] and personally interviewed and examined patient, reviewed the data (labs and imaging) and after discussion with my PA formulated the plan. Agree with note with the following edits. HPI:     Patient to the hospital for shortness of breath. Work-up consistent with acute heart failure. Echo done today shows diastolic dysfunction. Prior history of bladder cancer and has an ileal conduit. Subjectively feeling improved. No chest pain. I reviewed the patient's Past Medical History, Past Surgical History, Medications, and Allergies. Physical exam:    BP (!) 163/65   Pulse 73   Temp 98.2 °F (36.8 °C)   Resp 18   Ht 4' 11\" (1.499 m)   Wt 132 lb 11.2 oz (60.2 kg)   SpO2 97%   BMI 26.80 kg/m²     Gen: No distress. Alert. Eyes: PERRL. No sclera icterus. No conjunctival injection. ENT: No discharge. Pharynx clear. Neck: Trachea midline. Normal thyroid. Resp: No accessory muscle use. + crackles. No wheezes. No rhonchi. No dullness on percussion.   CV: Regular rate. Regular rhythm. No murmur or rub. No edema. Assessment/Plan:    Acute hypoxic respiratory failure  - patient presented with c/o shortness of breath  - this is secondary to CHF (BNP 5200, imaging with pleural effusions, pulmonary edema)  - supplemental O2. Wean as tolerated  - was on 4 L. Down to 2 L     Acute Diastolic CHF  - monitor on tele. Serial troponin negative  - Echo with Grade II DD, mild pulmonary hypertension. See above  - daily weights; I&O's  - IV Lasix 40 mg BID. Hx of Bladder Cancer S/p Radical Cystectomy with Ileal Conduit  Non-Functioning Left Kidney     DELMER on CKD Stage III  - Cr 1.3  - baseline: ~ 1.1  - monitor with diuresis     HTN  - uncontrolled  - cont Norvasc, Atenolol, Doxazosin  - nitro-paste as above. She has been refusing nitropaste. Hydralazine prn.      Normocytic Anemia  - Hgb 10.8-->9.9  - baseline: ~10.5-11  - monitor     HLD  - cont Lopid     GERD  - cont Protonix     Essential Tremor  - cont Primidone    Anxiety  - on Paxil.      DVT Prophylaxis: Lovenox  Diet: DIET RENAL;  Code Status: Full Code    Aurea Graff FNP-C  9/21/2020      PARAMPADDY Mcdonald Gentleman 9/21/2020 2:32 PM

## 2020-09-21 NOTE — PROGRESS NOTES
Patient resting in bed, AM assessment completed. Lungs decreased. BS+. Only slight edema in BLE. INT intact. No acute distress noted. Patient denies any needs. Call light in reach. Will continue to monitor.

## 2020-09-21 NOTE — PLAN OF CARE
Problem: OXYGENATION/RESPIRATORY FUNCTION  Goal: Patient will maintain patent airway  Outcome: Ongoing  Goal: Patient will achieve/maintain normal respiratory rate/effort  Description: Respiratory rate and effort will be within normal limits for the patient  Outcome: Ongoing     Problem: HEMODYNAMIC STATUS  Goal: Patient has stable vital signs and fluid balance  Outcome: Ongoing     Problem: FLUID AND ELECTROLYTE IMBALANCE  Goal: Fluid and electrolyte balance are achieved/maintained  Outcome: Ongoing     Problem: ACTIVITY INTOLERANCE/IMPAIRED MOBILITY  Goal: Mobility/activity is maintained at optimum level for patient  Outcome: Ongoing     Problem: Pain:  Description: Pain management should include both nonpharmacologic and pharmacologic interventions.   Goal: Pain level will decrease  Description: Pain level will decrease  Outcome: Ongoing  Goal: Control of acute pain  Description: Control of acute pain  Outcome: Ongoing  Goal: Control of chronic pain  Description: Control of chronic pain  Outcome: Ongoing

## 2020-09-21 NOTE — PROGRESS NOTES
EOS report and transfer of care to [address]. Patient resting in bed, stable condition at hand off.

## 2020-09-21 NOTE — PLAN OF CARE
Patient's EF --still to be determined with ECHO today    Patient's weights and intake/output reviewed:    Patient's Last Weight: 132 lbs obtained by standing scale. Difference of 6 lbs less than last documented weight. Intake/Output Summary (Last 24 hours) at 9/21/2020 1019  Last data filed at 9/21/2020 0853  Gross per 24 hour   Intake 810 ml   Output 2600 ml   Net -1790 ml         Pt is currently on 2 L O2. Pt denies shortness of breath. Pt with nonpitting lower extremity edema. Patient and/or Family's stated Goal of Care this Admission: reduce shortness of breath prior to discharge      Comorbidities Reviewed Yes  Patient has a past medical history of Acid reflux, Cancer ([address] CHF (congestive heart failure) ([address] Hyperlipidemia, Hypertension, and Kidney stone.          >>For CHF and Comorbidity documentation on Education Time and Topics, please see Education Tab

## 2020-09-22 NOTE — PROGRESS NOTES
Patient resting in bed, AM assessment completed. Lungs decreased. BS+. Tremors noted, normal for patient. Urostomy bag to yates drainage bag, urine clear yellow. Meds given. INT intact. No acute distress noted. Call light in reach. Will continue to monitor.

## 2020-09-22 NOTE — PLAN OF CARE
Problem: OXYGENATION/RESPIRATORY FUNCTION  Goal: Patient will maintain patent airway  9/21/2020 2106 by Venida Code  Outcome: Ongoing  9/21/2020 1019 by Ricardo Person RN  Outcome: Ongoing  Goal: Patient will achieve/maintain normal respiratory rate/effort  Description: Respiratory rate and effort will be within normal limits for the patient  9/21/2020 2106 by Venida Code  Outcome: Ongoing  9/21/2020 1019 by Ricardo Person RN  Outcome: Ongoing     Problem: HEMODYNAMIC STATUS  Goal: Patient has stable vital signs and fluid balance  Outcome: Ongoing     Problem: FLUID AND ELECTROLYTE IMBALANCE  Goal: Fluid and electrolyte balance are achieved/maintained  Outcome: Ongoing     Problem: ACTIVITY INTOLERANCE/IMPAIRED MOBILITY  Goal: Mobility/activity is maintained at optimum level for patient  Outcome: Ongoing     Problem: Pain:  Description: Pain management should include both nonpharmacologic and pharmacologic interventions.   Goal: Pain level will decrease  Description: Pain level will decrease  Outcome: Ongoing  Goal: Control of acute pain  Description: Control of acute pain  Outcome: Ongoing  Goal: Control of chronic pain  Description: Control of chronic pain  Outcome: Ongoing

## 2020-09-22 NOTE — PROGRESS NOTES
Assessment complete as per flow sheets. Vitals stable, though BP is elevated  174/78, meds given as scheduled. Patient is A/O x 4. Unlabored breathing at rest on 2L NC, SpO2 in upper 90s. O2 titrated down to 1L at this time, will monitor via continuous pulse ox. Normal sinus rhythm on cardiac monitor. Bowel sounds + x4 quads. Patient c/o headache type pain. PRN tylenol given and comfort measures provided. Patient voids per urostomy. Site appears WNL, urine is clear and yellow. PIV appears WNL. Dressing is C/D/I. Discussed POC, labs, testing, medical equipment and unit routine. Answered questions at this time. Meds as per MAR. Safety measures in place and will continue to monitor.

## 2020-09-22 NOTE — PROGRESS NOTES
EOS report and transfer of care to \[facility]\"". Patient resting in bed, stable condition at hand off.

## 2020-09-22 NOTE — PROGRESS NOTES
Nephrology Progress Note   [email or web address]      This patient is a 66year old female whom we are following for CKD and fluid overload. Subjective: The patient was seen and examined. Breathing better, off oxygen. LE edema is better. BP still on the high side. Family History: No family at bedside  ROS: No nausea or vomiting      Vitals:  BP (!) 196/67   Pulse 97   Temp 98.3 °F (36.8 °C) (Axillary)   Resp 20   Ht 4' 11\" (1.499 m)   Wt 132 lb 8 oz (60.1 kg)   SpO2 95%   BMI 26.76 kg/m²   I/O last 3 completed shifts: In: 600 [P.O.:590; I.V.:10]  Out: 1450 [Urine:1450]  I/O this shift:  In: 180 [P.O.:180]  Out: -     Physical Exam:  Physical Exam  Vitals signs reviewed. Constitutional:       General: She is not in acute distress. Appearance: Normal appearance. HENT:      Head: Normocephalic and atraumatic. Mouth/Throat:      Mouth: Mucous membranes are moist.   Eyes:      General: No scleral icterus. Conjunctiva/sclera: Conjunctivae normal.   Cardiovascular:      Rate and Rhythm: Normal rate. Heart sounds: No friction rub. Pulmonary:      Effort: No respiratory distress. Breath sounds: Rales present. No wheezing. Musculoskeletal:      Right lower leg: Edema present. Left lower leg: Edema present. Neurological:      Mental Status: She is alert.            Medications:   hydrALAZINE  25 mg Oral 3 times per day    PARoxetine  20 mg Oral Once    [START ON 9/23/2020] PARoxetine  60 mg Oral QAM    amLODIPine  10 mg Oral Daily    atenolol  50 mg Oral Daily    vitamin B complex w/C  1 tablet Oral Daily    gemfibrozil  600 mg Oral BID AC    lamoTRIgine  25 mg Oral 4x Daily    pantoprazole  40 mg Oral Daily    primidone  100 mg Oral Daily    sennosides-docusate sodium  2 tablet Oral Daily    sodium chloride flush  10 mL Intravenous 2 times per day    enoxaparin  30 mg Subcutaneous Daily    ipratropium-albuterol  1 ampule Inhalation BID    doxazosin  4 mg Oral 2

## 2020-09-22 NOTE — FLOWSHEET NOTE
09/22/20 1540   Encounter Summary   Services provided to: Patient   Referral/Consult From: Nurse   Support System Spouse; Children;Family members   Continue Visiting   (9/22 Initial visit/spt/prayer. Gave/explained/completed POA)   Complexity of Encounter High   Length of Encounter 1 hour;15 minutes   Spiritual Assessment Completed Yes   Advance Care Planning Yes   Spiritual/Baptism   Type Spiritual support   Assessment Calm; Approachable; Anxious; Doubtful   Intervention Active listening;Explored feelings, thoughts, concerns;Explored coping resources;Nurtured hope;Prayer;Empowerment; Discussed meaning/purpose;Discussed relationship with God;Discussed belief system/Roman Catholic practices/amos;Discussed illness/injury and it's impact   Outcome Connection/belonging;Comfort;Expressed gratitude;Engaged in conversation; Shared life review;Expressed feelings/needs/concerns; Less anxious, less agitated;Encouraged; Hopeful;Receptive   Advance Directives (For Healthcare)   Advance Directives Documents given;Documents explained; Healthcare power of attornery completed; Copy in paper lite chart

## 2020-09-22 NOTE — PROGRESS NOTES
Progress Note    Admit Date:  9/20/2020    66 y.o. female w remote hx of bladder Ca s/p ileal conduit, prior Hx of nephrostomy, Hx of anxiety, who presents to [facility] with dyspnea. Admitted to PCU on tele for CHF. Subjective:  Ms. Julianna Samuels reports she has noticed improvement in her swelling. Now has a headache though. Doesn't have much of an appetite because she only likes food with salt. Objective:   Vitals:    09/22/20 0811   BP: (!) 196/67   Pulse: 97   Resp: 20   Temp: 98.3 °F (36.8 °C)   SpO2: 95%         Intake/Output Summary (Last 24 hours) at 9/22/2020 1220  Last data filed at 9/22/2020 0856  Gross per 24 hour   Intake 460 ml   Output 900 ml   Net -440 ml       Physical Exam:   Gen: Frail appearing elderly female, No distress. Alert. Eyes: No conjunctival injection. ENT: No discharge. Pharynx clear. Neck: Trachea midline. Resp: No accessory muscle use. + RLL, LLL crackles. No wheezes. No rhonchi. CV: Regular rate. Regular rhythm. No murmur. No rub. 1+ BLE edema. GI: Non-tender. Non-distended. Normal bowel sounds. Skin: Warm and dry. Chronic skin changes to BLEs  M/S: No cyanosis. No joint deformity. No clubbing. Neuro: Awake. Grossly nonfocal, resting tremors to BUEs    Psych: Oriented x 3. No anxiety or agitation      Data:  CBC:   Recent Labs     09/20/20  1052 09/21/20  0432   WBC 5.0 4.3   HGB 10.8* 9.9*   HCT 33.8* 30.3*   MCV 91.7 90.1    227     BMP:   Recent Labs     09/20/20  1052 09/21/20  0432 09/22/20  0556    137 134*   K 4.5 4.6 3.9    106 100   CO2 19* 19* 22   PHOS  --  3.7 3.8   BUN 30* 31* 34*   CREATININE 1.4* 1.3* 1.6*     LIVER PROFILE:   Recent Labs     09/20/20  1052   AST 10*   ALT <5*   BILITOT <0.2   ALKPHOS 143*     CULTURES  N/A    RADIOLOGY  CT ABDOMEN PELVIS WO CONTRAST Additional Contrast? None   Final Result   1. Bilateral pleural effusions and dense peripheral airspace opacities.    Pattern may represent pulmonary edema or developing pneumonitis. 2. Prior surgical change of cystectomy with an ileal loop urinary conduit. Physiologic mild hydronephrosis on the right. Chronic left hydronephrosis   and cortical atrophy likely due to longstanding obstruction. 3. No other significant findings in the abdomen or pelvis. XR CHEST (2 VW)   Final Result   Increased interstitial markings in both lungs could represent pulmonary edema   or atypical pneumonia           Echo 9/21/2020   Summary    LV systolic function is normal with EF estimated at 55%.    No regional wall motion abnormalities are noted.    There is mild concentric left ventricular hypertrophy.    Grade II diastolic dysfunction with elevated filing pressure.    The left atrium is mildly dilated.    Mild mitral annular calcification is present.    Mild mitral, aortic, tricuspid, and pulmonic regurgitation.    Systolic pulmonary artery pressure (SPAP) estimated at 41 mmHg (RA pressure    8 mmHg) c/w mild pulm HTN. Ej Son  have reviewed the chart on [facility] and personally interviewed and examined patient, reviewed the data (labs and imaging) and after discussion with my PA formulated the plan. Agree with note with the following edits. HPI:   Patient to the hospital for shortness of breath. Work-up consistent with acute heart failure. Echo done today shows diastolic dysfunction. Prior history of bladder cancer and has an ileal conduit. Subjectively feeling improved. No chest pain. 9/22-blood pressure still elevated. Has diuresed well. Breathing is improved. Creatinine is going up. Lasix held by nephrology. I reviewed the patient's Past Medical History, Past Surgical History, Medications, and Allergies. Physical exam:    BP (!) 196/67   Pulse 97   Temp 98.3 °F (36.8 °C) (Axillary)   Resp 20   Ht 4' 11\" (1.499 m)   Wt 132 lb 8 oz (60.1 kg)   SpO2 95%   BMI 26.76 kg/m²     Gen: No distress. Alert. Eyes: PERRL. No sclera icterus. No conjunctival injection. ENT: No discharge. Pharynx clear. Neck: Trachea midline. Normal thyroid. Resp: No accessory muscle use. + crackles. No wheezes. No rhonchi. No dullness on percussion. CV: Regular rate. Regular rhythm. No murmur or rub. No edema. Assessment/Plan:  Acute hypoxic respiratory failure  - patient presented with c/o shortness of breath  - this is secondary to CHF (BNP 5200, imaging with pleural effusions, pulmonary edema)  - supplemental O2. Wean as tolerated  - was on 4 L. Down to 2 L     Acute Diastolic CHF  - monitor on tele. Serial troponin negative  - Echo with Grade II DD, mild pulmonary hypertension. See above  - daily weights; I&O's  - IV Lasix 40 mg BID. - net fluid deficit 2.2L   - weight 138lbs-->132 lbs     Hx of Bladder Cancer S/p Radical Cystectomy with Ileal Conduit  Non-Functioning Left Kidney     DELMER on CKD Stage III  - Cr 1.3-->1.6  - baseline: ~ 1.1  - monitor with diuresis-->hold Lasix 9/22     HTN  - uncontrolled  - cont Norvasc, Atenolol, Doxazosin  - nitro-paste as above. She has been refusing nitropaste. - Hydralazine prn.   - BP still high, started on scheduled hydralazine today      Normocytic Anemia  - Hgb 10.8-->9.9  - baseline: ~10.5-11  - monitor     HLD  - cont Lopid     GERD  - cont Protonix     Essential Tremor  - cont Primidone    Anxiety  - on Paxil.      DVT Prophylaxis: Lovenox  Diet: DIET RENAL;  Code Status: Full Code    Creatinine increasing, hold Lasix, hydralazine scheduled for BP control     Anika Bee PA-C  9/22/2020 12:25 PM      ALYCIA Matamoros 9/22/2020 2:09 PM

## 2020-09-23 NOTE — FLOWSHEET NOTE
09/23/20 0315   Vitals   Pulse 86   BP (!) 155/76  (After PRN hydralazine)       Following PRN hydralazine, /76. Patient resting calmly in bed, no distress at this time. Will closely monitor.

## 2020-09-23 NOTE — PROGRESS NOTES
Patient and daughter concerned about the possibility of a sinus infection due to constant headaches, facial pressure, and multiple bloody noses.  Perfect serve sent to MD.

## 2020-09-23 NOTE — PROGRESS NOTES
Patient has had recurrently high Bps. Following PRN hydralazine dose, BP became elevated again (180/79) prior to next scheduled dose. 25mg PO administered as per schedule. She also c/o headache, PRN tylenol given and comfort measures provided. She is resting in bed, stable condition. Will closely monitor.

## 2020-09-23 NOTE — PROGRESS NOTES
Progress Note    Admit Date:  9/20/2020    66 y.o. female w remote hx of bladder Ca s/p ileal conduit, prior Hx of nephrostomy, Hx of anxiety, who presents to [facility] with dyspnea. Admitted to PCU on tele for CHF. Subjective:  Ms. Ezio Larson reports she is feeling better today. Creatinine worsening today. Back on IVFs. BP is still high. Renal US pending     Objective:   Vitals:    09/23/20 0854   BP: (!) 155/71   Pulse: 96   Resp: 16   Temp: 98.2 °F (36.8 °C)   SpO2: 96%         Intake/Output Summary (Last 24 hours) at 9/23/2020 1018  Last data filed at 9/23/2020 0926  Gross per 24 hour   Intake 610 ml   Output 450 ml   Net 160 ml       Physical Exam:   Gen: Frail appearing elderly female, No distress. Alert. Eyes: No conjunctival injection. ENT: No discharge. Pharynx clear. Neck: Trachea midline. Resp: No accessory muscle use. + RLL, LLL crackles. No wheezes. No rhonchi. CV: Regular rate. Regular rhythm. No murmur. No rub. 1+ BLE edema. GI: Non-tender. Non-distended. Normal bowel sounds. Skin: Warm and dry. Chronic skin changes to BLEs  M/S: No cyanosis. No joint deformity. No clubbing. Neuro: Awake. Grossly nonfocal, resting tremors to BUEs    Psych: Oriented x 3. No anxiety or agitation      Data:  CBC:   Recent Labs     09/20/20  1052 09/21/20  0432   WBC 5.0 4.3   HGB 10.8* 9.9*   HCT 33.8* 30.3*   MCV 91.7 90.1    227     BMP:   Recent Labs     09/21/20  0432 09/22/20  0556 09/23/20  0452    134* 135*   K 4.6 3.9 4.2    100 102   CO2 19* 22 19*   PHOS 3.7 3.8 4.0   BUN 31* 34* 40*   CREATININE 1.3* 1.6* 1.9*     LIVER PROFILE:   Recent Labs     09/20/20  1052   AST 10*   ALT <5*   BILITOT <0.2   ALKPHOS 143*     CULTURES  N/A    RADIOLOGY  CT ABDOMEN PELVIS WO CONTRAST Additional Contrast? None   Final Result   1. Bilateral pleural effusions and dense peripheral airspace opacities. Pattern may represent pulmonary edema or developing pneumonitis.    2. Prior surgical change of cystectomy with an ileal loop urinary conduit. Physiologic mild hydronephrosis on the right. Chronic left hydronephrosis   and cortical atrophy likely due to longstanding obstruction. 3. No other significant findings in the abdomen or pelvis. XR CHEST (2 VW)   Final Result   Increased interstitial markings in both lungs could represent pulmonary edema   or atypical pneumonia         VL Renal Arterial Duplex Complete    (Results Pending)     Echo 9/21/2020   Summary    LV systolic function is normal with EF estimated at 55%.    No regional wall motion abnormalities are noted.    There is mild concentric left ventricular hypertrophy.    Grade II diastolic dysfunction with elevated filing pressure.    The left atrium is mildly dilated.    Mild mitral annular calcification is present.    Mild mitral, aortic, tricuspid, and pulmonic regurgitation.    Systolic pulmonary artery pressure (SPAP) estimated at 41 mmHg (RA pressure    8 mmHg) c/w mild pulm HTN. Ludwig Amaya   have reviewed the chart on [facility] and personally interviewed and examined patient, reviewed the data (labs and imaging) and after discussion with my PA formulated the plan. Agree with note with the following edits. HPI:   Patient to the hospital for shortness of breath. Work-up consistent with acute heart failure. Echo done today shows diastolic dysfunction. Prior history of bladder cancer and has an ileal conduit. Subjectively feeling improved. No chest pain. 9/22-blood pressure still elevated. Has diuresed well. Breathing is improved. Creatinine is going up. Lasix held by nephrology. 9/23- creatinine is up. Breathing stable. I reviewed the patient's Past Medical History, Past Surgical History, Medications, and Allergies.      Physical exam:    BP (!) 155/71   Pulse 96   Temp 98.2 °F (36.8 °C) (Oral)   Resp 16   Ht 4' 11\" (1.499 m)   Wt 132 lb 9.6 oz (60.1 kg)   SpO2

## 2020-09-23 NOTE — PROGRESS NOTES
[facility] Care transferred.

## 2020-09-23 NOTE — PROGRESS NOTES
EOS [facility] Patient resting in bed, stable condition at hand off.

## 2020-09-23 NOTE — PROGRESS NOTES
Assessment complete as per flow sheets. Vitals stable, but BP is elevated at 178/77. Scheduled nightly BP meds given. Patient is A/O x 4. Unlabored breathing at rest on room air. Normal sinus rhythm on cardiac monitor. Bowel sounds + x4 quads. Patient denies pain. Comfort measures provided. Patient voids per urostomy. Site appears WNL, no issues noted at this time. PIV appears WNL. Dressing is C/D/I. Discussed POC, labs, testing, medical equipment and unit routine. Answered questions at this time. Meds as per MAR. Safety measures in place and will continue to monitor.

## 2020-09-23 NOTE — PROGRESS NOTES
RESPIRATORY THERAPY ASSESSMENT    Name:  Kaiden Eden Gridley Record Number:  [id number]  Age: 66 y.o. Gender: female  : 1945  Today's Date:  2020  Room:  /0313-02    Assessment     Is the patient being admitted for a COPD or Asthma exacerbation? No   (If yes the patient will be seen every 4 hours for the first 24 hours and then reassessed)    Patient Admission Diagnosis      Allergies  Allergies   Allergen Reactions    Ciprofloxacin Hives       Minimum Predicted Vital Capacity:               Actual Vital Capacity:                    Pulmonary History:CHF/Pulmonary Edema  Home Oxygen Therapy:  room air  Home Respiratory Therapy:None   Current Respiratory Therapy:  Duo Neb BID  Treatment Type: HHN  Medications: Albuterol/Ipratropium    Respiratory Severity Index(RSI)   Patients with orders for inhalation medications, oxygen, or any therapeutic treatment modality will be placed on Respiratory Protocol. They will be assessed with the first treatment and at least every 72 hours thereafter. The following severity scale will be used to determine frequency of treatment intervention.     Smoking History: Pulmonary Disease or Smoking History, Greater than 15 pack year = 2    Social History  Social History     Tobacco Use    Smoking status: Former Smoker    Smokeless tobacco: Never Used    Tobacco comment: quit 25 years ago   Substance Use Topics    Alcohol use: No    Drug use: No       Recent Surgical History: None = 0  Past Surgical History  Past Surgical History:   Procedure Laterality Date    ABDOMEN SURGERY      APPENDECTOMY      BLADDER STONE REMOVAL      BLADDER SURGERY      pt had bladder cancer    CATARACT REMOVAL WITH IMPLANT  11    RIGHT    CHOLECYSTECTOMY  2018    EYE SURGERY      HYSTERECTOMY      OTHER SURGICAL HISTORY  2015    phacemilsification of cataract with intraocular lens implant left eye       Level of Consciousness: Alert, Oriented, and Cooperative = 0    Level of Activity: Walking unassisted = 0    Respiratory Pattern: Dyspnea with exertion;Irregular pattern;or RR less than 6 = 2    Breath Sounds: Diminished unilaterally = 1    Sputum   ,  , Sputum How Obtained: None  Cough: Strong, spontaneous, non-productive = 0    Vital Signs   BP (!) 147/64   Pulse 87   Temp 98.4 °F (36.9 °C) (Oral)   Resp 16   Ht 4' 11\" (1.499 m)   Wt 132 lb 9.6 oz (60.1 kg)   SpO2 96%   BMI 26.78 kg/m²   SPO2 (COPD values may differ): 88-89% on room air or greater than 92% on FiO2 28- 35% = 2    Peak Flow (asthma only): not applicable = 0    RSI: 7-8 = BID and Q4HPRN (every four hours as needed) for dyspnea        Plan       Goals: medication delivery, mobilize retained secretions, volume expansion and improve oxygenation    Patient/caregiver was educated on the proper method of use for Respiratory Care Devices:  Yes      Level of patient/caregiver understanding able to:   ? Verbalize understanding   ? Demonstrate understanding       ? Teach back        ? Needs reinforcement       ? No available caregiver               ? Other:     Response to education:  Good     Is patient being placed on Home Treatment Regimen? NA     Does the patient have everything they need prior to discharge? NA     Comments: pt assessed    Plan of Care: Dub Neb BID    Electronically signed by Sid Tarango RCP on 9/23/2020 at 4:58 PM    Respiratory Protocol Guidelines     1. Assessment and treatment by Respiratory Therapy will be initiated for medication and therapeutic interventions upon initiation of aerosolized medication. 2. Physician will be contacted for respiratory rate (RR) greater than 35 breaths per minute. Therapy will be held for heart rate (HR) greater than 140 beats per minute, pending direction from physician. 3. Bronchodilators will be administered via Metered Dose Inhaler (MDI) with spacer when the following criteria are met:  a.  Alert and cooperative     b. HR < 140 bpm  c. RR < 30 bpm                d. Can demonstrate a 2-3 second inspiratory hold  4. Bronchodilators will be administered via Hand Held Nebulizer [facility]) to patients when ANY of the following criteria are met  a. Incognizant or uncooperative          b. Patients treated with HHN at Home        c. Unable to demonstrate proper use of MDI with spacer     d. RR > 30 bpm   5. Bronchodilators will be delivered via Metered Dose Inhaler (MDI), HHN, Aerogen to intubated patients on mechanical ventilation. 6. Inhalation medication orders will be delivered and/or substituted as outlined below. Aerosolized Medications Ordering and Administration Guidelines:    1. All Medications will be ordered by a physician, and their frequency and/or modality will be adjusted as defined by the patients Respiratory Severity Index (RSI) score. 2. If the patient does not have documented COPD, consider discontinuing anticholinergics when RSI is less than 9.  3. If the bronchospasm worsens (increased RSI), then the bronchodilator frequency can be increased to a maximum of every 4 hours. If greater than every 4 hours is required, the physician will be contacted. 4. If the bronchospasm improves, the frequency of the bronchodilator can be decreased, based on the patient's RSI, but not less than home treatment regimen frequency. 5. Bronchodilator(s) will be discontinued if patient has a RSI less than 9 and has received no scheduled or as needed treatment for 72  Hrs. Patients Ordered on a Mucolytic Agent:    1. Must always be administered with a bronchodilator. 2. Discontinue if patient experiences worsened bronchospasm, or secretions have lessened to the point that the patient is able to clear them with a cough. Anti-inflammatory and Combination Medications:    1.  If the patient lacks prior history of lung disease, is not using inhaled anti-inflammatory medication at home, and lacks wheezing by examination or by history for at least 24 hours, contact physician for possible discontinuation.

## 2020-09-23 NOTE — CONSULTS
CHF nurse visit to reinforce self mgmt education. Pt is able to teach back: need for daily wts, wt gain parameters, FR 64 ounces max, pt states \"I know I need salt in my life\". Discussed not adding salt with her salt shaker. Advised to start with not adding salt to her foods as we discussed there is much salt in the prepared foods she is buying. Reviewed label reading for sodium content. Pt admits she is \"depressed\" over her declining kidney function. She is aware she only has 1 kidney. Educated on need to protect her kidney and to not take any OTC medications without discussing with her MD first. Emphasized NSAID's to avoid. Pt appreciated spiritual care and would like their ongoing visits for support. Addendum at 1: Spoke with daughter Florentin Dowell per pt request. Reviewed CHF self mgmt zone tool. Emailed written CHF education to daughter at : [email or web address]. All questions answered and daughter able to transport to FU appointment on 9/29 at 10 AM to Kiran Mccoy MD office to see Jose Wilson CNP. Daughter will assist with pt eating a lower sodium diet as she agrees [facility] Mom loves her salt and eats a lot of it\".

## 2020-09-23 NOTE — PROGRESS NOTES
Shift assessment completed. See flow sheet. Medications given. Patient is awake, and oriented. Patient denies further needs at this time. Call light placed within reach. Will continue to monitor.

## 2020-09-23 NOTE — FLOWSHEET NOTE
09/23/20 0100   Vitals   Temp 97.5 °F (36.4 °C)   Temp Source Oral   Pulse 83   Heart Rate Source Monitor   Resp 17   BP (!) 172/75  (nurse was notified )   MAP (mmHg) 107   BP Location Left Arm   BP Upper/Lower Upper   BP Method Automatic   Patient Position Semi fowlers   Level of Consciousness 0   MEWS Score 1   Patient Currently in Pain Denies   Cardiac Rhythm NSR   Height and Weight   Weight 132 lb 9.6 oz (60.1 kg)   BMI (Calculated) 26.8   Oxygen Therapy   SpO2 96 %   Pulse Oximeter Device Mode Continuous   Pulse Oximeter Device Location Finger   O2 Device None (Room air)       BP again elevated. Otherwise, vitals stable. PRN hydralazine administered as per MAR. Will re-evaluate for effectiveness.

## 2020-09-23 NOTE — PROGRESS NOTES
Nephrology Progress Note   [email or web address]      This patient is a 66year old female whom we are following for CKD and fluid overload. Subjective: The patient was seen and examined. BP still on the high side, fluctuating. Family History: No family at bedside  ROS: No nausea or vomiting      Vitals:  BP (!) 155/71   Pulse 96   Temp 98.2 °F (36.8 °C) (Oral)   Resp 16   Ht 4' 11\" (1.499 m)   Wt 132 lb 9.6 oz (60.1 kg)   SpO2 96%   BMI 26.78 kg/m²   I/O last 3 completed shifts: In: 12 [P.O.:660; I.V.:10]  Out: 450 [Urine:450]  I/O this shift:  In: 120 [P.O.:120]  Out: -     Physical Exam:  Physical Exam  Vitals signs reviewed. Constitutional:       General: She is not in acute distress. Appearance: Normal appearance. HENT:      Head: Normocephalic and atraumatic. Mouth/Throat:      Mouth: Mucous membranes are moist.   Eyes:      General: No scleral icterus. Conjunctiva/sclera: Conjunctivae normal.   Cardiovascular:      Rate and Rhythm: Normal rate. Heart sounds: No friction rub. Pulmonary:      Effort: No respiratory distress. Breath sounds: No wheezing. Musculoskeletal:      Right lower leg: Edema present. Left lower leg: Edema present. Neurological:      Mental Status: She is alert.            Medications:   hydrALAZINE  50 mg Oral 3 times per day    PARoxetine  60 mg Oral QAM    amLODIPine  10 mg Oral Daily    atenolol  50 mg Oral Daily    vitamin B complex w/C  1 tablet Oral Daily    gemfibrozil  600 mg Oral BID AC    lamoTRIgine  25 mg Oral 4x Daily    pantoprazole  40 mg Oral Daily    primidone  100 mg Oral Daily    sennosides-docusate sodium  2 tablet Oral Daily    sodium chloride flush  10 mL Intravenous 2 times per day    enoxaparin  30 mg Subcutaneous Daily    ipratropium-albuterol  1 ampule Inhalation BID    doxazosin  4 mg Oral 2 times per day    nitroglycerin  0.5 inch Topical 4 times per day         Labs:  Recent Labs 09/20/20  1052 09/21/20  0432   WBC 5.0 4.3   HGB 10.8* 9.9*   HCT 33.8* 30.3*   MCV 91.7 90.1    227     Recent Labs     09/21/20  0432 09/22/20  0556 09/23/20  0452    134* 135*   K 4.6 3.9 4.2    100 102   CO2 19* 22 19*   GLUCOSE 103* 98 122*   PHOS 3.7 3.8 4.0   BUN 31* 34* 40*   CREATININE 1.3* 1.6* 1.9*   LABGLOM 40* 31* 26*   GFRAA 48* 38* 31*           Assessment/Plan:    DELMER on CKD Stage 3.  - DELMER in the setting of diuresis with fluctuating hemodynamics. 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  - Follows with Dr. Alin Harris in the office.  - Baseline serum creatinine of 1.2-1.4mg/dL. - Serum creatinine up to 1.9mg/dL even after stopping Lasix. Will give gentle IVF hydration today. - Renal function panel in am.     History of Bladder Cancer.  - S/P cystectomy and ileal conduit in 1985.     Hypertension.  - On Doxazosin, Amlodipine and Atenolol.  - Increase Hydralazine to 50mg 3x/day. - Will order renal artery duplex study.     SOB. - From pulmonary edema. Elevated BP.  - 2D echo 9/21/20: EF 55% with grade II diastolic dysfunction, mild pulmonary HTN. - Improved with diuresis.     Metabolic Acidosis. - From CKD. - Will add bicarb to IVF. Discussed with Dr. Katie Stuart. Please do not hesitate to contact me at [phone] if with questions. Thank you!     Neptali Rodrigues MD  9/23/2020  [facility]

## 2020-09-24 NOTE — PLAN OF CARE
Problem: OXYGENATION/RESPIRATORY FUNCTION  Goal: Patient will maintain patent airway  Outcome: Ongoing  Goal: Patient will achieve/maintain normal respiratory rate/effort  Description: Respiratory rate and effort will be within normal limits for the patient  Outcome: Ongoing     Problem: HEMODYNAMIC STATUS  Goal: Patient has stable vital signs and fluid balance  Outcome: Ongoing  Patient's EF (Ejection Fraction) is greater than 40%    Patient's weights and intake/output reviewed:    Patient's Last Weight: 133.6 lb obtained by standing scale. Difference of 1 lbs more than last documented weight. Intake/Output Summary (Last 24 hours) at 9/23/2020 2241  Last data filed at 9/23/2020 2200  Gross per 24 hour   Intake 480 ml   Output 700 ml   Net -220 ml         Pt is currently  room air . Pt denies shortness of breath. Pt with pitting lower extremity edema. Patient and/or Family's stated Goal of Care this Admission: better understand heart failure and disease management, be more comfortable, and reduce lower extremity edema prior to discharge      Comorbidities Reviewed Yes  Patient has a past medical history of Acid reflux, Cancer ([address] CHF (congestive heart failure) ([address] Hyperlipidemia, Hypertension, and Kidney stone.          >>For CHF and Comorbidity documentation on Education Time and Topics, please see Education Tab       Problem: FLUID AND ELECTROLYTE IMBALANCE  Goal: Fluid and electrolyte balance are achieved/maintained  Outcome: Ongoing     Problem: ACTIVITY INTOLERANCE/IMPAIRED MOBILITY  Goal: Mobility/activity is maintained at optimum level for patient  Outcome: Ongoing

## 2020-09-24 NOTE — PROGRESS NOTES
Nephrology Progress Note   [email or web address]      This patient is a 66year old female whom we are following for CKD and fluid overload. Subjective: The patient was seen and examined. BP seems better overnight but has significant tremors. Family History: No family at bedside  ROS: No nausea or vomiting      Vitals:  BP (!) 127/59   Pulse 102   Temp 98.8 °F (37.1 °C) (Oral)   Resp 18   Ht 4' 11\" (1.499 m)   Wt 133 lb 9.6 oz (60.6 kg)   SpO2 95%   BMI 26.98 kg/m²   I/O last 3 completed shifts: In: 2850 [P.O.:900; I.V.:780]  Out: 700 [Urine:700]  No intake/output data recorded. Physical Exam:  Physical Exam  Vitals signs reviewed. Constitutional:       General: She is not in acute distress. Appearance: Normal appearance. HENT:      Head: Normocephalic and atraumatic. Mouth/Throat:      Mouth: Mucous membranes are moist.   Eyes:      General: No scleral icterus. Conjunctiva/sclera: Conjunctivae normal.   Cardiovascular:      Rate and Rhythm: Normal rate. Heart sounds: No friction rub. Pulmonary:      Effort: No respiratory distress. Breath sounds: No wheezing. Musculoskeletal:      Right lower leg: No edema. Left lower leg: No edema. Neurological:      Mental Status: She is alert.            Medications:   hydrALAZINE  50 mg Oral 3 times per day    fluticasone  1 spray Each Nostril Daily    cefdinir  300 mg Oral Daily    heparin (porcine)  5,000 Units Subcutaneous 3 times per day    PARoxetine  60 mg Oral QAM    amLODIPine  10 mg Oral Daily    atenolol  50 mg Oral Daily    vitamin B complex w/C  1 tablet Oral Daily    gemfibrozil  600 mg Oral BID AC    lamoTRIgine  25 mg Oral 4x Daily    pantoprazole  40 mg Oral Daily    primidone  100 mg Oral Daily    sennosides-docusate sodium  2 tablet Oral Daily    sodium chloride flush  10 mL Intravenous 2 times per day    ipratropium-albuterol  1 ampule Inhalation BID    doxazosin  4 mg Oral 2 times per day  nitroglycerin  0.5 inch Topical 4 times per day         Labs:  No results for input(s): WBC, HGB, HCT, MCV, PLT in the last 72 hours. Recent Labs     09/22/20  0556 09/23/20  0452 09/24/20  0418   * 135* 132*   K 3.9 4.2 4.5    102 99   CO2 22 19* 18*   GLUCOSE 98 122* 135*   PHOS 3.8 4.0 5.3*   BUN 34* 40* 42*   CREATININE 1.6* 1.9* 2.1*   LABGLOM 31* 26* 23*   GFRAA 38* 31* 28*           Assessment/Plan:    DELMER on CKD Stage 3.  - DELMER in the setting of diuresis with fluctuating hemodynamics. 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  - Follows with Dr. Micheline Rucker in the office.  - Baseline serum creatinine of 1.2-1.4mg/dL. - Serum creatinine seems to be levelling off.      History of Bladder Cancer.  - S/P cystectomy and ileal conduit in 1985.     Hypertension.  - BP is better. - On Hydralazine, Doxazosin, Amlodipine and Atenolol.  - Renal artery duplex study ordered.     SOB. - From pulmonary edema. Elevated BP.  - 2D echo 9/21/20: EF 55% with grade II diastolic dysfunction, mild pulmonary HTN. - Improved with diuresis. - Counseled about low sodium diet.     Metabolic Acidosis. - From CKD. - Restart PO sodium bicarb. Ok to be discharged from renal standpoint today after renal artery duplex is done and orthostatic BP is negative. No PO Lasix on discharge. Please do not hesitate to contact me at [phone] if with questions. Thank you!     Angelena Buerger, MD  9/24/2020  [facility]

## 2020-09-24 NOTE — DISCHARGE INSTR - COC
Continuity of Care Form    Patient Name: Pavan Bhatt   :  1945  MRN:  [id number]    Admit date:  2020  Discharge date:  2020    Code Status Order: Full Code   Advance Directives:   Advance Care Flowsheet Documentation     Date/Time Healthcare Directive Type of Healthcare Directive Copy in [address] Agent's Name Healthcare Agent's Phone Number    20 1540  -- -- -- -- -- --    20 1601  No, patient does not have an advance directive for healthcare treatment -- -- -- -- --          Admitting Physician:  Shaniqua Garvin MD  PCP: Ke Baird MD    Discharging Nurse: [facility] Unit/Room#: /1691-17  Discharging Unit Phone Number: [phone]    Emergency Contact:   Extended Emergency Contact Information  Primary Emergency Contact: [facility]  Address: [address] [place] [address] [address], [address] Phone: [phone]  Relation: Spouse  Secondary Emergency Contact: Alexus Samuel  Address: [address] [place] [address], [address] [place] [address] Phone: [phone]  Relation: Child    Past Surgical History:  Past Surgical History:   Procedure Laterality Date    ABDOMEN SURGERY      APPENDECTOMY      BLADDER STONE REMOVAL      BLADDER SURGERY      pt had bladder cancer    CATARACT REMOVAL WITH IMPLANT  11    RIGHT    CHOLECYSTECTOMY  2018    EYE SURGERY      HYSTERECTOMY      OTHER SURGICAL HISTORY  2015    phacemilsification of cataract with intraocular lens implant left eye       Immunization History:   Immunization History   Administered Date(s) Administered    Influenza Virus Vaccine 10/08/2014    Pneumococcal Conjugate 13-valent ([place]) 2018       Active Problems:  Patient Active Problem List   Diagnosis Code    Hematuria R31.9    Acute renal failure (ARF) ([facility] N17.9    Chronic calculous cholecystitis 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.10    Hydronephrosis N13.30    Acute pyelonephritis N10    DELMER (acute kidney injury) ([facility] P58.6    Complicated UTI (urinary tract infection) N39.0    GERD (gastroesophageal reflux disease) K21.9    Hyperkalemia E87.5    Essential hypertension I10    Chronic kidney disease, stage 3 ([facility]) N18.3    Chest pain R07.9    Anxiety F41.9    Acute diastolic CHF (congestive heart failure) ([facility]) I50.31    Acute respiratory failure with hypoxia ([facility]) J96.01       Isolation/Infection:   Isolation          No Isolation        Patient Infection Status     Infection Onset Added Last Indicated Last Indicated By Review Planned Expiration Resolved Resolved By    None active    Resolved    C-diff Rule Out 06/16/20 06/16/20 06/16/20 Clostridium difficile toxin/antigen (Ordered)   06/16/20 Rule-Out Test Resulted          Nurse Assessment:  Last Vital Signs: BP (!) 127/59   Pulse 102   Temp 98.8 °F (37.1 °C) (Oral)   Resp 18   Ht 4' 11\" (1.499 m)   Wt 133 lb 9.6 oz (60.6 kg)   SpO2 95%   BMI 26.98 kg/m²     Last documented pain score (0-10 scale): Pain Level: 3  Last Weight:   Wt Readings from Last 1 Encounters:   09/24/20 133 lb 9.6 oz (60.6 kg)     Mental Status:  oriented and alert    IV Access:  - None    Nursing Mobility/ADLs:  Walking   Assisted  Transfer  Assisted  Bathing  Assisted  Dressing  Assisted  Toileting  Assisted  Feeding  Assisted  Med Admin  Assisted  Med Delivery   whole    Wound Care Documentation and Therapy:  Incision 03/06/18 Abdomen (Active)   Number of days: 932        Elimination:  Continence:   · Bowel:  Yes  · Bladder: Yes  Urinary Catheter: urostomy   Colostomy/Ileostomy/Ileal Conduit: urostomy  Urostomy Ileal conduit RLQ-Stomal Appliance: 1 piece, Clean, Dry, Intact  Urostomy Ileal conduit RLQ-Stoma  Assessment: Pink  Urostomy Ileal conduit RLQ-Mucocutaneous Junction: Intact  Urostomy Ileal conduit RLQ-Peristomal Assessment: Clean, Intact, Pink    Date of Last BM: 9/23    Intake/Output Summary (Last 24 hours) at 9/24/2020 1314  Last data filed at 9/24/2020 1018  Gross per 24 hour   Intake 1560 ml   Output 750 ml   Net 810 ml     I/O last 3 completed shifts: In: 0316 [P.O.:900; I.V.:780]  Out: 700 [Urine:700]    Safety Concerns:     History of Falls (last 30 days)    Impairments/Disabilities:      tremor at baseline    Nutrition Therapy:  Current Nutrition Therapy:   - Oral Diet:  Renal    Routes of Feeding: Oral  Liquids: Thin Liquids  Daily Fluid Restriction: yes - amount 2000  Last Modified Barium Swallow with Video (Video Swallowing Test): not done    Treatments at the Time of Hospital Discharge:   Respiratory Treatments: ***  Oxygen Therapy:  is not on home oxygen therapy.   Ventilator:    - No ventilator support    Rehab Therapies: Physical Therapy and Occupational Therapy  Weight Bearing Status/Restrictions: No weight bearing restirctions  Other Medical Equipment (for information only, NOT a DME order):  hospital bed  Other Treatments: ***    Patient's personal belongings (please select all that are sent with patient):  all belongings sent with patient    RN SIGNATURE:  Electronically signed by Chilo Chavez RN on 9/24/20 at 1:17 PM EDT    CASE MANAGEMENT/SOCIAL WORK SECTION    Inpatient Status Date: ***    Readmission Risk Assessment Score:  Readmission Risk              Risk of Unplanned Readmission:        27           Discharging to Facility/ Agency   · Name:   · Address:  · Phone:  · Fax:    Dialysis Facility (if applicable)   · Name:  · Address:  · Dialysis Schedule:  · Phone:  · Fax:    / signature: {Esignature:[id number]}    PHYSICIAN SECTION    Prognosis: {Prognosis:4566909759}    Condition at Discharge: [facility] Patient Condition:682962092}    Rehab Potential (if transferring to Rehab): {Prognosis:2942454769}    Recommended Labs or Other Treatments After Discharge: ***    Physician Certification: I certify the above information and transfer of Balaji Navas  is necessary for the continuing treatment of the diagnosis listed and that she requires {Admit to Appropriate Level of Care:13884} for {GREATER/LESS:780654065} 30 days.      Update Admission H&P: {CHP DME Changes in [facility]:371942356}    PHYSICIAN SIGNATURE:  {Esignature:[id number]}

## 2020-09-24 NOTE — PROGRESS NOTES
Shift assessment complete. Call light and bedside table within reach.  Electronically signed by Joaquín Gaytan RN on 9/23/2020 at 8:10 PM

## 2020-09-24 NOTE — DISCHARGE SUMMARY
Name:  Marline Fuentes  Room:  /7079-81  MRN:    [id number]    Discharge Summary      This discharge summary is in conjunction with a complete physical exam done on the day of discharge. Discharging Physician: Dr. Alena Amese: 9/20/2020  Discharge: 9/24/2020     HPI taken from admission H&P:    The patient is a 66 y.o. female w remote hx of bladder Ca s/p ileal conduit, prior Hx of nephrostomy, Hx of anxiety, who presents to [facility] with dyspnea.      Pt reports over the past 1-2 weeks she noted gradually worsening dyspnea accompanied with orthopnea and PND. She denies cough, no chest pain and no fever to be associated with her dyspnea. Denies palpitations. She reports associated worsening b/l LE edema as well.      In ED work up concerning for acute CHF - no prior Hx of CHF. Pt denies prior Hx of CAD.      She is now requiring 2-3l/min O2 - she is on RA at baseline. Diagnoses this Admission and Hospital Course   Acute hypoxic respiratory failure--resolved  - patient presented with c/o shortness of breath  - this is secondary to CHF (BNP 5200, imaging with pleural effusions, pulmonary edema)  - supplemental O2. Wean as tolerated  - was on 4 L. Down to 2 L-->stable on RA     Acute Diastolic CHF  - monitor on tele. Serial troponin negative  - Echo with Grade II DD, mild pulmonary hypertension. See above  - daily weights; I&O's  - IV Lasix 40 mg BID-->HOLD for DELMER   - net fluid deficit 1.1L  - weight 138lbs-->132 lbs-->133 lbs on discharge       Hx of Bladder Cancer S/p Radical Cystectomy with Ileal Conduit  Non-Functioning Left Kidney     DELMER on CKD Stage III  - Cr 1.3-->1.6-->1.9-->2.1  - baseline: ~ 1.1  - monitor with diuresis-->hold Lasix 9/22  - Slow IVF hydration   - nephrology following      HTN  - uncontrolled  - cont Norvasc, Atenolol, Doxazosin  - nitro-paste as above. She has been refusing nitropaste.    - Hydralazine prn.   - BP still high, started on scheduled hydralazine today-->BP improving-->d/c home on the same   - Renal Arterial US: see below      Normocytic Anemia  - Hgb 10.8-->9.9  - baseline: ~10.5-11  - monitor     HLD  - cont Lopid     GERD  - cont Protonix     Essential Tremor  - cont Primidone     Anxiety  - on Paxil. Sinus infection   - Continue Cefdinir      Procedures (Please Review Full Report for Details)  Renal Arterial Duplex     Consults    Nephrology     Physical Exam at Discharge:    BP (!) 127/59   Pulse 102   Temp 98.8 °F (37.1 °C) (Oral)   Resp 18   Ht 4' 11\" (1.499 m)   Wt 133 lb 9.6 oz (60.6 kg)   SpO2 95%   BMI 26.98 kg/m²     Gen: No distress. Alert. Eyes: PERRL. No sclera icterus. No conjunctival injection. ENT: No dischaLrge. Pharynx clear. Neck: Trachea midline. Normal thyroid. Resp: No accessory muscle use. minimal crackles. No wheezes. No rhonchi. No dullness on percussion. CV: Regular rate. Regular rhythm. No murmur or rub. No edema. CBC: No results for input(s): WBC, HGB, HCT, MCV, PLT in the last 72 hours. BMP:   Recent Labs     09/22/20  0556 09/23/20  0452 09/24/20  0418   * 135* 132*   K 3.9 4.2 4.5    102 99   CO2 22 19* 18*   PHOS 3.8 4.0 5.3*   BUN 34* 40* 42*   CREATININE 1.6* 1.9* 2.1*     CULTURES  None     RADIOLOGY  VL Renal Arterial Duplex Complete         CT ABDOMEN PELVIS WO CONTRAST Additional Contrast? None   Final Result   1. Bilateral pleural effusions and dense peripheral airspace opacities. Pattern may represent pulmonary edema or developing pneumonitis. 2. Prior surgical change of cystectomy with an ileal loop urinary conduit. Physiologic mild hydronephrosis on the right. Chronic left hydronephrosis   and cortical atrophy likely due to longstanding obstruction. 3. No other significant findings in the abdomen or pelvis.          XR CHEST (2 VW)   Final Result   Increased interstitial markings in both lungs could represent pulmonary edema   or atypical pneumonia Renal Arterial US  Tech Comments    Right    Limited exam due to excessive patient movement and inability to follow    breathing cues. There is no evidence to suggest renal artery stenosis. The right renal vein is patent. The parenchymal resistive index is within normal limits. Left    Limited exam due to excessive patient movement and inability to follow    breathing cues. There is no evidence to suggest renal artery stenosis. The left renal vein is patent. The parenchymal resistive index is within normal limits. The left kidney has an abnormal appearance, evaluation by general ultrasound    exam recommended if clinically indicated. Natasha Jacobo is evidence of atherosclerotic plaque noted in the Abdominal    Aorta, consistent with < 50% stenosis. Discharge Medications     Medication List      START taking these medications    cefdinir 300 MG capsule  Commonly known as:  OMNICEF  Take 1 capsule by mouth daily for 9 days  Start taking on:  September 25, 2020     hydrALAZINE 50 MG tablet  Commonly known as:  APRESOLINE  Take 1 tablet by mouth every 8 hours  Notes to patient:  Hydralazine (Apresoline®)  Use: treat heart failure, lower blood pressure,   prevent and treat chest pain. Side effects: headache and dizziness. CHANGE how you take these medications    doxazosin 4 MG tablet  Commonly known as:  CARDURA  What changed:  Another medication with the same name was removed. Continue taking this medication, and follow the directions you see here.         CONTINUE taking these medications    amLODIPine 10 MG tablet  Commonly known as:  NORVASC     atenolol 50 MG tablet  Commonly known as:  TENORMIN     B-Complex/B-12 Tabs     gemfibrozil 600 MG tablet  Commonly known as:  LOPID     lamoTRIgine 25 MG tablet  Commonly known as:  LAMICTAL     pantoprazole 40 MG tablet  Commonly known as:  PROTONIX     PARoxetine 20 MG tablet  Commonly known as:  PAXIL     primidone 50 MG tablet  Commonly known as: MYSOLINE     senna-docusate 8.6-50 MG per tablet  Commonly known as:  Senokot S  Take 2 tablets by mouth daily     sodium bicarbonate 650 MG tablet  Take 1 tablet by mouth 2 times daily        STOP taking these medications    COMPLETE MULTIVITAMIN/MINERAL PO     ondansetron 8 MG tablet  Commonly known as:  Zofran     venlafaxine 75 MG extended release capsule  Commonly known as:  EFFEXOR XR           Where to Get Your Medications      These medications were sent to [address] [place] [address] - F [phone]  [facility] [address] [place] [address]    Phone:  [phone]   · cefdinir 300 MG capsule  · hydrALAZINE 50 MG tablet       Discharged in stable condition to home    Follow Up:   Follow up with PCP in 1 week, nephrology OP    More than 30 mts spent    [address] 9/25/2020 4:41 PM

## 2020-09-24 NOTE — PROGRESS NOTES
AM assessment completed. NPO for renal US. Very tremulous this morning, orders for 0.5 mg ativan IV x1 since NPO. VSS on room air. A/O x4. Denies any needs, call light in reach. Will monitor.

## 2020-09-24 NOTE — CARE COORDINATION
DISCHARGE ORDER  Date/Time 2020 1:38 PM  Completed by: Gerardine Angelucci, Case Management    Patient Name: Robe Beltre      : 1945  Admitting Diagnosis: CHF (congestive heart failure), NYHA class I, acute on chronic, combined ([facility] [I50.43]      Admit order Date and Status:2020 1034 inpt  (verify MD's last order for status of admission)      Noted discharge order. If applicable PT/OT recommendation at Discharge: n/a  DME recommendation by PT/OT:n/a  Confirmed discharge plan  (pt): Yes  with whom_______pt________  If pt confirmed DC plan does family need to be contacted by CM Yes if yes who_____daughter, Ara Alvarado (who is in the room)_  Discharge Plan: Reviewed chart. Role of discharge planner explained and patient and Ara Alvarado (daughter) verbalized understanding. Discharge order is noted. Pt is being d/c'd to home today, as she lives at home with her spouse. Pt's O2 sats are 95% on RA. Pt and Ara Alvarado state that pt does not have home O2, nor does she need it. There was a descrepency earlier if she had home O2 through [facility]. Pt declines HHC. CM faxed DARRIAN/AVS to [place] M[place] with [employer] to fax #[phone]. Pt and Ara Alvarado agreed for CM to do this. Ara Alvarado is transporting pt home. No further discharge needs needed or noted. Reviewed chart. Role of discharge planner explained and patient verbalized understanding. Discharge order is noted. Has Home O2 in place on admit:  No  Informed of need to bring portable home O2 tank on day of discharge for nursing to connect prior to leaving:   Not Indicated  Verbalized agreement/Understanding:   Not Indicated    Discharge timeout done with Vinita Roach RN. All discharge needs and concerns addressed.

## 2020-09-24 NOTE — PROGRESS NOTES
Discharge paperwork and instructions reviewed with patient. Verbalized understanding. New medications reviewed. PIV and tele monitor removed. Pt belongings sent with pt. Stable condition at d/c. Awaiting  for transport.  Dtr at bedside

## 2020-09-25 NOTE — CARE COORDINATION
patient who verbalized understanding. Patient given an opportunity to ask questions and does not have any further questions or concerns at this time. Were discharge instructions available to patient? Yes. Reviewed appropriate site of care based on symptoms and resources available to patient including: PCP. The patient agrees to contact the PCP office for questions related to their healthcare. Medication reconciliation was performed with patient, who states that her daughter manages her medications and is not there at this time. She confirms that the daughter reviewed the AVS and the new meds were picked up and started. Covid Risk Education    Patient has following COVID-19 risk factors: acute respiratory failure, CKD3 (one kidney), heart failure and hx of bladder cancer. Education provided regarding infection prevention, and signs and symptoms of COVID-19 and when to seek medical attention with patient who verbalized understanding. Discussed exposure protocols and quarantine From CDC: Are you at higher risk for severe illness?   and given an opportunity for questions and concerns. The patient agrees to contact the COVID-19 hotline [phone] or PCP office for questions related to COVID-19. Symptoms reviewed with patient who verbalized the following symptoms: shortness of breath, no new symptoms and no worsening symptoms. Due to no new or worsening symptoms encounter was not routed to provider for escalation. Discussed follow-up appointments. If no appointment was previously scheduled, appointment scheduling offered: Yes. Is follow up appointment scheduled within 7 days of discharge? Yes  [facility] follow up appointment(s): PCP on 9/29/2020 at 10AM - daughter providing transportation. Plan for follow-up call in 7-10 days based on severity of symptoms and risk factors.     Plan for next call: symptom management-CHF, self management-CHF and follow up appointment-9/29/2020 at 10am    This

## 2020-09-30 NOTE — ED PROVIDER NOTES
[facility]  eMERGENCY dEPARTMENT eNCOUnter      Pt Name: Asher Cheema  MRN: [id number]  [place] 1945  Date of evaluation: 9/30/2020  Provider: Bishnu Lawrence MD    [address] [place] [address]       Chief Complaint   Patient presents with    Abnormal Lab     sent in from pcp for having \"bad kidney labs\" told to go to ER for eval. pt stated increased sob since last week hospital visit. pt stated only has one working kidney         HISTORY OF PRESENT ILLNESS   (Location/Symptom, Timing/Onset, Context/Setting, Quality, Duration, Modifying Factors, Severity)  Note limiting factors. Asher Cheema is a 66 y.o. female with remote history of bladder cancer status post remote urostomy still in place as well as CHF with recent admission from September 20 September 24 of this day where she was diuresed but is not on any diuretics as an outpatient according to the patient and her daughter who presents due to worsening of kidney labs on outpatient lab draw. Patient denies any fever, pain, or other symptoms. She does report shortness of breath and reports this is a constant symptom. She reports lying flat worsens her symptoms and sitting up improves it. She reports that her urostomy is still producing urine but may be slightly less than it normally does. HPI    Nursing Notes were reviewed. REVIEW OFSYSTEMS    (2-9 systems for level 4, 10 or more for level 5)     Review of Systems   Constitutional: Negative for appetite change, fever and unexpected weight change. HENT: Negative for facial swelling, trouble swallowing and voice change. Eyes: Negative for visual disturbance. Respiratory: Positive for shortness of breath. Negative for wheezing and stridor. Cardiovascular: Negative for chest pain and palpitations. Gastrointestinal: Negative for abdominal pain, nausea and vomiting. Genitourinary: Negative for dysuria and vaginal bleeding.    Musculoskeletal: Negative for neck pain and neck stiffness. Skin: Negative for color change and wound. Neurological: Negative for seizures and syncope. Psychiatric/Behavioral: Negative for self-injury and suicidal ideas. Except as noted above the remainder of the review of systems was reviewed and negative. PAST MEDICAL HISTORY     Past Medical History:   Diagnosis Date    Acid reflux     Cancer ([address]     bladder cancer    CHF (congestive heart failure) (HCC)     Hyperlipidemia     Hypertension     Kidney stone          SURGICAL HISTORY       Past Surgical History:   Procedure Laterality Date    ABDOMEN SURGERY      APPENDECTOMY      BLADDER STONE REMOVAL      BLADDER SURGERY      pt had bladder cancer    CATARACT REMOVAL WITH IMPLANT  8/26/11    RIGHT    CHOLECYSTECTOMY  03/06/2018    EYE SURGERY      HYSTERECTOMY      OTHER SURGICAL HISTORY  05/08/2015    phacemilsification of cataract with intraocular lens implant left eye         CURRENT MEDICATIONS       Previous Medications    AMLODIPINE (NORVASC) 10 MG TABLET    Take 10 mg by mouth daily    ATENOLOL (TENORMIN) 50 MG TABLET    Take 50 mg by mouth 2 times daily    B COMPLEX VITAMINS (B-COMPLEX/B-12) TABS    Take 100 mg by mouth daily    CEFDINIR (OMNICEF) 300 MG CAPSULE    Take 1 capsule by mouth daily for 9 days    DOXAZOSIN (CARDURA) 4 MG TABLET    Take 4 mg by mouth 2 times daily    GEMFIBROZIL (LOPID) 600 MG TABLET    Take 600 mg by mouth 2 times daily (before meals). HYDRALAZINE (APRESOLINE) 50 MG TABLET    Take 1 tablet by mouth every 8 hours    LAMOTRIGINE (LAMICTAL) 25 MG TABLET    Take 25 mg by mouth 4 times daily    PANTOPRAZOLE (PROTONIX) 40 MG TABLET    Take 40 mg by mouth daily    PAROXETINE (PAXIL) 20 MG TABLET    Take 60 mg by mouth every morning.      PRIMIDONE (MYSOLINE) 50 MG TABLET    Take 100 mg by mouth daily    SENNA-DOCUSATE (SENOKOT S) 8.6-50 MG PER TABLET    Take 2 tablets by mouth daily    SODIUM BICARBONATE 650 MG TABLET    Take 1 tablet by Musculoskeletal: Neck supple. Vascular: No JVD. Trachea: No tracheal deviation. Cardiovascular:      Rate and Rhythm: Normal rate. Pulmonary:      Effort: Pulmonary effort is normal. No respiratory distress. Breath sounds: Normal breath sounds. No wheezing. Abdominal:      General: There is no distension. Palpations: Abdomen is soft. Tenderness: There is no abdominal tenderness. There is no guarding or rebound. Comments: Urostomy in place draining pale yellow urine. Abdomen soft and nontender. Musculoskeletal: Normal range of motion. General: No tenderness or deformity. Skin:     General: Skin is warm and dry. Neurological:      Mental Status: She is alert. Cranial Nerves: No cranial nerve deficit. DIAGNOSTIC RESULTS     The Ekg interpreted by me shows  normal sinus rhythm with a rate of 69  Axis is   Normal  QTc is  469ms  Intervals and Durations are unremarkable. ST Segments: Nonspecific T wave changes in leads V2 and V3  Left bundle branch block has resolved and nonspecific changes to T waves in previous EKG on 9/20/2020      RADIOLOGY:     Interpretation per the Radiologist below, if available at the time of this note:    XR CHEST PORTABLE   Final Result   1. Improved with residual congestive heart failure. LABS:  Labs Reviewed   CBC WITH AUTO DIFFERENTIAL - Abnormal; Notable for the following components:       Result Value    RBC 3.15 (*)     Hemoglobin 9.2 (*)     Hematocrit 28.4 (*)     RDW 16.4 (*)     Lymphocytes Absolute 0.4 (*)     All other components within normal limits    Narrative:     Performed at:  [facility]. [facility]  [address] [place] [address].  [address]   Phone [phone]   COMPREHENSIVE METABOLIC PANEL W/ REFLEX TO MG FOR LOW K - Abnormal; Notable for the following components:    Sodium 135 (*)     CO2 16 (*)     Anion Gap 20 (*)     Glucose 116 (*)     BUN 92 (*) CREATININE 6.9 (*)     GFR Non- 6 (*)     GFR  7 (*)     Calcium 8.0 (*)     ALT 9 (*)     AST 12 (*)     All other components within normal limits    Narrative:     Livia John  [facility] tel. [phone],  Chemistry results called to and read back by Prabhakar Catherine MD., 09/30/2020  11:34, by [facility]  Performed at:  [facility]. [facility]  [facility],  [facility]. [place] [employer]   Phone [phone] - Abnormal; Notable for the following components:    Pro-BNP 23,650 (*)     All other components within normal limits    Narrative:     Performed at:  [facility]. [facility]  [facility],  [facility] [address] [place]   Phone [phone]   URINE RT REFLEX TO CULTURE       All otherlabs were within normal range or not returned as of this dictation. EMERGENCY DEPARTMENT COURSE and DIFFERENTIAL DIAGNOSIS/MDM:   Vitals:    Vitals:    09/30/20 1032   BP: (!) 116/51   Pulse: 70   Resp: 20   Temp: 98 °F (36.7 °C)   TempSrc: Oral   SpO2: 94%   Weight: 136 lb (61.7 kg)   Height: 4' 11\" (1.499 m)         MDM  Laboratory evaluation shows significant worsening of kidney function with creatinine of 6.9. The patient's urostomy is producing urine. There is no signs of emergent electrolyte abnormalities. Nephrology is consulted and the case is discussed with them. They recommend giving the patient IV fluids and admitting her to the hospitalist service. Patient is admitted to [facility] for further evaluation and care. The patient and her daughter expressed understanding and agreement with this plan. The patient is reevaluated multiple times with no acute worsening in clinical status.   I have considered ACS or other cardiac or pulmonary emergencies but do not feel these are likely based on the patient's work-up in the emergency department, her symptoms as reported by her and her family, and her physical exam.    CONSULTS:  IP CONSULT TO HOSPITALIST  IP CONSULT TO NEPHROLOGY    PROCEDURES:  Unless otherwise noted below, none     Critical Care  Performed by: Alexandra Meier MD  Authorized by: Alexandra Meier MD     Critical care provider statement:     Critical care time (minutes):  30    Critical care time was exclusive of:  Teaching time and separately billable procedures and treating other patients    Critical care was necessary to treat or prevent imminent or life-threatening deterioration of the following conditions:  Renal failure    Critical care was time spent personally by me on the following activities:  Ordering and performing treatments and interventions, development of treatment plan with patient or surrogate, ordering and review of laboratory studies, discussions with consultants, ordering and review of radiographic studies, evaluation of patient's response to treatment, re-evaluation of patient's condition, examination of patient, obtaining history from patient or surrogate and review of old charts        FINAL IMPRESSION      1. DELMER (acute kidney injury) [facility])          DISPOSITION/PLAN   DISPOSITION Decision To Admit 09/30/2020 11:52:15 AM        (Please note that portions of this note were completed with a voice recognition program.  Efforts were made to edit the dictations but occasionally words aremis-transcribed. )    Alexandra Meier MD (electronically signed)  Attending Emergency Physician           Alexandra Meier MD  09/30/20 1216

## 2020-09-30 NOTE — H&P
Hospital Medicine History & Physical      PCP: Devin Martinez MD    Date of Admission: 9/30/2020    Date of Service: Pt seen/examined on 9/30/2020     Chief Complaint:    Chief Complaint   Patient presents with    Abnormal Lab     sent in from pcp for having \"bad kidney labs\" told to go to ER for eval. pt stated increased sob since last week hospital visit. pt stated only has one working kidney       History Of Present Illness: The patient is a 66 y.o. female with hypertension, hyperlipidemia, GERD, h/o bladder cancer, and CHF who presents to [facility] with c/o having abnormal labs. She was admitted to [facility] 9/20-9/24. She was diuresed with IV lasix for acute diastolic CHF. Now presenting with DELMER. She reports poor PO intake, diarrhea the last 2 days, nausea, fatigue, and weakness. She has not felt well since she was admitted the last time. Past Medical History:        Diagnosis Date    Acid reflux     Cancer ([address]     bladder cancer    CHF (congestive heart failure) (HCC)     Hyperlipidemia     Hypertension     Kidney stone        Past Surgical History:        Procedure Laterality Date    ABDOMEN SURGERY      APPENDECTOMY      BLADDER STONE REMOVAL      BLADDER SURGERY      pt had bladder cancer    CATARACT REMOVAL WITH IMPLANT  8/26/11    RIGHT    CHOLECYSTECTOMY  03/06/2018    EYE SURGERY      HYSTERECTOMY      OTHER SURGICAL HISTORY  05/08/2015    phacemilsification of cataract with intraocular lens implant left eye       Medications Prior to Admission:    Prior to Admission medications    Medication Sig Start Date End Date Taking?  Authorizing Provider   hydrALAZINE (APRESOLINE) 50 MG tablet Take 1 tablet by mouth every 8 hours 9/24/20   Everlean Fleischer, MD   cefdinir (OMNICEF) 300 MG capsule Take 1 capsule by mouth daily for 9 days 9/25/20 10/4/20  Everlean Fleischer, MD   doxazosin (CARDURA) 4 MG tablet Take 4 mg by mouth 2 times daily    Historical Provider, MD   sodium bicarbonate 650 MG tablet Take 1 tablet by mouth 2 times daily 6/17/20 6/17/21  [facility] MD Rona   lamoTRIgine (LAMICTAL) 25 MG tablet Take 25 mg by mouth 4 times daily    Historical Provider, MD   amLODIPine (NORVASC) 10 MG tablet Take 10 mg by mouth daily    Historical Provider, MD   primidone (MYSOLINE) 50 MG tablet Take 100 mg by mouth daily    Historical Provider, MD   senna-docusate (SENOKOT S) 8.6-50 MG per tablet Take 2 tablets by mouth daily 3/6/18   Jadiel Mena MD   pantoprazole (PROTONIX) 40 MG tablet Take 40 mg by mouth daily    Historical Provider, MD   atenolol (TENORMIN) 50 MG tablet Take 50 mg by mouth 2 times daily    Historical Provider, MD   B Complex Vitamins (B-COMPLEX/B-12) TABS Take 100 mg by mouth daily    Historical Provider, MD   gemfibrozil (LOPID) 600 MG tablet Take 600 mg by mouth 2 times daily (before meals). Historical Provider, MD   paroxetine (PAXIL) 20 MG tablet Take 60 mg by mouth every morning. Historical Provider, MD       Allergies:  Ciprofloxacin    Social History:    TOBACCO:   reports that she has quit smoking. She has never used smokeless tobacco.  ETOH:   reports no history of alcohol use. Family History:   Positive as follows:        Problem Relation Age of Onset    Heart Disease Mother        REVIEW OF SYSTEMS:       Constitutional: Negative for fever + fatigue, poor PO itake  Respiratory: Negative for cough + dyspnea on exertion  Cardiovascular: Negative for chest pain   Gastrointestinal: Negative for vomiting, + nausea, diarrhea   Genitourinary: Negative for hematuria   Musculoskeletal: Negative for arthralgias + weakness  Skin: Negative for rash   Neurological: Negative for syncope   Psychiatric/Behavorial: Negative for anxiety    PHYSICAL EXAM:    BP (!) 108/48   Pulse 67   Temp 98 °F (36.7 °C) (Oral)   Resp 19   Ht 4' 11\" (1.499 m)   Wt 136 lb (61.7 kg)   SpO2 94%   BMI 27.47 kg/m²     Gen: No distress. Alert.   Eyes: PERRL. No sclera icterus. No conjunctival injection. ENT: No discharge. Pharynx clear. Neck: Trachea midline. Resp: No accessory muscle use. + RLL, LLL crackles. No wheezes. No rhonchi. CV: Regular rate. Regular rhythm. No murmur. No rub. Generalized edema. BLE edema  GI: Non-tender. Non-distended. Normal bowel sounds. + urostomy  Skin: Warm and dry. Scattered ecchymosis  M/S: No cyanosis. No joint deformity. No clubbing. Neuro: Awake. Grossly nonfocal    Psych: Oriented x 3. No anxiety or agitation. CBC:   Recent Labs     09/30/20  1037   WBC 4.3   HGB 9.2*   HCT 28.4*   MCV 90.2        BMP:   Recent Labs     09/30/20  1037   *   K 4.4   CL 99   CO2 16*   BUN 92*   CREATININE 6.9*     LIVER PROFILE:   Recent Labs     09/30/20  1037   AST 12*   ALT 9*   BILITOT <0.2   ALKPHOS 103     UA:  Recent Labs     09/30/20  1227   COLORU DARK YELLOW*   PHUR 6.0   WBCUA 10-20*   RBCUA 3-4   BACTERIA 1+*   CLARITYU SL CLOUDY*   SPECGRAV 1.015   LEUKOCYTESUR TRACE*   UROBILINOGEN 0.2   BILIRUBINUR Negative   BLOODU Negative   GLUCOSEU Negative       CULTURES  Urine: pending    EKG:  I have reviewed the EKG with the following interpretation:   Normal sinus rhythm, Nonspecific ST & T wave changes    RADIOLOGY  XR CHEST PORTABLE   Final Result   1. Improved with residual congestive heart failure. US RENAL COMPLETE    (Results Pending)     Renal Arterial US  Tech Comments    Right    Limited exam due to excessive patient movement and inability to follow    breathing cues. There is no evidence to suggest renal artery stenosis. The right renal vein is patent. The parenchymal resistive index is within normal limits. Left    Limited exam due to excessive patient movement and inability to follow    breathing cues. There is no evidence to suggest renal artery stenosis. The left renal vein is patent. The parenchymal resistive index is within normal limits.     The left kidney has an abnormal appearance, evaluation by general ultrasound    exam recommended if clinically indicated. Paula Oliveira is evidence of atherosclerotic plaque noted in the Abdominal    Aorta, consistent with < 50% stenosis. Echo 9/20/2020   Summary   LV systolic function is normal with EF estimated at 55%. No regional wall motion abnormalities are noted. There is mild concentric left ventricular hypertrophy. Grade II diastolic dysfunction with elevated filing pressure. The left atrium is mildly dilated. Mild mitral annular calcification is present. Mild mitral, aortic, tricuspid, and pulmonic regurgitation. Systolic pulmonary artery pressure (SPAP) estimated at 41 mmHg (RA pressure   8 mmHg) c/w mild pulm HTN. Active Problems:    DELMER (acute kidney injury) ([facility]  Resolved Problems:    * No resolved hospital problems. *        ASSESSMENT/PLAN:    DELMER on CKD Stage III  AGMA  - Cr 6.9 on admission  - baseline: ~ 1.1  - Slow IVF hydration   - check Renal US  - nephrology following     Bacteruria   - urine cx pending. Chronic Diastolic CHF  - Echo with Grade II DD, mild pulmonary hypertension. See above  - BNP 23,650  - daily weights; I&O's  - give IVFs. Monitor closely.      Hx of Bladder Cancer S/p Radical Cystectomy with Ileal Conduit  Non-Functioning Left Kidney     HTN  - stable  - cont Norvasc, Atenolol, Doxazosin, Hydralazine  - renal duplex, results above.      Normocytic Anemia  - Hgb 9.2  - baseline: ~10.5-11  - monitor     HLD  - cont Lopid     GERD  - cont Protonix     Essential Tremor  - cont Primidone     Anxiety  - on Paxil.     DVT Prophylaxis: Heparin  Diet: DIET RENAL;  Code Status: Full Code    Pepito Sequeira FNP-C  9/30/2020

## 2020-09-30 NOTE — PROGRESS NOTES
Spoke with Kenneth Sam Clinical RN updated on patient status- states chest pain 3/10 and states wanted to rest she was tired. STAT EKG reveals change junction rhythm with ST changes, and notified of chest x-ray results. Slowed NS fluids to 25cc/hr at this time per [facility] DELMY, states will notify Nocturnist regarding patient changes.

## 2020-09-30 NOTE — CONSULTS
File Prior to Encounter   Medication Sig Dispense Refill    hydrALAZINE (APRESOLINE) 50 MG tablet Take 1 tablet by mouth every 8 hours 90 tablet 3    cefdinir (OMNICEF) 300 MG capsule Take 1 capsule by mouth daily for 9 days 9 capsule 0    doxazosin (CARDURA) 4 MG tablet Take 4 mg by mouth 2 times daily      lamoTRIgine (LAMICTAL) 25 MG tablet Take 25 mg by mouth 4 times daily      amLODIPine (NORVASC) 10 MG tablet Take 10 mg by mouth daily      primidone (MYSOLINE) 50 MG tablet Take 100 mg by mouth daily      senna-docusate (SENOKOT S) 8.6-50 MG per tablet Take 2 tablets by mouth daily 30 tablet 1    pantoprazole (PROTONIX) 40 MG tablet Take 40 mg by mouth daily      atenolol (TENORMIN) 50 MG tablet Take 50 mg by mouth 2 times daily      B Complex Vitamins (B-COMPLEX/B-12) TABS Take 100 mg by mouth daily      gemfibrozil (LOPID) 600 MG tablet Take 600 mg by mouth 2 times daily (before meals).  paroxetine (PAXIL) 20 MG tablet Take 60 mg by mouth every morning.        sodium bicarbonate 650 MG tablet Take 1 tablet by mouth 2 times daily 60 tablet 2       Allergies:  Ciprofloxacin    Social History:    Social History     Socioeconomic History    Marital status:      Spouse name: Not on file    Number of children: Not on file    Years of education: Not on file    Highest education level: Not on file   Occupational History    Not on file   Social Needs    Financial resource strain: Not on file    Food insecurity     Worry: Not on file     Inability: Not on file   [employer] needs     Medical: Not on file     Non-medical: Not on file   Tobacco Use    Smoking status: Former Smoker    Smokeless tobacco: Never Used    Tobacco comment: quit 25 years ago   Substance and Sexual Activity    Alcohol use: No    Drug use: No    Sexual activity: Not Currently   Lifestyle    Physical activity     Days per week: Not on file     Minutes per session: Not on file    Stress: Not on file Relationships    Social connections     Talks on phone: Not on file     Gets together: Not on file     Attends Voodoo service: Not on file     Active member of club or organization: Not on file     Attends meetings of clubs or organizations: Not on file     Relationship status: Not on file    Intimate partner violence     Fear of current or ex partner: Not on file     Emotionally abused: Not on file     Physically abused: Not on file     Forced sexual activity: Not on file   Other Topics Concern    Not on file   Social History Narrative    Not on file       Family History:   Family History   Problem Relation Age of Onset    Heart Disease Mother        Review of Systems:   Pertinent positives stated above in HPI. All other 10 systems were reviewed and were negative. Physical exam:   Constitutional:  VITALS:  /63   Pulse 69   Temp 97.9 °F (36.6 °C) (Oral)   Resp 16   Ht 4' 11\" (1.499 m)   Wt 136 lb 3.2 oz (61.8 kg)   SpO2 93%   BMI 27.51 kg/m²   Gen: alert, awake, nad  Skin: no rash, turgor wnl  Heent:  eomi, mmm  Neck: no bruits or jvd noted, thyroid normal  Cardiovascular:  S1, S2 without m/r/g  Respiratory: CTA B without w/r/r; respiratory effort normal  Abdomen:  +bs, soft, nt, nd, no hepatosplenomegaly, ileal conduit in situ  Ext: no lower extremity edema  Neuro/Psy: AAoriented times 3 ; moves all 4 ext  Musculoskeletal:  Rom, muscular strength intact; digits, nails normal    Data/  Recent Labs     09/30/20  1037   WBC 4.3   HGB 9.2*   HCT 28.4*   MCV 90.2        Recent Labs     09/30/20  1037   *   K 4.4   CL 99   CO2 16*   GLUCOSE 116*   BUN 92*   CREATININE 6.9*   LABGLOM 6*   GFRAA 7*     Impression:      Redemonstration of moderate to severe left-sided hydronephrosis.  There is   renal cortical thinning on the left which is similar when compared to the   previous exam.     Bilateral hyperechogenicity of both kidneys, with suggests medical renal   disease.     UA shows protein 100 trace ketones, 10-20 WBCs [this is from an ileal conduit]    Assessment  -DELMER on CKD 3 in the setting of diarrhea, most likely volume depleted  -Chronic kidney disease stage III with baseline creatinine of mid to low ones, has 1 kidney and obstructive uropathy  -Metabolic acidosis consistent with diarrhea and DELMER  -Abnormal UA consistent with ileal conduit sample  -Diarrhea-work-up in progress    Plan  -We will give 1 L of normal saline over 3 hours.  -Changed to IV fluid with bicarb at 100 mL/h  -Watch for signs of volume overload as this patient has a history of CHF with grade 2 diastolic dysfunction and mild pulmonary hypertension  -Serial renal panel  -daily wts and strict i/o  -renal dose medications   -avoid nephrotoxins        Thank you for the consultation. Please do not hesitate to call with questions.     Ray Timmons  [facility]  Office: [phone]  Fax:    [phone]

## 2020-09-30 NOTE — PROGRESS NOTES
Vitals:    09/30/20 1831   BP: (!) 136/56   Pulse: 71   Resp: 16   Temp: 97.6 °F (36.4 °C)   SpO2: 91%     Pt complaints of chest pressure/discomfort rates 3/10 states across chest not radiating and pressure. Pt remains alert, states she does feel tired. Oriented X4. Vs obtained. Pt placed on 2L via nasal cannula. Perfect serve to - no response call to [address]. Order for stat EKG and chest Xray. States to place telemetry monitor. Fluids infusing at 333ml/hr per nephrology order slowed to 150cc/hr at this time. Will remain at pt bedside. No response from medical at this time 1911.

## 2020-09-30 NOTE — PLAN OF CARE
Admit 2W     [facility]-- DELMER on CKD, see recent admission, now with worsening renal failure, extensive hx of bladder cancer with ileal conduit-->reports continued urine drainage, nephrology consult

## 2020-09-30 NOTE — PROGRESS NOTES
Handoff report given to [facility], notified of all communications with Rita Fritz. Care transferred.

## 2020-09-30 NOTE — PROGRESS NOTES
Patient admitted to room 301 from [place] [place] [facility]. Patient oriented to room, call light, bed rails, phone, lights and bathroom. Patient instructed about the schedule of the day including: vital sign frequency, lab draws, possible tests, frequency of MD and staff rounds, daily weights, I &O's and prescribed diet. yes bed alarm in place, patient aware of placement and reason. NO Telemetry box in place- no order, patient aware of placement and reason. Bed locked, in lowest position, side rails up 2/4, call light within reach. Recliner Assessment  Patient is able to demonstrated the ability to move from a reclining position to an upright position within the recliner. 4 Eyes Skin Assessment     The patient is being assess for   Admission    I agree that 2 RN's have performed a thorough Head to Toe Skin Assessment on the patient. ALL assessment sites listed below have been assessed. Areas assessed by both nurses:   [x]   Head, Face, and Ears   [x]   Shoulders, Back, and Chest, Abdomen  [x]   Arms, Elbows, and Hands   [x]   Coccyx, Sacrum, and Ischium  [x]   Legs, Feet, and Heels        Scattered bruising. Urostomy to RLQ.     **SHARE this note so that the co-signing nurse is able to place an eSignature**    Co-signer eSignature: Margarita Renee RN 09/30/20   Does the Patient have Skin Breakdown?   No          Inocente Prevention initiated:  No   Wound Care Orders initiated:  No      [facility] nurse consulted for Pressure Injury (Stage 3,4, Unstageable, DTI, NWPT, Complex wounds)and New or Established Ostomies:  No      Primary Nurse eSignature: Electronically signed by Zaid Rod RN on 9/30/20 at 7:04 PM EDT

## 2020-09-30 NOTE — ED NOTES
Attempted to call report. Awaiting call back.      Aamir Larson RN  09/30/20 1220 Calin Vallejo RN  09/30/20 [id number]

## 2020-09-30 NOTE — CARE COORDINATION
BPCI-A Readmission on 9/30/2020    Ap Alexander is included in the BPCI-A bundle with qualifying  (CHF) at recent admission on 9/20/2020. CTN notified case management at this time of her readmission. PT/OT eval recommended. Consider nebulizer and HHN meds (patient was using spouse's at most recent discharge)  Home care recommended at discharge    A follow up appointment has been scheduled with PCP Dr Horton Kawasaki at [facility] for Friday, October 9th at 11:15AM. His nurse Marla Hayes also notes that patient has a 1-month follow up appointment in November they are leaving on the schedule as well. CTN following. Marc Lerma RN  Care Transition Nurse  [phone] mobile    No future appointments.

## 2020-09-30 NOTE — ED NOTES
Report given to [facility] at [facility] PCU at this time.      Mili Boyd RN  09/30/20 3722

## 2020-10-01 NOTE — PROGRESS NOTES
Vitals:    10/01/20 0936   BP: (!) 111/53   Pulse: 67   Resp: 16   Temp: 97.3 °F (36.3 °C)   SpO2: 98%     Pt in bed. Alert oriented X4, pt seems tired, however responds appropriately to verbal commands and follows directions. Shift assessment complete. Fine crackles more prominent to LL field. Active bowel sounds. CDIF negative. Heart monitor- Accelerated junctional. Pitting edema BLE. Pt on 2L via nasl cannula 98%. Denies nay pain. AM meds whole w/ater. Sodium bicarb infusing at 50cc/hr. Consent to chart for non-tunneled vas cath placement. Urostomy- little output frothy sediment noted to urostomy bag. Call light in reach, bed alarm. Will monitor.

## 2020-10-01 NOTE — PROGRESS NOTES
Lab called critical values of CO2 13, Bun 100, and Cr 7.9. Values reported to day shift to report to nephro.

## 2020-10-01 NOTE — PROGRESS NOTES
Daily    sodium chloride flush  10 mL Intravenous 2 times per day    heparin (porcine)  5,000 Units Subcutaneous 3 times per day    gemfibrozil  600 mg Oral QAM AC       Continuous Infusions:   IV infusion builder 50 mL/hr at 10/01/20 0950       Data:  CBC:   Recent Labs     09/30/20  1037 10/01/20  0511   WBC 4.3 4.4   RBC 3.15* 2.97*   HGB 9.2* 8.9*   HCT 28.4* 27.3*   MCV 90.2 92.0   RDW 16.4* 16.6*    200     BMP:   Recent Labs     09/30/20  1037 10/01/20  0511   * 134*   K 4.4 4.9   CL 99 97*   CO2 16* 13*   BUN 92* 100*   CREATININE 6.9* 7.9*     BNP: No results for input(s): BNP in the last 72 hours. PT/INR:   Recent Labs     10/01/20  0825   PROTIME 13.5*   INR 1.16*     APTT: No results for input(s): APTT in the last 72 hours. CARDIAC ENZYMES: No results for input(s): CKMB, CKMBINDEX, TROPONINI in the last 72 hours. Invalid input(s): CKTOTAL;3  FASTING LIPID PANEL:No results found for: CHOL, HDL, TRIG  LIVER PROFILE:   Recent Labs     09/30/20  1037 10/01/20  0511   AST 12* 13*   ALT 9* 7*   BILITOT <0.2 <0.2   ALKPHOS 103 115        Radiology  IR NONTUNNELED VASCULAR CATHETER > 5 YEARS   Final Result   Successful ultrasound and fluoroscopy guided 12.5 Irish 16 cm non-tunneled   hemodialysis catheter placement, via right IJ access, as described above. XR CHEST PORTABLE   Final Result   Stable chest.  Cardiomegaly with probable early interstitial pulmonary edema. US RENAL COMPLETE   Final Result   Redemonstration of moderate to severe left-sided hydronephrosis. There is   renal cortical thinning on the left which is similar when compared to the   previous exam.      Bilateral hyperechogenicity of both kidneys, with suggests medical renal   disease. XR CHEST PORTABLE   Final Result   1. Improved with residual congestive heart failure.                Assessment:  Active Problems:    DELMER (acute kidney injury) ([address]    Chronic diastolic CHF (congestive heart

## 2020-10-01 NOTE — PROGRESS NOTES
Treatment time: 2.5   Net UF: 0    Pre weight: 63.9kg  Post weight: 63.9  EDW: unknown    Access used: temp cath R neck  Access function: good    Medications or blood products given: none    Regular outpatient schedule: uknown    Summary of response to treatment: Pt tolerated treatment well. VSS, afebrile, alert, denied any issues throughout HD. Zero net UF. Pt transported back to room in stable condition. Copy of dialysis treatment record placed in chart, to be scanned into EMR.

## 2020-10-01 NOTE — PROGRESS NOTES
Nephro consulted regarding pts Sodium Bicarb infusion. Order update: rate change from 100cc/hr to 50cc/hr. Okay to Discontinue drip if patient shows further signs of CHF. Will continue to monitor and assess. Dr. Chuy Brooks notified via Perfect serve. Fernie newton/clinical updated on situation.

## 2020-10-01 NOTE — PROGRESS NOTES
Perfect serve sent to Dr. Elizabeth Nolasco notifying him of the following changes;    FYI - Pt was c/o midsternal chest pressure on previous shift rating pain 3/10, pain does not radiate but is consistent. EKG stat showed a change from previous EKG. Results >>> Accelerated Junctional rhythm - ST & T wave abnormality, consider anterior ischemia Prolonged QT Abnormal ECG When compared with ECG of 30-SEP-2020 10:39, Junctional rhythm has replaced Sinus rhythm    VSS, pt. is currently asleep on 2L O2, tele orders initiated, Fluid bolus NS running @ 100/hr per Nephro, fluids decreased to 25cc/hr per clinical until pt. is further assessed.

## 2020-10-01 NOTE — PROGRESS NOTES
[facility]    Follow up Note           Reason for Consult:  delmre  Requesting Physician:  Dr. Tram Valiente    Sub/interval history  Pt had chest pain last night  Needed o2 overnight  IVF has been decreased  Renal function worse     Last 24 h uop 50 ml    ROS:drowsy  PSFH: No visitor    Scheduled Meds:   amLODIPine  10 mg Oral Daily    atenolol  50 mg Oral BID    doxazosin  4 mg Oral BID    hydrALAZINE  50 mg Oral 3 times per day    lamoTRIgine  25 mg Oral 4x Daily    pantoprazole  40 mg Oral QAM AC    PARoxetine  40 mg Oral QAM    primidone  100 mg Oral Daily    sodium bicarbonate  650 mg Oral BID    sennosides-docusate sodium  2 tablet Oral Daily    sodium chloride flush  10 mL Intravenous 2 times per day    heparin (porcine)  5,000 Units Subcutaneous 3 times per day    gemfibrozil  600 mg Oral QAM AC     Continuous Infusions:   IV infusion builder 50 mL/hr at 10/01/20 0950     PRN Meds:.sodium chloride flush, acetaminophen **OR** acetaminophen, promethazine **OR** ondansetron      History of Present Illness on 9/30/30:    66 y.o. yo female with PMH of GERD, HTN,CKD stage 3 from obstructive nephropathy in the setting of bladder cancer S/P cystectomy and ileal conduit in 1985. Baseline serum creatinine of 1.2-1.4mg/dL and follows with Dr. Blanco Magallon  who is admitted for DELMER on CKD  Patient was admitted to [facility] and was discharged on 9/24 for pulmonary edema and renal artery stenosis was ruled out, creatinine was worsening when she was discharged on 9/24. It was at 2.1 her presenting creatinine was about 1.3-1.4. Patient reports of having diarrhea several episodes which started about 3 days ago. She was told to come to the ER due to abnormal labs and was noted to have a creatinine of 6.9 and has been readmitted.   She also admits that her urostomy is draining less amount of urine    Physical exam:   Constitutional:  VITALS:  BP (!) 111/53   Pulse 67   Temp 97.3 °F (36.3 °C) (Oral)   Resp 16   Ht 4' 11\" (1.499 m)   Wt 138 lb 12.8 oz (63 kg)   SpO2 98%   BMI 28.03 kg/m²   Gen: alert, awake, nad  Skin: no rash, turgor wnl  Heent:  eomi, mmm  Neck: no bruits or jvd noted, thyroid normal  Cardiovascular:  S1, S2 without m/r/g  Respiratory: CTA B without w/r/r; respiratory effort normal  Abdomen:  +bs, soft, nt, nd, no hepatosplenomegaly, ileal conduit in situ  Ext: no lower extremity edema  Neuro/Psy: AAoriented times 3 ; moves all 4 ext  Musculoskeletal:  Rom, muscular strength intact; digits, nails normal    Data/  Recent Labs     09/30/20  1037 10/01/20  0511   WBC 4.3 4.4   HGB 9.2* 8.9*   HCT 28.4* 27.3*   MCV 90.2 92.0    200     Recent Labs     09/30/20  1037 10/01/20  0511   * 134*   K 4.4 4.9   CL 99 97*   CO2 16* 13*   GLUCOSE 116* 81   BUN 92* 100*   CREATININE 6.9* 7.9*   LABGLOM 6* 5*   GFRAA 7* 6*     Impression:      Redemonstration of moderate to severe left-sided hydronephrosis.  There is   renal cortical thinning on the left which is similar when compared to the   previous exam.     Bilateral hyperechogenicity of both kidneys, with suggests medical renal   disease.     UA shows protein 100 trace ketones, 10-20 WBCs [this is from an ileal conduit]    Assessment  -DELMER on CKD 3 in the setting of diarrhea, most likely volume depleted; now progressed to ATN w oliguria; HD initiated on 10/1/20  -Chronic kidney disease stage III with baseline creatinine of mid to low ones, has 1 kidney and obstructive uropathy  -Metabolic acidosis consistent with diarrhea and DELMER  -Abnormal UA consistent with ileal conduit sample  -Diarrhea-work-up in progress  -history of CHF with grade 2 diastolic dysfunction and mild pulmonary hypertension    Plan  -cont low rate of iVF  -HD after vasc cath , 2.5h for today    Will do HD again tomorrow as well   Due to low blood pressure during dialysis, will discontinue amlodipine, decrease atenolol, hydralazine and Cardura with holding orders for systolic blood pressure less than 120 and before dialysis  -daily wts and strict i/o  -renal dose medications   -avoid nephrotoxins        Thank you for the consultation. Please do not hesitate to call with questions.     Rosi Bray  [facility]  Office: [phone]  Fax:    [phone]

## 2020-10-01 NOTE — PROGRESS NOTES
Handoff report given to [facility]. Pt resting in bed respirations observed. Care transferred.

## 2020-10-01 NOTE — BRIEF OP NOTE
Brief Postoperative Note      Patient: Brittnee Carey  YOB: 1945  MRN: [id number]    Date of Procedure:     Pre-Op Diagnosis: DELMER on CKD    Post-Op Diagnosis: Same         Right IJ Nontunneled HD catheter placement    Serenity Alicea DO    Anesthesia: Local anesthesia    Estimated Blood Loss (mL): Minimal    Complications: None      Drains:   Urostomy Ileal conduit RLQ (Active)       [REMOVED] Urostomy Ileal conduit RLQ (Removed)   Stomal Appliance 1 piece;Clean;Dry; Intact 09/22/20 2028   Stoma  Assessment Pink 09/22/20 2028   Mucocutaneous Junction Intact 09/22/20 2028   Peristomal Assessment Clean; Intact; Pink 09/22/20 2028   Urine Color Yellow 09/22/20 2028   Urine Appearance Clear 09/30/20 2049   Urine Odor Malodorous 09/22/20 2028   Output (ml) 50 ml 09/30/20 1830       Findings:     Right IJ Nontunneled HD catheter placement - 12.5 Fr x 16 cm - the catheter is ok for immediate use     Electronically signed by Serenity Alicea MD on 10/1/2020 at 11:34 AM

## 2020-10-01 NOTE — DISCHARGE INSTR - COC
Continuity of Care Form    Patient Name: Marie Mendes   :  1945  MRN:  [id number]    Admit date:  2020  Discharge date:  10/9    Code Status Order: Full Code   Advance Directives:   Advance Care Flowsheet Documentation       Date/Time Healthcare Directive Type of Healthcare Directive Copy in [address] Agent's Name Healthcare Agent's Phone Number    20 1636  Yes, patient has an advance directive for healthcare treatment  Living will  Yes, copy in chart  Spouse  Miesha Books  [phone]            Admitting Physician:  Butch Manley MD  PCP: Alejandro Chacon MD    Discharging Nurse: [facility] Unit/Room#: /0886-94  Discharging Unit Phone Number:     Emergency Contact:   Extended Emergency Contact Information  Primary Emergency Contact: [facility]  Address: [address] [place] [address] [address] Phone: [phone]  Relation: Spouse  Secondary Emergency Contact: Alexus Samuel  Address: [address] [place] [address], [address] [place] [address] Phone: [phone]  Relation: Child    Past Surgical History:  Past Surgical History:   Procedure Laterality Date    ABDOMEN SURGERY      APPENDECTOMY      BLADDER STONE REMOVAL      BLADDER SURGERY      pt had bladder cancer    CATARACT REMOVAL WITH IMPLANT  11    RIGHT    CHOLECYSTECTOMY  2018    COLONOSCOPY      EYE SURGERY      HYSTERECTOMY      IR NONTUNNELED VASCULAR CATHETER  10/1/2020    IR NONTUNNELED VASCULAR CATHETER 10/1/2020 [address] SPECIAL PROCEDURES    OTHER SURGICAL HISTORY  2015    phacemilsification of cataract with intraocular lens implant left eye       Immunization History:   Immunization History   Administered Date(s) Administered    Influenza Virus Vaccine 10/08/2014, 2020    Pneumococcal Conjugate 13-valent (Fay Hopkins) 2018       Active Problems:  Patient Active Problem List   Diagnosis Code    Hematuria R31.9    Acute renal failure (ARF) ([facility]) N17.9    Chronic calculous cholecystitis K80.10    Hydronephrosis N13.30    Acute pyelonephritis N10    DELMER (acute kidney injury) ([facility] A32.9    Complicated UTI (urinary tract infection) N39.0    GERD (gastroesophageal reflux disease) K21.9    Hyperkalemia E87.5    Essential hypertension I10    Chronic kidney disease, stage 3 N18.30    Chest pain R07.9    Anxiety F41.9    Acute diastolic CHF (congestive heart failure) ([facility]) I50.31    Acute respiratory failure with hypoxia ([facility]) J96.01    Chronic diastolic CHF (congestive heart failure) ([facility]) I50.32    Increased anion gap metabolic acidosis K05.3    Anemia, normocytic normochromic D64.9       Isolation/Infection:   Isolation            No Isolation          Patient Infection Status       Infection Onset Added Last Indicated Last Indicated By Review Planned Expiration Resolved Resolved By    None active    Resolved    C-diff Rule Out 09/30/20 09/30/20 09/30/20 Clostridium difficile toxin/antigen (Ordered)   09/30/20 Rule-Out Test Resulted    C-diff Rule Out 06/16/20 06/16/20 06/16/20 Clostridium difficile toxin/antigen (Ordered)   06/16/20 Rule-Out Test Resulted            Nurse Assessment:  Last Vital Signs: BP (!) 91/44   Pulse 60   Temp 97.6 °F (36.4 °C) (Oral)   Resp 16   Ht 4' 11\" (1.499 m)   Wt 138 lb 12.8 oz (63 kg)   SpO2 97%   BMI 28.03 kg/m²     Last documented pain score (0-10 scale): Pain Level: 0  Last Weight:   Wt Readings from Last 1 Encounters:   10/01/20 138 lb 12.8 oz (63 kg)     Mental Status:  oriented and alert    IV Access:  - Dialysis Catheter  - site  right, insertion date: 10/8/20    Nursing Mobility/ADLs:  Walking   Assisted  Transfer  Assisted  Bathing  Assisted  Dressing  Assisted  Toileting  Assisted  Feeding  Independent  Med Admin  Independent  Med Delivery   whole    Wound Care Documentation and Therapy: Elimination:  Continence:   · Bowel: Yes  · Bladder: No  Urinary Catheter:   Colostomy/Ileostomy/Ileal Conduit: Urostomy       Date of Last BM:     Intake/Output Summary (Last 24 hours) at 10/1/2020 1409  Last data filed at 10/1/2020 1320  Gross per 24 hour   Intake 1849 ml   Output 50 ml   Net 1799 ml     I/O last 3 completed shifts: In: 1056 [P.O.:240; I.V.:1267]  Out: 50 [Urine:50]    Safety Concerns: At Risk for Falls    Impairments/Disabilities:      None    Nutrition Therapy:  Current Nutrition Therapy:   - Oral Diet:  Renal    Routes of Feeding: Oral  Liquids: Thin Liquids  Daily Fluid Restriction: no  Last Modified Barium Swallow with Video (Video Swallowing Test): not done    Treatments at the Time of Hospital Discharge:   Respiratory Treatments:   Oxygen Therapy:  is not on home oxygen therapy.   Ventilator:    - No ventilator support    Rehab Therapies:   Weight Bearing Status/Restrictions: No weight bearing restirctions  Other Medical Equipment (for information only, NOT a DME order):  walker  Other Treatments:     Patient's personal belongings (please select all that are sent with patient):      RN SIGNATURE:  {Esignature:[id number]}    CASE MANAGEMENT/SOCIAL WORK SECTION    Inpatient Status Date: ***    Readmission Risk Assessment Score:  Readmission Risk              Risk of Unplanned Readmission:        31           Discharging to Facility/ Agency   · Name:   · Address:  · Phone:  · Fax:    Dialysis Facility (if applicable)   · Name:  · Address:  · Dialysis Schedule:  · Phone:  · Fax:    / signature: {Esignature:[id number]}    PHYSICIAN SECTION    Prognosis: Good    Condition at Discharge: Stable    Rehab Potential (if transferring to Rehab): Good    Recommended Labs or Other Treatments After Discharge:  f/w HD as planned  Monitor and note Urine output daily      Physician Certification: I certify the above information and transfer of Esteban Reasons  is necessary for the continuing treatment of the diagnosis listed and that she requires Home Care for less 30 days.      Update Admission H&P: No change in H&P    PHYSICIAN SIGNATURE:  Electronically signed by Margie Billings MD on 10/9/20 at 10:15 AM EDT

## 2020-10-01 NOTE — PROGRESS NOTES
Physician Progress Note      Kaylin Ryan  CSN #:                  [id number]  :                       1945  ADMIT DATE:       2020 10:25 AM  DISCH DATE:  RESPONDING  PROVIDER #:        Abdifatah Juarez MD          QUERY TEXT:    Pt admitted with DELMER and has diastolic CHF documented. If possible, please   document in progress notes and discharge summary further specificity regarding   the type and acuity of CHF:    The medical record reflects the following:  Risk Factors: 66 y.o. female with obstructive uropathy and solitary kidney,   DELMER on CKD3, htn and dCHF  Clinical Indicators: notes reflect chronic diastolic CHF and nsg notes of   decreased sats and initiation of supplemental NC O2 required, IVF rate   slowed/stopped. initial cxr : FINDINGS:  There is improved with mild residual pulmonary vascular congestion. ? There  small bilateral pleural effusions with atelectasis. ?Cardiomegaly is stable. There is no pneumothorax. Impression  1. Improved with residual congestive heart failure. repeat cxr : FINDINGS:  The cardiac silhouette is enlarged and stable. ? Aortic vascular  calcification. ? There is stable central vascular congestion and mild  prominence of the interstitial markings throughout the lungs. ?No focal  consolidation or significant pleural fluid. ? No significant interval change. Impression  Stable chest. ? Cardiomegaly with probable early interstitial pulmonary edema.     Treatment: EKG, repeat imaging, Nephr consult, HD line placement and   initiation of HD, daily wts and I/O's per nursing, tele monitoring, ongoing   supportive care    Thank you,  Dario Hashimoto RN CDS  [phone]  Options provided:  -- Acute on Chronic Diastolic CHF/HFpEF  -- Chronic Diastolic CHF/HFpEF  -- Other - I will add my own diagnosis  -- Disagree - Not applicable / Not valid  -- Disagree - Clinically unable to determine / Unknown  -- Refer to Clinical Documentation Reviewer    PROVIDER RESPONSE TEXT:    This patient is in acute on chronic diastolic CHF/HFpEF. Query created by:  Sergio Haddad on 10/1/2020 12:51 PM      Electronically signed by:  Doc Humphries MD 10/1/2020 2:01 PM

## 2020-10-01 NOTE — CARE COORDINATION
Case Management Assessment  Initial Evaluation      Patient Name: Federico Rob  YOB: 1945  Diagnosis: DELMER (acute kidney injury) ([facility] [N17.9]  DELMER (acute kidney injury) ([facility] [N17.9]  DELMER (acute kidney injury) ([facility] [N17.9]  DELMER (acute kidney injury) ([facility] [N17.9]  Date / Time: 9/30/2020 10:25 AM    Admission status/Date:09/30/20  Chart Reviewed: Yes      Patient Interviewed: No- cannot stay awake to hold conversation  Family Interviewed:  Yes - dtr at bedside. Hospitalization in the last 30 days:  Yes      Health Care Decision Maker: Eliel Mijares [phone]    Met with: Pt's daughter Smith Vazquez conducted  (bedside/phone):bedside     Current PCP: Dr. Jose Prince required for SNF :  N          3 night stay required - N- currently waived due to Covid 19    ADLS  Support Systems/Care Needs:    Transportation: family    Meal Preparation: self and family    Housing  Living Arrangements: ranch with   Steps: none  Intent for return to present living arrangements: Yes  Identified Issues: none    Home Care Information  Active with [address] : No Agency:(Services)     Passport/Waiver : Yes - per daughter has Passport, unknown who CM is   : ? 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        Durable Medical Equiptment   DME Provider: none  Equipment:  Walker__X_Cane__X_RTS___ BSC___Shower Chair_X__Hospital Bed___W/C____Other________  02 at ____Liter(s)---wears(frequency)_______ [facility] ___ CPAP___ BiPap___   N/A____      Home O2 Use :  No    If No for home O2---if presently on O2 during hospitalization:  No  if yes CM to follow for potential DC O2 need  Informed of need for care provider to bring portable home O2 tank on day of discharge for nursing to connect prior to leaving:   Not Indicated  Verbalized agreement/Understanding:   Not Indicated    Community Service Affiliation  Dialysis:  No- however may need new OP HD set up pending clinical decisions and progress. · Agency:  · Location:  · Dialysis Schedule:  · Phone:   · Fax: Other Community Services:  DISCHARGE PLAN: Explained Case Management role/services. Return home with spouse. Pt is open to [place] [phone] or SNF if needed. Vas Cath placed for new HD. Will follow for poss. New OP HD set up.

## 2020-10-01 NOTE — PROGRESS NOTES
Pt resting in bed this evening taking a nap. Shift assessment complete and all meds given per MAR. Snack and beverage offered to pt. Pt has no complaints this evening stating chest pain had subsided and is no longer in pain. Bed in lowest position, all belongings and call light within reach. Pt denies any further needs at this time. Will continue to monitor and assess. Pt informed if chest pain returns or she becomes SOB to call out for help.

## 2020-10-02 NOTE — PROGRESS NOTES
Pt given one time dose of Percocet for pain 7-8/10 from placement of IJ today and feeling miserable all over. Pt informed if she feels that she still needs pain medication then she needs to ask the doctors on day shift tomorrow for something stronger than Tylenol.

## 2020-10-02 NOTE — PROGRESS NOTES
Soft, non-tender, non-distended with normal bowel sounds. Musculoskeletal: No clubbing, cyanosis orplus  edema bilaterally. Full range of motion without deformity. Neurologic:  Neurovascularly intact without any focal sensory/motor deficits.  Cranial nerves: II-XII intact, grossly non-focal.  Psychiatric: Alert and oriented      Labs:   Recent Labs     09/30/20  1037 10/01/20  0511   WBC 4.3 4.4   HGB 9.2* 8.9*   HCT 28.4* 27.3*    200     Recent Labs     09/30/20  1037 10/01/20  0511   * 134*   K 4.4 4.9   CL 99 97*   CO2 16* 13*   BUN 92* 100*   CREATININE 6.9* 7.9*   CALCIUM 8.0* 8.3     Recent Labs     09/30/20  1037 10/01/20  0511   AST 12* 13*   ALT 9* 7*   BILITOT <0.2 <0.2   ALKPHOS 103 115     Recent Labs     10/01/20  0825   INR 1.16*       Assessment/Plan:    Active Hospital Problems    Diagnosis    Acute kidney injury superimposed on CKD ([address] [N17.9, N18.9]    Acute on chronic diastolic CHF (congestive heart failure) (HCC) [I50.33]    Chronic diastolic CHF (congestive heart failure) (HCC) [I50.32]    Increased anion gap metabolic acidosis [N52.1]    Anemia, normocytic normochromic [D64.9]    DELMER (acute kidney injury) ([address] [N17.9]     DELMER on CKD Stage III  AGMA  - Cr 6.9 on admission  - baseline: ~ 1.1  - Renal US noted chronic dx    klebisella UTI    rocephinD1       Hx of Bladder Cancer S/p Radical Cystectomy with Ileal Conduit  Non-Functioning Left Kidney    htn   up and down with  HD    Closely monitor for drop     accelerated Junctional  Yesterday  Today tele Sinus que  first degree   Keep in Tele  Hold atenolol( may need to change to coreg if HR high due to renal failure )    Anemia   hG 8.9  Monitor     Depression   paxil/lamictal   DVT Prophylaxis: heparin  Diet: DIET RENAL;  Code Status: Full Code    PT/OT Eval Status: p    Dispo - pcu    Odalys Fox MD

## 2020-10-02 NOTE — PROGRESS NOTES
Nephrology Progress Note   [email or web address]      This patient is a 66year old female whom we are following for DELMER on CKD. Subjective: The patient was seen and examined on HD. BP is stable. Had HD yesterday for 2.5H. Still having diarrhea but improving. Family History: No family at bedside  ROS: No SOB or chest pain      Vitals:  BP (!) 160/58   Pulse 67   Temp (!) 53.6 °F (12 °C)   Resp 18   Ht 4' 11\" (1.499 m)   Wt 139 lb 3 oz (63.1 kg)   SpO2 97%   BMI 28.11 kg/m²   I/O last 3 completed shifts: In: 522 [P.O.:270; I.V.:252]  Out: 0   I/O this shift:  In: 180 [P.O.:180]  Out: -     Physical Exam:  Physical Exam  Vitals signs reviewed. Constitutional:       General: She is not in acute distress. Appearance: Normal appearance. HENT:      Head: Normocephalic and atraumatic. Eyes:      General: No scleral icterus. Conjunctiva/sclera: Conjunctivae normal.   Cardiovascular:      Rate and Rhythm: Normal rate. Heart sounds: No friction rub. Pulmonary:      Effort: Pulmonary effort is normal. No respiratory distress. Abdominal:      General: Bowel sounds are normal. There is no distension. Tenderness: There is no abdominal tenderness. Musculoskeletal:      Right lower leg: No edema. Left lower leg: No edema. Neurological:      Mental Status: She is alert.        Access: RIJ temp dialysis catheter      Medications:   cefTRIAXone (ROCEPHIN) IV  1 g Intravenous Q24H    doxazosin  1 mg Oral BID    hydrALAZINE  25 mg Oral 3 times per day    lamoTRIgine  25 mg Oral 4x Daily    pantoprazole  40 mg Oral QAM AC    PARoxetine  40 mg Oral QAM    primidone  100 mg Oral Daily    sennosides-docusate sodium  2 tablet Oral Daily    sodium chloride flush  10 mL Intravenous 2 times per day    heparin (porcine)  5,000 Units Subcutaneous 3 times per day    gemfibrozil  600 mg Oral QAM AC         Labs:  Recent Labs     09/30/20  1037 10/01/20  0511   WBC 4.3 4.4   HGB 9.2* 8.9* HCT 28.4* 27.3*   MCV 90.2 92.0    200     Recent Labs     09/30/20  1037 10/01/20  0511   * 134*   K 4.4 4.9   CL 99 97*   CO2 16* 13*   GLUCOSE 116* 81   BUN 92* 100*   CREATININE 6.9* 7.9*   LABGLOM 6* 5*   GFRAA 7* 6*       Assessment  -DELMER on CKD 3 in the setting of diarrhea, most likely volume depleted; now progressed to ATN w oliguria; HD initiated on 10/1/20. Has RIJ vas cath  -Chronic kidney disease stage III with baseline creatinine of mid to low ones, has 1 kidney and obstructive uropathy  -Metabolic acidosis consistent with diarrhea and DELMER  -Abnormal UA consistent with ileal conduit sample  -Diarrhea-work-up in progress  -history of CHF with grade 2 diastolic dysfunction and mild pulmonary hypertension  -Hypertension. On Doxazosin and Hydralazine.     Plan  - 2nd HD today for 3H with no UF.  - Plan to have her do another HD tomorrow. - Will monitor closely for signs of renal recovery. - daily wts and strict i/o  - renal dose medications   - avoid nephrotoxins      Please do not hesitate to contact me at [phone] if with questions. Thank you!     Corey Wilkerson MD  10/2/2020  [facility]

## 2020-10-02 NOTE — PROGRESS NOTES
Pt resting in bed this evening taking a nap. Shift assessment complete and all meds given per MAR. Snack and beverage offered to pt. Pt c/o pain from RIJ placement today and stated she is miserable overall. Tylenol provided to pt earlier. Message sent to Dr. Hollie Emmanuel asking for pain medication. Bed in lowest position, all belongings and call light within reach. Pt denies any further needs at this time. Will continue to monitor and assess.

## 2020-10-02 NOTE — PROGRESS NOTES
Treatment time: 3    Net UF: 0    Pre weight: 64.5KG  Post weight: 64.5  EDW: TBD    Access used: CVC  Access function: WELL    Medications or blood products given: NONE    Regular outpatient schedule: TBD    Summary of response to treatment: TOLERATED WELL. NO ISSUES VOICED T/O TX. CRIT LINE USED WITH INITIAL HCT @ 25.5, 10 MIN PRIOR TO END HCT-26.5, ENDING HCT- 26.5    Copy of dialysis treatment record placed in chart, to be scanned into EMR.

## 2020-10-02 NOTE — CARE COORDINATION
INTERDISCIPLINARY PLAN OF CARE CONFERENCE    Date/Time: 10/2/2020 11:01 AM  Completed by: Gulshan Grant Case Management      Patient Name:  Fang Mcclure  YOB: 1945  Admitting Diagnosis: DELMER (acute kidney injury) ([facility] [N17.9]  DELMER (acute kidney injury) ([facility] [address] [N17.9]  DELMER (acute kidney injury) ([facility] [address] [N17.9]  DELMER (acute kidney injury) ([facility] [address] [N17.9]     Admit Date/Time:  9/30/2020 10:25 AM    Chart reviewed. Interdisciplinary team contacted or reviewed plan related to patient progress and discharge plans. Disciplines included Case Management, Nursing, and Dietitian. Current Status:stable  PT/OT recommendation for discharge plan of care: NA    Expected D/C Disposition:  Home  Confirmed plan with patient and/or family Yes confirmed with: (name) pt    Discharge Plan Comments: Chart reviewed, met with pt at bedside. Pt continues with plan to return home with spouse. Following pt for possible new HD slot and HHC and possible home O2. 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      Home O2 in place on admit: No

## 2020-10-03 NOTE — PROGRESS NOTES
Patient's EF (Ejection Fraction) is greater than 40%    Patient's weights and intake/output reviewed:    Patient's Last Weight: 138 lbs obtained by standing scale. Difference of 8 lbs less than last documented weight. Intake/Output Summary (Last 24 hours) at 10/3/2020 1128  Last data filed at 10/3/2020 0945  Gross per 24 hour   Intake 780 ml   Output 425 ml   Net 355 ml         Pt is currently room air. Pt denies shortness of breath. Pt without lower extremity edema. Patient and/or Family's stated Goal of Care this Admission: reduce shortness of breath, increase activity tolerance, better understand heart failure and disease management, be more comfortable, reduce lower extremity edema and unable to assess, will attempt again prior to discharge      Comorbidities Reviewed Yes  Patient has a past medical history of Acid reflux, Cancer ([address] CHF (congestive heart failure) ([facility] [address] Hyperlipidemia, Hypertension, and Kidney stone.          >>For CHF and Comorbidity documentation on Education Time and Topics, please see Education Tab

## 2020-10-03 NOTE — PROGRESS NOTES
Patient's EF (Ejection Fraction) is greater than 40%    Patient's weights and intake/output reviewed:    Patient's Last Weight: 64.5 kg & previously 63.1 kg      Intake/Output Summary (Last 24 hours) at 10/2/2020 2316  Last data filed at 10/2/2020 2140  Gross per 24 hour   Intake 740 ml   Output 425 ml   Net 315 ml         Pt is currently on 2 L O2. Pt denies shortness of breath at rest. Pt without lower extremity edema. Patient and/or Family's stated Goal of Care this Admission: reduce shortness of breath, increase activity tolerance, better understand heart failure and disease management, be more comfortable, reduce lower extremity edema and unable to assess, will attempt again prior to discharge      Comorbidities Reviewed Yes  Patient has a past medical history of Acid reflux, Cancer ([address] CHF (congestive heart failure) ([address] Hyperlipidemia, Hypertension, and Kidney stone.          >>For CHF and Comorbidity documentation on Education Time and Topics, please see Education Tab

## 2020-10-03 NOTE — PROGRESS NOTES
Hospitalist Progress Note      PCP: Radha Epps MD    Date of Admission: 9/30/2020      Hospital Course: patient is a 66 y.o. female, h/o bladder cancer s/p ileal conduit following radical cystectomy     with c/o having abnormal labs. ARF       She was admitted to [facility] 9/20-9/24. Subjective:     Started dialysis this admit. Feels a little better. BP elevated. Ongoing leg edema. Minimal ileal conduit output. Medications:  Reviewed    Infusion Medications    sodium chloride       Scheduled Medications    b complex-C-folic acid  1 mg Oral Daily    cloNIDine  1 patch Transdermal Weekly    [START ON 10/12/2020] darbepoetin parminder-polysorbate  60 mcg Intravenous Weekly - Monday    hydrALAZINE  50 mg Oral 3 times per day    doxazosin  2 mg Oral BID    cefTRIAXone (ROCEPHIN) IV  1 g Intravenous Q24H    lamoTRIgine  25 mg Oral 4x Daily    pantoprazole  40 mg Oral QAM AC    PARoxetine  40 mg Oral QAM    primidone  100 mg Oral Daily    sennosides-docusate sodium  2 tablet Oral Daily    sodium chloride flush  10 mL Intravenous 2 times per day    heparin (porcine)  5,000 Units Subcutaneous 3 times per day     PRN Meds: heparin (porcine), albumin human, midodrine, sodium chloride flush, acetaminophen **OR** acetaminophen, promethazine **OR** ondansetron      Intake/Output Summary (Last 24 hours) at 10/3/2020 1515  Last data filed at 10/3/2020 1117  Gross per 24 hour   Intake 1110 ml   Output 1475 ml   Net -365 ml       Physical Exam Performed:    BP (!) 180/71   Pulse 80   Temp 98.6 °F (37 °C) (Axillary)   Resp 16   Ht 4' 11\" (1.499 m)   Wt 138 lb 3.7 oz (62.7 kg)   SpO2 94%   BMI 27.92 kg/m²     General appearance: No apparent distress, appears stated age and cooperative. HEENT: Pupils equal, round, and reactive to light. Respiratory:  Normal respiratory effort. Clear to auscultation, bilaterally without Rales/Wheezes/Rhonchi.   Cardiovascular: Regular rate and rhythm with normal S1/S2 without murmurs, rubs or gallops. Abdomen: Soft, non-tender, non-distended with normal bowel sounds. Musculoskeletal: No clubbing, cyanosis orplus  edema bilaterally. Full range of motion without deformity. Neurologic:  Neurovascularly intact without any focal sensory/motor deficits. Cranial nerves: II-XII intact, grossly non-focal.  Psychiatric: Alert and oriented      Labs:   Recent Labs     10/01/20  0511 10/02/20  1429   WBC 4.4 3.6*   HGB 8.9* 8.5*   HCT 27.3* 25.9*    218     Recent Labs     10/01/20  0511 10/03/20  0500   * 130*   K 4.9 3.4*   CL 97* 93*   CO2 13* 28   * 24*   CREATININE 7.9* 2.9*   CALCIUM 8.3 8.7   PHOS  --  4.4     Recent Labs     10/01/20  0511   AST 13*   ALT 7*   BILITOT <0.2   ALKPHOS 115     Recent Labs     10/01/20  0825   INR 1.16*       Assessment/Plan:    Active Hospital Problems    Diagnosis    Acute kidney injury superimposed on CKD ([facility] [N17.9, N18.9]    Acute on chronic diastolic CHF (congestive heart failure) (HCC) [I50.33]    Chronic diastolic CHF (congestive heart failure) (HCC) [I50.32]    Increased anion gap metabolic acidosis [S13.9]    Anemia, normocytic normochromic [D64.9]    DELMER (acute kidney injury) ([facility] [N17.9]         DELMER on CKD Stage III  - Cr 6.9 on admission  - baseline: ~ 1.1  - Renal US noted chronic dx  - started HD this admit. Klebsiella UTI    Rocephin IV      Hx of Bladder Cancer S/p Radical Cystectomy with Ileal Conduit  Non-Functioning Left Kidney    HTN - uncontrolled. Will start clonidine patch. Give lasix IV today. Still grossly fluid overloaded. Anemia   hG 8.9  Monitor       Depression   paxil/lamictal         Diarrhea. Check WBC and calprotectin. Soft stool - Cdiff testing would be inappropriate. DVT Prophylaxis: heparin  Diet: DIET RENAL;  Code Status: Full Code    PT/OT Eval Status: as tolerates.      Dispo - pcu    Miguel Car MD

## 2020-10-03 NOTE — FLOWSHEET NOTE
Treatment time: 3.5 hours  Net UF: 500 ml    Pre weight: 63.1 kg   Post weight: 62.7 kg  EDW: TBD kg    Access used: Right IJ  Access function: Good with  ml/min    Medications or blood products given: no    Regular outpatient schedule: DELMER    Summary of response to treatment: Pt's asymptomatic hypertension, tolerated well with HD, HD completed in full, heparin dwell in NTDC, capped and clamped. Pt c/o diarrhea on and off during hospitalization, reported to primary nurse for follow up. Cirt Line: Initial Hct: 26.2;   End Profile : B; Refill ( Hct.1 -27.8  subtract Hct 2- 28.1): -0.3 ( no  Refill); BV: -6.6 %      Copy of dialysis treatment record placed in chart, to be scanned into EMR. 10/03/20 0734 10/03/20 1117   Vital Signs   BP (!) 179/77 (!) 173/70   Temp 98 °F (36.7 °C) 98 °F (36.7 °C)   Pulse 70 80   Resp 20 16   SpO2 98 % 99 %   Weight 139 lb 1.8 oz (63.1 kg) 138 lb 3.7 oz (62.7 kg)   Weight Method Actual;Standing scale Actual;Standing scale   Percent Weight Change -3.66 -0.63   Post-Hemodialysis Assessment   Post-Treatment Procedures  --  Blood returned;Catheter capped, clamped and heparinized x 2 ports   Machine Disinfection Process  --  Acid/Vinegar Clean;Heat Disinfect; Exterior Machine Disinfection   Rinseback Volume (ml)  --  400 ml   Total Liters Processed (l/min)  --  68.8 l/min   Dialyzer Clearance  --  Lightly streaked   Duration of Treatment (minutes)  --  210 minutes   Heparin amount administered during treatment (units)  --  0 units   Hemodialysis Intake (ml)  --  450 ml   Hemodialysis Output (ml)  --  1050 ml   NET Removed (ml)  --  500 ml   Tolerated Treatment  --  Good   Bilateral Breath Sounds Clear;Diminished Clear;Diminished   Edema Left lower extremity Left lower extremity   RLE Edema +1;Pitting  (CALF) +1;Pitting  (calf)

## 2020-10-03 NOTE — FLOWSHEET NOTE
10/03/20 1151   Vitals   Temp 98.6 °F (37 °C)   Temp Source Axillary   Pulse 80   Heart Rate Source Monitor   Resp 16   BP (!) 180/71  (Dr. [place] Dr. Simeon Rash aware)   Oxygen Therapy   SpO2 94 %   O2 Device None (Room air)       Patient back from dialysis. A/A/Ox4  Denies any pain or shortness of breath.

## 2020-10-03 NOTE — PROGRESS NOTES
Nephrology Progress Note   [email or web address]      This patient is a 66year old female whom we are following for DELMER on CKD. Subjective: The patient was seen and examined; she feels well today with no CP, SOB, nausea or vomiting. ROS: No fever or chills. Social: Family at bedside. Vitals:  BP (!) 180/71   Pulse 80   Temp 98.6 °F (37 °C) (Axillary)   Resp 16   Ht 4' 11\" (1.499 m)   Wt 138 lb 3.7 oz (62.7 kg)   SpO2 94%   BMI 27.92 kg/m²   I/O last 3 completed shifts: In: 840 [P.O.:440]  Out: 425 [Urine:25]  I/O this shift: In: 450   Out: 1050     Physical Exam:  Physical Exam  Vitals signs reviewed. Constitutional:       General: She is not in acute distress. Appearance: Normal appearance. HENT:      Head: Normocephalic and atraumatic. Eyes:      General: No scleral icterus. Conjunctiva/sclera: Conjunctivae normal.   Cardiovascular:      Rate and Rhythm: Normal rate. Heart sounds: No friction rub. Pulmonary:      Effort: Pulmonary effort is normal. No respiratory distress. Abdominal:      General: Bowel sounds are normal. There is no distension. Tenderness: There is no abdominal tenderness. Musculoskeletal:      Right lower leg: No edema. Left lower leg: No edema. Neurological:      Mental Status: She is alert.        Access: RIJ temp dialysis catheter      Medications:   cefTRIAXone (ROCEPHIN) IV  1 g Intravenous Q24H    doxazosin  1 mg Oral BID    hydrALAZINE  25 mg Oral 3 times per day    lamoTRIgine  25 mg Oral 4x Daily    pantoprazole  40 mg Oral QAM AC    PARoxetine  40 mg Oral QAM    primidone  100 mg Oral Daily    sennosides-docusate sodium  2 tablet Oral Daily    sodium chloride flush  10 mL Intravenous 2 times per day    heparin (porcine)  5,000 Units Subcutaneous 3 times per day    gemfibrozil  600 mg Oral QAM AC         Labs:  Recent Labs     10/01/20  0511 10/02/20  1429   WBC 4.4 3.6*   HGB 8.9* 8.5*   HCT 27.3* 25.9*   MCV 92.0 89.5  218     Recent Labs     10/01/20  0511 10/03/20  0500   * 130*   K 4.9 3.4*   CL 97* 93*   CO2 13* 28   GLUCOSE 81 90   PHOS  --  4.4   * 24*   CREATININE 7.9* 2.9*   LABGLOM 5* 16*   GFRAA 6* 19*       Assessment  -DELMER on CKD 3 in the setting of diarrhea, most likely volume depleted; now progressed to ATN w oliguria; HD initiated on 10/1/20. Has RIJ vas cath  -Chronic kidney disease stage III with baseline creatinine of mid to low ones, has 1 kidney and obstructive uropathy  -Metabolic acidosis consistent with diarrhea and DELMER  -Abnormal UA consistent with ileal conduit sample  -Diarrhea-work-up in progress  -history of CHF with grade 2 diastolic dysfunction and mild pulmonary hypertension  -Hypertension. On Doxazosin and Hydralazine.     Plan  - 3rd Hemodialysis today  - Will monitor closely for signs of renal recovery.               - May need tunneled dialysis catheter early next week

## 2020-10-03 NOTE — FLOWSHEET NOTE
10/03/20 1701   Vitals   Pulse 72   Heart Rate Source Monitor   Resp 16   BP (!) 190/78   BP Location Left upper arm   Level of Consciousness 0   Oxygen Therapy   SpO2 95 %   O2 Device None (Room air)       Perfect Serve to Dr. Wynell Cogan

## 2020-10-04 NOTE — PROGRESS NOTES
Patient sitting on edge of bed complaints of restlessness, anxiety, irritability and inability to rest.  She requested and was given clonopin as ordered.

## 2020-10-04 NOTE — PROGRESS NOTES
Nephrology Progress Note   [email or web address]      This patient is a 66year old female whom we are following for DELMER on CKD. Subjective: The patient was seen and examined; she feels well today with no CP, SOB, nausea or vomiting. ROS: No fever or chills. Social: Family at bedside. Vitals:  BP (!) 184/62   Pulse 83   Temp 97.7 °F (36.5 °C) (Oral)   Resp 16   Ht 4' 11\" (1.499 m)   Wt 137 lb 6.4 oz (62.3 kg)   SpO2 94%   BMI 27.75 kg/m²   I/O last 3 completed shifts: In: 850 [P.O.:400]  Out: 1150 [Urine:100]  I/O this shift:  In: 150 [P.O.:120; I.V.:30]  Out: 0     Physical Exam:  Physical Exam  Vitals signs reviewed. Constitutional:       General: She is not in acute distress. Appearance: Normal appearance. HENT:      Head: Normocephalic and atraumatic. Eyes:      General: No scleral icterus. Conjunctiva/sclera: Conjunctivae normal.   Cardiovascular:      Rate and Rhythm: Normal rate. Heart sounds: No friction rub. Pulmonary:      Effort: Pulmonary effort is normal. No respiratory distress. Abdominal:      General: Bowel sounds are normal. There is no distension. Tenderness: There is no abdominal tenderness. Musculoskeletal:      Right lower leg: No edema. Left lower leg: No edema. Neurological:      Mental Status: She is alert.        Access: RIJ temp dialysis catheter      Medications:   carvedilol  12.5 mg Oral BID WC    b complex-C-folic acid  1 mg Oral Daily    cloNIDine  1 patch Transdermal Weekly    [START ON 10/12/2020] darbepoetin parminder-polysorbate  60 mcg Intravenous Weekly - Monday    hydrALAZINE  50 mg Oral 3 times per day    doxazosin  2 mg Oral BID    cefTRIAXone (ROCEPHIN) IV  1 g Intravenous Q24H    lamoTRIgine  25 mg Oral 4x Daily    pantoprazole  40 mg Oral QAM AC    PARoxetine  40 mg Oral QAM    primidone  100 mg Oral Daily    sennosides-docusate sodium  2 tablet Oral Daily    sodium chloride flush  10 mL Intravenous 2 times per day    heparin (porcine)  5,000 Units Subcutaneous 3 times per day         Labs:  Recent Labs     10/02/20  1429 10/04/20  0820   WBC 3.6* 4.2   HGB 8.5* 9.1*   HCT 25.9* 27.5*   MCV 89.5 90.2    231     Recent Labs     10/03/20  0500 10/04/20  0820   * 132*   K 3.4* 3.5   CL 93* 91*   CO2 28 28   GLUCOSE 90 122*   PHOS 4.4 3.2   MG  --  1.80   BUN 24* 12   CREATININE 2.9* 2.3*   LABGLOM 16* 21*   GFRAA 19* 25*       Assessment  -DELMER on CKD 3 in the setting of diarrhea, most likely volume depleted; now progressed to ATN w oliguria; HD initiated on 10/1/20. Has RIJ vas cath  -Chronic kidney disease stage III with baseline creatinine of mid to low ones, has 1 kidney and obstructive uropathy  -Metabolic acidosis consistent with diarrhea and DELMER  -Abnormal UA consistent with ileal conduit sample  -Diarrhea-work-up in progress  -history of CHF with grade 2 diastolic dysfunction and mild pulmonary hypertension  -Hypertension. On Doxazosin and Hydralazine.     Plan  - Hemodialysis tomorrow per [facility]  - Will monitor closely for signs of renal recovery.               - May need tunneled dialysis catheter early next week

## 2020-10-04 NOTE — PROGRESS NOTES
Patient's EF (Ejection Fraction) is greater than 40%    Patient's weights and intake/output reviewed:    Patient's Last Weight: 62.7 kg & previous day 64.5 kg      Intake/Output Summary (Last 24 hours) at 10/3/2020 2200  Last data filed at 10/3/2020 2009  Gross per 24 hour   Intake 890 ml   Output 1125 ml   Net -235 ml         Pt is currently on RA. Pt denies shortness of breath. Pt without lower extremity edema. Patient and/or Family's stated Goal of Care this Admission: reduce shortness of breath, increase activity tolerance, better understand heart failure and disease management, be more comfortable, reduce lower extremity edema and unable to assess, will attempt again prior to discharge      Comorbidities Reviewed Yes  Patient has a past medical history of Acid reflux, Cancer ([address] CHF (congestive heart failure) ([address] Hyperlipidemia, Hypertension, and Kidney stone.          >>For CHF and Comorbidity documentation on Education Time and Topics, please see Education Tab

## 2020-10-04 NOTE — PROGRESS NOTES
Hospitalist Progress Note      PCP: Carlos Stein MD    Date of Admission: 9/30/2020      Hospital Course: patient is a 66 y.o. female, h/o bladder cancer s/p ileal conduit following radical cystectomy     with c/o having abnormal labs. ARF       She was admitted to [facility] 9/20-9/24. Subjective:     Started dialysis this admit. Pt emotional - reports she feels very anxious and requesting her paxil to be increased to home dose - discussed risk of serotonin syndrome with full dose paxil and ESRD. Will try klonopin low dose today and reassess. Medications:  Reviewed    Infusion Medications     Scheduled Medications    carvedilol  12.5 mg Oral BID WC    hydrALAZINE  100 mg Oral 3 times per day    b complex-C-folic acid  1 mg Oral Daily    cloNIDine  1 patch Transdermal Weekly    [START ON 10/12/2020] darbepoetin parminder-polysorbate  60 mcg Intravenous Weekly - Monday    doxazosin  2 mg Oral BID    cefTRIAXone (ROCEPHIN) IV  1 g Intravenous Q24H    lamoTRIgine  25 mg Oral 4x Daily    pantoprazole  40 mg Oral QAM AC    PARoxetine  40 mg Oral QAM    primidone  100 mg Oral Daily    sennosides-docusate sodium  2 tablet Oral Daily    sodium chloride flush  10 mL Intravenous 2 times per day    heparin (porcine)  5,000 Units Subcutaneous 3 times per day     PRN Meds: clonazePAM, heparin (porcine), albumin human, midodrine, sodium chloride flush, acetaminophen **OR** acetaminophen, promethazine **OR** ondansetron      Intake/Output Summary (Last 24 hours) at 10/4/2020 1352  Last data filed at 10/4/2020 0943  Gross per 24 hour   Intake 550 ml   Output 100 ml   Net 450 ml       Physical Exam Performed:    BP (!) 184/62   Pulse 83   Temp 97.7 °F (36.5 °C) (Oral)   Resp 16   Ht 4' 11\" (1.499 m)   Wt 137 lb 6.4 oz (62.3 kg)   SpO2 94%   BMI 27.75 kg/m²     General appearance: No apparent distress, appears stated age and cooperative. HEENT: Pupils equal, round, and reactive to light. Check WBC - negative  Diarrhea now resolving. DVT Prophylaxis: heparin  Diet: DIET RENAL;  Code Status: Full Code    PT/OT Eval Status: as tolerates.      Dispo - pcu    Chay Pan MD

## 2020-10-05 NOTE — PROGRESS NOTES
Lilian Barroso reports that patient will not have tunneled catheter placed today and we will reassess in AM. OK to feed patient upon return from dialysis.

## 2020-10-05 NOTE — PROGRESS NOTES
[facility]       Progress Note    Sub/interval history  Seen and examined on HD on 10/5 with 1 L UF planned  Patient feels well  No labs drawn before dialysis today    Last 24 h uop to 225 mL, urine output seems to be picking up    ROS: No chest pain/shortness of breath/fever/nausea/vomiting  PSFH: No visitor    Scheduled Meds:   darbepoetin parminder-polysorbate  60 mcg Intravenous Weekly - Monday    carvedilol  12.5 mg Oral BID WC    hydrALAZINE  100 mg Oral 3 times per day    b complex-C-folic acid  1 mg Oral Daily    cloNIDine  1 patch Transdermal Weekly    doxazosin  2 mg Oral BID    cefTRIAXone (ROCEPHIN) IV  1 g Intravenous Q24H    lamoTRIgine  25 mg Oral 4x Daily    pantoprazole  40 mg Oral QAM AC    PARoxetine  40 mg Oral QAM    primidone  100 mg Oral Daily    sennosides-docusate sodium  2 tablet Oral Daily    sodium chloride flush  10 mL Intravenous 2 times per day    heparin (porcine)  5,000 Units Subcutaneous 3 times per day     Continuous Infusions:   sodium chloride       PRN Meds:.clonazePAM, heparin (porcine), albumin human, midodrine, sodium chloride flush, acetaminophen **OR** acetaminophen, promethazine **OR** ondansetron    Objective/     Vitals:    10/04/20 2105 10/05/20 0221 10/05/20 0500 10/05/20 0717   BP: (!) 187/79 (!) 154/69 (!) 170/67 (!) 180/71   Pulse: 85 86  84   Resp: 16 16  12   Temp: 97.9 °F (36.6 °C) 97.8 °F (36.6 °C)  97.9 °F (36.6 °C)   TempSrc: Oral Oral  Oral   SpO2: 94% 95%  93%   Weight:  137 lb 6 oz (62.3 kg)     Height:         24HR INTAKE/OUTPUT:      Intake/Output Summary (Last 24 hours) at 10/5/2020 1216  Last data filed at 10/5/2020 0441  Gross per 24 hour   Intake 100 ml   Output 225 ml   Net -125 ml     Constitutional:  Alert, awake, no apparent distress  Cardiovascular:  S1, S2 without m/r/g  Respiratory:  CTA B without w/r/r  Abdomen: +bs, soft, nt  Ext: 1- LE edema    Data/  Recent Labs     10/02/20  1429 10/04/20  0841 WBC 3.6* 4.2   HGB 8.5* 9.1*   HCT 25.9* 27.5*   MCV 89.5 90.2    231     Recent Labs     10/03/20  0500 10/04/20  0820   * 132*   K 3.4* 3.5   CL 93* 91*   CO2 28 28   GLUCOSE 90 122*   PHOS 4.4 3.2   MG  --  1.80   BUN 24* 12   CREATININE 2.9* 2.3*   LABGLOM 16* 21*   GFRAA 19* 25*       Assessment/   -DELMER on CKD 3 in the setting of diarrhea, most likely volume depleted; now progressed to ATN w oliguria; HD initiated on 10/1/20. Has RIJ vas cath  -Chronic kidney disease stage III with baseline creatinine of mid to low ones, has 1 kidney and obstructive uropathy  -Metabolic acidosis consistent with diarrhea and DELMER  -Abnormal UA consistent with ileal conduit sample  -Diarrhea-work-up in progress  -history of CHF with grade 2 diastolic dysfunction and mild pulmonary hypertension  -Hypertension. On Doxazosin and Hydralazine.     Plan  -HD on 10/5  - daily renal panels  -Strict urine output measurement  - Will monitor closely for signs of renal recovery.               -Hold tunneled dialysis catheter today, reassess daily  -Serial renal panel  -daily wts and strict i/o  -renal dose medications   -avoid nephrotoxins        Kate Marshall MD  [facility]  Office: [phone]  Fax:    [phone]

## 2020-10-05 NOTE — PROGRESS NOTES
Pt resting in bed with eyes closed. She denies any pain or SOB. Assessment complete-see flowsheet. Medications given-see MAR. Pt denies further needs, call light and bedside table within reach. Will continue to monitor.

## 2020-10-05 NOTE — PLAN OF CARE
Problem: Falls - Risk of:  Goal: Will remain free from falls  Description: Will remain free from falls  Outcome: Ongoing  Goal: Absence of physical injury  Description: Absence of physical injury  Outcome: Ongoing     Problem: Pain:  Goal: Pain level will decrease  Description: Pain level will decrease  Outcome: Ongoing  Goal: Control of acute pain  Description: Control of acute pain  Outcome: Ongoing  Note: PRN APAP for leg pain tonight  Goal: Control of chronic pain  Description: Control of chronic pain  Outcome: Ongoing     Problem: OXYGENATION/RESPIRATORY FUNCTION  Goal: Patient will maintain patent airway  Outcome: Ongoing  Note:   Patient's EF (Ejection Fraction) is greater than 40%            . .................................................................................................................................................. Patient's weights and intake/output reviewed: Yes    Patient's Last Weight: 137 lbs obtained by standing scale. Difference of 0 lbs than last documented weight. Intake/Output Summary (Last 24 hours) at 10/5/2020 0323  Last data filed at 10/4/2020 1824  Gross per 24 hour   Intake 310 ml   Output 125 ml   Net 185 ml       Comorbidities Reviewed Yes    Patient has a past medical history of Acid reflux, Cancer ([facility] [address] CHF (congestive heart failure) ([facility] [address] Hyperlipidemia, Hypertension, and Kidney stone. >>For CHF and Comorbidity documentation on Education Time and Topics, please see Education  Pt resting in bed at this time on room air. Pt denies shortness of breath. Pt without lower extremity edema.      Patient and/or Family's stated Goal of Care this Admission: be more comfortable prior to discharge        :   Goal: Patient will achieve/maintain normal respiratory rate/effort  Description: Respiratory rate and effort will be within normal limits for the patient  Outcome: Ongoing     Problem: HEMODYNAMIC STATUS  Goal: Patient has stable vital signs and fluid balance  Outcome: Ongoing     Problem: FLUID AND ELECTROLYTE IMBALANCE  Goal: Fluid and electrolyte balance are achieved/maintained  Outcome: Ongoing     Problem: ACTIVITY INTOLERANCE/IMPAIRED MOBILITY  Goal: Mobility/activity is maintained at optimum level for patient  Outcome: Ongoing

## 2020-10-05 NOTE — PROGRESS NOTES
Dialysis nurse Sadaf Pierson called in regards to patient coming over for dialysis. Informed him that patient is scheduled to have a tunneled catheter placed from 11-12 today per Summer. RN states he will do treatment prior to tunneled catheter placement. Transport here to take patient to dialysis.

## 2020-10-05 NOTE — PROGRESS NOTES
Patient is back from dialysis. Patient requesting acetaminophen at this time for leg pain, it was given at this time. Patient was assisted to [facility] and she had a large BM. Patient also requested some of her morning medications but not all, see MAR. Patient is resting showing no s/s of distress. Bed is in lowest position and call light is within reach. Will continue to monitor. Shift assessment complete, see flowsheets.

## 2020-10-05 NOTE — PROGRESS NOTES
Tunneled dialysis cath order acknowledged. Dr Merle Palma called and stated he would like to hold off till tomorrow or next day based on patients improving labs.

## 2020-10-05 NOTE — FLOWSHEET NOTE
10/05/20 1530   Vital Signs   Temp 98.6 °F (37 °C)   Temp Source Oral   Pulse 83   Heart Rate Source Monitor   Resp 18   BP (!) 215/83   BP Location Right upper arm   BP Upper/Lower Upper   Patient Position Semi fowlers   Level of Consciousness 0   MEWS Score 3   Patient Currently in Pain Denies   Oxygen Therapy   SpO2 98 %   Pulse Oximeter Device Mode Intermittent   Pulse Oximeter Device Location Finger   O2 Device None (Room air)   Perfect serve sent to Dr. Baljit Mcclure regarding patient's BPs.

## 2020-10-05 NOTE — FLOWSHEET NOTE
10/05/20 1411   Encounter Summary   Services provided to: Patient   Referral/Consult From: [facility] [place]   Support System Spouse; Children;Family members   [facility] [address] [facility]   (10/5 Tel-pt. grateful for healing prayer \"Thank you Omar! \")   Complexity of Encounter Low   Length of Encounter 15 minutes

## 2020-10-05 NOTE — PLAN OF CARE
Problem: Falls - Risk of:  Goal: Will remain free from falls  Description: Will remain free from falls  10/5/2020 1653 by Kieran Rodriguez RN  Outcome: Ongoing  10/5/2020 0313 by Ta Hernandez RN  Outcome: Ongoing  Goal: Absence of physical injury  Description: Absence of physical injury  10/5/2020 1653 by Kieran Rodriguez RN  Outcome: Ongoing  10/5/2020 0313 by Ta Hernandez RN  Outcome: Ongoing     Problem: Pain:  Goal: Pain level will decrease  Description: Pain level will decrease  10/5/2020 1653 by Kieran Rodriguez RN  Outcome: Ongoing  10/5/2020 0313 by Ta Hernandez RN  Outcome: Ongoing  Goal: Control of acute pain  Description: Control of acute pain  10/5/2020 1653 by Kieran Rodriguez RN  Outcome: Ongoing  10/5/2020 0313 by Ta Hernandez RN  Outcome: Ongoing  Note: PRN APAP for leg pain tonight  Goal: Control of chronic pain  Description: Control of chronic pain  10/5/2020 1653 by Kieran Rodriguez RN  Outcome: Ongoing  10/5/2020 0313 by Ta Hernandez RN  Outcome: Ongoing     Problem: OXYGENATION/RESPIRATORY FUNCTION  Goal: Patient will maintain patent airway  10/5/2020 1653 by Kieran Rodriguez RN  Outcome: Ongoing  10/5/2020 0323 by Ta Hernandez RN  Outcome: Ongoing  Note:   Patient's EF (Ejection Fraction) is greater than 40%            . .................................................................................................................................................. Patient's weights and intake/output reviewed: Yes    Patient's Last Weight: 137 lbs obtained by standing scale. Difference of 0 lbs than last documented weight. Intake/Output Summary (Last 24 hours) at 10/5/2020 0323  Last data filed at 10/4/2020 1824  Gross per 24 hour   Intake 310 ml   Output 125 ml   Net 185 ml       Comorbidities Reviewed Yes    Patient has a past medical history of Acid reflux, Cancer ([facility] [address] CHF (congestive heart failure) ([facility] [address] Hyperlipidemia, Hypertension, and Kidney stone.

## 2020-10-05 NOTE — PROGRESS NOTES
Hospitalist Progress Note      PCP: Clarita Wells MD    Date of Admission: 9/30/2020      Hospital Course: patient is a 66 y.o. female, h/o bladder cancer s/p ileal conduit following radical cystectomy     with c/o having abnormal labs. ARF       She was admitted to [facility] 9/20-9/24. Started dialysis this admit. Subjective:     Pt seen in HD, no issues   Holding on tunneled cath per nephro        Medications:  Reviewed    Infusion Medications    sodium chloride       Scheduled Medications    carvedilol  12.5 mg Oral BID WC    hydrALAZINE  100 mg Oral 3 times per day    b complex-C-folic acid  1 mg Oral Daily    cloNIDine  1 patch Transdermal Weekly    [START ON 10/12/2020] darbepoetin parminder-polysorbate  60 mcg Intravenous Weekly - Monday    doxazosin  2 mg Oral BID    cefTRIAXone (ROCEPHIN) IV  1 g Intravenous Q24H    lamoTRIgine  25 mg Oral 4x Daily    pantoprazole  40 mg Oral QAM AC    PARoxetine  40 mg Oral QAM    primidone  100 mg Oral Daily    sennosides-docusate sodium  2 tablet Oral Daily    sodium chloride flush  10 mL Intravenous 2 times per day    heparin (porcine)  5,000 Units Subcutaneous 3 times per day     PRN Meds: clonazePAM, heparin (porcine), albumin human, midodrine, sodium chloride flush, acetaminophen **OR** acetaminophen, promethazine **OR** ondansetron      Intake/Output Summary (Last 24 hours) at 10/5/2020 0726  Last data filed at 10/5/2020 0441  Gross per 24 hour   Intake 250 ml   Output 225 ml   Net 25 ml       Physical Exam Performed:    BP (!) 180/71   Pulse 84   Temp 97.9 °F (36.6 °C) (Oral)   Resp 12   Ht 4' 11\" (1.499 m)   Wt 137 lb 6 oz (62.3 kg)   SpO2 93%   BMI 27.75 kg/m²         General: elderly female in bed   Awake, alert and oriented.  Appears to be not in any distress  Right IJ HD catheter  Mucous Membranes:  Pink , anicteric  Neck: No JVD, no carotid bruit, no thyromegaly  Chest:  Clear to auscultation bilaterally, no added sounds  Cardiovascular:  RRR S1S2 heard, no murmurs or gallops  Abdomen:  Soft, undistended, non tender, no organomegaly, BS present  Ileal conduit in lower abd  Extremities: No edema or cyanosis. Distal pulses well felt  Neurological : grossly normal          Labs:   Recent Labs     10/02/20  1429 10/04/20  0820   WBC 3.6* 4.2   HGB 8.5* 9.1*   HCT 25.9* 27.5*    231     Recent Labs     10/03/20  0500 10/04/20  0820   * 132*   K 3.4* 3.5   CL 93* 91*   CO2 28 28   BUN 24* 12   CREATININE 2.9* 2.3*   CALCIUM 8.7 9.3   PHOS 4.4 3.2     No results for input(s): AST, ALT, BILIDIR, BILITOT, ALKPHOS in the last 72 hours. No results for input(s): INR in the last 72 hours. Assessment/Plan:    Active Hospital Problems    Diagnosis    Acute kidney injury superimposed on CKD ([facility] [address] [N17.9, N18.9]    Acute on chronic diastolic CHF (congestive heart failure) (HCC) [I50.33]    Chronic diastolic CHF (congestive heart failure) (HCC) [I50.32]    Increased anion gap metabolic acidosis [K23.1]    Anemia, normocytic normochromic [D64.9]    DELMER (acute kidney injury) ([facility] [address] [N17.9]         DELMER on CKD Stage III  - Cr 6.9 on admission  - baseline: ~ 1.1 with hx of bladder removal and non functioning left kidney  - Renal US noted chronic features   - started HD this admit. nephro managing  - UOP remains minimal.       Klebsiella UTI    On Rocephin IV  No fevers    Hx of Bladder Cancer S/p Radical Cystectomy with Ileal Conduit  Non-Functioning Left Kidney      HTN - uncontrolled. Started clonidine patch. Given lasix IV on Saturday - minimal urine output  Added coreg, cardura and hydralazine. Still grossly fluid overloaded. Anemia   hG 8.9  Monitor       Depression   paxil/lamictal   Decreased paxil with renal disease  Added klonopin         Diarrhea. Check WBC - negative  Diarrhea now resolving.          DVT Prophylaxis: heparin  Diet: Diet NPO Effective Now  Code Status: Full Code    PT/OT Eval Status: as tolerates.      Dispo - pcu    Margie Billings MD 10/5/2020 11:02 AM

## 2020-10-05 NOTE — CARE COORDINATION
INTERDISCIPLINARY PLAN OF CARE CONFERENCE    Date/Time: 10/5/2020 1:38 PM  Completed by: Sirisha Arevalo, Case Management      Patient Name:  Giana Duff  YOB: 1945  Admitting Diagnosis: DELMER (acute kidney injury) ([facility] [address] [N17.9]  DELMER (acute kidney injury) ([facility] [address] [N17.9]  DELMER (acute kidney injury) ([facility] [address] [N17.9]  DELMER (acute kidney injury) ([facility] [address] [N17.9]     Admit Date/Time:  9/30/2020 10:25 AM    Chart reviewed. Interdisciplinary team contacted or reviewed plan related to patient progress and discharge plans. Disciplines included Case Management, Nursing, and Dietitian. Current Status:ongoing  PT/OT recommendation for discharge plan of care: n/a    Expected D/C Disposition:  Home  Confirmed plan with patient and/or family Yes confirmed with: (name) pt    Discharge Plan Comments: Reviewed chart. Role of discharge planner explained and patient verbalized understanding. Pt states that she is from home with spouse and plans to return. Pt states that she declines HHC, but has Passport 4 days a week with a HHA. Her CM is Agustín Cochran and Honor Heradha would like a AVS/DARRIAN faxed to her to fax #: [phone]. Following pt for possible new HD placement. Per Dr. Emani Vargas note today (10/5) \" -Strict urine output measurement  - Will monitor closely for signs of renal recovery.               -Hold tunneled dialysis catheter today, reassess daily\". At this time, HD placement slot is not needed. Will continue to follow. We were following for possible need for O2 as well, but pt is currently 95% on RA.     Home O2 in place on admit: No  Pt informed of need to bring portable home O2 tank on day of discharge for nursing to connect prior to leaving:  Not Indicated  Verbalized agreement/Understanding:  Not Indicated

## 2020-10-06 NOTE — PROGRESS NOTES
[facility]       Progress Note    Sub/interval history  Denies any new complaints    Last 24 h uop to 225 mL    ROS: No chest pain/shortness of breath/fever/nausea/vomiting  PSFH: No visitor    Scheduled Meds:   darbepoetin parminder-polysorbate  60 mcg Intravenous Weekly - Monday    nitroglycerin  0.5 inch Topical 4 times per day    carvedilol  12.5 mg Oral BID WC    hydrALAZINE  100 mg Oral 3 times per day    b complex-C-folic acid  1 mg Oral Daily    cloNIDine  1 patch Transdermal Weekly    doxazosin  2 mg Oral BID    cefTRIAXone (ROCEPHIN) IV  1 g Intravenous Q24H    lamoTRIgine  25 mg Oral 4x Daily    pantoprazole  40 mg Oral QAM AC    PARoxetine  40 mg Oral QAM    primidone  100 mg Oral Daily    sennosides-docusate sodium  2 tablet Oral Daily    sodium chloride flush  10 mL Intravenous 2 times per day    heparin (porcine)  5,000 Units Subcutaneous 3 times per day     Continuous Infusions:    PRN Meds:.clonazePAM, heparin (porcine), albumin human, midodrine, sodium chloride flush, acetaminophen **OR** acetaminophen, promethazine **OR** ondansetron    Objective/     Vitals:    10/06/20 0523 10/06/20 0800 10/06/20 0830 10/06/20 1045   BP: (!) 169/77 (!) 151/66 (!) 168/75 (!) 169/61   Pulse: 99 81 92 67   Resp: 18 19 18 19   Temp: 98.3 °F (36.8 °C) 98.2 °F (36.8 °C) 98.2 °F (36.8 °C) 97.3 °F (36.3 °C)   TempSrc: Oral Oral Oral Oral   SpO2: 95%  95% 95%   Weight:       Height:         24HR INTAKE/OUTPUT:      Intake/Output Summary (Last 24 hours) at 10/6/2020 1233  Last data filed at 10/6/2020 1225  Gross per 24 hour   Intake 900 ml   Output 275 ml   Net 625 ml     Constitutional:  Alert, awake, no apparent distress  Cardiovascular:  S1, S2 without m/r/g  Respiratory:  CTA B without w/r/r  Abdomen: +bs, soft, nt  Ext: 1- LE edema    Data/  Recent Labs     10/04/20  0820   WBC 4.2   HGB 9.1*   HCT 27.5*   MCV 90.2        Recent Labs     10/04/20  0820 10/06/20  0835 * 132*   K 3.5 3.3*   CL 91* 91*   CO2 28 25   GLUCOSE 122* 129*   PHOS 3.2  --    MG 1.80  --    BUN 12 9   CREATININE 2.3* 2.1*   LABGLOM 21* 23*   GFRAA 25* 28*       Assessment/   -DELMER on CKD 3 in the setting of diarrhea, most likely volume depleted; now progressed to ATN w oliguria; HD initiated on 10/1/20. Has RIJ vas cath  -Chronic kidney disease stage III with baseline creatinine of mid to low ones, has 1 kidney and obstructive uropathy  -Metabolic acidosis consistent with diarrhea and DELMER  -Abnormal UA consistent with ileal conduit sample  -Diarrhea-work-up in progress  -history of CHF with grade 2 diastolic dysfunction and mild pulmonary hypertension  -Hypertension. On Doxazosin and Hydralazine.     Plan  - daily renal panels and assessment for dialysis need  -Strict urine output measurement  - Will monitor closely for signs of renal recovery.               -Hold tunneled dialysis catheter   -Serial renal panel  -daily wts and strict i/o  -renal dose medications   -avoid nephrotoxins        Cosmo Huff MD  [facility]  Office: [phone]  Fax:    [phone]

## 2020-10-06 NOTE — PLAN OF CARE
Problem: Falls - Risk of:  Goal: Will remain free from falls  Description: Will remain free from falls  Outcome: Ongoing  Goal: Absence of physical injury  Description: Absence of physical injury  Outcome: Ongoing     Problem: Pain:  Goal: Pain level will decrease  Description: Pain level will decrease  Outcome: Ongoing  Goal: Control of acute pain  Description: Control of acute pain  Outcome: Ongoing  Goal: Control of chronic pain  Description: Control of chronic pain  Outcome: Ongoing     Problem: OXYGENATION/RESPIRATORY FUNCTION  Goal: Patient will maintain patent airway  Outcome: Ongoing  Goal: Patient will achieve/maintain normal respiratory rate/effort  Description: Respiratory rate and effort will be within normal limits for the patient  Outcome: Ongoing     Problem: HEMODYNAMIC STATUS  Goal: Patient has stable vital signs and fluid balance  Outcome: Ongoing     Problem: FLUID AND ELECTROLYTE IMBALANCE  Goal: Fluid and electrolyte balance are achieved/maintained  Outcome: Ongoing     Problem: ACTIVITY INTOLERANCE/IMPAIRED MOBILITY  Goal: Mobility/activity is maintained at optimum level for patient  Outcome: Ongoing

## 2020-10-06 NOTE — PROGRESS NOTES
Hospitalist Progress Note      PCP: Hunter Duran MD    Date of Admission: 9/30/2020      Hospital Course: patient is a 66 y.o. female, h/o bladder cancer s/p ileal conduit following radical cystectomy     with c/o having abnormal labs. ARF       She was admitted to [facility] 9/20-9/24. Started dialysis this admit. Subjective: -    Pt seen up in bed, feels ok today . Slightly dyspneic on exam  Reports minimal UOP <300     BP high, added nitropaste      Medications:  Reviewed    Infusion Medications     Scheduled Medications    darbepoetin parminder-polysorbate  60 mcg Intravenous Weekly - Monday    nitroglycerin  0.5 inch Topical 4 times per day    carvedilol  12.5 mg Oral BID WC    hydrALAZINE  100 mg Oral 3 times per day    b complex-C-folic acid  1 mg Oral Daily    cloNIDine  1 patch Transdermal Weekly    doxazosin  2 mg Oral BID    cefTRIAXone (ROCEPHIN) IV  1 g Intravenous Q24H    lamoTRIgine  25 mg Oral 4x Daily    pantoprazole  40 mg Oral QAM AC    PARoxetine  40 mg Oral QAM    primidone  100 mg Oral Daily    sennosides-docusate sodium  2 tablet Oral Daily    sodium chloride flush  10 mL Intravenous 2 times per day    heparin (porcine)  5,000 Units Subcutaneous 3 times per day     PRN Meds: clonazePAM, heparin (porcine), albumin human, midodrine, sodium chloride flush, acetaminophen **OR** acetaminophen, promethazine **OR** ondansetron      Intake/Output Summary (Last 24 hours) at 10/6/2020 0729  Last data filed at 10/6/2020 0537  Gross per 24 hour   Intake 480 ml   Output 225 ml   Net 255 ml       Physical Exam Performed:    BP (!) 169/77   Pulse 99   Temp 98.3 °F (36.8 °C) (Oral)   Resp 18   Ht 4' 11\" (1.499 m)   Wt 134 lb 11.2 oz (61.1 kg)   SpO2 95%   BMI 27.21 kg/m²         General: elderly female in bed   Awake, alert and oriented.  Appears to be not in any distress  Right IJ HD catheter  Mucous Membranes:  Pink , anicteric  Neck: No JVD, no carotid bruit, no thyromegaly  Chest:  Clear to auscultation bilaterally, diminished in bases   Cardiovascular:  RRR S1S2 heard, no murmurs or gallops  Abdomen:  Soft, undistended, non tender, no organomegaly, BS present  Ileal conduit in lower abd  Extremities: No edema or cyanosis. Distal pulses well felt  Neurological : grossly normal          Labs:   Recent Labs     10/04/20  0820   WBC 4.2   HGB 9.1*   HCT 27.5*        Recent Labs     10/04/20  0820   *   K 3.5   CL 91*   CO2 28   BUN 12   CREATININE 2.3*   CALCIUM 9.3   PHOS 3.2     No results for input(s): AST, ALT, BILIDIR, BILITOT, ALKPHOS in the last 72 hours. No results for input(s): INR in the last 72 hours. Assessment/Plan:    Active Hospital Problems    Diagnosis    Acute kidney injury superimposed on CKD ([facility] [address] [N17.9, N18.9]    Acute on chronic diastolic CHF (congestive heart failure) ([facility]) [I50.33]    Chronic diastolic CHF (congestive heart failure) ([facility]) [I50.32]    Increased anion gap metabolic acidosis [O55.0]    Anemia, normocytic normochromic [D64.9]    DELMER (acute kidney injury) ([facility] [address] [N17.9]         DEMLER on CKD Stage III  - Cr 6.9 on admission  - baseline: ~ 1.1 with hx of bladder removal and non functioning left kidney  - Renal US noted chronic features   - started HD this admit. nephro managing  - UOP remains minimal. Might need tunneled catheter       Klebsiella UTI    On Rocephin IV  No fevers    Hx of Bladder Cancer S/p Radical Cystectomy with Ileal Conduit  Non-Functioning Left Kidney      HTN - uncontrolled. Started clonidine patch. Added coreg, cardura and hydralazine. Nifedipine and nitropaste  improvng    Anemia   hG 8.9>9   Monitor       Depression   paxil/lamictal   Decreased paxil with renal disease  Prn klonopin       Diarrhea. Check WBC - negative  Diarrhea now resolved. DVT Prophylaxis: heparin  Diet: DIET RENAL;  Code Status: Full Code    PT/OT Eval Status: as tolerates.      Dispo - pcu    Alphonse Sanabria, MD 10/6/2020 7:29 AM

## 2020-10-06 NOTE — PROGRESS NOTES
Shift assessment complete- see flow sheet. Patient is A/Ox3,  disoriented to time. Stated it was Sept. 2019. Continues to have elevated B/Ps. Medications given without difficulty. Currently on 2 L NC , SpO2 WNL. Lung sounds diminished. Denies current shortness of breath. Pt c/o being anxious and having leg pain since admission. 2+ pitting on BLE. PRN Klonopin and tylenol given. Patient denies any further needs. Call light explained and in reach. Bed alarm on. Will continue to monitor.

## 2020-10-06 NOTE — PROGRESS NOTES
Received a message that patient was upset about paxil mg being decreased from 60 mg to 40mg. I have educated patient multiple times today that her paxil is being decreased as of now due to her kidney numbers being elevated and that paxil can be hard on the kidneys. Patient was also upset that she is not allowed to ambulate as she wants. Educated patient that a staff member must be present to ensure her safety. patient has been up to batista with walker one time today and patient has been around the unit multiple times in the wheelchair to help decrease anxiety. Patient was very understanding and accepting of this. Patient has a tendency to forget things easily and may need reminding throughout the shift on why her medications have changed.

## 2020-10-06 NOTE — PROGRESS NOTES
Spoke with Dr. Senthil Kelly about patient's potassium, he said that nephrology will take care of it.

## 2020-10-06 NOTE — PROGRESS NOTES
Patient is resting showing no s/s of distress. Patient is alert and oriented. Meds were given per nursing student and faculty, see MAR. Patient is denying any needs. Bed is in lowest position and call light is within reach. Will continue to monitor. Shift assessment complete, see flowsheets.

## 2020-10-07 PROBLEM — E44.1 MILD PROTEIN-CALORIE MALNUTRITION (HCC): Status: ACTIVE | Noted: 2020-01-01

## 2020-10-07 NOTE — PROGRESS NOTES
28* 25*       Assessment/   -DELMER on CKD 3 in the setting of diarrhea, most likely volume depleted; now progressed to ATN w oliguria; HD initiated on 10/1/20. Has RIJ vas cath  -Chronic kidney disease stage III with baseline creatinine of mid to low ones, has 1 kidney and obstructive uropathy  -Metabolic acidosis consistent with diarrhea and DELMER  -Abnormal UA consistent with ileal conduit sample  -Diarrhea-work-up in progress  -history of CHF with grade 2 diastolic dysfunction and mild pulmonary hypertension  -Hypertension. On Doxazosin and Hydralazine.     Plan  - daily renal panels and assessment for dialysis need  -no HD today  -Strict urine output measurement  - Will monitor closely for signs of renal recovery.               -tunneled dialysis catheter tomorrow If no improvement, keep NPO after midnight  -Serial renal panel  -daily wts and strict i/o  -renal dose medications   -avoid nephrotoxins        Cornelia Hernández MD  [facility]  Office: [phone]  Fax:    [phone]

## 2020-10-07 NOTE — PROGRESS NOTES
Educated patient and patient's daughter at bedside in regards to dialysis plan and the the reason she will be NPO after midnight.

## 2020-10-07 NOTE — PROGRESS NOTES
Shift assessment complete- see flow sheet. Patient is A/Ox4. VSS. Morning medications given without difficulty. Currently on room air, SpO2 WNL. Lung sounds diminished. Denies shortness of breath. Patient denies any further needs. Call light explained and in reach. Bed alarm on. Will continue to monitor.

## 2020-10-07 NOTE — PROGRESS NOTES
Consent is signed for possible tunneled bas cath in AM. SubQ heparin at 0600 has been held at this time.

## 2020-10-07 NOTE — PROGRESS NOTES
Comprehensive Nutrition Assessment    Type and Reason for Visit:  Initial(LOS x 7 days)    Nutrition Recommendations/Plan:   1. Continue current renal diet  2. Monitor po intake, weight and nutrition related labs    Nutrition Assessment:  pt is nurtionally compromised AEB DELMER d/t diarrhea on admission and is at further risk for compromise d/t 5% wt loss in 3 months and need for renal diet r/t DELMER and only having 1 kidney; will continue renal diet order    Malnutrition Assessment:  Malnutrition Status:  Mild malnutrition    Context:  Acute Illness     Findings of the 6 clinical characteristics of malnutrition:  Energy Intake:  No significant decrease in energy intake  Weight Loss:  No significant weight loss(5.1% in 3 months)     Body Fat Loss:  7 - Moderate body fat loss Triceps   Muscle Mass Loss:  1 - Mild muscle mass loss Hand (interosseous), Clavicles (pectoralis & deltoids)  Fluid Accumulation:  No significant fluid accumulation     Strength:  Not Performed    Estimated Daily Nutrient Needs:  Energy (kcal):  0576-7989 kcals/day based on 20-23 kcals/kg/CBW; Weight Used for Energy Requirements:  Current     Protein (g):  56-64 g protein/day based on 1.3-1.5 g/kg/IBW;  Weight Used for Protein Requirements:  Ideal        Fluid (ml/day):  5680-6552 mls/day; Weight Used for Fluid Requirements:  Current      Nutrition Related Findings:  pt has hx of bladder cancer w/ remote urostomy; only has 1 kidney w/ obstructive uropathy; started HD on 10/1/20; hx of CHF, GERD, HLD, HTN and kidney stones; currently on nephrocaps, coreg, clonidine patches, cardurea, hydralazine, imdur, lamictal, protonix, paxil, clonazepam and preomethazine; abdomen is soft, non-distended, no guarding, non-tender w/ active bowel sounds and last BM on 10/6/20; skin color is appropriate for ethnicity, swollen, warm w/ +2 BLE non-pitting edema; pt eats two meals per day at home - a light breakfast of tea and toast and dinner is usually a protein with corn/peas; pt likes to suck on ice instead of drinking water; she has upper and lower dentures but they don't fit and she prefers to eat w/o them; she lives w/  but dtr cooks and shops for groceries; expected d/c to [facility]; pt has bruising on BUE and dusky color of LLE; strength is better in right leg than left      Wounds:  None       Current Nutrition Therapies:    DIET RENAL;  Diet NPO, After Midnight    Anthropometric Measures:  · Height: 4' 11\" (149.9 cm)  · Current Body Weight: 135 lb 1.6 oz (61.3 kg)(10/7/20)   · Admission Body Weight: 136 lb 3.2 oz (61.8 kg)(9/30/20)    · Usual Body Weight: 142 lb 4.8 oz (64.5 kg)(7/26/20)     · Ideal Body Weight: 95 lbs; % Ideal Body Weight 142.2 %   · BMI: 27.3  · BMI Categories: Overweight (BMI 25.0-29. 9)       Nutrition Diagnosis:   · Altered GI function related to renal dysfunction as evidenced by lab values, diarrhea, GI abnormality      Nutrition Interventions:   Food and/or Nutrient Delivery:  Continue Current Diet  Nutrition Education/Counseling:  No recommendation at this time   Coordination of Nutrition Care:  Continued Inpatient Monitoring    Goals:  pt will consume 75% or greater of meals x 3 meals per day       Nutrition Monitoring and Evaluation:   Behavioral-Environmental Outcomes:  Beliefs and Attitutes, Knowledge or Skill   Food/Nutrient Intake Outcomes:  Food and Nutrient Intake  Physical Signs/Symptoms Outcomes:  Biochemical Data, Meal Time Behavior, Nutrition Focused Physical Findings, Skin, Weight, Hemodynamic Status, Diarrhea     Discharge Planning:    Continue current diet     Electronically signed by Regan Alfaro on 10/7/20 at 4:36 PM EDT    Contact: [phone]

## 2020-10-07 NOTE — PROGRESS NOTES
Patient was given IMDUR and educated on the purpose and side effects. Patient asked at this time to be ambulated as this time. PCA ambulating patient in batista at this time.

## 2020-10-07 NOTE — PROGRESS NOTES
Patient's daughter assisted patient in changing urostomy. PRN acetaminophen given for a mild headache.

## 2020-10-07 NOTE — PLAN OF CARE
Problem: Falls - Risk of:  Goal: Will remain free from falls  Description: Will remain free from falls  Outcome: Ongoing  Goal: Absence of physical injury  Description: Absence of physical injury  Outcome: Ongoing     Problem: Pain:  Goal: Pain level will decrease  Description: Pain level will decrease  Outcome: Ongoing  Goal: Control of acute pain  Description: Control of acute pain  Outcome: Ongoing  Goal: Control of chronic pain  Description: Control of chronic pain  Outcome: Ongoing     Problem: OXYGENATION/RESPIRATORY FUNCTION  Goal: Patient will maintain patent airway  Outcome: Ongoing  Goal: Patient will achieve/maintain normal respiratory rate/effort  Description: Respiratory rate and effort will be within normal limits for the patient  Outcome: Ongoing     Problem: HEMODYNAMIC STATUS  Goal: Patient has stable vital signs and fluid balance  Outcome: Ongoing     Problem: FLUID AND ELECTROLYTE IMBALANCE  Goal: Fluid and electrolyte balance are achieved/maintained  Outcome: Ongoing     Problem: ACTIVITY INTOLERANCE/IMPAIRED MOBILITY  Goal: Mobility/activity is maintained at optimum level for patient  Outcome: Ongoing     Problem: Nutrition  Goal: Optimal nutrition therapy  Outcome: Ongoing  Goal: Understanding of nutritional guidelines  Outcome: Ongoing

## 2020-10-07 NOTE — PROGRESS NOTES
Patient is up to chair eating breakfast. Patient is resting showing no s/s of distress. Patient is alert and oriented. Meds were given, see MAR. Patient is denying any needs. Bed is in lowest position and call light is within reach. Will continue to monitor. Shift assessment complete, see flowsheets. Re-educated patient on why paxil medication has been decreased from 60 to 40 mg.

## 2020-10-07 NOTE — PROGRESS NOTES
Hospitalist Progress Note      PCP: Winnie Gamboa MD    Date of Admission: 9/30/2020      Hospital Course: patient is a 66y.o. female, h/o bladder cancer s/p ileal conduit following radical cystectomy     with c/o having abnormal labs. ARF       She was admitted to [facility] 9/20-9/24. Started dialysis this admit. Holding HD for last 2 days, slow renal recovery but UOP remains low at 250 ml last 24 hrs      Subjective: -    Pt seen up in chair, feels ok today . BP improving       Medications:  Reviewed    Infusion Medications     Scheduled Medications    doxazosin  4 mg Oral BID    darbepoetin parminder-polysorbate  60 mcg Intravenous Weekly - Monday    nitroglycerin  0.5 inch Topical 4 times per day    carvedilol  12.5 mg Oral BID WC    hydrALAZINE  100 mg Oral 3 times per day    b complex-C-folic acid  1 mg Oral Daily    cloNIDine  1 patch Transdermal Weekly    cefTRIAXone (ROCEPHIN) IV  1 g Intravenous Q24H    lamoTRIgine  25 mg Oral 4x Daily    pantoprazole  40 mg Oral QAM AC    PARoxetine  40 mg Oral QAM    primidone  100 mg Oral Daily    sennosides-docusate sodium  2 tablet Oral Daily    sodium chloride flush  10 mL Intravenous 2 times per day    heparin (porcine)  5,000 Units Subcutaneous 3 times per day     PRN Meds: clonazePAM, heparin (porcine), albumin human, midodrine, sodium chloride flush, acetaminophen **OR** acetaminophen, promethazine **OR** ondansetron      Intake/Output Summary (Last 24 hours) at 10/7/2020 0734  Last data filed at 10/7/2020 0404  Gross per 24 hour   Intake 870 ml   Output 250 ml   Net 620 ml       Physical Exam Performed:    BP (!) 178/91   Pulse 92   Temp 98 °F (36.7 °C) (Oral)   Resp 18   Ht 4' 11\" (1.499 m)   Wt 135 lb 1.6 oz (61.3 kg)   SpO2 91%   BMI 27.29 kg/m²         General: elderly female in bed  Benign head tremor noted    Awake, alert and oriented.  Appears to be not in any distress  Right IJ HD catheter  Mucous Membranes:  Pink , anicteric  Neck: No JVD, no carotid bruit, no thyromegaly  Chest:  Clear to auscultation bilaterally, diminished in bases   Cardiovascular:  RRR S1S2 heard, no murmurs or gallops  Abdomen:  Soft, undistended, non tender, no organomegaly, BS present  Ileal conduit in lower abd  Extremities: 2+ Skyler LE edema  Distal pulses well felt  Neurological : grossly normal          Labs:   Recent Labs     10/04/20  0820   WBC 4.2   HGB 9.1*   HCT 27.5*        Recent Labs     10/04/20  0820 10/06/20  0835 10/07/20  0527   * 132* 136   K 3.5 3.3* 4.0   CL 91* 91* 96*   CO2 28 25 28   BUN 12 9 13   CREATININE 2.3* 2.1* 2.3*   CALCIUM 9.3 9.2 9.1   PHOS 3.2  --  3.5     No results for input(s): AST, ALT, BILIDIR, BILITOT, ALKPHOS in the last 72 hours. No results for input(s): INR in the last 72 hours. Assessment/Plan:    Active Hospital Problems    Diagnosis    Acute kidney injury superimposed on CKD ([facility] [address] [N17.9, N18.9]    Acute on chronic diastolic CHF (congestive heart failure) ([facility]) [I50.33]    Chronic diastolic CHF (congestive heart failure) ([facility]) [I50.32]    Increased anion gap metabolic acidosis [Y72.6]    Anemia, normocytic normochromic [D64.9]    Hypertension, uncontrolled [I10]    DELMER (acute kidney injury) ([facility] [address] [N17.9]         DELMER on CKD Stage III  - Cr 6.9 on admission  - baseline: ~ 1.1 with hx of bladder removal and non functioning left kidney  - Renal US noted chronic features   - started HD this admit. nephro managing  - UOP remains minimal. Might need tunneled catheter       Klebsiella UTI    On Rocephin IV for 5 days , stop   No fevers    Hx of Bladder Cancer S/p Radical Cystectomy with Ileal Conduit  Non-Functioning Left Kidney      HTN - uncontrolled. Started clonidine patch. Added coreg, cardura and hydralazine. Nifedipine and imdur  improvng    Anemia   hG 8.9>9   Monitor       Depression   paxil/lamictal   Decreased paxil with renal disease  Prn klonopin       Diarrhea.    Check WBC - negative  Diarrhea now resolved. DVT Prophylaxis: heparin  Diet: DIET RENAL;  Code Status: Full Code    PT/OT Eval Status: as tolerates.      Dispo - pcu    Jelena Ardon MD 10/7/2020 7:34 AM

## 2020-10-07 NOTE — PLAN OF CARE
Problem: Falls - Risk of:  Goal: Will remain free from falls  Description: Will remain free from falls  10/7/2020 0211 by Paulino Salas RN  Outcome: Ongoing  10/6/2020 1744 by Dominic Botello RN  Outcome: Ongoing  Goal: Absence of physical injury  Description: Absence of physical injury  10/7/2020 0211 by Paulino Salas RN  Outcome: Ongoing  10/6/2020 1744 by Dominic Botello RN  Outcome: Ongoing     Problem: Pain:  Goal: Pain level will decrease  Description: Pain level will decrease  10/7/2020 0211 by Paulino Salas RN  Outcome: Ongoing  10/6/2020 1744 by Dominic Botello RN  Outcome: Ongoing  Goal: Control of acute pain  Description: Control of acute pain  10/7/2020 0211 by Paulino Salas RN  Outcome: Ongoing  10/6/2020 1744 by Dominic Botello RN  Outcome: Ongoing  Goal: Control of chronic pain  Description: Control of chronic pain  10/7/2020 0211 by Paulino Salas RN  Outcome: Ongoing  10/6/2020 1744 by Dominic Botello RN  Outcome: Ongoing     Problem: OXYGENATION/RESPIRATORY FUNCTION  Goal: Patient will maintain patent airway  10/7/2020 0211 by Paulino Salas RN  Outcome: Ongoing  10/6/2020 1744 by Dominic Botello RN  Outcome: Ongoing  Goal: Patient will achieve/maintain normal respiratory rate/effort  Description: Respiratory rate and effort will be within normal limits for the patient  10/7/2020 0211 by Paulino Salas RN  Outcome: Ongoing  10/6/2020 1744 by Dominic Botello RN  Outcome: Ongoing     Problem: HEMODYNAMIC STATUS  Goal: Patient has stable vital signs and fluid balance  10/7/2020 0211 by Paulino Salas RN  Outcome: Ongoing  10/6/2020 1744 by Dominic Botello RN  Outcome: Ongoing     Problem: FLUID AND ELECTROLYTE IMBALANCE  Goal: Fluid and electrolyte balance are achieved/maintained  10/7/2020 0211 by Paulino Salas RN  Outcome: Ongoing  10/6/2020 1744 by Dominic Botello RN  Outcome: Ongoing     Problem: ACTIVITY INTOLERANCE/IMPAIRED MOBILITY  Goal: Mobility/activity is maintained at optimum level for patient  10/7/2020 0211 by Roc Gagnon RN  Outcome: Ongoing  10/6/2020 1744 by Rakel Moore RN  Outcome: Ongoing

## 2020-10-08 NOTE — PROGRESS NOTES
Spoke with Yohana Pérez in radiology in regards to ancef. He states he will give it down there right before the procedure.

## 2020-10-08 NOTE — PROGRESS NOTES
Shift assessment complete- see flow sheet. Patient is A/Ox4. VSS. Medications given without difficulty. Currently on 2 L NC, SpO2 WNL. Lung sounds diminished. Denies  shortness of breath. Denies CP. Patient denies any further needs. Call light explained and in reach. Bed alarmon. Will continue to monitor.

## 2020-10-08 NOTE — PLAN OF CARE
Problem: Falls - Risk of:  Goal: Will remain free from falls  Description: Will remain free from falls  10/8/2020 0102 by John Pollock RN  Outcome: Ongoing  10/7/2020 1751 by Avani Robbins RN  Outcome: Ongoing  Goal: Absence of physical injury  Description: Absence of physical injury  10/8/2020 0102 by John Pollock RN  Outcome: Ongoing  10/7/2020 1751 by Avani Robbins RN  Outcome: Ongoing     Problem: Pain:  Goal: Pain level will decrease  Description: Pain level will decrease  10/8/2020 0102 by John Pollock RN  Outcome: Ongoing  10/7/2020 1751 by Avani Robbins RN  Outcome: Ongoing  Goal: Control of acute pain  Description: Control of acute pain  10/8/2020 0102 by John Pollock RN  Outcome: Ongoing  10/7/2020 1751 by Avani Robbins RN  Outcome: Ongoing  Goal: Control of chronic pain  Description: Control of chronic pain  10/8/2020 0102 by John Pollock RN  Outcome: Ongoing  10/7/2020 1751 by Avani Robbins RN  Outcome: Ongoing     Problem: OXYGENATION/RESPIRATORY FUNCTION  Goal: Patient will maintain patent airway  10/8/2020 0102 by John Pollock RN  Outcome: Ongoing  10/7/2020 1751 by Avani Robbins RN  Outcome: Ongoing  Goal: Patient will achieve/maintain normal respiratory rate/effort  Description: Respiratory rate and effort will be within normal limits for the patient  10/8/2020 0102 by John Pollock RN  Outcome: Ongoing  10/7/2020 1751 by Avani Robbins RN  Outcome: Ongoing     Problem: HEMODYNAMIC STATUS  Goal: Patient has stable vital signs and fluid balance  10/8/2020 0102 by John Pollock RN  Outcome: Ongoing  10/7/2020 1751 by Avani Robbins RN  Outcome: Ongoing     Problem: FLUID AND ELECTROLYTE IMBALANCE  Goal: Fluid and electrolyte balance are achieved/maintained  10/8/2020 0102 by John Pollock RN  Outcome: Ongoing  10/7/2020 1751 by Avani Robbins RN  Outcome: Ongoing     Problem: ACTIVITY INTOLERANCE/IMPAIRED MOBILITY  Goal: Mobility/activity is maintained at optimum level for patient  10/8/2020 0102 by Kimberly Gan RN  Outcome: Ongoing  10/7/2020 1751 by Robbin Bey RN  Outcome: Ongoing     Problem: Nutrition  Goal: Optimal nutrition therapy  10/8/2020 0102 by Kimberly Gan RN  Outcome: Ongoing  10/7/2020 1751 by Robbin Bey RN  Outcome: Ongoing  Goal: Understanding of nutritional guidelines  10/8/2020 0102 by Kimberly Gan RN  Outcome: Ongoing  10/7/2020 1751 by Robbin Bey RN  Outcome: Ongoing

## 2020-10-08 NOTE — PROGRESS NOTES
Pt arrived for image guided tunneled dialysis catheter. Procedure explained including the risk and benefits of the procedure. All questions answered. Pt verbalizes understanding of the procedure and states no more questions. Consent signed. Vital signs stable see flow sheets. Labs, allergies, medications, and code status reviewed. No Contraindications noted. Pre Will score 10.

## 2020-10-08 NOTE — PROGRESS NOTES
Patient is resting showing no s/s of distress. Patient is alert and oriented. Most meds were held  due to a procedure and dialysis after. Patient is denying any needs. Bed is in lowest position and call light is within reach. Will continue to monitor. Shift assessment complete, see flowsheets.

## 2020-10-08 NOTE — PROGRESS NOTES
[facility]       Progress Note    Sub/interval history  Low appetite, continues to feel weak  Cr up to 2.5  Seen and examined on HD on 10/8 with goals to remove 1 L fluid    Last 24 h uop to 275 mL    ROS: No chest pain/shortness of breath/fever/nausea/vomiting  PSFH: No visitor    Scheduled Meds:   isosorbide mononitrate  30 mg Oral Daily    doxazosin  4 mg Oral BID    darbepoetin parminder-polysorbate  60 mcg Intravenous Weekly - Monday    carvedilol  12.5 mg Oral BID WC    hydrALAZINE  100 mg Oral 3 times per day    b complex-C-folic acid  1 mg Oral Daily    cloNIDine  1 patch Transdermal Weekly    lamoTRIgine  25 mg Oral 4x Daily    pantoprazole  40 mg Oral QAM AC    PARoxetine  40 mg Oral QAM    primidone  100 mg Oral Daily    sennosides-docusate sodium  2 tablet Oral Daily    sodium chloride flush  10 mL Intravenous 2 times per day    heparin (porcine)  5,000 Units Subcutaneous 3 times per day     Continuous Infusions:   sodium chloride       PRN Meds:.heparin (porcine), clonazePAM, albumin human, midodrine, sodium chloride flush, acetaminophen **OR** acetaminophen, promethazine **OR** ondansetron    Objective/     Vitals:    10/08/20 0946 10/08/20 0950 10/08/20 0956 10/08/20 1021   BP: (!) 169/68 (!) 159/67 (!) 144/64 (!) 167/71   Pulse: 101 93 79 78   Resp: 15 14 15 18   Temp:    98.1 °F (36.7 °C)   TempSrc:       SpO2: 96% 93% 94% 91%   Weight:    136 lb 11 oz (62 kg)   Height:         24HR INTAKE/OUTPUT:      Intake/Output Summary (Last 24 hours) at 10/8/2020 1330  Last data filed at 10/8/2020 0905  Gross per 24 hour   Intake 360 ml   Output 175 ml   Net 185 ml     Constitutional:  Alert, awake, no apparent distress  Cardiovascular:  S1, S2 without m/r/g  Respiratory:  CTA B without w/r/r  Abdomen: +bs, soft, nt  Ext: 1- LE edema    Data/  Recent Labs     10/08/20  1157   WBC 3.7*   HGB 8.2*   HCT 25.3*   MCV 90.3        Recent Labs     10/06/20  0808

## 2020-10-08 NOTE — CARE COORDINATION
INTERDISCIPLINARY PLAN OF CARE CONFERENCE    Date/Time: 10/8/2020 10:48 AM  Completed by: Corinne Belts, Case Management      Patient Name:  Marcus Welch  YOB: 1945  Admitting Diagnosis: DELMER (acute kidney injury) ([facility] [N17.9]  DELMER (acute kidney injury) ([facility] [N17.9]  DELMER (acute kidney injury) ([facility] [N17.9]  DELMER (acute kidney injury) ([facility] [N17.9]     Admit Date/Time:  9/30/2020 10:25 AM    Chart reviewed. Interdisciplinary team contacted or reviewed plan related to patient progress and discharge plans. Disciplines included Case Management, Nursing, and Dietitian. Current Status:ongoing  PT/OT recommendation for discharge plan of care: n/a    Expected D/C Disposition:  Home  Confirmed plan with patient and/or family Yes confirmed with: (name) pt    Discharge Plan Comments: Reviewed chart. Role of discharge planner explained and patient verbalized understanding. Pt states that she lives at home with her spouse and plans to return. Pt declines HHC. Pt states that she is active with [facility] 4 days a week. CM spoke with Dr. Anuja Chavez and he asked for CM to set up permanent HD slot. Pt wants [facility]. Cm called Manju Gonzalez with [facility] and she is finding a HD slot for pt at [facility] and will let CM know time slot and days. Pt is having tunnel cath placed today (10/8)      Will need to fax DARRIAN/AVS to Marilia Douglas at d/c ([phone]. Home O2 in place on admit: No  Pt informed of need to bring portable home O2 tank on day of discharge for nursing to connect prior to leaving:  Not Indicated  Verbalized agreement/Understanding:  Not Indicated    Addendum 10/8/2020 1500: Manju Gonzalez with [facility] stated that [facility] does nto have any beds, but the [facility] [place] for [employer], Sat with arrival at 0945am, and HD at 10am.   Pt agreed to go [place] [facility] [place]. Cm called Manju Gonzalez back to inform her of this.    CM informed Curtis Maravilla with [facility]

## 2020-10-08 NOTE — PROGRESS NOTES
anicteric  Neck: No JVD, no carotid bruit, no thyromegaly  Chest:  Clear to auscultation bilaterally, diminished in bases   Cardiovascular:  RRR S1S2 heard, no murmurs or gallops  Abdomen:  Soft, undistended, non tender, no organomegaly, BS present  Ileal conduit in lower abd  Extremities: 2+ Skyler LE edema  Distal pulses well felt  Neurological : grossly normal          Labs:   No results for input(s): WBC, HGB, HCT, PLT in the last 72 hours. Recent Labs     10/06/20  0835 10/07/20  0527 10/08/20  0500   * 136 131*   K 3.3* 4.0 3.5   CL 91* 96* 95*   CO2 25 28 28   BUN 9 13 17   CREATININE 2.1* 2.3* 2.5*   CALCIUM 9.2 9.1 8.7   PHOS  --  3.5 3.8     No results for input(s): AST, ALT, BILIDIR, BILITOT, ALKPHOS in the last 72 hours. Recent Labs     10/08/20  0500   INR 1.09       Assessment/Plan:    Active Hospital Problems    Diagnosis    Mild protein-calorie malnutrition ([facility] [E44.1]    Acute kidney injury superimposed on CKD ([facility] [N17.9, N18.9]    Acute on chronic diastolic CHF (congestive heart failure) (HCC) [I50.33]    Chronic diastolic CHF (congestive heart failure) (HCC) [I50.32]    Increased anion gap metabolic acidosis [N38.9]    Anemia, normocytic normochromic [D64.9]    Hypertension, uncontrolled [I10]    DELMER (acute kidney injury) ([facility] [N17.9]         DELMER on CKD Stage III  - Cr 6.9 on admission  - baseline: ~ 1.1 with hx of bladder removal and non functioning left kidney  - Renal US noted chronic features   - started HD this admit. nephro managing  - UOP remains minimal.  For tunneled catheter today       Klebsiella UTI    Received Rocephin IV for 5 days  No fevers    Hx of Bladder Cancer S/p Radical Cystectomy with Ileal Conduit  Non-Functioning Left Kidney      HTN - uncontrolled. Started clonidine patch. Added coreg, cardura and hydralazine.  Nifedipine and imdur  improving    Anemia   hG 8.9>9   Monitor       Depression   paxil/lamictal   Decreased paxil with renal disease  Prn klonopin       Diarrhea. Check WBC - negative  Diarrhea now resolved. DVT Prophylaxis: heparin  Diet: Diet NPO, After Midnight  Code Status: Full Code    PT/OT Eval Status: as tolerates.      Dispo - pcu    Mere Skinner MD 10/8/2020 7:33 AM

## 2020-10-08 NOTE — PROGRESS NOTES
RADIOLOGY:  Patient status post removal of temporary dialysis catheter followed by fluoroscopically guided placement of right internal jugular tunneled dialysis catheter. Patient tolerated the procedure well. Full report to follow.

## 2020-10-08 NOTE — PROGRESS NOTES
Spoke with  about patients elevated BPs. OK to give coreg and IMDUR this morning. Patient's daughter called and updated at this time.

## 2020-10-08 NOTE — FLOWSHEET NOTE
10/08/20 1430   Vital Signs   Temp 99 °F (37.2 °C)   Temp Source Oral   Pulse 86   Heart Rate Source Monitor   Resp 18   BP (!) 174/85   BP Location Right upper arm   BP Upper/Lower Upper   Patient Position Sitting   Level of Consciousness 0   MEWS Score 1   Patient Currently in Pain Denies   Oxygen Therapy   SpO2 98 %   Pulse Oximeter Device Mode Intermittent   Pulse Oximeter Device Location Finger   O2 Device None (Room air)   Patient back from dialysis. VSS. Patient requestin paxil at this time. Paxil, Lamictal, and hydralazine all administered at this time.

## 2020-10-08 NOTE — FLOWSHEET NOTE
Treatment time: 3.5 hours  Net UF: 1100 ml    Pre weight: 62 kg   Post weight: 60.7 kg  EDW: TBD kg    Access used: Right chest wall TDC  Access function: Good with  ml/min    Medications or blood products given: no    Regular outpatient schedule: DELMER, on T. T.S now [facility] after DC)    Summary of response to treatment: Pt tolerated well with HD, HD completed in full, heparin dwell in [facility], capped and clamped. Pt c/o headache 3/10 after HD, gave Tylenol oral.     Cirt Line: Initial Hct: 25.0;   End Profile : A; Refill ( Hct.1 -27.1  subtract Hct 2- 27.2): -0.1 ( no  Refill); BV: -7.9 %      Copy of dialysis treatment record placed in chart, to be scanned into EMR. 10/08/20 1021 10/08/20 1403   Vital Signs   BP (!) 167/71 (!) 164/78   Temp 98.1 °F (36.7 °C) 98 °F (36.7 °C)   Pulse 78 104   Resp 18 16   SpO2 91 % 93 %   Weight 136 lb 11 oz (62 kg) 133 lb 13.1 oz (60.7 kg)   Weight Method Actual;Bed scale  ( 1 pillow, 1 sheet and 1 pad) Actual;Bed scale   Percent Weight Change 0.95 -2.1   Post-Hemodialysis Assessment   Post-Treatment Procedures  --  Blood returned;Catheter capped, clamped and heparinized x 2 ports   Machine Disinfection Process  --  Acid/Vinegar Clean;Heat Disinfect; Exterior Machine Disinfection   Rinseback Volume (ml)  --  400 ml   Total Liters Processed (l/min)  --  77.2 l/min   Dialyzer Clearance  --  Lightly streaked   Duration of Treatment (minutes)  --  210 minutes   Heparin amount administered during treatment (units)  --  0 units   Hemodialysis Intake (ml)  --  400 ml   Hemodialysis Output (ml)  --  1500 ml   NET Removed (ml)  --  1100 ml   Tolerated Treatment  --  Good

## 2020-10-08 NOTE — PROGRESS NOTES
Informed patient's daughter Demi Thornton about [facility] dialysis location, date and time. Daughter agreeable to assist mother to appointments.

## 2020-10-09 NOTE — PROGRESS NOTES
Per dr Dalton Alan.    [facility] to verify medication/dose/frequency. Pt takes 20mg of lamictal TID. Updated in home med rec and discharge orders.

## 2020-10-09 NOTE — PROGRESS NOTES
[facility]       Progress Note    Sub/interval history  Still has poor appetite  HD on 10/8 with goals to remove 1 L fluid    Last 24 h uop to 475 mL    ROS: No chest pain/shortness of breath/fever/nausea/vomiting  PSFH: No visitor    Scheduled Meds:   isosorbide mononitrate  30 mg Oral Daily    doxazosin  4 mg Oral BID    darbepoetin parminder-polysorbate  60 mcg Intravenous Weekly - Monday    carvedilol  12.5 mg Oral BID WC    hydrALAZINE  100 mg Oral 3 times per day    b complex-C-folic acid  1 mg Oral Daily    cloNIDine  1 patch Transdermal Weekly    lamoTRIgine  25 mg Oral 4x Daily    pantoprazole  40 mg Oral QAM AC    PARoxetine  40 mg Oral QAM    primidone  100 mg Oral Daily    sennosides-docusate sodium  2 tablet Oral Daily    sodium chloride flush  10 mL Intravenous 2 times per day    heparin (porcine)  5,000 Units Subcutaneous 3 times per day     Continuous Infusions:    PRN Meds:.heparin (porcine), clonazePAM, albumin human, midodrine, sodium chloride flush, acetaminophen **OR** acetaminophen, promethazine **OR** ondansetron    Objective/     Vitals:    10/08/20 1902 10/09/20 0128 10/09/20 0534 10/09/20 0845   BP: (!) 164/65 (!) 180/84 (!) 173/86 (!) 159/53   Pulse: 98 77  100   Resp: 16 18  18   Temp: 98.3 °F (36.8 °C) 97.2 °F (36.2 °C)  97.8 °F (36.6 °C)   TempSrc: Oral Oral  Oral   SpO2: 96% 95%  97%   Weight:  135 lb 3.2 oz (61.3 kg)     Height:         24HR INTAKE/OUTPUT:      Intake/Output Summary (Last 24 hours) at 10/9/2020 1300  Last data filed at 10/9/2020 1252  Gross per 24 hour   Intake 1120 ml   Output 1975 ml   Net -855 ml     Constitutional:  Alert, awake, no apparent distress  Cardiovascular:  S1, S2 without m/r/g  Respiratory:  CTA B without w/r/r  Abdomen: +bs, soft, nt  Ext: 1- LE edema    Data/  Recent Labs     10/08/20  1157   WBC 3.7*   HGB 8.2*   HCT 25.3*   MCV 90.3        Recent Labs     10/07/20  0527 10/08/20  0500 10/09/20  0414  131* 136   K 4.0 3.5 3.9   CL 96* 95* 101   CO2 28 28 25   GLUCOSE 114* 102* 114*   PHOS 3.5 3.8 2.5   BUN 13 17 7   CREATININE 2.3* 2.5* 1.5*   LABGLOM 21* 19* 34*   GFRAA 25* 23* 41*     Lab Results   Component Value Date    IRON 31 (L) 10/08/2020    TIBC 244 (L) 10/08/2020    FERRITIN 34.6 10/08/2020   Transferrin saturation 13%    Assessment/   -DELMER on CKD 3 in the setting of diarrhea, most likely volume depleted; now progressed to ATN w oliguria; HD initiated on 10/1/20. Has RIJ vas cath, changed to tunneled dialysis catheter on 10/8  -Chronic kidney disease stage III with baseline creatinine of mid to low ones, has 1 kidney and obstructive uropathy  -Metabolic acidosis consistent with diarrhea and DELMER  -Abnormal UA consistent with ileal conduit sample  -Diarrhea  -history of CHF with grade 2 diastolic dysfunction and mild pulmonary hypertension  -Hypertension. On Doxazosin and Hydralazine.     Plan  -HD Tuesday Thursday Saturday, outpatient target weight 60.5, she has been [facility]  -Strict urine output measurement  - Will monitor closely for signs of renal recovery.               -Serial renal panel  -daily wts and strict i/o  -renal dose medications   -avoid nephrotoxins      Brenton Ferrell MD  [facility]  Office: [phone]  Fax:    [phone]

## 2020-10-09 NOTE — PROGRESS NOTES
[facility]off [facility] and [facility] Patient in [facility] condition, denies needs/concerns at this time. Call light within reach.

## 2020-10-09 NOTE — PROGRESS NOTES
Attempted to call PCP for discharge hospital follow up appointment-RN was advised that they will call back to schedule appointment.

## 2020-10-09 NOTE — PROGRESS NOTES
Inpatient Physical Therapy Evaluation and Treatment    Unit: PCU  Date:  10/9/2020  Patient Name:    Juan Mederos  Admitting diagnosis:  DELMER (acute kidney injury) ([facility] [N17.9]  [facility] [N17.9]  DELMER (acute kidney injury) ([facility] [N17.9]  DELMER (acute kidney injury) ([facility] [N17.9]  Admit Date:  9/30/2020  Precautions/Restrictions/WB Status/ Lines/ Wounds/ Oxygen: Fall risk, Bed/chair alarm, Telemetry and R tunnel HD catheter, ostomy bag    Treatment Time:  11:05-11:40  Treatment Number:  1   Timed Code Treatment Minutes: 25 minutes  Total Treatment Minutes:  35  minutes    Patient Goals for Therapy: \" go home \"          Discharge Recommendations: Home PRN assist  and Home PT - pt declining home healthcare, states she has an aid  DME needs for discharge: steve RW       Therapy recommendation for EMS Transport: can transport by wheelchair    Therapy recommendations for staff:   Stand by assist with use of rolling walker (RW) for all transfers and ambulation to/from bathroom  within halls    History of Present Illness: (Per H&P by Brissa Don on 9/30/20)  66 y.o. female with hypertension, hyperlipidemia, GERD, h/o bladder cancer, and CHF who presents to [facility] with c/o having abnormal labs. She was admitted to [facility] 9/20-9/24. She was diuresed with IV lasix for acute diastolic CHF. Now presenting with DELMER. She reports poor PO intake, diarrhea the last 2 days, nausea, fatigue, and weakness. She has not felt well since she was admitted the last time. Per Dr. Claudia Morales progress note on 10/9/20 - pt started HD this admission, slow renal recovery, now has tunneled HD catheter and is tolerating HD    Home Health S4 Level Recommendation:  Level 1 Standard  AM-PAC Mobility Score    AM-PAC Inpatient Mobility Raw Score : 20       Preadmission Environment    Pt.  Lives with spouse (uses a walker intermittently, on home O2)  Home environment:  one story home  Steps to enter first floor: 1 step to enter (pt holds onto door frame)  Steps to second floor: N/A  Bathroom: tub with door, shower chair, grab bars; comfort height toilet  Equipment owned: wc (manual), shower chair/bench, BSC, quad cane, SPC, reacher and lifealert  Pt sleeps either in flat bed or standard recliner    Preadmission Status:  Pt. Able to drive: No - daughter drives  Pt Fully independent with ADLs: Yes  Pt. Required assistance from family for: Cleaning, Cooking, Dressing and Laundry     [facility]HA 4x/wk for household chores   Daughter assists with medication management  Pt. independent for transfers and gait and walked with No Device - occasionally holding onto furniture  History of falls No    Pain   Yes  Location: B legs ache  Rating: mild /10  Pain Medicine Status: Received pain med prior to tx    Cognition    A&O Person, Place and Time (off by one day)  Able to follow 2 step commands    Subjective  Patient sitting up in chair with no family present. Pt agreeable to this PT eval & tx. Upper Extremity ROM/Strength  Please see OT evaluation.       Lower Extremity ROM / Strength   AROM WFL: Yes    Strength Assessment (measured on a 0-5 scale):  R LE   Quad   5/5   Ant Tib  5/5   Hamstring 5/5   Iliopsoas 4+  L LE  Quad   5/5   Ant Tib  5/5   Hamstring 5/5   Iliopsoas 4+    Lower Extremity Sensation    Diminished to light touch sensation in B feet and legs (feet more impaired than legs)    Lower Extremity Proprioception:   WFL    Coordination and Tone  WFL    Balance  Sitting:  Normal; Independent  Comments: for static and dynamic sitting tasks in chair    Standing: Good - ; SBA  Comments: 2 minutes for UE reaching in and outside DAVID x6 with CGA, pt mildly unsteady   Pt's static standing balance improved with B UE support on RW    Bed Mobility   Supine to Sit:    Not Tested  Sit to Supine:   Not Tested  Rolling:   Not Tested  Scooting in sitting: Independent  Scooting in supine:  Not Tested    Transfer Training     Sit to stand: Supervision  Stand to sit:   Supervision with cues for hand placement  Bed to Chair:   Not Tested with use of N/A    Gait gait completed as indicated below  Distance:      100 ft  Deviations (firm surface/linoleum):  decreased dakota, increased DAVID, decreased step length bilaterally (minimal step length bilaterally) and increased medial/lateral sway  Assistive Device Used:    gait belt; added RW for 2nd half of ambulation  Level of Assist:    CGA without AD; SBA with RW  Comment: pt with improved dakota, balance and step length with use of RW    Stair Training deferred, pt unsafe/ not appropriate to complete stairs at this time    Activity Tolerance   Pt completed therapy session with SOB noted with ambulation   Vitals WFL prior to ambulation    Positioning Needs   Pt up in chair, alarm set, positioned in proper neutral alignment and pressure relief provided. Call light provided and all needs within reach    Exercises Initiated  Kishor deferred secondary to treatment focus on functional mobility  NA    Other  None. Patient/Family Education   Pt educated on role of inpatient PT, POC, importance of continued activity, DC recommendations, safety awareness, transfer techniques, pursed lip breathing, pacing activity and calling for assist with mobility. Educated on role of home therapy    Assessment  Pt seen for Physical Therapy evaluation in acute care setting. Pt demonstrated decreased Activity tolerance, Balance and Safety as well as decreased independence with Ambulation, Bed Mobility  and Transfers. Recommending Home 24 hr assist upon discharge as patient functioning below baseline level and would benefit from continued therapy services, however pt is declining home PT. Pt's balance and gait pattern did improve significantly with use of RW, and recommend RW at discharge for pt use at home. Goals :   Anticipate pt to discharge today.      Rehabilitation Potential: Good  Strengths for achieving goals include:   Pt motivated, PLOF, Family Support and Pt cooperative   Barriers to achieving goals include:    Complexity of condition    Plan    To be seen 2-3 x / week  while in acute care setting for therapeutic exercises, bed mobility, transfers, progressive gait training, balance training, and family/patient education. Pt to discharge home today. Signature: Melba Velazquez PT, DPT #[id number]    If patient discharges from this facility prior to next visit, this note will serve as the Discharge Summary.

## 2020-10-09 NOTE — CARE COORDINATION
DISCHARGE ORDER  Date/Time 10/9/2020 10:22 AM  Completed by: Efe Greenwood, Case Management    Patient Name: Brittnee Carey      : 1945  Admitting Diagnosis: DELMER (acute kidney injury) ([facility] [N17.9]  DELMER (acute kidney injury) ([facility] [N17.9]  DELMER (acute kidney injury) ([facility] [N17.9]  DELMER (acute kidney injury) ([facility] [N17.9]      Admit order Date and Status:2020  (verify MD's last order for status of admission)      Noted discharge order. If applicable PT/OT recommendation at Discharge: n/a  DME recommendation by PT/OT:n/a  Confirmed discharge plan  (pt): Yes  with whom_______pt________  If pt confirmed DC plan does family need to be contacted by CM No if yes who___n/a___  Discharge Plan: Reviewed chart. Role of discharge planner explained and patient verbalized understanding. Discharge order is noted. Pt is being d/c'd to home today. Pt's O2 sats are 97% on RA. Pt has [facility] [place] [facility] [place], arive at 0945am. Pt is aware. Orysia Mortimer with [facility] is aware, as she states that she will tell Maricel Carrasquillo with [facility] and that they will fax all info to them. Pt declines C. Pt states that she lives with her spouse and has 24/7 supervision from her spouse. CM spoke with Jeannie Gurrola RN, and informed her that pt will need a Cardiology or PCP appt within 7 days of d/c that Jeannie Gurrola, [facility], will need to make for pt. Rosie Lopez, verbalized understanding. No further discharge needs needed or noted. Faxed DARRIAN/AVS to Sina Flores with [employer] to fax#: [phone]. Per PT/OT, needs a jr rolling walker. Pt chose [facility] for DME. CM provided this and placed paperwork in [facility] drawer. Reviewed chart. Role of discharge planner explained and patient verbalized understanding. Discharge order is noted.     Has Home O2 in place on admit:  No  Informed of need to bring portable home O2 tank on day of discharge for nursing to connect prior to leaving:   Not Indicated  Verbalized agreement/Understanding:   Not Indicated    Discharge timeout done with Harry Catalan RN. All discharge needs and concerns addressed.

## 2020-10-09 NOTE — PROGRESS NOTES
Hospitalist Progress Note      PCP: Shanika Eller MD    Date of Admission: 9/30/2020      Hospital Course: patient is a 66y.o. female, h/o bladder cancer s/p ileal conduit following radical cystectomy     with c/o having abnormal labs. ARF       She was admitted to [facility] 9/20-9/24. Started dialysis this admit. Holding HD for last 3 days, slow renal recovery but UOP remains low at 275 ml last 24 hrs  S/p tunneled HD catheter  And tolerated HD     Subjective: -    Pt seen up in chair, feels ok today . Ready to go home per pt    UOP improving to 475 ml       Medications:  Reviewed    Infusion Medications     Scheduled Medications    isosorbide mononitrate  30 mg Oral Daily    doxazosin  4 mg Oral BID    darbepoetin parminder-polysorbate  60 mcg Intravenous Weekly - Monday    carvedilol  12.5 mg Oral BID WC    hydrALAZINE  100 mg Oral 3 times per day    b complex-C-folic acid  1 mg Oral Daily    cloNIDine  1 patch Transdermal Weekly    lamoTRIgine  25 mg Oral 4x Daily    pantoprazole  40 mg Oral QAM AC    PARoxetine  40 mg Oral QAM    primidone  100 mg Oral Daily    sennosides-docusate sodium  2 tablet Oral Daily    sodium chloride flush  10 mL Intravenous 2 times per day    heparin (porcine)  5,000 Units Subcutaneous 3 times per day     PRN Meds: heparin (porcine), clonazePAM, albumin human, midodrine, sodium chloride flush, acetaminophen **OR** acetaminophen, promethazine **OR** ondansetron      Intake/Output Summary (Last 24 hours) at 10/9/2020 0742  Last data filed at 10/9/2020 0659  Gross per 24 hour   Intake 700 ml   Output 1975 ml   Net -1275 ml       Physical Exam Performed:    BP (!) 173/86   Pulse 77   Temp 97.2 °F (36.2 °C) (Oral)   Resp 18   Ht 4' 11\" (1.499 m)   Wt 135 lb 3.2 oz (61.3 kg)   SpO2 95%   BMI 27.31 kg/m²         General: elderly female in bed  Benign head tremor noted    Awake, alert and oriented.  Appears to be not in any distress  Right IJ tunneled HD catheter  Mucous Membranes:  Pink , anicteric  Neck: No JVD, no carotid bruit, no thyromegaly  Chest:  Clear to auscultation bilaterally, diminished in bases   Cardiovascular:  RRR S1S2 heard, no murmurs or gallops  Abdomen:  Soft, undistended, non tender, no organomegaly, BS present  Ileal conduit in lower abd  Extremities: resolving 2+ Skyler LE edema  Distal pulses well felt  Neurological : grossly normal  Benign head tremor noted        Labs:   Recent Labs     10/08/20  1157   WBC 3.7*   HGB 8.2*   HCT 25.3*        Recent Labs     10/07/20  0527 10/08/20  0500 10/09/20  0416    131* 136   K 4.0 3.5 3.9   CL 96* 95* 101   CO2 28 28 25   BUN 13 17 7   CREATININE 2.3* 2.5* 1.5*   CALCIUM 9.1 8.7 8.9   PHOS 3.5 3.8 2.5     No results for input(s): AST, ALT, BILIDIR, BILITOT, ALKPHOS in the last 72 hours. Recent Labs     10/08/20  0500   INR 1.09       Assessment/Plan:    Active Hospital Problems    Diagnosis    Mild protein-calorie malnutrition ([facility] [E44.1]    Acute kidney injury superimposed on CKD ([facility] [N17.9, N18.9]    Acute on chronic diastolic CHF (congestive heart failure) (HCC) [I50.33]    Chronic diastolic CHF (congestive heart failure) (HCC) [I50.32]    Increased anion gap metabolic acidosis [D88.1]    Anemia, normocytic normochromic [D64.9]    Hypertension, uncontrolled [I10]    DELMER (acute kidney injury) ([facility] [N17.9]         DELMER on CKD Stage III  - Cr 6.9 on admission  - baseline: ~ 1.1 with hx of bladder removal and non functioning left kidney  - Renal US noted chronic features   - started HD this admit. Placed tunneled HD catheter 10/8, tolerating HD  - nephro managing  - UOP slowly recovering. F/w outpt       Klebsiella UTI    Received Rocephin IV for 5 days  No fevers    Hx of Bladder Cancer S/p Radical Cystectomy with Ileal Conduit  Non-Functioning Left Kidney      HTN - uncontrolled. Started clonidine patch. Added coreg, cardura and hydralazine.  Nifedipine and imdur  improving    Anemia   hG 8.9>9   Monitor       Depression   paxil/lamictal   Decreased paxil with renal disease  Prn klonopin       Diarrhea. Check WBC - negative  Diarrhea now resolved. DVT Prophylaxis: heparin  Diet: DIET RENAL;  Code Status: Full Code    PT/OT Eval Status: as tolerates.      Dispo - pcu  Dc planning miguel Romero MD 10/9/2020 7:42 AM

## 2020-10-09 NOTE — PROGRESS NOTES
Shift assessment completed. Pt is alert and oriented. Vital signs are WNL. BP elevated. Respirations are even & easy but dyspnea with exertion. Vas cath dressing noted to have drainage the size of a quarter. Marked and will monitor. No complaints voiced. Swelling noted to BLE. Elevated at this time. Pedal pulse felt. Overall pt reports not feeling well. Pt denies needs at this time. SR up x 2 and bed in low position. Call light is within reach. Will monitor.

## 2020-10-09 NOTE — PLAN OF CARE
Problem: Falls - Risk of:  Goal: Will remain free from falls  Description: Will remain free from falls  Outcome: Ongoing     Problem: Pain:  Goal: Pain level will decrease  Description: Pain level will decrease  Outcome: Ongoing     Problem: FLUID AND ELECTROLYTE IMBALANCE  Goal: Fluid and electrolyte balance are achieved/maintained  Outcome: Ongoing

## 2020-10-09 NOTE — PROGRESS NOTES
Inpatient Occupational Therapy  Evaluation and Treatment    Unit: PCU  Date:  10/9/2020  Patient Name:    Vannesa Knight  Admitting diagnosis:  DELMER (acute kidney injury) ([facility] [N17.9]  [facility]) [facility] [N17.9]  DELMER (acute kidney injury) ([facility] [N17.9]  DELMER (acute kidney injury) ([facility] [N17.9]  Admit Date:  9/30/2020  Precautions/Restrictions/WB Status/ Lines/ Wounds/ Oxygen: fall risk, bed/chair alarm and telemetry, R tunnel HD catheter, ostomy bag    Treatment Time:  4387-6291  Treatment Number: 1     Billable Treatment Time: 25 minutes   Total Treatment Time:   35   minutes    Patient Goals for Therapy:  \" go home \"      Discharge Recommendations: Home with PRN assist and Home with 24/7 supervision  DME needs for discharge: RW       Therapy recommendations for staff:   Assist of 1 with use of rolling walker (RW) for all transfers within room    History of Present Illness: 66 y.o. female with hypertension, hyperlipidemia, GERD, h/o bladder cancer, and CHF who presents to [facility] with c/o having abnormal labs. She was admitted to [facility] 9/20-9/24. She was diuresed with IV lasix for acute diastolic CHF. Now presenting with DELMER. She reports poor PO intake, diarrhea the last 2 days, nausea, fatigue, and weakness. She has not felt well since she was admitted the last time. Home Health S4 Level Recommendation:  Level 1 Standard  AM-PAC Score:  22    Preadmission Environment    Pt.  Lives with spouse (uses a walker intermittently, on home O2)  Home environment:            one story home  Steps to enter first floor: 1 step to enter (pt holds onto door frame)  Steps to second floor:         N/A  Bathroom: tub with door, shower chair, grab bars; comfort height toilet  Equipment owned: wc (manual), shower chair/bench, BSC, quad cane, SPC, reacher and lifealert  Pt sleeps either in flat bed or standard recliner     Preadmission Status:  Pt. Able to drive: No - daughter drives  Pt Fully independent with ADLs: Yes  Pt. Required assistance from family for: Cleaning, Cooking, Dressing and Laundry               HHA 4x/wk for household chores             Daughter assists with medication management  Pt. independent for transfers and gait and walked with No Device - occasionally holding onto furniture  History of falls          No    Pain   Yes  Location: B legs ache  Rating: mild /10  Pain Medicine Status: Received pain med prior to tx     Cognition    A&O Person, Place and Time (off by one day)  Able to follow 2 step commands     Subjective  Patient sitting up in chair with no family presentPt agreeable to this OT eval & tx. Upper Extremity ROM:    WFL,  pt able to perform all bed mobility, transfers, and gait without ROM limitation. Upper Extremity Strength:    BUE strength WFL, but not formally assessed w/ MMT    Upper Extremity Sensation    Impaired reports numbness and tingling in bilateral UEs    Upper Extremity Proprioception:  WFL    Coordination and Tone  WFL    Balance  Functional Sitting Balance:  WFL  Functional Standing Balance:Impaired, SBA with RW, CGA without AD    Bed mobility:    Supine to sit:   Not Tested  Sit to supine:   Not Tested  Rolling:    Not Tested  Scooting in sitting:  Not Tested  Scooting to head of bed:   Not Tested    Bridging:   Not Tested    Transfers:    Sit to stand:  SBA  Stand to sit:  SBA  Bed to chair:   SBA  Standard toilet: Not Tested  Bed to [facility]:  Not Tested    Dressing:      UE:   Not Tested  LE:    Min A    Bathing:    UE:  Not Tested  LE:  Not Tested    Eating:   Independent    Toileting:  Not Tested    Activity Tolerance   Pt completed therapy session with No adverse symptoms noted w/activity  SpO2:   HR:   BP:     Positioning Needs:   Up in chair, call light and needs in reach.       Exercise / Activities Initiated:   N/A    Patient/Family Education:   Role of OT  Recommendations for DC    Assessment of Deficits: Pt seen for Occupational therapy evaluation in acute care setting. Pt demonstrated decreased Activity tolerance, ADLs, IADLs, Balance , Safety Awareness, Transfers and Coping Skills. Pt functioning below baseline and will likely benefit from skilled occupational therapy services to maximize safety and independence. Goal(s) : To be met in 3 Visits:  1). Bed to toilet/BSC: Independent    To be met in 5 Visits:  1). Supine to/from Sit:  Independent  2). Upper Body Bathing:   Independent  3). Lower Body Bathing:   Independent  4). Upper Body Dressing:  Independent  5). Lower Body Dressing:  Independent  6). Pt to demonstrate UE exs x 15 reps with minimal cues    Rehabilitation Potential:  Good for goals listed above. Strengths for achieving goals include: PLOF  Barriers to achieving goals include:  No barriers     Plan: To be seen 2-3 x/wk  while in acute care setting for therapeutic exercises, bed mobility, transfers, dressing, bathing, family/patient education, ADL/IADL retraining, energy conservation training.      Laura Lopez, OTR/L [id number]            If patient discharges from this facility prior to next visit, this note will serve as the Discharge Summary

## 2020-10-10 NOTE — CARE COORDINATION
[facility] Initial Follow Up Call    Call within 2 business days of discharge: Yes    Patient: Giana Duff Patient : 1945   MRN: [id number]  Discharge Date: 10/9/20   RARS: Readmission Risk Score: 33      Last Discharge [facility]       Complaint Diagnosis Description Type Department Provider    20 Abnormal Lab DELMER (acute kidney injury) [facility]) ED to Hosp-Admission (Discharged) ([facility]) Oswaldo Valderrama MD; Mi. ..           1st attempt - reached spouse who states Giana Duff is at HD as scheduled. Provided him with CTN contact requesting return call from Felix Rogers when she returns home. Follow Up  No future appointments.     Mendel Trotter RN

## 2020-10-12 PROBLEM — I50.43 CHF (CONGESTIVE HEART FAILURE), NYHA CLASS I, ACUTE ON CHRONIC, COMBINED (HCC): Status: ACTIVE | Noted: 2020-01-01

## 2020-10-12 PROBLEM — I50.9 HEART FAILURE EXACERBATED BY SOTALOL (HCC): Status: ACTIVE | Noted: 2020-01-01

## 2020-10-12 PROBLEM — I50.9 CHF WITH UNKNOWN LVEF (HCC): Status: ACTIVE | Noted: 2020-01-01

## 2020-10-12 NOTE — CARE COORDINATION
Reviewed appropriate site of care based on symptoms and resources available to patient including: PCP. The patient agrees to contact the PCP office for questions related to their healthcare. Discussed follow-up appointments. If no appointment was previously scheduled, appointment scheduling offered: Yes. Is follow up appointment scheduled within 7 days of discharge? No  [facility] follow up appointment(s): Dr Lubna Parr on 10/19/2020 at 11:15am    Plan for follow-up call in 1-2 days based on severity of symptoms and risk factors. Plan for next call: symptom management-diarrhea    States had 2 episodes of diarrhea this morning. Denies fever, chills, n/v, abd pain. Instructed her to monitor, if increased or uncontrolled or accompanied by prior listed symptoms to contact her PCP for same/next day appt. She voices understanding. Medication reconciliation was attempted but patient states her meds are managed by her daughter Madeline Monreal and she is unsure if she reviewed the AVS when she updated her med box. She asked CTN to call dtr to review meds. Gwen Trevor states HD going well, [facility] at this time. CTN provided contact information for future needs. Message left on daughter's voice mail requesting return call.     Care Transitions 24 Hour Call    Do you have any ongoing symptoms?:  No  Do you have a copy of your discharge instructions?:  Yes  Do you have all of your prescriptions and are they filled?:  Yes  Have you been contacted by a [address]?:  No  Have you scheduled your follow up appointment?:  Yes  How are you going to get to your appointment?:  Car - family or friend to transport  Were you discharged with any Home Care or Post Acute Services:  No  Do you have support at home?:  Partner/Spouse/SO  Do you feel like you have everything you need to keep you well at home?:  Yes  Are you an active caregiver in your home?:  No  Care Transitions Interventions         Follow Up  No future appointments.     Minoo Patel RN

## 2020-10-12 NOTE — ED PROVIDER NOTES
MT. [facility] EMERGENCY DEPARTMENT    CHIEF COMPLAINT  Shortness of Breath (pt went to [place] and had come back out and was noted to be sob daughter monitored withoulse ox at home today and sats were high 80's pt took her husbands hhn treatment today and called squad. Christopher Mcknight states o2 in high 80's upon arrival , no fever noted. )       HISTORY OF PRESENT ILLNESS  Reena Ac is a 66 y.o. female with past medical history including bladder cancer, status post remote urostomy, CHF, hypertension, hyperlipidemia, status post right IJ nontunneled HD catheter placement 10/1/2020 on dialysis Tuesday, Thursday, Saturday (last dialysis Saturday) who presents to the ED with shortness of breath. Symptoms started earlier today. Denies fever, cough, congestion, chest pain, nausea, vomiting, abdominal pain. Denies recent travel. No known COVID 19 exposure. Complains of diarrhea, nonbloody. No other complaints, modifying factors or associated symptoms.      I have reviewed the following from the nursing documentation:    Past Medical History:   Diagnosis Date    Acid reflux     Cancer ([address]     bladder cancer    CHF (congestive heart failure) (HCC)     Chronic kidney disease     Hyperlipidemia     Hypertension     Kidney stone      Past Surgical History:   Procedure Laterality Date    ABDOMEN SURGERY      APPENDECTOMY      BLADDER STONE REMOVAL      BLADDER SURGERY      pt had bladder cancer    CATARACT REMOVAL WITH IMPLANT  8/26/11    RIGHT    CHOLECYSTECTOMY  03/06/2018    COLONOSCOPY      EYE SURGERY      HYSTERECTOMY      IR NONTUNNELED VASCULAR CATHETER  10/1/2020    IR NONTUNNELED VASCULAR CATHETER 10/1/2020 [facility] PROCEDURES    IR TUNNELED CATHETER PLACEMENT GREATER THAN 5 YEARS  10/8/2020    IR TUNNELED CATHETER PLACEMENT GREATER THAN 5 YEARS 10/8/2020 [address] SPECIAL PROCEDURES    OTHER SURGICAL HISTORY  05/08/2015    phacemilsification of cataract with intraocular lens implant left eye     Family History   Problem Relation Age of Onset    Heart Disease Mother      Social History     Socioeconomic History    Marital status:      Spouse name: Not on file    Number of children: Not on file    Years of education: Not on file    Highest education level: Not on file   Occupational History    Not on file   Social Needs    Financial resource strain: Not on file    Food insecurity     Worry: Not on file     Inability: Not on file    Transportation needs     Medical: Not on file     Non-medical: Not on file   Tobacco Use    Smoking status: Former Smoker    Smokeless tobacco: Never Used    Tobacco comment: quit 25 years ago   Substance and Sexual Activity    Alcohol use: No    Drug use: No    Sexual activity: Not Currently   Lifestyle    Physical activity     Days per week: Not on file     Minutes per session: Not on file    Stress: Not on file   Relationships    Social connections     Talks on phone: Not on file     Gets together: Not on file     Attends Restorationist service: Not on file     Active member of club or organization: Not on file     Attends meetings of clubs or organizations: Not on file     Relationship status: Not on file    Intimate partner violence     Fear of current or ex partner: Not on file     Emotionally abused: Not on file     Physically abused: Not on file     Forced sexual activity: Not on file   Other Topics Concern    Not on file   Social History Narrative    Not on file     No current facility-administered medications for this encounter.       Current Outpatient Medications   Medication Sig Dispense Refill    hydrALAZINE (APRESOLINE) 100 MG tablet Take 1 tablet by mouth 3 times daily 90 tablet 0    carvedilol (COREG) 12.5 MG tablet Take 1 tablet by mouth 2 times daily (with meals) 60 tablet 3    cloNIDine (CATAPRES) 0.2 MG/24HR PTWK Place 1 patch onto the skin once a week 4 patch 1    isosorbide mononitrate (IMDUR) 30 MG extended release tablet Take 1 tablet by mouth daily 30 tablet 3    PARoxetine (PAXIL) 20 MG tablet Take 2 tablets by mouth every morning 30 tablet 3    lamoTRIgine (LAMICTAL) 25 MG tablet Take 25 mg by mouth 3 times daily      doxazosin (CARDURA) 4 MG tablet Take 4 mg by mouth 2 times daily      primidone (MYSOLINE) 50 MG tablet Take 100 mg by mouth daily      senna-docusate (SENOKOT S) 8.6-50 MG per tablet Take 2 tablets by mouth daily 30 tablet 1    pantoprazole (PROTONIX) 40 MG tablet Take 40 mg by mouth daily      B Complex Vitamins (B-COMPLEX/B-12) TABS Take 100 mg by mouth daily      gemfibrozil (LOPID) 600 MG tablet Take 600 mg by mouth 2 times daily (before meals). Allergies   Allergen Reactions    Ciprofloxacin Hives       REVIEW OF SYSTEMS  10 systems reviewed, pertinent positives and negatives per HPI, otherwise noted to be negative. PHYSICAL EXAM  ED Triage Vitals [10/12/20 1653]   Enc Vitals Group      /71      Pulse 107      Resp 22      Temp 98.7 °F (37.1 °C)      Temp Source Oral      SpO2 98 %      Weight 136 lb (61.7 kg)      Height 4' 11\" (1.499 m)      Head Circumference       Peak Flow       Pain Score       Pain Loc       Pain Edu? Excl. in [address]? General appearance: Awake and alert. Cooperative. No acute distress. HENT: Normocephalic. Atraumatic. Mucous membranes are moist.  Neck: Supple. Eyes: PERRL. EOMI. Heart/Chest: RRR. No murmurs. Right chest wall vas cath in place. Lungs: Respirations unlabored. Crackles at bases. Diminished. Speaking in full sentences. Abdomen: Soft. Non-tender. Non-distended. No rebound or guarding. Musculoskeletal: No deformity. No tenderness in the extremities. All extremities neurovascularly intact. Skin: Warm and dry. No acute rashes. Neurological: Alert and oriented. CN II-XII intact. Strength 5/5 bilateral upper and lower extremities. Sensation intact to light touch.    Psychiatric: Mood/affect: normal      LABS  I have reviewed all labs for this visit.    Results for orders placed or performed during the hospital encounter of 10/12/20   CBC Auto Differential   Result Value Ref Range    WBC 4.9 4.0 - 11.0 K/uL    RBC 2.97 (L) 4.00 - 5.20 M/uL    Hemoglobin 8.7 (L) 12.0 - 16.0 g/dL    Hematocrit 27.2 (L) 36.0 - 48.0 %    MCV 91.8 80.0 - 100.0 fL    MCH 29.3 26.0 - 34.0 pg    MCHC 31.9 31.0 - 36.0 g/dL    RDW 16.3 (H) 12.4 - 15.4 %    Platelets 772 153 - 904 K/uL    MPV 7.4 5.0 - 10.5 fL    Neutrophils % 78.8 %    Lymphocytes % 9.7 %    Monocytes % 10.7 %    Eosinophils % 0.2 %    Basophils % 0.6 %    Neutrophils Absolute 3.9 1.7 - 7.7 K/uL    Lymphocytes Absolute 0.5 (L) 1.0 - 5.1 K/uL    Monocytes Absolute 0.5 0.0 - 1.3 K/uL    Eosinophils Absolute 0.0 0.0 - 0.6 K/uL    Basophils Absolute 0.0 0.0 - 0.2 K/uL   Comprehensive Metabolic Panel w/ Reflex to MG   Result Value Ref Range    Sodium 135 (L) 136 - 145 mmol/L    Potassium reflex Magnesium 5.6 (H) 3.5 - 5.1 mmol/L    Chloride 101 99 - 110 mmol/L    CO2 23 21 - 32 mmol/L    Anion Gap 11 3 - 16    Glucose 126 (H) 70 - 99 mg/dL    BUN 18 7 - 20 mg/dL    CREATININE 3.4 (H) 0.6 - 1.2 mg/dL    GFR Non-African American 13 (A) >60    GFR  16 (A) >60    Calcium 8.6 8.3 - 10.6 mg/dL    Total Protein 6.6 6.4 - 8.2 g/dL    Alb 3.9 3.4 - 5.0 g/dL    Albumin/Globulin Ratio 1.4 1.1 - 2.2    Total Bilirubin <0.2 0.0 - 1.0 mg/dL    Alkaline Phosphatase 104 40 - 129 U/L    ALT 11 10 - 40 U/L    AST 34 15 - 37 U/L    Globulin 2.7 g/dL   Brain Natriuretic Peptide   Result Value Ref Range    Pro-BNP 34,575 (H) 0 - 449 pg/mL   EKG 12 Lead   Result Value Ref Range    Ventricular Rate 107 BPM    Atrial Rate 107 BPM    P-R Interval 152 ms    QRS Duration 84 ms    Q-T Interval 356 ms    QTc Calculation (Bazett) 475 ms    P Axis 66 degrees    R Axis 16 degrees    T Axis -32 degrees    Diagnosis       Sinus tachycardia with occasional Premature ventricular complexesST & T wave abnormality, consider lateral ischemiaAbnormal ECGConfirmed by Reyna Grijalva MD, [address] (1988) on 10/12/2020 5:27:27 PM       RADIOLOGY  I have reviewed all radiographic studies for this visit. XR CHEST PORTABLE   Final Result   Cardiomegaly with interstitial changes, likely interstitial edema and CHF. Left basilar atelectasis. ECG  EKG interpreted by myself. Rate: 107  Rhythm: Sinus tachycardia with occasional PVCs  Axis: normal  Intervals: within normal limits  Nonspecific ST/T wave abnormality  Comparison: Compared to 9/30/2020, the rhythm is sinus tachycardia with occasional PVCs from accelerated junctional rhythm  Impression: Sinus tachycardia with occasional PVCs and nonspecific ST/T wave abnormality. ED COURSE/MDM  Patient seen and evaluated. Old records reviewed. Labs and imaging reviewed and results discussed with patient/family to extent possible. 64-year-old female presents with shortness of breath with presentation most consistent with CHF exacerbation. On arrival the patient is hypoxic necessitating 3 L nasal cannula to correct. Lung fields diminished and with scant crackles. Renal panel with hyperkalemia potassium 5.6, no peaking of the T waves on EKG. Creatinine 3.4 in setting of anticipated dialysis tomorrow. CBC no leukocytosis. Stable anemia hemoglobin 8.7 up from 8.2 earlier this month. BNP is elevated at 35,000. Difficult to interpret in the setting of renal dysfunction however this is uptrending from 5000 on 9/20 to 23,000 on 9/30. EKG sinus tachycardia with occasional PVCs and nonspecific ST/T wave abnormality. Patient without chest pain and therefore troponin not obtained, particular in the setting of renal disease this would add little utility. Suspicion for ACS is very low. Blood pressure will not support nitrates. No indication for BiPAP at this time. Will defer Lasix as the patient makes very little urine.   Will admit to hospital medicine given acute hypoxic respiratory failure with anticipated need for dialysis. During the patient's ED course, the patient was given:  Medications - No data to display     All questions were answered and the patient/family expressed understanding and agreement with the plan. PROCEDURES  None    CRITICAL CARE  N/A    CLINICAL IMPRESSION  1. Acute on chronic congestive heart failure, unspecified heart failure type ([facility]    2. Acute respiratory failure with hypoxia ([facility]        DISPOSITION   admit    Laury Duggan MD    Note: This chart was created using voice recognition dictation software. Efforts were made by me to ensure accuracy, however some errors may be present due to limitations of this technology and occasionally words are not transcribed correctly.         Laury Duggan MD  10/12/20 3273

## 2020-10-12 NOTE — ED NOTES
[address] Call from [facility] with Dr. Jack Toro on the line for Dr. Ofelia Peterson  10/12/20 [address]

## 2020-10-13 NOTE — PROGRESS NOTES
Pharmacy-Low Dose Heparin Drip  Ptt 28.2 secs, 3100 unit bolus and increase drip to 8.3ml/hr (830 units. hr)  Ptt at 1500 today, adjust to goal of 49-76 secs  Orly PÉREZ 10/13/81927:41 AM  .

## 2020-10-13 NOTE — FLOWSHEET NOTE
10/13/20 0400   Vitals   Temp 98.2 °F (36.8 °C)   Temp Source Axillary   Pulse 102   Resp 16   /69   MAP (mmHg) 88   Level of Consciousness 0   MEWS Score 2   Height and Weight   Weight 135 lb 4 oz (61.3 kg)   Weight Method Bed scale   BMI (Calculated) 27.4   Oxygen Therapy   SpO2 97 %       Pt tolerated Bi-PAP well overnight. Nephrology has been consulted. K+ is now 4.3. Call light within reach, will continue to monitor.

## 2020-10-13 NOTE — PROGRESS NOTES
Inpatient Occupational Therapy  Evaluation and Treatment    Unit: ICU  Date:  10/13/2020  Patient Name:    Patricia Pérez  Admitting diagnosis:  CHF (congestive heart failure), NYHA class I, acute on chronic, combined ([facility] [I50.43]  CHF with unknown LVEF ([address] [I50.9]  Heart failure exacerbated by sotalol ([address] [I50.9]  Admit Date:  10/12/2020  Precautions/Restrictions/WB Status/ Lines/ Wounds/ Oxygen: {IP Therapy Precautions:00832}    Treatment Time:  ***  Treatment Number: 1   Timed code treatment minutes *** minutes   Total Treatment minutes:   ***   minutes    Patient Goals for Therapy:  \" *** \"      Discharge Recommendations: {DC rec:15049}  DME needs for discharge: {DME needs for discharge:50777}       Therapy recommendations for staff:   {IP Assist Levels:68061} with use of {acute AD:09328} for all {transfers ambulation:75085} {distance:95509}    History of Present Illness:   66 y.o. female with past medical history including bladder cancer, status post remote urostomy, CHF, hypertension, hyperlipidemia, status post right IJ nontunneled HD catheter placement 10/1/2020 on dialysis Tuesday, Thursday, Saturday (last dialysis Saturday) who presents to the ED with shortness of breath. Symptoms started earlier today. Denies fever, cough, congestion, chest pain, nausea, vomiting, abdominal pain. Denies recent travel. No known COVID 19 exposure. Complains of diarrhea, nonbloody.      Home Health S4 Level Recommendation:  {Home Health S4 Level :14888::\"NA\"}  AM-PAC Score:      Preadmission Environment    Pt.  Kailash Elizabeth spouse (uses a walker intermittently, on home O2)  Home environment:            one story home  Steps to enter first floor: 1 step to enter (pt holds onto door frame)  Steps to second floor:         N/A  Bathroom: tub with door, shower chair, grab bars; comfort height toilet  Equipment owned: wc (manual), shower chair/bench, BSC, quad cane, SPC, reacher and lifealert  Pt sleeps either in flat bed or standard recliner     Preadmission Status:  Pt. Able to drive: No - daughter drives  Pt Fully independent with ADLs: Yes  Pt. Required assistance from family for: Cleaning, Cooking, Dressing and [facility] 4x/wk for household chores  [phone] assists with medication management  Pt. independent for transfers and gait and walked with No Device - occasionally holding onto furniture  History of falls          No       Pain  {YES/NO:49873}  Rating:{Pain Rating :89470}  Location:***  Pain Medicine Status: {Pain Med Status:63286}      Cognition    A&O {Alert and Oriented:33785}   Able to follow {Cognition:38661}    Subjective  Patient {location:53173} with {visitors:98347}. Pt agreeable to this OT eval & tx.      Upper Extremity ROM:    {UE restrictions:12961}    Upper Extremity Strength:    {UE strength assessment:10344}    R UE   Shoulder flexion      Shoulder extension   Shoulder abduction    Elbow flexion     Elbow extension    Wrist flexion     Wrist extension           L UE  Shoulder flexion      Shoulder extension   Shoulder abduction    Elbow flexion     Elbow extension    Wrist flexion     Wrist extension           Upper Extremity Sensation    {WNL Impaired:22543}    Upper Extremity Proprioception:  {WNL/WFL/impaired/NT:79640}    Coordination and Tone  {WNL Impaired:13718}    Balance  Functional Sitting Balance:  {WNL Impaired:04937}  Functional Standing Balance:{WNL Impaired:43021}    Bed mobility:    Supine to sit:   {IP Level Of Assist:72863}  Sit to supine:   {IP Level Of Assist:31820}  Rolling:    {IP Level Of Assist:62448}  Scooting in sitting:  {IP Level Of Assist:22999}  Scooting to head of bed:   {IP Level Of Assist:17559}    Bridging:   {IP Level Of Assist:66878}    Transfers:    Sit to stand:  {IP Level Of Assist:61847}  Stand to sit:  {IP Level Of Assist:06972}  Bed to chair:   {IP Level Of Assist:54876}  Standard toilet: {IP Level Of Assist:84357}  Bed to BSC:  {IP Level Of Assist:12807}    Dressing:      UE:   {IP Level Of Assist:67987}  LE:    {IP Level Of Assist:11592}    Bathing:    UE:  {IP Level Of Assist:71700}  LE:  {IP Level Of Assist:05267}    Eating:   {IP Level Of Assist:38757}    Toileting:  {IP Level Of Assist:33675}    Activity Tolerance   Pt completed therapy session with {Activity Tolerance PT List:56833}    Positioning Needs:   {IP position:59054}    Exercise / Activities Initiated: {Kishor:16611}  {UE exercises:02345}    Patient/Family Education:   {OT education:76393}    Assessment of Deficits: Pt seen for Occupational therapy evaluation in acute care setting. Pt demonstrated decreased {OT assessment:23808}. Pt functioning below baseline and will likely benefit from skilled occupational therapy services to maximize safety and independence. Goal(s) : To be met in 3 Visits:  1). Bed to toilet/BSC: {IP Level Of Assist:82480}    To be met in 5 Visits:  1). Supine to/from Sit:  {IP Level Of Assist:09816}  2). Upper Body Bathing:   {IP Level Of Assist:26142}  3). Lower Body Bathing:   {IP Level Of Assist:56706}  4). Upper Body Dressing:  {IP Level Of Assist:85786}  5). Lower Body Dressing:  {IP Level Of Assist:45932}  6). Pt to demonstrate UE exs x 15 reps with minimal cues    Rehabilitation Potential:  {Good Fair Poor:56664} for goals listed above. Strengths for achieving goals include: {Strengths for Achieving Goals:30970}  Barriers to achieving goals include:  {IP barriers:68291}     Plan: To be seen {IP plan of care/frequency :65269} while in acute care setting for therapeutic exercises, bed mobility, transfers, dressing, bathing, family/patient education, ADL/IADL retraining, energy conservation training.      ***          If patient discharges from this facility prior to next visit, this note will serve as the Discharge Summary

## 2020-10-13 NOTE — PROGRESS NOTES
Patient admitted to room 315 from [facility] ER. Patient oriented to room, call light, bed rails, phone, lights and bathroom. Patient instructed about the schedule of the day including: vital sign frequency, lab draws, possible tests, frequency of MD and staff rounds, daily weights, I &O's and prescribed diet. Without bed alarm in place;patient refusing at this time. Telemetry box in place, patient aware of placement and reason. Bed locked, in lowest position, side rails up 2/4, call light within reach. Recliner Assessment  Patient is not able to demonstrated the ability to move from a reclining position to an upright position within the recliner. however patient is alert, oriented and able to provide informed consent    4 Eyes Skin Assessment     The patient is being assess for   Admission    I agree that 2 RN's have performed a thorough Head to Toe Skin Assessment on the patient. ALL assessment sites listed below have been assessed. Areas assessed by both nurses:   [x]   Head, Face, and Ears   [x]   Shoulders, Back, and Chest, Abdomen  [x]   Arms, Elbows, and Hands   [x]   Coccyx, Sacrum, and Ischium  [x]   Legs, Feet, and Heels        Skin showed multiple large bruises to abdomen, stated from blood thinners on last admit. Patient also had large bruise surrounding dialysis catheter to Right chest., otherwise unremarkable    **SHARE this note so that the co-signing nurse is able to place an eSignature**    Co-signer eSignature: Electronically signed by Janki Byrne RN on 10/12/20 at 11:38 PM EDT    Does the Patient have Skin Breakdown?   No          Inocente Prevention initiated:  No   Wound Care Orders initiated:  NA      [facility] nurse consulted for Pressure Injury (Stage 3,4, Unstageable, DTI, NWPT, Complex wounds)and New or Established Ostomies:  NA      Primary Nurse eSignature: Electronically signed by Artemus Osgood, RN on 10/12/20 at 9:57 PM EDT

## 2020-10-13 NOTE — CARE COORDINATION
Case Management Assessment  Initial Evaluation      Patient Name: Jimmie Del Rio  YOB: 1945  Diagnosis: CHF (congestive heart failure), NYHA class I, acute on chronic, combined ([facility] [I50.43]  [facility] [I50.9]  Heart failure exacerbated by sotalol [facility]) [I50.9]  Date / Time: 10/12/2020  4:31 PM    Admission status/Date:inpt  Chart Reviewed: Yes      Patient Interviewed: No   Family Interviewed:  Yes - pt's spouse      Hospitalization in the last 30 days:  Yes      Health Care Decision Maker:  Primary Decision Maker: Aleyda Carrington - Spouse - [phone]    Met with: spoke with pt's spouse via TC  Interview conducted  (bedside/phone):    Current PCP:   Melvin Turner MD      Financial  Medicare  Precert required for SNF : Y, N          3 night stay required - Y, N    ADLS  Support Systems/Care Needs: Spouse/Significant Other  Transportation: family    Meal Preparation: self,family    Housing  Living Arrangements: ranch with spouse  Steps: none  Intent for return to present living arrangements: Yes  Identified Issues: no    Home Care Information  Active with Home Health Care : No Agency:(Services)  Type of Home Care Services: None  Passport/Waiver : Yes - unsure of   :                      Phone Number:    Passport/Waiver Services: yes          Durable Medical Equiptment   DME Provider:   Equipment:   Walker__x_Cane_x__RTS___ BSC___Shower Chair_x__Hospital Bed___W/C____Other________  02 at ____Liter(s)---wears(frequency)_______ HHN ___ CPAP___ BiPap___   N/A____      Home O2 Use :  No    If No for home O2---if presently on O2 during hospitalization:  Yes  if yes CM to follow for potential DC O2 need  Informed of need for care provider to bring portable home O2 tank on day of discharge for nursing to connect prior to leaving:   Not Indicated  Verbalized agreement/Understanding:   Not Indicated    Community Service Affiliation  Dialysis:  Yes - [facility]    · Agency:  · Location:  · Dialysis Schedule: [facility]  · Phone:   · Fax: Other Community Services: (ex:PT/OT,Mental Health,Wound Clinic, Cardio/Pul [address])    DISCHARGE PLAN: Explained Case Management role/services. Reviewed chart. Pt in ICU. Writer spoke with pt's spouse,Maurice, for initial interview. 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 Referral called to Marco A Holt with [facility] for SN per family request. Pt active with [facility]. awaiting PT when appropriate. Following.

## 2020-10-13 NOTE — PROGRESS NOTES
[place] Dr. [facility]ain [facility] at this time to update with troponin of 0.54. No new orders at this time.   Anna Bridges RN

## 2020-10-13 NOTE — PROGRESS NOTES
Report given to DONNA Luna at bedside for transfer of care. Pt denies needs at this time, call light within reach.   Ghanshyam Bridges RN

## 2020-10-13 NOTE — PROGRESS NOTES
Patient sent to ICU for BIPAP administration. Patient report given in ICU at bedside, care transferred at this time.

## 2020-10-13 NOTE — CONSULTS
[address]                 [address] [facility]                                  CONSULTATION    PATIENT NAME: Garcia Monahan                       :        1945  MED REC NO:   [id number]                          ROOM:       3007  ACCOUNT NO:   [de-identified]                           ADMIT DATE: 10/12/2020  PROVIDER:     Harsha Johnson MD    CONSULT DATE:  10/13/2020    REASON FOR CONSULTATION:  Shortness of breath and elevated troponin. HISTORY OF PRESENT ILLNESS:  The patient is a 47-year-old female with no  prior known cardiac history until just about a month ago. She presented  a few weeks ago with new-onset of congestive heart failure. She was  treated and released, but then returned a couple of days ago with  recurrent symptoms. She complains of shortness of breath that really  started about a month ago. It has been getting progressively worse. It  is worse when she lays down. It is worse with any activity. She had no  associated chest pain. She feels better after fluid removal with  dialysis. Symptoms were constant. PAST MEDICAL HISTORY:  1. End-stage renal disease on hemodialysis. 2.  Bladder cancer. 3.  Hypertension. 4.  Hyperlipidemia. 5.  Normal left ventricular ejection fraction, on 2020 by  echocardiogram.  6.  Anemia. SOCIAL HISTORY:  She is . Lives with her . Former  smoker. FAMILY HISTORY:  Positive for heart disease. REVIEW OF SYSTEMS:  No fevers or chills. No cough. No focal neurologic  symptoms. No headache or visual changes. No recent GI or  bleeding. No recent or upcoming surgeries. All other systems are negative except  as present illness. ALLERGIES:  CIPRO. MEDICATIONS:  See list in the chart, which I have reviewed. PHYSICAL EXAMINATION:  VITAL SIGNS:  Blood pressure is 134/67, heart rate 107, respirations 17  and temperature 98.2.   She is 99% saturated on oxygen. GENERAL:  A well-developed, well-nourished white female, in no acute  distress. HEENT:  Normocephalic, atraumatic. Oropharynx clear. Moist mucous  membranes. NECK:  Supple. CHEST:  Rales. CARDIAC:  Regular S1 and S2. There is no S3 or S4 gallop. There is a  soft systolic murmur. Jugular venous pressure is normal.  Carotids 2+  and symmetric without bruits. ABDOMEN:  Soft and nontender. Positive bowel sounds. EXTREMITIES:  Trace edema. NEUROLOGIC:  Grossly nonfocal.  SKIN:  Warm and dry. PSYCHIATRIC:  Affect calm. DIAGNOSTIC STUDIES:  EKG, sinus rhythm, ST and T-wave abnormalities. Troponin is 0.38. Chest x-ray, pulmonary edema. IMPRESSION:  1.  Non-ST-segment elevation myocardial infarction. 2.  Shortness of breath due to pulmonary edema with associated hypoxia. 3.  Respiratory failure, improved. 4.  Volume overload mixed cardiac and renal.  5.  Pulmonary edema. 6.  Elevated troponin. 7.  Abnormal EKG. 8.  Normal left ventricular ejection fraction in 09/2020.  9. Hypertension. 10.  Hyperlipidemia. 11.  End-stage renal disease on hemodialysis. 12.  Bladder cancer. 13.  Anemia. RECOMMENDATIONS:  1. Add aspirin, statin and beta-blocker therapy. 2.  Heparin drip. 3.  Monitor PTT. Adjust heparin drip as necessary. 4.  Right and left heart catheterization. I discussed my assessment and plan and options with both the patient and  her daughter. They were in agreement and wished to proceed with cardiac  catheterization. They understand all the risks, benefits, alternatives  and expectations. Forty minutes of critical care was spent.         Emani Huynh MD    D: 10/13/2020 11:06:41       T: 10/13/2020 13:01:46     [email or web address]  Job#: [id number]     Doc#: [id number]    CC:

## 2020-10-13 NOTE — CONSULTS
[facility]    Consult Note           Reason for Consult: DELMER, volume overload  Requesting Physician:  Dr. Valdo Tan    Chief Complaint:    Chief Complaint   Patient presents with    Shortness of Breath     pt went to br and had come back out and was noted to be sob daughter monitored withoulse ox at home today and sats were high 80's pt took her husbands hhn treatment today and called squad. Nila Mendoza states o2 in high 80's upon arrival , no fever noted. History of Present Illness on 10/13/2020:    66 y.o. yo female with PMH of CHF with preserved EF, pulmonary hypertension, hypertension, ATN on dialysis from 10/1/2020, CKD stage III with baseline creatinine of low ones who is admitted for increased shortness of breath and volume overload  Patient was started on HD from 10/1/2020   She came in with complaints of shortness of breath to the point that she was not able to lay flat and was transferred from PCU to ICU.   She underwent dialysis this morning and had 2.5 L of fluid removed and her shortness of breath has improved    Past Medical History:        Diagnosis Date    Acid reflux     Arthritis     Cancer ([facility] [address]     bladder cancer    CHF (congestive heart failure) (HCC)     Chronic kidney disease     Hemodialysis [facility] ([facility] [address]     Hx of blood clots     Hyperlipidemia     Hypertension     Kidney stone        Past Surgical History:        Procedure Laterality Date    ABDOMEN SURGERY      APPENDECTOMY      BLADDER STONE REMOVAL      BLADDER SURGERY      pt had bladder cancer    CATARACT REMOVAL WITH IMPLANT  8/26/11    RIGHT    CHOLECYSTECTOMY  03/06/2018    COLONOSCOPY      EYE SURGERY      HYSTERECTOMY      IR NONTUNNELED VASCULAR CATHETER  10/1/2020    IR NONTUNNELED VASCULAR CATHETER 10/1/2020 [facility] PROCEDURES    IR TUNNELED CATHETER PLACEMENT GREATER THAN 5 YEARS  10/8/2020    IR TUNNELED CATHETER PLACEMENT GREATER THAN 5 YEARS 10/8/2020 [facility] comment: quit 25 years ago   Substance and Sexual Activity    Alcohol use: No    Drug use: No    Sexual activity: Not Currently   Lifestyle    Physical activity     Days per week: Not on file     Minutes per session: Not on file    Stress: Not on file   Relationships    Social connections     Talks on phone: Not on file     Gets together: Not on file     Attends Jainism service: Not on file     Active member of club or organization: Not on file     Attends meetings of clubs or organizations: Not on file     Relationship status: Not on file    Intimate partner violence     Fear of current or ex partner: Not on file     Emotionally abused: Not on file     Physically abused: Not on file     Forced sexual activity: Not on file   Other Topics Concern    Not on file   Social History Narrative    Not on file       Family History:   Family History   Problem Relation Age of Onset    Heart Disease Mother        Review of Systems:   Pertinent positives stated above in HPI. All other 10 systems were reviewed and were negative.      Physical exam:   Constitutional:  VITALS:  BP (!) 140/67   Pulse 106   Temp 97.5 °F (36.4 °C) (Oral)   Resp 22   Ht 4' 11\" (1.499 m)   Wt 127 lb 3.3 oz (57.7 kg)   SpO2 99%   BMI 25.69 kg/m²   Gen: alert, awake, nad  Skin: no rash, turgor wnl  Heent:  eomi, mmm  Neck: no bruits or jvd noted, thyroid normal  Cardiovascular:  S1, S2 without m/r/g  Respiratory: Diminished  Abdomen:  +bs, soft, nt, nd, no hepatosplenomegaly  Ext: 1+ lower extremity edema  Neuro/Psy: AAoriented times 3 ; moves all 4 ext  Musculoskeletal:  Rom, muscular strength intact; digits, nails normal    Data/  Recent Labs     10/12/20  1640 10/13/20  0448   WBC 4.9 5.0   HGB 8.7* 8.1*   HCT 27.2* 25.1*   MCV 91.8 92.8    167     Recent Labs     10/12/20  1640 10/12/20  2342 10/13/20  0448   *  --  134*   K 5.6*  --  4.3     --  100   CO2 23  --  22   GLUCOSE 126*  --  122*   PHOS  --  6.3*  --

## 2020-10-13 NOTE — CARE COORDINATION
[facility]  Received referral regarding [facility], [facility]. services from Booker BARTHOLOMEW CM. Sent request to [facility] for Coverage approval.    Approval received from [facility] for [facility] coverage. Liaison to follow up with pt when transferred out of ICU.     Electronically signed by Pierre Carlson RN on 10/13/2020 at 4:09 PM

## 2020-10-13 NOTE — CONSULTS
aPTT < 36.3    Heparin 60 units/kg bolus  Increase infusion by 4 units/kg/hr  aPTT 36.3 - 48.9 Heparin 30 units/kg bolus Increase infusion by 2 units/kg/hr  aPTT 49 - 76    No bolus No change   aPTT 76.1 - 85 No bolus Decrease infusion by 2 units/kg/hr  aPTT 85.1 - 94 Hold heparin for 1 hour Decrease infusion by 3 units/kg/hr  aPTT 94.1 - 103    Hold heparin for 1 hour Decrease infusion by 4 units/kg/hr  aPTT > 103 Hold heparin for 1 hour Decrease infusion by 6 units/kg/hr    Obtain aPTT 6 hours after bolus and 6 hours after any dose change until two consecutive therapeutic aPTT are achieved- then daily. [address] [place] [address]. .  10/13/2020 1:41 AM

## 2020-10-13 NOTE — CONSULTS
[id number]    NSTEMI  SOB  Resp failure  Volume overload  Pulm edema  Elev trop  Abnormal EKG  Normal LVEF 9/21/20  HTN  HLD  ESRD/HD  Bladder CA  Anemia    ASA, statin, BBlk  Heparin gtt  RHC and LHC    40 min critical care

## 2020-10-13 NOTE — PROGRESS NOTES
10/12/20 7166   NIV Type   $NIV $Daily Charge   Skin Assessment Clean, dry, & intact   Skin Protection for O2 Device Yes   NIV Started/Stopped On   Equipment Type V60   Mode Bilevel   Mask Type Full face mask   Mask Size Small   Settings/Measurements   IPAP 16 cmH20   CPAP/EPAP 8 cmH2O   Rate Ordered 12   Resp (!) 31   Insp Rise Time (%) 2 %   FiO2  30 %   I Time/ I Time % 1 s   Vt Exhaled 493 mL   Minute Volume 14.3 Liters   Mask Leak (lpm) 1 lpm   Comfort Level Good   Using Accessory Muscles No   Alarm Settings   Alarms On Y   Press Low Alarm 8 cmH2O   High Pressure Alarm 25 cmH2O   Delay Alarm 20 sec(s)   Resp Rate Low Alarm 12   High Respiratory Rate 45 br/min   Oxygen Therapy/Pulse Ox   O2 Therapy Oxygen   $Oxygen $Daily Charge   O2 Device PAP (positive airway pressure)   $Pulse Oximeter $Spot check (multiple/continuous)

## 2020-10-13 NOTE — CONSULTS
Patient is being seen at the request of Dr. Lea Sharif for a consultation for Acute Respiratory Failure     HISTORY OF PRESENT ILLNESS: This is a 28-year-old female with a history of bladder cancer and end-stage kidney disease on hemodialysis since 10/1/2020 who presents with a several day history of increasing severe shortness of breath, worse with lying flat, associated with inability to sleep. She was initially admitted to the PCU but was transferred to the ICU for BiPAP. BiPAP resulted in improvement in shortness of breath. PAST MEDICAL HISTORY:  Past Medical History:   Diagnosis Date    Acid reflux     Arthritis     Cancer ([facility] [address]     bladder cancer    CHF (congestive heart failure) (HCC)     Chronic kidney disease     Hemodialysis patient ([facility] [address]     Hx of blood clots     Hyperlipidemia     Hypertension     Kidney stone      PAST SURGICAL HISTORY:  Past Surgical History:   Procedure Laterality Date    ABDOMEN SURGERY      APPENDECTOMY      BLADDER STONE REMOVAL      BLADDER SURGERY      pt had bladder cancer    CATARACT REMOVAL WITH IMPLANT  8/26/11    RIGHT    CHOLECYSTECTOMY  03/06/2018    COLONOSCOPY      EYE SURGERY      HYSTERECTOMY      IR NONTUNNELED VASCULAR CATHETER  10/1/2020    IR NONTUNNELED VASCULAR CATHETER [facility]    IR TUNNELED CATHETER PLACEMENT GREATER THAN 5 YEARS  10/8/2020    IR TUNNELED CATHETER PLACEMENT GREATER THAN 5 YEARS 10/8/2020 [facility] PROCED[facility]    OTHER SURGICAL HISTORY  05/08/2015    phacemilsification of cataract with intraocular lens implant left eye       FAMILY HISTORY:  family history includes Heart Disease in her mother. SOCIAL HISTORY:   reports that she has quit smoking.  She has never used smokeless tobacco.    Scheduled Meds:   iron sucrose  100 mg Intravenous Once in dialysis    gemfibrozil  600 mg Oral QAM AC    vitamin B complex w/C  1 tablet Oral Daily    pantoprazole  40 mg Oral Daily    sennosides-docusate sodium  2 tablet Oral Daily    primidone  100 mg Oral Daily    doxazosin  4 mg Oral BID    [START ON 10/19/2020] cloNIDine  1 patch Transdermal Weekly    isosorbide mononitrate  30 mg Oral Daily    PARoxetine  40 mg Oral QAM    lamoTRIgine  25 mg Oral TID    sodium chloride flush  10 mL Intravenous 2 times per day    furosemide  40 mg Intravenous BID     Continuous Infusions:   heparin (PORCINE) Infusion 6.2 mL/hr (10/13/20 0203)     PRN Meds:  heparin (porcine), heparin (porcine), sodium chloride flush, acetaminophen **OR** acetaminophen, polyethylene glycol, promethazine **OR** ondansetron, famotidine    ALLERGIES:  Patient is allergic to ciprofloxacin. REVIEW OF SYSTEMS:  Constitutional: Negative for fever  HENT: Negative for sore throat  Eyes: Negative for redness   Respiratory: + for dyspnea  Cardiovascular: + Orthopnea  Gastrointestinal: Negative for vomiting, diarrhea   Genitourinary: Negative for hematuria   Musculoskeletal: Negative for arthralgias   Skin: Negative for rash  Neurological: Negative for syncope  Hematological: Negative for adenopathy  Psychiatric/Behavorial: Negative for anxiety    PHYSICAL EXAM:  Blood pressure (!) 130/113, pulse 95, temperature 98.2 °F (36.8 °C), temperature source Axillary, resp. rate 17, height 4' 11\" (1.499 m), weight 135 lb 4 oz (61.3 kg), SpO2 97 %, not currently breastfeeding.' on 30% BiPAP  General: ill appearing. Eyes: PERRL. No sclera icterus. No conjunctival injection. ENT: No discharge. Pharynx clear. Neck: Trachea midline. Normal thyroid. Resp: No accessory muscle use. + crackles. No wheezing. No rhonchi. No dullness on percussion. CV: Regular rate. Regular rhythm. No mumur or rub. No edema. Peripheral pulses are 2+. Capillary refill is less than 3 seconds. GI: Non-tender. Non-distended. No masses. No organomegaly. Normal bowel sounds. No hernia. Skin: Warm and dry. No nodule on exposed extremities.  No rash on exposed extremities. Lymph: No cervical LAD. No supraclavicular LAD. M/S: No cyanosis. No joint deformity. No clubbing. Neuro: A&OX3. Patellar reflexes are symmetric. Psych: No agitation, no anxiety, affect is full. LABS:  CBC:   Recent Labs     10/12/20  1640 10/13/20  0448   WBC 4.9 5.0   HGB 8.7* 8.1*   HCT 27.2* 25.1*   MCV 91.8 92.8    167     BMP:   Recent Labs     10/12/20  1640 10/12/20  2342 10/13/20  0448   *  --  134*   K 5.6*  --  4.3     --  100   CO2 23  --  22   PHOS  --  6.3*  --    BUN 18  --  22*   CREATININE 3.4*  --  3.6*     LIVER PROFILE:   Recent Labs     10/12/20  1640   AST 34   ALT 11   BILITOT <0.2   ALKPHOS 104     PT/INR: No results for input(s): PROTIME, INR in the last 72 hours. APTT:   Recent Labs     10/13/20  0144   APTT 31.8     UA:  Recent Labs     10/13/20  0430   COLORU Yellow   PHUR 6.0   WBCUA 21-50*   RBCUA 5-10*   MUCUS 2+*   BACTERIA 2+*   CLARITYU SL CLOUDY*   SPECGRAV 1.020   LEUKOCYTESUR Negative   UROBILINOGEN 0.2   BILIRUBINUR SMALL*   BLOODU Negative   GLUCOSEU Negative     No results for input(s): PHART, SEG9MIM, PO2ART in the last 72 hours. ECG was reviewed by me and shows normal sinus rhythm @ 100 without acute ischemic ST segment elevation     Cultures:      None    Chest imaging was reviewed by me and showed:   10/12/2020 CXR diffuse interstitial edema    ECHO 9/20/20 EF 55%, Grade II DD    ASSESSMENT:  · Acute hypoxemic and hypercapnic respiratory failure  · Acute pulmonary edema  · Acute diastolic CHF  · ESKD started IHD on 10/1/20  · NSTEMI  · H/O bladder cancer SP ileal conduit    PLAN:  · Bilevel non-invasive positive pressure ventilation as needed for work of breathing  · HD per nephrology  · ASA & heparin gtt started overnight, consult cardiology.   PTT monitoring    · Prophylaxis: Bactroban

## 2020-10-13 NOTE — PROGRESS NOTES
I have been notified of the new consult request for us to see Ms. Gabriel  Chart Reviewed;    ESRD on HD T/Th/S Arthur Gerardo since 10/10/20 admitted with HARRELL/SOB. Concern of fluid overload, due for HD Today. Last HD 10/10/20 to 60.5 kg admitted 61.3 kg      Formal consult & note to follow. Thank you for involving us in the care of this patient, please call with any questions.     Signed:  Mercedes Peters M.D.   [facility]  Office Number: [phone]  Fax Number: [phone]  Answering Service: [phone]  10/13/2020, 6:47 AM

## 2020-10-13 NOTE — H&P
PROCEDURES    IR TUNNELED CATHETER PLACEMENT GREATER THAN 5 YEARS  10/8/2020    IR TUNNELED CATHETER PLACEMENT GREATER THAN 5 YEARS 10/8/2020 [facility]    OTHER SURGICAL HISTORY  05/08/2015    phacemilsification of cataract with intraocular lens implant left eye       Medications Prior to Admission:      Prior to Admission medications    Medication Sig Start Date End Date Taking? Authorizing Provider   hydrALAZINE (APRESOLINE) 100 MG tablet Take 1 tablet by mouth 3 times daily 10/9/20  Yes Stas Mcallister MD   carvedilol (COREG) 12.5 MG tablet Take 1 tablet by mouth 2 times daily (with meals) 10/9/20  Yes Stas Mcallister MD   cloNIDine (CATAPRES) 0.2 MG/24HR PTWK Place 1 patch onto the skin once a week 10/10/20  Yes Stas Mcallister MD   isosorbide mononitrate (IMDUR) 30 MG extended release tablet Take 1 tablet by mouth daily 10/10/20  Yes Stas Mcallister MD   PARoxetine (PAXIL) 20 MG tablet Take 2 tablets by mouth every morning 10/9/20  Yes Stas Mcallister MD   lamoTRIgine (LAMICTAL) 25 MG tablet Take 25 mg by mouth 3 times daily   Yes Historical Provider, MD   doxazosin (CARDURA) 4 MG tablet Take 4 mg by mouth 2 times daily   Yes Historical Provider, MD   primidone (MYSOLINE) 50 MG tablet Take 100 mg by mouth daily   Yes Historical Provider, MD   senna-docusate (SENOKOT S) 8.6-50 MG per tablet Take 2 tablets by mouth daily 3/6/18  Yes Jessica Estrada MD   pantoprazole (PROTONIX) 40 MG tablet Take 40 mg by mouth daily   Yes Historical Provider, MD   B Complex Vitamins (B-COMPLEX/B-12) TABS Take 100 mg by mouth daily   Yes Historical Provider, MD   gemfibrozil (LOPID) 600 MG tablet Take 600 mg by mouth 2 times daily (before meals). Yes Historical Provider, MD       Allergies:  Ciprofloxacin    Social History:          TOBACCO:   reports that she has quit smoking. She has never used smokeless tobacco.  ETOH:   reports no history of alcohol use.   E-Cigarettes Vaping or Juuling     Questions Responses    Vaping Use Never User    Start Date     Does device contain nicotine? Quit Date     Vaping Type             Family History:      Reviewed in detail         Problem Relation Age of Onset    Heart Disease Mother        REVIEW OF SYSTEMS:     Constitutional:  No Fever, No Chills, No Night Sweats  ENT/Mouth:  No Nasal Congestion,  No Hoarseness, No new mouth lesion  Eyes:  No Eye Pain, No Redness, No Discharge  Cardiovascular:  No Chest Pain, No Orthopnea, No Palpitations  Respiratory:  No Cough, No Sputum, + Dyspnea  Gastrointestinal:  No Nausea, No Vomiting, No Diarrhea,   Genitourinary: No Urinary Frequency, No Hematuria, No Urinary pain  Musculoskeletal:  No worsening Arthralgias, No worsening Myalgias  Skin:  No new Skin Lesions, No new skin rash  Neuro:  No new weakness, No new numbness. Psych:  No suicial ideation, No Violence ideation    PHYSICAL EXAM PERFORMED:    /65   Pulse 109   Temp 97.8 °F (36.6 °C) (Axillary)   Resp 21   Ht 4' 11\" (1.499 m)   Wt 136 lb (61.7 kg)   SpO2 98%   BMI 27.47 kg/m²     General appearance:  mild acute distress, appears older than stated age  HEENT:   atraumatic, sclera anicteric, Conjunctivae clear. Neck: Supple,Trachea midline, no goiter  Respiratory: Severe respiratory distress, accessory muscle usage, pursing and tripoding with crackles bilaterally on nasal cannula  Cardiovascular: Tachycardia without murmurs, capillary refill 2 seconds  Abdomen: Soft, non-tender, non-distended with normal bowel sounds. Musculoskeletal:  No clubbing, cyanosis. 3+ pitting edema bilaterally  Skin: turgor normal.  No new rashes or lesions. Noted Vas-Cath  Neurologic: Alert and oriented x4, no new focal sensory/motor deficits.      Labs:     Recent Labs     10/12/20  1640   WBC 4.9   HGB 8.7*   HCT 27.2*        Recent Labs     10/12/20  1640 10/12/20  2342   *  --    K 5.6*  --      --    CO2 23  --    BUN 18  -- CREATININE 3.4*  --    CALCIUM 8.6  --    PHOS  --  6.3*     Recent Labs     10/12/20  1640   AST 34   ALT 11   BILITOT <0.2   ALKPHOS 104     No results for input(s): INR in the last 72 hours. Recent Labs     10/12/20  2342   TROPONINI 0.32*       Urinalysis:      Lab Results   Component Value Date    NITRU Negative 09/30/2020    WBCUA 10-20 09/30/2020    BACTERIA 4+ 09/30/2020    RBCUA 3-4 09/30/2020    BLOODU Negative 09/30/2020    SPECGRAV 1.015 09/30/2020    GLUCOSEU Negative 09/30/2020    GLUCOSEU NEGATIVE 11/19/2011       Radiology:     CXR: I have reviewed the CXR with the following interpretation:   Cardiomegaly with interstitial changes  EKG:  I have reviewed the EKG with the following interpretation:   Sinus tachycardia no ST-T change    XR CHEST PORTABLE   Final Result   Cardiomegaly with interstitial changes, likely interstitial edema and CHF. Left basilar atelectasis. XR CHEST (2 VW)    (Results Pending)       ASSESSMENT AND PLAN:    Active Hospital Problems    Diagnosis Date Noted    CHF (congestive heart failure), NYHA class I, acute on chronic, combined ([facility] [address] [I50.43] 10/12/2020    CHF with unknown LVEF ([address] [I50.9] 10/12/2020    Heart failure exacerbated by sotalol ([address] [I50.9] 10/12/2020     1. Acute mixed respiratory failure:  Secondary to CHF exacerbation  Due to patient's severe respiratory depression BiPAP was initiated  Transferred to ICU    2. Acute CHF exacerbation secondary to patient's poor fluid restriction in the setting of acute renal failure requiring dialysis  Patient reportedly unable to urinate very minimally  BiPAP initiated, nephrology consultation  Lasix given    3. Hyperkalemia:  Calcium gluconate given    4. NSTEMI: unclear but suspect type 2 In the setting of CHF exacerbation  Aspirin, low dose heparin  continue to trend    5.   Acute on chronic renal failure: Vas-Cath is present and patient reported she has been receiving dialysis unclear etiology at this time  Nephrology consultation much appreciated    6. Normocytic anemia: No signs or symptoms of bleeding  Hemoccult ordered. Reviewed Iron panel reviewed showing ferritin 34.6, iron sat 13 consistent with iron deficiency  Currently not at goal in the setting of possible end-stage renal disease would recommend replacing when off BiPAP.  1 dose of Venofer given    7. Hyperglycemia: A1c pending    Chronic medical conditions:  Hypertension: Hold home medication so patient can take out more fluid during dialysis  Hyperlipidemia: Continue medication  Acid reflux: Continue medication    Diet: NPO except meds ordered    PT/OT Eval Status: consulted    DVT Prophylaxis: heparin    Dispo:   Expected LOS greater than two [facility],

## 2020-10-13 NOTE — PROGRESS NOTES
Progress Note    Admit Date:  10/12/2020    Subjective:  Ms. Dahiana Mccurdy was on bipap all night  Now on 3 L of O2    Objective:   Patient Vitals for the past 4 hrs:   BP Temp Temp src Pulse Resp SpO2   10/13/20 0800 135/67 -- -- 102 16 98 %   10/13/20 0700 96/62 97.8 °F (36.6 °C) Axillary 97 22 98 %   10/13/20 0600 (!) 130/113 -- -- 95 17 97 %   10/13/20 0500 135/65 -- -- 104 17 97 %            Intake/Output Summary (Last 24 hours) at 10/13/2020 0850  Last data filed at 10/13/2020 0451  Gross per 24 hour   Intake --   Output 20 ml   Net -20 ml       Physical Exam:    General appearance:  mild acute distress, appears older than stated age  HEENT:   atraumatic, sclera anicteric, Conjunctivae clear. Neck: Supple,Trachea midline, no goiter  Respiratory:  crackles bilaterally on nasal cannula, no wheeze  Cardiovascular: Tachycardia without murmurs, capillary refill 2 seconds  Abdomen: Soft, non-tender, non-distended with normal bowel sounds. Musculoskeletal:  No clubbing, cyanosis. 2+ pitting edema bilaterally  Skin: turgor normal.  No new rashes or lesions. Noted Vas-Cath  Neurologic: Alert and oriented x4, no new focal sensory/motor deficits.      Scheduled Meds:   mupirocin   Each Nostril BID    heparin (porcine)  3,100 Units Intravenous Once    epoetin parminder-epbx        gemfibrozil  600 mg Oral QAM AC    vitamin B complex w/C  1 tablet Oral Daily    pantoprazole  40 mg Oral Daily    sennosides-docusate sodium  2 tablet Oral Daily    primidone  100 mg Oral Daily    doxazosin  4 mg Oral BID    [START ON 10/19/2020] cloNIDine  1 patch Transdermal Weekly    isosorbide mononitrate  30 mg Oral Daily    PARoxetine  40 mg Oral QAM    lamoTRIgine  25 mg Oral TID    sodium chloride flush  10 mL Intravenous 2 times per day    furosemide  40 mg Intravenous BID       Continuous Infusions:   heparin (PORCINE) Infusion         PRN Meds:  heparin (porcine), heparin (porcine), sodium chloride, sodium chloride flush, acetaminophen **OR** acetaminophen, polyethylene glycol, promethazine **OR** ondansetron, famotidine      Data:  CBC:   Recent Labs     10/12/20  1640 10/13/20  0448   WBC 4.9 5.0   HGB 8.7* 8.1*   HCT 27.2* 25.1*   MCV 91.8 92.8    167     BMP:   Recent Labs     10/12/20  1640 10/12/20  2342 10/13/20  0448   *  --  134*   K 5.6*  --  4.3     --  100   CO2 23  --  22   PHOS  --  6.3*  --    BUN 18  --  22*   CREATININE 3.4*  --  3.6*     LIVER PROFILE:   Recent Labs     10/12/20  1640   AST 34   ALT 11   BILITOT <0.2   ALKPHOS 104     CULTURES  None     RADIOLOGY    XR CHEST PORTABLE   Final Result   Cardiomegaly with interstitial changes, likely interstitial edema and CHF. Left basilar atelectasis.          XR CHEST (2 VW)    (Results Pending)       Assessment/Plan:    Acute Hypoxic and Hypercapnic Respiratory Failure  - 2/2 CHF  - CXR with cardiomegaly with interstitial changes  - Admitted to ICU, tele  - started on BiPAP, mgmt as below  Now on 3 L of O2  - critical care consulted    Acute on Chronic Diastolic CHF  Acute Pulmonary Edema  - last echo 9/20/20: normal EF, grade II DD  - currently NPO, daily weights, I&Os  - Lasix 40 mg BID  - cards involved as below    ESRD on HD  Hx of Bladder Cancer s/p cystectomy w/ ileal conduit  - Cr on admission: 3.4  - previously 1.5 on 10/9; baseline ~ 1.1; hx of bladder removal and non-functioning left kidney  - on prior admission, pt was started on HD, has tunneled HD catheter  - [address]: T, Th, S; last HD on 10/10  - nephro consulted    Hyperkalemia - Resolved  - 5.6 on admission   - Ca Gluconate given  - repeat 4.3     Elevated troponin  Likely secondary to ESRD and ddemand ischemia  - unclear but suspect type II in setting of CHF AE  - trop: 0.38 >  0.32  - trend troponin  - start ASA, heparin gtt, on Imdur  - cardiology consulted    Normocytic Anemia  - no signs/symptoms of bleeding; Hgb 8.7 on admission  - reviewed iron panel showing ferritin 34.6, Iron

## 2020-10-13 NOTE — PROGRESS NOTES
10/13/20 0315   NIV Type   NIV Started/Stopped On   Equipment Type V60   Mode Bilevel   Mask Type Full face mask   Mask Size Small   Settings/Measurements   IPAP 16 cmH20   CPAP/EPAP 8 cmH2O   Rate Ordered 12   Resp 17   Insp Rise Time (%) 2 %   FiO2  30 %   I Time/ I Time % 1 s   Vt Exhaled 721 mL   Minute Volume 11.1 Liters   Mask Leak (lpm) 26 lpm   Comfort Level Good   Using Accessory Muscles No   SpO2 97   Oxygen Therapy/Pulse Ox   O2 Therapy Oxygen   O2 Device PAP (positive airway pressure)   SpO2 97 %

## 2020-10-13 NOTE — PROGRESS NOTES
Physical Therapy  Orders received, chart reviewed. Pt currently in dialysis. Will re-attempt evaluation later as schedule permits. Thank you.   Latonya Brownlee, PT, DPT

## 2020-10-13 NOTE — PROGRESS NOTES
10/13 @ 8188 called polo from [facility] ambulance service to have pt  @ [address] cardiac cath lab. time 06:00

## 2020-10-13 NOTE — PROGRESS NOTES
Occupational Therapy/Physical Therapy  Attempted OT/PT eval and the pt is on dialysis at this time. Will attempt again at a later time.   Sushma Gravesing OTR/L [id number]

## 2020-10-13 NOTE — FLOWSHEET NOTE
10/12/20 2323   Vitals   Temp 97.8 °F (36.6 °C)   Temp Source Axillary   Pulse 120   Heart Rate Source Monitor   Resp (!) 32   /67   Level of Consciousness 0   MEWS Score 5   Oxygen Therapy   SpO2 94 %   Pulse Oximeter Device Mode Continuous   O2 Device PAP (positive airway pressure)       Pt being transferred from PCU to ICU for continuous Bi-PAP. Pt was given an CHG bath and full assessment. A+Ox4. Bilateral crackles in lungs. Scattered bruising from blood thinners. Pt expresses fear and anxiety of situation. Family updated of transfer. Call light within reach, bed alarm placed. Will continue to monitor.

## 2020-10-14 PROBLEM — I25.10 CAD IN NATIVE ARTERY: Status: ACTIVE | Noted: 2020-01-01

## 2020-10-14 PROBLEM — I21.4 NSTEMI (NON-ST ELEVATED MYOCARDIAL INFARCTION) (HCC): Status: ACTIVE | Noted: 2020-01-01

## 2020-10-14 NOTE — PRE SEDATION
Brief Pre-Op Note/Sedation Assessment      Barry Shen  1945  Cath Pool Rm/NONE      [id number]  1:53 PM    Planned Procedure: Cardiac Catheterization Procedure    Post Procedure Plan: Return to same level of care    Consent: I have discussed with the patient and/or the patient representative the indication, alternatives, and the possible risks and/or complications of the planned procedure and the anesthesia methods. The patient and/or patient representative appear to understand and agree to proceed. Chief Complaint: NSTEMI      Indications for Cath Procedure:  ACS > 24 hrs and Suspected CAD  Anginal Classification within 2 weeks:  CCS IV - Inability to perform any activity without angina or angina at rest, i.e., severe limitation  NYHA Heart Failure Class within 2 weeks: Class III - Symptoms of HF on less-than-ordinary exertion, Newly Diagnosed? No, Heart Failure Type: Diastolic  Is Cath Lab Visit Valve-related?: No  Surgical Risk: Intermediate  Functional Type: < 4 METS    Anti- Anginal Meds within 2 weeks:   Yes: Aspirin    Stress or Imaging Studies Performed (within 6 months):  None     Vital Signs: There were no vitals taken for this visit. Allergies:   Allergies   Allergen Reactions    Ciprofloxacin Hives       Past Medical History:  Past Medical History:   Diagnosis Date    Acid reflux     Arthritis     Cancer ([facility] [address]     bladder cancer    CHF (congestive heart failure) (HCC)     Chronic kidney disease     Hemodialysis patient ([facility] [address]     Hx of blood clots     Hyperlipidemia     Hypertension     Kidney stone          Surgical History:  Past Surgical History:   Procedure Laterality Date    ABDOMEN SURGERY      APPENDECTOMY      BLADDER STONE REMOVAL      BLADDER SURGERY      pt had bladder cancer    CATARACT REMOVAL WITH IMPLANT  8/26/11    RIGHT    CHOLECYSTECTOMY  03/06/2018    COLONOSCOPY      EYE SURGERY      HYSTERECTOMY      IR NONTUNNELED VASCULAR CATHETER  10/1/2020 [place] NONTUNNELED VASCULAR CATHE[place] [facility] [place]    IR TUNNELED CATHETER PLACEMENT GREATER THAN 5 YEARS  10/8/2020    IR TUNNELED CATHETER PLACEMENT GREATER THAN 5 YEARS [place] [facility] [place] [facility] [place]    OTHER SURGICAL HISTORY  05/08/2015    phacemilsification of cataract with intraocular lens implant left eye         Medications:  Current Facility-Administered Medications   Medication Dose Route Frequency Provider Last Rate Last Dose    [START ON 10/15/2020] darbepoetin parminder-polysorbate (ARANESP) injection 40 mcg  40 mcg Intravenous Weekly Eileen Grijalva MD               Pre-Sedation:    Pre-Sedation Documentation and Exam:  I have assessed the patient and agree with the H&P present on the chart. Prior History of Anesthesia Complications:   none    Modified Mallampati:  II (soft palate, uvula, fauces visible)    ASA Classification:  Class 3 - A patient with severe systemic disease that limits activity but is not incapacitating      Will Scale: Activity:  2 - Able to move 4 extremities voluntarily on command  Respiration:  1 - Dyspnic, shallow, or limited breathing  Circulation:  2 - BP+/- 20mmHg of normal  Consciousness:  1 - Arousable on calling  Oxygen Saturation (color):  1 - Needs oxygen to maintain oxygen saturation >90%    Sedation/Anesthesia Plan:  Guard the patient's safety and welfare. Minimize physical discomfort and pain. Minimize negative psychological responses to treatment by providing sedation and analgesia and maximize the potential amnesia. Patient to meet pre-procedure discharge plan.     Medication Planned:  midazolam intravenously and fentanyl intravenously    Patient is an appropriate candidate for plan of sedation: yes      Electronically signed by Trudy Duque MD on 10/14/2020 at 1:53 PM

## 2020-10-14 NOTE — PLAN OF CARE
Problem: Falls - Risk of:  Goal: Will remain free from falls  Description: Will remain free from falls  Outcome: Ongoing  Goal: Absence of physical injury  Description: Absence of physical injury  Outcome: Ongoing     Problem: OXYGENATION/RESPIRATORY FUNCTION  Goal: Patient will maintain patent airway  Description: Pt receiving O2 therapy per order.   2L per NC and BIPAP as needed for increased WOB  Outcome: Ongoing  Goal: Patient will achieve/maintain normal respiratory rate/effort  Description: Respiratory rate and effort will be within normal limits for the patient  Outcome: Ongoing     Problem: HEMODYNAMIC STATUS  Goal: Patient has stable vital signs and fluid balance  Outcome: Ongoing     Problem: FLUID AND ELECTROLYTE IMBALANCE  Goal: Fluid and electrolyte balance are achieved/maintained  Description: Daily labs, lasix and dialysis as ordered  Outcome: Ongoing     Problem: ACTIVITY INTOLERANCE/IMPAIRED MOBILITY  Goal: Mobility/activity is maintained at optimum level for patient  Outcome: Ongoing

## 2020-10-14 NOTE — CONSULTS
Consultation H&P    Date of Admission:  10/14/2020  6:49 AM  Date of Consultation:  10/14/2020    PCP:  Sheyla Wood MD      Chief Complaint: Shortness of breath and elevated troponin. History of Present Illness: We are asked to see this patient in consultation by Dr. carroll  Linda Johnson is a 66 y.o. female who presented  a few weeks ago with new-onset of congestive heart failure. She was  treated and released, but then returned a couple of days ago with  recurrent symptoms. She complains of shortness of breath that really  started about a month ago.       Past Medical History:  Past Medical History:   Diagnosis Date    Acid reflux     Arthritis     CAD in native artery 10/14/2020    Cancer [facility])     bladder cancer    CHF (congestive heart failure) (HCC)     Chronic kidney disease     [facility] [address]     Hx of blood clots     Hyperlipidemia     Hypertension     Kidney stone        Past Surgical History:  Past Surgical History:   Procedure Laterality Date    ABDOMEN SURGERY      APPENDECTOMY      BLADDER STONE REMOVAL      BLADDER SURGERY      pt had bladder cancer    CATARACT REMOVAL WITH IMPLANT  8/26/11    RIGHT    CHOLECYSTECTOMY  03/06/2018    COLONOSCOPY      EYE SURGERY      HYSTERECTOMY      IR NONTUNNELED VASCULAR CATHETER  10/1/2020    [place]UNNELED VASCULAR CAT[place] [facility] [place]    IR TUNNELED CATHETER PLACEMENT GREATER THAN 5 YEARS  10/8/2020    IR TUNNELED CATHETER PLACEMENT GREATER THAN 5 YEARS 10/8/2020 [facility]    OTHER SURGICAL HISTORY  05/08/2015    phacemilsification of cataract with intraocular lens implant left eye       Home Medications:   Prior to Admission medications    Medication Sig Start Date End Date Taking?  Authorizing Provider   hydrALAZINE (APRESOLINE) 100 MG tablet Take 1 tablet by mouth 3 times daily 10/9/20   Stas Mcallister MD   carvedilol (COREG) 12.5 MG tablet Take 1 tablet by mouth 2 times daily (with meals) 10/9/20   Jade Gonzalez MD   cloNIDine (CATAPRES) 0.2 MG/24HR PTWK Place 1 patch onto the skin once a week 10/10/20   Jade Gonzalez MD   isosorbide mononitrate (IMDUR) 30 MG extended release tablet Take 1 tablet by mouth daily 10/10/20   Jade Gonzalez MD   PARoxetine (PAXIL) 20 MG tablet Take 2 tablets by mouth every morning 10/9/20   Jade Gonzalez MD   lamoTRIgine (LAMICTAL) 25 MG tablet Take 25 mg by mouth 3 times daily    Historical Provider, MD   doxazosin (CARDURA) 4 MG tablet Take 4 mg by mouth 2 times daily    Historical Provider, MD   primidone (MYSOLINE) 50 MG tablet Take 100 mg by mouth daily    Historical Provider, MD cooleydocusamarsha Toby Soulier S) 8.6-50 MG per tablet Take 2 tablets by mouth daily 3/6/18   Rachid Lovell MD   pantoprazole (PROTONIX) 40 MG tablet Take 40 mg by mouth daily    Historical Provider, MD   B Complex Vitamins (B-COMPLEX/B-12) TABS Take 100 mg by mouth daily    Historical Provider, MD   gemfibrozil (LOPID) 600 MG tablet Take 600 mg by mouth 2 times daily (before meals). Historical Provider, MD        Facility Administered Medications:   Hector Ashford ON 10/15/2020] darbepoetin parminder-polysorbate  40 mcg Intravenous Weekly       Allergies:     Allergies   Allergen Reactions    Ciprofloxacin Hives        Social History:    Working:   Caffeine:   Lifestyle:    Social History     Socioeconomic History    Marital status:      Spouse name: Not on file    Number of children: Not on file    Years of education: Not on file    Highest education level: Not on file   Occupational History    Not on file   Social Needs    Financial resource strain: Not on file    Food insecurity     Worry: Not on file     Inability: Not on file    Transportation needs     Medical: Not on file     Non-medical: Not on file   Tobacco Use    Smoking status: Former Smoker    Smokeless tobacco: Never Used    Tobacco comment: quit 25 years ago   Substance and Sexual Activity    Alcohol use: No    Drug use: No    Sexual activity: Not Currently   Lifestyle    Physical activity     Days per week: Not on file     Minutes per session: Not on file    Stress: Not on file   Relationships    Social connections     Talks on phone: Not on file     Gets together: Not on file     Attends Gnosticism service: Not on file     Active member of club or organization: Not on file     Attends meetings of clubs or organizations: Not on file     Relationship status: Not on file    Intimate partner violence     Fear of current or ex partner: Not on file     Emotionally abused: Not on file     Physically abused: Not on file     Forced sexual activity: Not on file   Other Topics Concern    Not on file   Social History Narrative    Not on file       Family History:  Heart Disease:   Stroke:   Cancer:   Diabetes:   Hypertension:   Aneurysm/PVD:         Problem Relation Age of Onset    Heart Disease Mother        Review of Systems:  Constitutional:  No night sweats, headaches, weight loss. Eyes:  No glaucoma, cataracts. ENMT:  No nosebleeds, deviated septum. Cardiac:  No arrhythmias, previous MI. Vascular:  No claudication, varicosities. GI:  No PUD, heartburn. :  No kidney stones, frequent UTIs  Musculoskeletal:  No arthritis, gout. Respiratory:  No SOB, emphysema, asthma. Integumentary:  No dermatitis, itching, rash. Neurological:  No stroke, TIAs, seizures. Psychiatric:  No depression, anxiety. Endocrine: No diabetes, thyroid issues. Hematologic:  No bleeding, easy bruising. Immunologic:  No known cancer, steroid therapies. Physical Examination:    There were no vitals taken for this visit.      BP RUE:  BP LUE:   Admission     Hand dominance:    General appearance: NAD, well nourished  Eyes: anicteric, PERRLA  ENMT: no scars or lesions, no nasal deformity, normal dentition, no cyanosis of oral mucosa  Neck: no masses, no thyroid enlargement, no JVD. Respiratory: effort is unlabored, symmetric, no crackles, wheezes or rubs. No palpable/percussable abnormalities. Cardiovascular: regular, no murmur. PMI normal, no thrill. No carotid bruits. No edema or varicosities. Abdominal aorta cannot be appreciated given body habitus. GI: abdomen soft, nondistended, no organomegaly. No masses. Lymphatic: no cervical/supraclavicular adenopathy  Musculoskeletal: strength and tone normal. Full ROM. No scoliosis. Extremities: warm and pink. No clubbing or petechiae. Skin: no dermatitis or ulceration. No nodularity or induration. Neuro: CN grossly intact. Sensation and motor function grossly intact. Psychiatric: oriented, appropriate mood/affect. MEDICAL DECISION MAKING/TESTING  Studies personally reviewed. Cath:  Findings:     1. Left main coronary artery is diseased. There is 50% distal   stenosis. It gave off the left anterior descending artery and   left circumflex. 2. Left anterior descending artery has severe atherosclerotic   disease.  It was moderate in size. It gave off septal perforators   and a moderate sized diagonal branch. The LAD covered the entire   apex of the left ventricle.    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 3. Left circumflex has severe atherosclerotic disease.  It was   moderate in size. There was a moderate sized obtuse marginal   branch.    ~70-90%     4. Right coronary artery has severe 70-90% atherosclerotic   disease. It was moderate in size and was the dominant artery. 5. Left ventriculogram was not performed.  Left ventricular end   diastolic pressure is 18. Echo:     CXR:     CT:   Summary   LV systolic function is normal with EF estimated at 55%. No regional wall motion abnormalities are noted. There is mild concentric left ventricular hypertrophy. Grade II diastolic dysfunction with elevated filing pressure. The left atrium is mildly dilated.    Mild mitral annular calcification is present. Mild mitral, aortic, tricuspid, and pulmonic regurgitation. Systolic pulmonary artery pressure (SPAP) estimated at 41 mmHg (RA pressure   8 mmHg) c/w mild pulm HTN. Carotid duplex:     PFT      Labs:   CBC:   Recent Labs     10/12/20  1640 10/13/20  0448 10/14/20  0411   WBC 4.9 5.0 3.9*   HGB 8.7* 8.1* 8.9*   HCT 27.2* 25.1* 27.5*   MCV 91.8 92.8 92.7    167 164     BMP:   Recent Labs     10/12/20  1640 10/12/20  2342 10/13/20  0448 10/14/20  0411   *  --  134* 128*   K 5.6*  --  4.3 3.9     --  100 95*   CO2 23  --  22 22   PHOS  --  6.3*  --   --    BUN 18  --  22* 13   CREATININE 3.4*  --  3.6* 2.1*   CALCIUM 8.6  --  9.0 9.0   MG 1.90 1.90 2.00 1.90     Cardiac Enzymes:   Recent Labs     10/12/20  2342 10/13/20  0448 10/13/20  1102   TROPONINI 0.32* 0.38* 0.54*     PT/INR: No results for input(s): PROTIME, INR in the last 72 hours.   APTT:   Recent Labs     10/13/20  0805 10/13/20  1504 10/13/20  2312   APTT 28.2 105.6* 43.1*     Liver Profile:  Lab Results   Component Value Date    AST 34 10/12/2020    ALT 11 10/12/2020    BILIDIR <0.2 03/06/2018    BILITOT <0.2 10/12/2020    ALKPHOS 104 10/12/2020     Lab Results   Component Value Date    CHOL 145 10/13/2020    HDL 53 10/13/2020    TRIG 122 10/13/2020     UA:   Lab Results   Component Value Date    COLORU Yellow 10/13/2020    PHUR 6.0 10/13/2020    WBCUA 21-50 10/13/2020    RBCUA 5-10 10/13/2020    MUCUS 2+ 10/13/2020    BACTERIA 2+ 10/13/2020    CLARITYU SL CLOUDY 10/13/2020    SPECGRAV 1.020 10/13/2020    LEUKOCYTESUR Negative 10/13/2020    UROBILINOGEN 0.2 10/13/2020    BILIRUBINUR SMALL 10/13/2020    BILIRUBINUR NEGATIVE, By Confirmatory Testing 11/19/2011    BLOODU Negative 10/13/2020    GLUCOSEU Negative 10/13/2020    GLUCOSEU NEGATIVE 11/19/2011    AMORPHOUS 3+ 09/30/2020       History obtained: chart, pt    Risk factors:     STS Cardiac Surgery Risk profile:  CABG     Mortality:   Morbidity and

## 2020-10-14 NOTE — FLOWSHEET NOTE
Treatment time: 2 hours  Net UF: 1000 ml     Pre weight: Unable to weigh patient   Post weight: Unable to weigh patient   EDW: 59 kg     Access used: RIJ TDC  Access function: Good with  ml/min     Medications or blood products given: Heparin for catheter dwells     Regular outpatient schedule: TTS     Summary of response to treatment:      10/14/20 1712 10/14/20 1912   Vital Signs   BP (!) 149/70 (!) 154/74   Temp 97.6 °F (36.4 °C) 97 °F (36.1 °C)   Pulse 108 107   Resp 18 18   Pain Assessment   Pain Assessment 0-10 0-10   Pain Level 0 0   Post-Hemodialysis Assessment   Post-Treatment Procedures  --  Blood returned;Catheter capped, clamped and heparinized x 2 ports   Machine Disinfection Process  --  Acid/Vinegar Clean;Heat Disinfect; Exterior Machine Disinfection   Rinseback Volume (ml)  --  400 ml   Total Liters Processed (l/min)  --  38.7 l/min   Dialyzer Clearance  --  Lightly streaked   Duration of Treatment (minutes)  --  120 minutes   Heparin amount administered during treatment (units)  --  0 units   Hemodialysis Intake (ml)  --  400 ml   Hemodialysis Output (ml)  --  1400 ml   NET Removed (ml)  --  1000 ml   Tolerated Treatment  --  Good   Copy of dialysis treatment record placed in chart, to be scanned into EMR.  Report called to [facility], RN.

## 2020-10-14 NOTE — CONSULTS
H&P Update    PATIENT: Ap Alexander  DATE: 10/14/2020  MRN: [id number]  [facility]: [id number]  : 1945    I have reviewed the history and physical and examined the patient and find no relevant changes. I have reviewed with the patient and/or family the risks, benefits, and alternatives to the procedure. History of present illness:    66 y.o. patient who presents to the hospital for invasive cardiac testing. The patient underwent clinical evaluation, and it was determined that the patient needed to undergo cardiac catheterization for further diagnostic evaluation. Pre-sedation Assessment    Patient:  Ap Alexander   :   1945  Intended Procedures may include:   1. Left heart Catheterization with possible angioplasty/stent with associated supportive testing. 2. Right heart catheterization   3. Peripheral angiogram    [place] nurses notes reviewed and agreed. Medications reviewed  Allergies:    Allergies   Allergen Reactions    Ciprofloxacin Hives       Primary Care Doctor: Rain Leblanc MD    Patient Active Problem List   Diagnosis    Hematuria    Acute renal failure (ARF) ([address]    Chronic calculous cholecystitis    Hydronephrosis    Acute pyelonephritis    DELMER (acute kidney injury) ([address]    Complicated UTI (urinary tract infection)    GERD (gastroesophageal reflux disease)    Hyperkalemia    Essential hypertension    Chronic kidney disease, stage 3    Chest pain    Anxiety    Acute diastolic CHF (congestive heart failure) (HCC)    Acute respiratory failure with hypoxia (HCC)    Chronic diastolic CHF (congestive heart failure) (HCC)    Increased anion gap metabolic acidosis    Anemia, normocytic normochromic    Acute kidney injury superimposed on CKD ([address]    Acute on chronic diastolic CHF (congestive heart failure) (HCC)    Mild protein-calorie malnutrition (HCC)    CHF (congestive heart failure), NYHA class I, acute on chronic, combined ([address]    CHF with unknown LVEF (HCC)    Heart failure exacerbated by sotalol [facility]    Acute on chronic congestive heart failure ([facility]    ESRD (end stage renal disease) ([facility]    Acute respiratory failure with hypoxia and hypercapnia (HCC)    NSTEMI (non-ST elevated myocardial infarction) [facility])         Cardiac Testing: I have reviewed the findings below. EKG:  ECHO:   STRESS TEST:  CATH:  BYPASS:  VASCULAR:    Past Medical History:   has a past medical history of Acid reflux, Arthritis, Cancer ([facility] CHF (congestive heart failure) ([facility] Chronic kidney disease, Hemodialysis patient ([facility] Hx of blood clots, Hyperlipidemia, Hypertension, and Kidney stone. Surgical History:   has a past surgical history that includes Bladder stone removal; Bladder surgery; Cataract removal with implant (8/26/11); other surgical history (05/08/2015); Cholecystectomy (03/06/2018); Abdomen surgery; Appendectomy; eye surgery; Hysterectomy; Colonoscopy; IR NONTUNNELED VASCULAR CATHETER > 5 YEARS (10/1/2020); and IR TUNNELED CVC PLACE WO SQ PORT/PUMP > 5 YEARS (10/8/2020). Social History:   reports that she has quit smoking. She has never used smokeless tobacco. She reports that she does not drink alcohol or use drugs. Family History:  No evidence for sudden cardiac death or premature CAD    Home Medications:  Reviewed and are listed in nursing record. and/or listed below  Current Facility-Administered Medications   Medication Dose Route Frequency Provider Last Rate Last Dose    [START ON 10/15/2020] darbepoetin parminder-polysorbate (ARANESP) injection 40 mcg  40 mcg Intravenous Weekly Argenis Mcfadden MD            Allergies:  Ciprofloxacin     Review of Systems:   All 14 point review of symptoms completed. Pertinent positives identified in the HPI, all other review of symptoms negative as below. Physical Examination:    There were no vitals filed for this visit.        Wt Readings from Last 3 Encounters:   10/14/20 118 lb 13.3 oz (53.9 kg) 10/09/20 135 lb 3.2 oz (61.3 kg)   09/24/20 133 lb 9.6 oz (60.6 kg)     No intake/output data recorded. No intake/output data recorded.     General Appearance:  Alert, cooperative, no distress, appears stated age   Head:  Normocephalic, without obvious abnormality, atraumatic   Eyes:  PERRL, conjunctiva/corneas clear       Nose: Nares normal, no drainage or sinus tenderness   Throat: Lips, mucosa, and tongue normal   Neck: Supple, symmetrical, trachea midline, no adenopathy, thyroid: not enlarged, symmetric, no tenderness/mass/nodules, no carotid bruit or JVD       Lungs:   Clear to auscultation bilaterally, respirations unlabored   Chest Wall:  No tenderness or deformity   Heart:  Regular rhythm and normal rate; S1, S2 are normal; no murmur noted; no rub or gallop   Abdomen:   Soft, non-tender, bowel sounds active all four quadrants,  no masses, no organomegaly           Extremities: Extremities normal, atraumatic, no cyanosis or edema   Pulses: 2+ and symmetric   Skin: Skin color, texture, turgor normal, no rashes or lesions   Pysch: Normal mood and affect   Neurologic: Normal gross motor and sensory exam.         Labs  CBC:   Lab Results   Component Value Date    WBC 3.9 10/14/2020    RBC 2.97 10/14/2020    HGB 8.9 10/14/2020    HCT 27.5 10/14/2020    MCV 92.7 10/14/2020    RDW 15.9 10/14/2020     10/14/2020     CMP:    Lab Results   Component Value Date     10/14/2020    K 3.9 10/14/2020    K 5.6 10/12/2020    CL 95 10/14/2020    CO2 22 10/14/2020    BUN 13 10/14/2020    CREATININE 2.1 10/14/2020    GFRAA 28 10/14/2020    GFRAA 41 11/19/2011    AGRATIO 1.4 10/12/2020    LABGLOM 23 10/14/2020    GLUCOSE 102 10/14/2020    PROT 6.6 10/12/2020    PROT 8.0 11/19/2011    CALCIUM 9.0 10/14/2020    BILITOT <0.2 10/12/2020    ALKPHOS 104 10/12/2020    AST 34 10/12/2020    ALT 11 10/12/2020     PT/INR:  No results found for: PTINR  Lab Results   Component Value Date    TROPONINI 0.54 ([facility]) 10/13/2020     No components found for: CHLPL  Lab Results   Component Value Date    TRIG 122 10/13/2020     Lab Results   Component Value Date    HDL 53 10/13/2020     Lab Results   Component Value Date    LDLCALC 68 10/13/2020     Lab Results   Component Value Date    LABVLDL 24 10/13/2020     Lab Results   Component Value Date    ALT 11 10/12/2020    AST 34 10/12/2020    ALKPHOS 104 10/12/2020    BILITOT <0.2 10/12/2020       Assessment:  66 y.o. patient with:  1. Pre-procedure assessment for cardiac procedure. Principal Problem:    NSTEMI (non-ST elevated myocardial infarction) [facility])  Active Problems:    Chest pain    Acute kidney injury superimposed on CKD ([facility]    Acute respiratory failure with hypoxia and hypercapnia (HCC)  Resolved Problems:    * No resolved hospital problems. *      Plan:  1. Proceed with planned procedure for further assessment. ~I had the opportunity to review the Maykel Mancera clinical presentation and the available pertinent data. With the patient's clinical risk factors and symptoms, there is a high pretest likelihood for CAD. I have asked Maykel Mancera to undergo cardiac angiography for further diagnostic testing. The procedure was explained in depth. All questions and alternate treatment strategies were discussed. The patient agrees to proceed. They understand all the risks associated with the procedure, including myocardial infarction, stroke, death, vascular complications, and the possible need for emergent surgery. All questions and concerns were addressed to the patient/family. Alternatives to my treatment were discussed. The note was completed using EMR. Every effort was made to ensure accuracy; however, inadvertent computerized transcription errors may be present.     Jose Gu MD, [address], [place], [place]  [phone] [facility]  [phone] Main central  10/14/2020  1:52 PM

## 2020-10-14 NOTE — PROCEDURES
[address]       Cardiac Catheterization Lab Report    PATIENT: Kaveh Mendoza  DATE: 10/14/2020  MRN: [id number]  CSN: [id number]  : 1945      Performing Physician: Jana Hopkins MD, [place], [facility]  Primary Care Physician: Ghanshyam Billy MD  Admitting/Referring provider: Mario Moreno DO     Procedures Performed:   1. Left heart catheterization  2. Selective left and right coronary angiogram  3. Right heart catheterization    Procedure Findings:  1. Severe multi vessel coronary artery diease  2. Normal left heart hemodynamics  3.   Decompensated right heart hemodynamics with moderate pulmonary hypertension    Indications:   Patient Active Problem List   Diagnosis    Hematuria    Acute renal failure (ARF) (HCC)    Chronic calculous cholecystitis    Hydronephrosis    Acute pyelonephritis    DELMER (acute kidney injury) ([address]    Complicated UTI (urinary tract infection)    GERD (gastroesophageal reflux disease)    Hyperkalemia    Essential hypertension    Chronic kidney disease, stage 3    Chest pain    Anxiety    Acute diastolic CHF (congestive heart failure) (HCC)    Acute respiratory failure with hypoxia (HCC)    Chronic diastolic CHF (congestive heart failure) (HCC)    Increased anion gap metabolic acidosis    Anemia, normocytic normochromic    Acute kidney injury superimposed on CKD (HCC)    Acute on chronic diastolic CHF (congestive heart failure) (HCC)    Mild protein-calorie malnutrition (HCC)    CHF (congestive heart failure), NYHA class I, acute on chronic, combined ([address]    CHF with unknown LVEF ([address]    Heart failure exacerbated by sotalol ([address]    Acute on chronic congestive heart failure ([address]    ESRD (end stage renal disease) ([address]    Acute respiratory failure with hypoxia and hypercapnia (HCC)    NSTEMI (non-ST elevated myocardial infarction) ([address]       Details:   Kaveh Mendoza was brought to the cardiac catheterization lab in a 69%  Aortic saturation 97%    There were no vitals taken for this visit. The access site was controlled with manual pressure and/or appropriate closure device. Moderate Conscious Sedation Details: An independent trained observer pushed medications at my direction. We monitoring the patient's level of consciousness and vital signs/physiologic status throughout the procedure. CPT codes 60448 and 65577      Start time: 1316  Stop time: 1333  ASA class: 4    Sedation totals:  Versad - 2mg  Fentanyl - 25mcg    EBL - minimal <5 cc blood loss    The patient was monitored continuously with the ECG, pulse oximetry, blood pressure, and direct observation. CONCLUSIONS:    1. Severe multi-vessel coronary artery disease    ASSESSMENT/RECOMMENDATIONS:    1. Admit to the hospital for medical stabilization and cardiac surgery consultation for coronary artery bypass grafting surgery.       Wil Ibarra MD, TIARA, [place] [address] Cardiology  [facility]  [phone] Main central office  [phone] [facility] office  10/14/2020  1:54 PM

## 2020-10-14 NOTE — POST SEDATION
Patient:  Odalys Florentino   :   1945    A pre-sedation re-evaluation was performed immediately at the end of the procedure. Procedure:  Cardiac cath  Medications: Procedural sedation with minimal conscious sedation  Complications: None  Estimated Blood Loss: none  Specimens: Were not obtained        [place] Medication and Procedural Reconciliation:  I agree that the documented medications and procedures performed are true. The medications were given under my order. The procedures were performed under my direct supervision.

## 2020-10-14 NOTE — PROGRESS NOTES
Call returned from [facility] cath lab. Report given to Alejandro Cochran [facility].

## 2020-10-14 NOTE — FLOWSHEET NOTE
10/14/20 1945   Vital Signs   Temp 98.2 °F (36.8 °C)   Temp Source Oral   Pulse 116   Heart Rate Source Monitor   Resp 18   BP (!) 163/76   BP Location Right upper arm   Level of Consciousness 0   MEWS Score 3   Patient Currently in Pain Denies   Pain Assessment   Pain Assessment 0-10   Pain Level 0   Oxygen Therapy   SpO2 96 %   O2 Device Nasal cannula   O2 Flow Rate (L/min) 2 L/min

## 2020-10-14 NOTE — H&P
 BLADDER SURGERY      pt had bladder cancer    CATARACT REMOVAL WITH IMPLANT  8/26/11    RIGHT    CHOLECYSTECTOMY  03/06/2018    COLONOSCOPY      EYE SURGERY      HYSTERECTOMY      IR NONTUNNELED VASCULAR CATHETER  10/1/2020    IR NONTUNNELED VASCULAR CATHETER 10/1/2020 [facility] PROCED[facility]    IR TUNNELED CATHETER PLACEMENT GREATER THAN 5 YEARS  10/8/2020    IR TUNNELED CATHETER PLACEMENT GREATER THAN 5 YEARS 10/8/2020 [facility] SPECIAL PROCEDURES    OTHER SURGICAL HISTORY  05/08/2015    phacemilsification of cataract with intraocular lens implant left eye       Medications Prior to Admission:      Prior to Admission medications    Medication Sig Start Date End Date Taking?  Authorizing Provider   hydrALAZINE (APRESOLINE) 100 MG tablet Take 1 tablet by mouth 3 times daily 10/9/20   Lizzeth Whittaker MD   carvedilol (COREG) 12.5 MG tablet Take 1 tablet by mouth 2 times daily (with meals) 10/9/20   Lizzeth Whittaker MD   cloNIDine (CATAPRES) 0.2 MG/24HR PTWK Place 1 patch onto the skin once a week 10/10/20   Lizzeth Whittaker MD   isosorbide mononitrate (IMDUR) 30 MG extended release tablet Take 1 tablet by mouth daily 10/10/20   Lizzeth Whittaker MD   PARoxetine (PAXIL) 20 MG tablet Take 2 tablets by mouth every morning 10/9/20   Lizzeth Whittaker MD   lamoTRIgine (LAMICTAL) 25 MG tablet Take 25 mg by mouth 3 times daily    Historical Provider, MD   doxazosin (CARDURA) 4 MG tablet Take 4 mg by mouth 2 times daily    Historical Provider, MD   primidone (MYSOLINE) 50 MG tablet Take 100 mg by mouth daily    Historical Provider, MD   senna-docusate (SENOKOT S) 8.6-50 MG per tablet Take 2 tablets by mouth daily 3/6/18   Priyanka Campbell MD   pantoprazole (PROTONIX) 40 MG tablet Take 40 mg by mouth daily    Historical Provider, MD   B Complex Vitamins (B-COMPLEX/B-12) TABS Take 100 mg by mouth daily    Historical Provider, MD   gemfibrozil (LOPID) 600 MG tablet Take 600 mg by mouth 2 times daily (before meals). Historical Provider, MD       Allergies:  Ciprofloxacin    Social History:      The patient currently lives at home    TOBACCO:   reports that she has quit smoking. She has never used smokeless tobacco.  ETOH:   reports no history of alcohol use. E-Cigarettes Vaping or Juuling     Questions Responses    Vaping Use Never User    Start Date     Does device contain nicotine? Quit Date     Vaping Type             Family History:      Reviewed in detail and negative for ESRD. Positive as follows:        Problem Relation Age of Onset    Heart Disease Mother        REVIEW OF SYSTEMS:   Pertinent positives as noted in the HPI. All other systems reviewed and negative. PHYSICAL EXAM PERFORMED:    There were no vitals taken for this visit. General appearance:  No apparent distress, appears stated age and cooperative. HEENT:  Normal cephalic, atraumatic without obvious deformity. Pupils equal, round, and reactive to light. Extra ocular muscles intact. Conjunctivae/corneas clear. Neck: Supple, with full range of motion. No jugular venous distention. Trachea midline. Respiratory:  Normal respiratory effort. Clear to auscultation, bilaterally without Rales/Wheezes/Rhonchi. Cardiovascular:  Regular rate and rhythm with normal S1/S2 without murmurs, rubs or gallops. Abdomen: Soft, non-tender, non-distended with normal bowel sounds. Musculoskeletal:  No clubbing, cyanosis or edema bilaterally. Full range of motion without deformity. Skin: Skin color, texture, turgor normal.  No rashes or lesions. Neurologic:  Neurovascularly intact without any focal sensory/motor deficits.  Cranial nerves: II-XII intact, grossly non-focal.  Psychiatric:  Alert and oriented, thought content appropriate, limited insight  Capillary Refill: Brisk,< 3 seconds   Peripheral Pulses: +2 palpable, equal bilaterally       Labs:     Recent Labs     10/12/20  1640 10/13/20  0448 10/14/20  0411   WBC 4.9 5.0 3.9*   HGB 8. 7* 8.1* 8.9*   HCT 27.2* 25.1* 27.5*    167 164     Recent Labs     10/12/20  1640 10/12/20  2342 10/13/20  0448 10/14/20  0411   *  --  134* 128*   K 5.6*  --  4.3 3.9     --  100 95*   CO2 23  --  22 22   BUN 18  --  22* 13   CREATININE 3.4*  --  3.6* 2.1*   CALCIUM 8.6  --  9.0 9.0   PHOS  --  6.3*  --   --      Recent Labs     10/12/20  1640   AST 34   ALT 11   BILITOT <0.2   ALKPHOS 104     No results for input(s): INR in the last 72 hours. Recent Labs     10/12/20  2342 10/13/20  0448 10/13/20  1102   TROPONINI 0.32* 0.38* 0.54*       Urinalysis:      Lab Results   Component Value Date    NITRU Negative 10/13/2020    WBCUA 21-50 10/13/2020    BACTERIA 2+ 10/13/2020    RBCUA 5-10 10/13/2020    BLOODU Negative 10/13/2020    SPECGRAV 1.020 10/13/2020    GLUCOSEU Negative 10/13/2020    GLUCOSEU NEGATIVE 11/19/2011       Radiology:     CXR: I have reviewed the CXR with the following interpretation: CHF  EKG:  I have reviewed the EKG with the following interpretation: nonspecific abnormalities    VL PRE OP VEIN MAPPING    (Results Pending)   VL DUP CAROTID BILATERAL    (Results Pending)   CT CHEST WO CONTRAST    (Results Pending)       ASSESSMENT:    Active Hospital Problems    Diagnosis Date Noted    NSTEMI (non-ST elevated myocardial infarction) ([facility] [I21.4] 10/14/2020    CAD in native artery [I25.10] 10/14/2020    Acute respiratory failure with hypoxia and hypercapnia ([facility] [J96.01, J96.02]     Acute kidney injury superimposed on CKD ([facility] [N17.9, N18.9]     Chest pain [R07.9]          PLAN:    The patient is a frail, chronically-ill appearing woman with a h/o former smoking, HTN, HLD, CHF, CKD3, and bladder cancer s/p cystectomy with ileal conduit. She was not admitted frequently until about 4 months ago, when it seems like her overall health started to rapidly decline.   She has had 5 admissions in the last 4 months: pyelonephritis (suspected due to ileal conduit stricture), then chest pain (attributed to anxiety), then CHF (increased diuretic regimen), then DELMER (had to start HD on 10/1). She was most recently admitted on 10/12 to [facility] when she presented with dyspnea and was found to have volume overload due to a combination of ESRD and CHF. She was initially admitted to the ICU on BiPAP, but then her dry weight was challenged and she was weaned to [facility] and dyspnea resolved. Her troponin was initially 0.32 and attributed to demand ischemia, but it increased to 0.54 and cardiology recommended transfer to [facility] for a C. She was found to have severe multivessel disease and CT surgery was consulted for consideration of CABG. Hospital medicine was consulted for admission. NSTEMI, in the setting of severe multivessel disease  - LM 50%, pLAD 70%, LCx 70-90%, RCA 70-90%  - aspirin, statin, carvedilol, ISMN  - patient seems higher risk for CABG, but her non-operative prognosis may be much worse. As of last month she had not yet developed a cardiomyopathy and there is value in preserving this cardiac function. Tentatively planning surgery 10/21. Acute hypoxic respiratory failure due to volume overload, which in turn was due to ESRD and acute on chronic diastolic CHF  - challenging dry weight with HD. Appreciate nephrology input. Of note, her PCWP was still 25 during the 10/14 [facility]. - carvedilol, ISMN. Will start ACE/ARB/ARNI if BP allows for this. - wean to RA as tolerated. The patient has no supplemental O2 requirement at baseline. DVT Prophylaxis: heparin SC  Diet: Diet NPO Time Specified Exceptions are: Sips with Meds  Code Status: Full Code    PT/OT Eval Status: order postop    Dispo - perhaps 10/28, pending postop course. Freddie Rock MD    Thank you Alejandro Chacon MD for the opportunity to be involved in this patient's care. If you have any questions or concerns please feel free to contact me at [phone].

## 2020-10-14 NOTE — CONSULTS
10-13-20 (2300) aptt 43.1. Please increase heparin drip to 6.2 ml/hr. Next aptt 0800 on 10-14-20. [facility]. .  10/14/2020 12:50 AM

## 2020-10-14 NOTE — PROGRESS NOTES
Report given to [facility] with [facility] transport. Pt left unit via stretcher to [facility] cath lab. Pt left unit in stable condition.

## 2020-10-14 NOTE — H&P
H&P Update    PATIENT: Armando Santana  DATE: 10/14/2020  MRN: [id number]  CSN: [id number]  : 1945    I have reviewed the history and physical and examined the patient and find no relevant changes. I have reviewed with the patient and/or family the risks, benefits, and alternatives to the procedure. History of present illness:    66 y.o. patient who presents to the hospital for invasive cardiac testing. The patient underwent clinical evaluation, and it was determined that the patient needed to undergo cardiac catheterization for further diagnostic evaluation. Pre-sedation Assessment    Patient:  Armando Santana   :   1945  Intended Procedures may include:   1. Left heart Catheterization with possible angioplasty/stent with associated supportive testing. 2. Right heart catheterization   3. Peripheral angiogram    [facility] nurses notes reviewed and agreed. Medications reviewed  Allergies:    Allergies   Allergen Reactions    Ciprofloxacin Hives       Primary Care Doctor: Sherita Morales MD    Patient Active Problem List   Diagnosis    Hematuria    Acute renal failure (ARF) ([address]    Chronic calculous cholecystitis    Hydronephrosis    Acute pyelonephritis    DELMER (acute kidney injury) ([address]    Complicated UTI (urinary tract infection)    GERD (gastroesophageal reflux disease)    Hyperkalemia    Essential hypertension    Chronic kidney disease, stage 3    Chest pain    Anxiety    Acute diastolic CHF (congestive heart failure) (HCC)    Acute respiratory failure with hypoxia (HCC)    Chronic diastolic CHF (congestive heart failure) (HCC)    Increased anion gap metabolic acidosis    Anemia, normocytic normochromic    Acute kidney injury superimposed on CKD ([address]    Acute on chronic diastolic CHF (congestive heart failure) (HCC)    Mild protein-calorie malnutrition (HCC)    CHF (congestive heart failure), NYHA class I, acute on chronic, combined ([address]    CHF with unknown LVEF (HCC)    Heart failure exacerbated by sotalol ([facility]    Acute on chronic congestive heart failure ([facility]    ESRD (end stage renal disease) ([facility]    Acute respiratory failure with hypoxia and hypercapnia (HCC)    NSTEMI (non-ST elevated myocardial infarction) [facility])         Cardiac Testing: I have reviewed the findings below. EKG:  ECHO:   STRESS TEST:  CATH:  BYPASS:  VASCULAR:    Past Medical History:   has a past medical history of Acid reflux, Arthritis, Cancer ([facility] CHF (congestive heart failure) ([facility] Chronic kidney disease, Hemodialysis patient ([facility] Hx of blood clots, Hyperlipidemia, Hypertension, and Kidney stone. Surgical History:   has a past surgical history that includes Bladder stone removal; Bladder surgery; Cataract removal with implant (8/26/11); other surgical history (05/08/2015); Cholecystectomy (03/06/2018); Abdomen surgery; Appendectomy; eye surgery; Hysterectomy; Colonoscopy; IR NONTUNNELED VASCULAR CATHETER > 5 YEARS (10/1/2020); and IR TUNNELED CVC PLACE WO SQ PORT/PUMP > 5 YEARS (10/8/2020). Social History:   reports that she has quit smoking. She has never used smokeless tobacco. She reports that she does not drink alcohol or use drugs. Family History:  No evidence for sudden cardiac death or premature CAD    Home Medications:  Reviewed and are listed in nursing record. and/or listed below  Current Facility-Administered Medications   Medication Dose Route Frequency Provider Last Rate Last Dose    [START ON 10/15/2020] darbepoetin parminder-polysorbate (ARANESP) injection 40 mcg  40 mcg Intravenous Weekly Josh Navas MD            Allergies:  Ciprofloxacin     Review of Systems:   All 14 point review of symptoms completed. Pertinent positives identified in the HPI, all other review of symptoms negative as below. Physical Examination:    There were no vitals filed for this visit.        Wt Readings from Last 3 Encounters:   10/14/20 118 lb 13.3 oz (53.9 kg) 10/09/20 135 lb 3.2 oz (61.3 kg)   09/24/20 133 lb 9.6 oz (60.6 kg)     No intake/output data recorded. No intake/output data recorded.     General Appearance:  Alert, cooperative, no distress, appears stated age   Head:  Normocephalic, without obvious abnormality, atraumatic   Eyes:  PERRL, conjunctiva/corneas clear       Nose: Nares normal, no drainage or sinus tenderness   Throat: Lips, mucosa, and tongue normal   Neck: Supple, symmetrical, trachea midline, no adenopathy, thyroid: not enlarged, symmetric, no tenderness/mass/nodules, no carotid bruit or JVD       Lungs:   Clear to auscultation bilaterally, respirations unlabored   Chest Wall:  No tenderness or deformity   Heart:  Regular rhythm and normal rate; S1, S2 are normal; no murmur noted; no rub or gallop   Abdomen:   Soft, non-tender, bowel sounds active all four quadrants,  no masses, no organomegaly           Extremities: Extremities normal, atraumatic, no cyanosis or edema   Pulses: 2+ and symmetric   Skin: Skin color, texture, turgor normal, no rashes or lesions   Pysch: Normal mood and affect   Neurologic: Normal gross motor and sensory exam.         Labs  CBC:   Lab Results   Component Value Date    WBC 3.9 10/14/2020    RBC 2.97 10/14/2020    HGB 8.9 10/14/2020    HCT 27.5 10/14/2020    MCV 92.7 10/14/2020    RDW 15.9 10/14/2020     10/14/2020     CMP:    Lab Results   Component Value Date     10/14/2020    K 3.9 10/14/2020    K 5.6 10/12/2020    CL 95 10/14/2020    CO2 22 10/14/2020    BUN 13 10/14/2020    CREATININE 2.1 10/14/2020    GFRAA 28 10/14/2020    GFRAA 41 11/19/2011    AGRATIO 1.4 10/12/2020    LABGLOM 23 10/14/2020    GLUCOSE 102 10/14/2020    PROT 6.6 10/12/2020    PROT 8.0 11/19/2011    CALCIUM 9.0 10/14/2020    BILITOT <0.2 10/12/2020    ALKPHOS 104 10/12/2020    AST 34 10/12/2020    ALT 11 10/12/2020     PT/INR:  No results found for: PTINR  Lab Results   Component Value Date    TROPONINI 0.54 ([facility]) 10/13/2020     No components found for: CHLPL  Lab Results   Component Value Date    TRIG 122 10/13/2020     Lab Results   Component Value Date    HDL 53 10/13/2020     Lab Results   Component Value Date    LDLCALC 68 10/13/2020     Lab Results   Component Value Date    LABVLDL 24 10/13/2020     Lab Results   Component Value Date    ALT 11 10/12/2020    AST 34 10/12/2020    ALKPHOS 104 10/12/2020    BILITOT <0.2 10/12/2020       Assessment:  66 y.o. patient with:  1. Pre-procedure assessment for cardiac procedure. Principal Problem:    NSTEMI (non-ST elevated myocardial infarction) [facility])  Active Problems:    Chest pain    Acute kidney injury superimposed on CKD ([facility]    Acute respiratory failure with hypoxia and hypercapnia (HCC)  Resolved Problems:    * No resolved hospital problems. *      Plan:  1. Proceed with planned procedure for further assessment. ~I had the opportunity to review the Maykel Mancera clinical presentation and the available pertinent data. With the patient's clinical risk factors and symptoms, there is a high pretest likelihood for CAD. I have asked Maykel Mancera to undergo cardiac angiography for further diagnostic testing. The procedure was explained in depth. All questions and alternate treatment strategies were discussed. The patient agrees to proceed. They understand all the risks associated with the procedure, including myocardial infarction, stroke, death, vascular complications, and the possible need for emergent surgery. All questions and concerns were addressed to the patient/family. Alternatives to my treatment were discussed. The note was completed using EMR. Every effort was made to ensure accuracy; however, inadvertent computerized transcription errors may be present.     Jose Gu MD, [address], [facility]  [phone] [facility]  [phone] Main central  10/14/2020  1:52 PM

## 2020-10-14 NOTE — PROGRESS NOTES
Shift assessment completed, see flow sheet. Pt is alert and oriented x 4. Following commands with a RASS of 0. On RA.    NSR on monitor, HR 99, /67 (86), SpO2 98%. Respirations are easy, even, and unlabored. Bilateral lung sounds diminished. Right SC vas cath and PIVs in place, WNL with heparin gtt infusing at 5.2 mL/hr. Urostomy in place and putting out kvng clear urine. PM meds given. Pt incont of stool, cleaned up and repositioned at this time. Call light within reach. Bed in lowest position. Bed alarm on. Will continue to monitor.

## 2020-10-14 NOTE — PROGRESS NOTES
Nephrology Progress Note   [email or web address]      This patient is a 66year old female whom we are following for DELMER, HD dependent. Subjective: The patient was seen and examined. Transferred from [facility] for cardiac cath today. Admitted there yesterday for SOB from fluid overload. Had HD 10/13 with 2.5L fluid removed. Family History: No family at bedside  ROS: (+) anxiety, no nausea or vomiting      Vitals: There were no vitals taken for this visit. No intake/output data recorded. No intake/output data recorded. Physical Exam:  Physical Exam  Vitals signs reviewed. Constitutional:       General: She is in acute distress. Comments: Anxious   HENT:      Head: Normocephalic and atraumatic. Mouth/Throat:      Mouth: Mucous membranes are moist.   Eyes:      General: No scleral icterus. Conjunctiva/sclera: Conjunctivae normal.   Cardiovascular:      Rate and Rhythm: Normal rate. Rhythm irregular. Heart sounds: No friction rub. Pulmonary:      Breath sounds: No wheezing. Comments: coarse breath sounds bilateral  Abdominal:      General: Bowel sounds are normal. There is no distension. Tenderness: There is no abdominal tenderness. Musculoskeletal:      Right lower leg: Edema present. Left lower leg: Edema present. Access: [facility]      Medications:        Labs:  Recent Labs     10/12/20  1640 10/13/20  0448 10/14/20  0411   WBC 4.9 5.0 3.9*   HGB 8.7* 8.1* 8.9*   HCT 27.2* 25.1* 27.5*   MCV 91.8 92.8 92.7    167 164     Recent Labs     10/12/20  1640 10/12/20  2342 10/13/20  0448 10/14/20  0411   *  --  134* 128*   K 5.6*  --  4.3 3.9     --  100 95*   CO2 23  --  22 22   GLUCOSE 126*  --  122* 102*   PHOS  --  6.3*  --   --    MG 1.90 1.90 2.00 1.90   BUN 18  --  22* 13   CREATININE 3.4*  --  3.6* 2.1*   LABGLOM 13*  --  12* 23*   GFRAA 16*  --  15* 28*         Assessment  -DELMER from [facility], currently HD dependent.  Tuesday Thursday Saturday

## 2020-10-15 NOTE — CARE COORDINATION
[facility]    Referral received from  to follow for home care services. I will follow for needs, and speak with patient to verify demos.     Celedonio Gosselin RN, BSN CTN  [facility] [phone]

## 2020-10-15 NOTE — PROGRESS NOTES
Pt is complaining of aching feet. Can I get an order for Tylenol PRN? Please and thank you. MEMO Rivas has been messaged.

## 2020-10-15 NOTE — PROGRESS NOTES
[facility]  Progress Note    10/15/2020 4:04 PM  Surgeon: Jorge Delgadillo        Chief complaint: [place] course:  No major complaint or event overnight  Vital Signs: BP (!) 145/78   Pulse 110   Temp 98.2 °F (36.8 °C) (Oral)   Resp 19   Ht 4' 11\" (1.499 m)   Wt 123 lb 3.8 oz (55.9 kg)   SpO2 95%   BMI 24.89 kg/m²  O2 Flow Rate (L/min): 2 L/min     I/O:      Intake/Output Summary (Last 24 hours) at 10/15/2020 1604  Last data filed at 10/15/2020 1205  Gross per 24 hour   Intake 640 ml   Output 3672 ml   Net -3032 ml       Exam:   Cardiovascular: S1 plus S2 +0 no additional sound  Pulmonary:  Right    There is no evidence of deep or superficial venous thrombosis involving the    right common femoral or greater saphenous vein. Please refer to diameter measurements. Left    There is no evidence of deep or superficial venous thrombosis involving the    left common femoral or greater saphenous vein. Please refer to diameter measurements. Labs:   CBC:   Recent Labs     10/13/20  0448 10/14/20  0411 10/15/20  0500   WBC 5.0 3.9* 4.2   HGB 8.1* 8.9* 8.6*   HCT 25.1* 27.5* 27.0*   MCV 92.8 92.7 92.5    164 172     BMP:   Recent Labs     10/12/20  2342 10/13/20  0448 10/14/20  0411 10/15/20  0500   NA  --  134* 128* 133*   K  --  4.3 3.9 4.0   CL  --  100 95* 98*   CO2  --  22 22 24   PHOS 6.3*  --   --   --    BUN  --  22* 13 11   CREATININE  --  3.6* 2.1* 1.8*     PT/INR: No results for input(s): PROTIME, INR in the last 72 hours.   APTT:   Recent Labs     10/13/20  0805 10/13/20  1504 10/13/20  2312   APTT 28.2 105.6* 43.1*       Scheduled Meds:    darbepoetin parminder-polysorbate        heparin (porcine)        darbepoetin parminder-polysorbate  40 mcg Intravenous Weekly    carvedilol  12.5 mg Oral BID WC    hydrALAZINE  100 mg Oral TID    isosorbide mononitrate  30 mg Oral Daily    PARoxetine  40 mg Oral QAM    aspirin  81 mg Oral Daily    atorvastatin  40 mg Oral Nightly    heparin (porcine)  5,000 Units Subcutaneous 3 times per day     Continuous Infusions:       Patient Active Problem List   Diagnosis    Hematuria    [facility]    Chronic calculous cholecystitis    Hydronephrosis    Acute pyelonephritis    DELMER (acute kidney injury) ([address]    Complicated UTI (urinary tract infection)    GERD (gastroesophageal reflux disease)    Hyperkalemia    Essential hypertension    Chronic kidney disease, stage 3    Chest pain    Anxiety    Acute diastolic CHF (congestive heart failure) ([facility])    Acute respiratory failure with hypoxia (HCC)    Chronic diastolic CHF (congestive heart failure) ([facility])    Increased anion gap metabolic acidosis    Anemia, normocytic normochromic    Acute kidney injury superimposed on CKD ([facility])    Acute on chronic diastolic CHF (congestive heart failure) ([facility])    Mild protein-calorie malnutrition ([facility])    CHF (congestive heart failure), NYHA class I, acute on chronic, combined ([address]    CHF with unknown LVEF ([address]    Heart failure exacerbated by sotalol ([address]    Acute on chronic congestive heart failure ([address]    ESRD (end stage renal disease) ([facility])    Acute respiratory failure with hypoxia and hypercapnia ([facility])    NSTEMI (non-ST elevated myocardial infarction) ([address]    CAD in native artery           Assessment  End-stage renal disease on dialysis  Coronary artery disease non-ST MI  Frail    Plan:     1. Complex coronary artery disease detailed discussion with the patient's about the procedure and her daughter will plan for coronary artery bypass x3 on Wednesday  2. PT OT consult for patient aggressive rehabilitation preop  3.  Most probably will patient receive 1 unit of blood transfusion to aggressively get her hemoglobin above 9    Jailene Fitch MD FACS

## 2020-10-15 NOTE — PROGRESS NOTES
[facility] spoke with RN stating pt had a conduction change in her rhythm, ordered an EKG and will paged NP

## 2020-10-15 NOTE — PLAN OF CARE
Problem: Skin Integrity:  Goal: Will show no infection signs and symptoms  Description: Will show no infection signs and symptoms  10/15/2020 1053 by Wilman Vázquez RN  Outcome: Ongoing  Note: Patient skin kept clean and dry, cleansed if soiled. Patient turned every 2 hours if not able to turn self. No evidence of skin breakdown this shift, will continue to monitor. 10/14/2020 2138 by Tommy Segundo RN  Outcome: Ongoing  Goal: Absence of new skin breakdown  Description: Absence of new skin breakdown  10/15/2020 1053 by Wilman Vázquez RN  Outcome: Ongoing  10/14/2020 2138 by Tommy Segundo RN  Outcome: Ongoing     Problem: Falls - Risk of:  Goal: Will remain free from falls  Description: Will remain free from falls  10/15/2020 1053 by Wilman Vázquez RN  Outcome: Ongoing  Note: Patient educated on fall risk precautions. Patient bed in low position, bed alarm in place, call light and bedside table within reach. No falls reported this shift.     10/14/2020 2138 by Tommy Segundo RN  Outcome: Ongoing  Goal: Absence of physical injury  Description: Absence of physical injury  10/15/2020 1053 by Wilman Vázquez RN  Outcome: Ongoing  10/14/2020 2138 by Tommy Segundo RN  Outcome: Ongoing

## 2020-10-15 NOTE — PROGRESS NOTES
Hospitalist Progress Note      PCP: Octavio Benjamin MD    Date of Admission: 10/14/2020    Chief Complaint:  Chest pain     History Of Present Illness: The patient is a frail, chronically-ill appearing woman with a h/o former smoking, HTN, HLD, CHF, CKD3, and bladder cancer s/p cystectomy with ileal conduit. She was not admitted frequently until about 4 months ago, when it seems like her overall health started to rapidly decline. She has had 5 admissions in the last 4 months: pyelonephritis (suspected due to ileal conduit stricture), then chest pain (attributed to anxiety), then CHF (increased diuretic regimen), then DELMER (had to start HD on 10/1). She was most recently admitted on 10/12 to [facility] when she presented with dyspnea and was found to have volume overload due to a combination of ESRD and CHF. She was initially admitted to the ICU on BiPAP, but then her dry weight was challenged and she was weaned to [facility] and dyspnea resolved. Her troponin was initially 0.32 and attributed to demand ischemia, but it increased to 0.54 and cardiology recommended transfer to [facility] for a [facility]. She was found to have severe multivessel disease and CT surgery was consulted for consideration of CABG. Hospital medicine was consulted for admission. Subjective:  She feels well. No chest pain. When asked, she is a little dyspneic. Not breathing heavily.        Medications:  Reviewed    Infusion Medications   Scheduled Medications    darbepoetin parminder-polysorbate  40 mcg Intravenous Weekly    carvedilol  12.5 mg Oral BID WC    hydrALAZINE  100 mg Oral TID    isosorbide mononitrate  30 mg Oral Daily    PARoxetine  40 mg Oral QAM    aspirin  81 mg Oral Daily    atorvastatin  40 mg Oral Nightly    heparin (porcine)  5,000 Units Subcutaneous 3 times per day     PRN Meds: acetaminophen, hydrALAZINE      Intake/Output Summary (Last 24 hours) at 10/15/2020 3390  Last data filed at 10/15/2020 9879  Gross per 24 hour   Intake 640 ml   Output 1625 ml   Net -985 ml       Physical Exam Performed:    BP (!) 167/81   Pulse 90   Temp 98.7 °F (37.1 °C) (Oral)   Resp 16   Ht 4' 11\" (1.499 m)   Wt 129 lb 10.1 oz (58.8 kg)   SpO2 97%   BMI 26.18 kg/m²       General appearance:  No apparent distress, appears stated age and cooperative. HEENT:  Normal cephalic, atraumatic without obvious deformity. Pupils equal, round, and reactive to light. Extra ocular muscles intact. Conjunctivae/corneas clear. Neck: Supple, with full range of motion. No jugular venous distention. Trachea midline. Respiratory:  Normal respiratory effort. Clear to auscultation, bilaterally without Rales/Wheezes/Rhonchi. Cardiovascular:  Regular rate and rhythm with normal S1/S2 without murmurs, rubs or gallops. Abdomen: Soft, non-tender, non-distended with normal bowel sounds. Musculoskeletal:  No clubbing, cyanosis or edema bilaterally. Full range of motion without deformity. Skin: Skin texture, turgor normal.  vitiligo. Neurologic:  Neurovascularly intact without any focal sensory/motor deficits. Cranial nerves: II-XII intact, grossly non-focal.  Psychiatric:  Alert and oriented, thought content appropriate, limited insight  Capillary Refill: Brisk,< 3 seconds   Peripheral Pulses: +2 palpable, equal bilaterally       Labs:   Recent Labs     10/13/20  0448 10/14/20  0411 10/15/20  0500   WBC 5.0 3.9* 4.2   HGB 8.1* 8.9* 8.6*   HCT 25.1* 27.5* 27.0*    164 172     Recent Labs     10/12/20  2342 10/13/20  0448 10/14/20  0411 10/15/20  0500   NA  --  134* 128* 133*   K  --  4.3 3.9 4.0   CL  --  100 95* 98*   CO2  --  22 22 24   BUN  --  22* 13 11   CREATININE  --  3.6* 2.1* 1.8*   CALCIUM  --  9.0 9.0 8.7   PHOS 6.3*  --   --   --      Recent Labs     10/12/20  1640   AST 34   ALT 11   BILITOT <0.2   ALKPHOS 104     No results for input(s): INR in the last 72 hours.   Recent Labs     10/12/20  2342 10/13/20  0448 10/13/20  1102   TROPONINI (attributed to anxiety), then CHF (increased diuretic regimen), then DELMER (had to start HD on 10/1). She was most recently admitted on 10/12 to [facility] when she presented with dyspnea and was found to have volume overload due to a combination of ESRD and CHF. She was initially admitted to the ICU on BiPAP, but then her dry weight was challenged and she was weaned to [facility] and dyspnea resolved. Her troponin was initially 0.32 and attributed to demand ischemia, but it increased to 0.54 and cardiology recommended transfer to [facility] for a C. She was found to have severe multivessel disease and CT surgery was consulted for consideration of CABG. Hospital medicine was consulted for admission.        NSTEMI, in the setting of severe multivessel disease  - LM 50%, pLAD 70%, LCx 70-90%, RCA 70-90%  - aspirin, statin, carvedilol, ISMN  - patient seems higher risk for CABG, but her non-operative prognosis may be much worse. As of last month she had not yet developed a cardiomyopathy and there is value in preserving this cardiac function. Tentatively planning surgery 10/21.     Acute hypoxic respiratory failure due to volume overload, which in turn was due to ESRD and acute on chronic diastolic CHF  - challenging dry weight with HD. Appreciate nephrology input. Of note, her PCWP was still 25 during the 10/14 RHC. - carvedilol, ISMN. Will start ACE/ARB/ARNI if BP allows for this. - wean to RA as tolerated. The patient has no supplemental O2 requirement at baseline. DVT Prophylaxis: heparin SC  Diet: Diet NPO Time Specified Exceptions are: Sips with Meds  Code Status: Full Code    PT/OT Eval Status: order postop    Dispo - pending postop course. She lives at home.         Yomaira Hwang MD

## 2020-10-15 NOTE — PROGRESS NOTES
Physician Progress Note      Smith Gómez  CSN #:                  [id number]  :                       1945  ADMIT DATE:       10/12/2020 4:31 PM  [address] DATE:        10/14/2020 6:15 AM  RESPONDING  PROVIDER #:        Valerie Cuba MD          QUERY TEXT:    Dear Attending Provider,  Patient admitted with acute respiratory failure and Acute on Chronic Diastolic   CHF. Due to conflicting documentation between IM and cardiology consult,   please document in progress notes and discharge summary if you are evaluating   and /or treating any of the following:     The medical record reflects the following:  Risk Factors: age, esrd, chf, ac/chronic resp failure, anemia  Clinical Indicators: cardiology note on 10/13: NSTEMI, per IM: NSTEMI: unclear   but suspect type 2 In the setting of CHF exacerbation  Treatment: trend troponin, cardiology consult, LHC/RHC, Aspirin, low dose   heparin  Options provided:  -- NSTEMI  -- Type 2 MI due to CHF  -- NSTEMI ruled out  -- Other - I will add my own diagnosis  -- Disagree - Not applicable / Not valid  -- Disagree - Clinically unable to determine / Unknown  -- Refer to Clinical Documentation Reviewer    PROVIDER RESPONSE TEXT:    NSTEMI- Type 2    Query created by: Crystal Beltran on 10/13/2020 2:29 PM      Electronically signed by:  Valerie Cuba MD 10/15/2020 3:51 PM

## 2020-10-15 NOTE — PROGRESS NOTES
Occupational Therapy   Occupational Therapy Initial Assessment and Treatment Note  Date: 10/15/2020   Patient Name: Federico Rob  MRN: [id number]     : 1945    Date of Service: 10/15/2020    Discharge Recommendations:  Continue to assess pending progress, 24 hour supervision or assist, Home with Home health OT(pending outcome of upcoming surgery)     Assessment   Performance deficits / Impairments: Decreased functional mobility ; Decreased ADL status; Decreased safe awareness;Decreased balance;Decreased endurance  Assessment: OT santyal completed. Pt admitted to [facility] from [facility] for NSTEMI and severe multi-vessel coronary artery disease. Tentative plan is for CABG next week. Currently, pt is CGA with walker for functional transfers and standing balance. She was limited with ADLs today due to R arm board from cardiac cath 10/14/20. O2 sats and HR monitored during activity. Pt lives at home with spouse and was Ind with ADLs prior to admission. Cont skilled OT in acute care. Prognosis: Good  Decision Making: Medium Complexity  OT Education: OT Role;Plan of Care;Energy Conservation;Home Exercise Program;Family Education  Patient Education: disease specific ed:  energy conservation, activity pacing, safe mobility  REQUIRES OT FOLLOW UP: Yes  Activity Tolerance  Activity Tolerance: Patient Tolerated treatment well  Activity Tolerance: O2 sats 98% on 2LO2, HR ranged from 110-122 with activity  Safety Devices  Safety Devices in place: Yes  Type of devices: Gait belt;Call light within reach; Left in chair;Nurse notified         Patient Diagnosis(es): There were no encounter diagnoses. has a past medical history of Acid reflux, Arthritis, CAD in native artery, Cancer ([facility] CHF (congestive heart failure) ([facility] [address] Chronic kidney disease, Hemodialysis patient ([facility] [address] Hx of blood clots, Hyperlipidemia, Hypertension, and Kidney stone. has a past surgical history that includes Bladder stone removal; Bladder surgery;  Cataract removal with implant (8/26/11); other surgical history (05/08/2015); Cholecystectomy (03/06/2018); Abdomen surgery; Appendectomy; eye surgery; Hysterectomy; Colonoscopy; IR NONTUNNELED VASCULAR CATHETER > 5 YEARS (10/1/2020); and IR TUNNELED CVC PLACE WO SQ PORT/PUMP > 5 YEARS (10/8/2020). Restrictions  Restrictions/Precautions  Restrictions/Precautions: Fall Risk  Position Activity Restriction  Other position/activity restrictions: up with assist,  LHC/RHC via R radial artery 10/14/20    Subjective   General  Chart Reviewed: Yes  Patient assessed for rehabilitation services?: Yes  Additional Pertinent Hx: hx ESRD with HD  Family / Caregiver Present: Yes(daughter)  Referring Practitioner: Ramila Saini  Diagnosis: NSTEMI,Severe multi-vessel coronary artery disease  Subjective  Subjective: Pt seated in chair, agreeable to OT  General Comment  Comments: RN approved therapy this afternoon. Patient Currently in Pain: Yes  Pain Assessment  Pain Assessment: 0-10  Pain Level: 6  Pain Type: Chronic pain  Pain Location: Leg  Pain Orientation: Right;Left  Non-Pharmaceutical Pain Intervention(s): Ambulation/Increased Activity;Repositioned; Therapeutic presence  Response to Pain Intervention: Patient Satisfied  Vital Signs  Patient Currently in Pain: Yes  Social/Functional History  Social/Functional History  Lives With: Spouse(Daughter comes in to assist pt at home.)  Type of Home: House  Home Layout: One level  Home Access: Stairs to enter without rails  Entrance Stairs - Number of Steps: 1 (pt holds door frame)  Bathroom Shower/Tub: Tub/Shower unit  Bathroom Toilet: Handicap height  Bathroom Equipment: Shower chair, Grab bars in shower, 3-in-1 commode  Home Equipment: [address], Quad cane, [employer], Alert [employer], Reacher, Rolling walker  ADL Assistance: Independent  Homemaking Responsibilities: Yes  Meal Prep Responsibility: Secondary  Laundry Responsibility: Secondary(Family helps carry laundry)  Cleaning Responsibility: Secondary(Light chores)  Shopping Responsibility: No(Daughter performs)  Ambulation Assistance: Independent(no device (occassionally uses cane or walker if she feels ill))  Transfer Assistance: Independent  Active : No     Objective   Vision: Impaired  Vision Exceptions: Wears glasses for reading  Hearing: Within functional limits    Orientation  Overall Orientation Status: Within Functional Limits     Balance  Sitting Balance: Supervision  Standing Balance: Contact guard assistance(RW)  Standing Balance  Activity: Stood 1x with CGA for 1 minute. Rested in chair and then transferred chair<>bed with CGA and RW. Remained seated in chair with feet on footrest.  ADL  Feeding: Independent  Grooming: Setup(seated)  LE Dressing: Minimal assistance(limited by R arm board d/t cath procedure)  Tone RUE  RUE Tone: Normotonic  Tone LUE  LUE Tone: Normotonic  Coordination  Movements Are Fluid And Coordinated: Yes     Bed mobility  Supine to Sit: Unable to assess  Sit to Supine: Unable to assess  Comment: seated in chair at start and end of session  Transfers  Stand Pivot Transfers: Contact guard assistance(RW)  Sit to stand: Contact guard assistance  Stand to sit: Contact guard assistance     Cognition  Overall Cognitive Status: Exceptions  Following Commands:  Follows one step commands consistently  Attention Span: Attends with cues to redirect  Memory: Decreased recall of precautions  Problem Solving: Assistance required to correct errors made        Sensation  Overall Sensation Status: Impaired(c/o numbness in B feet)  Type of ROM/Therapeutic Exercise  Type of ROM/Therapeutic Exercise: AROM  Comment: seated  Exercises  Shoulder Elevation: 10x  Shoulder Flexion: 10x  Elbow Flexion: 10x  Elbow Extension: 10x  Grasp/Release: 10x      Plan   Plan  Times per week: 2-4x  Current Treatment Recommendations: Self-Care / ADL, Functional Mobility Training, Endurance Training, Patient/Caregiver Education & Training, Safety Education & Training    AM-PAC Score   AM-PAC Inpatient Daily Activity Raw Score: 16 (10/15/20 1645)  AM-PAC Inpatient ADL T-Scale Score : 35.96 (10/15/20 1645)  ADL Inpatient CMS 0-100% Score: 53.32 (10/15/20 1645)  ADL Inpatient CMS G-Code Modifier : CK (10/15/20 1645)  Goals  Short term goals  Time Frame for Short term goals: 1 week (10/22) unless noted  Short term goal 1: Perform functional transfers with Supervision with walker by 10/19  Short term goal 2: Perform bathroom mobility with Supervision with walker  Short term goal 3: Perform LE ADL tasks with SBA  Short term goal 4: Perform UE exer as tolerated 15x each  Patient Goals   Patient goals : \"Be able to go home\"     Therapy Time   Individual Concurrent Group Co-treatment   Time In 1555         Time Out 1635         Minutes 40         Timed Code Treatment Minutes: 30 Minutes(10 min eval)    If pt is discharged prior to next OT session, this note will serve as the discharge summary.   Ivana De Leon OT

## 2020-10-15 NOTE — PROGRESS NOTES
FYI..Pt had a conduction change. Was in NSR now L bundle branch block w/ sinus tach in the one teens. Pt is asymptomatic. Thank you. MEMO Sanders has been messaged.

## 2020-10-15 NOTE — CARE COORDINATION
CASE MANAGEMENT INITIAL ASSESSMENT      Reviewed chart and completed assessment via telephone with: Pt   Explained Case Management role/services. Primary contact information: Pt's  [address]:    Who do you trust or have selected to make healthcare decisions for you? Name:   Daniela Cook   Phone  Number: [phone]  Can this person be reached and be able to respond quickly, such as within a few minutes or hours? Y  Who would be your back-up decision maker? Name Jerome Salmeron (Daughter)  Phone [phone] date/status:10/14/20  Diagnosis: Chest Pain   Is this a Readmission?:  Y    Insurance:Medicare   Precert required for SNF: N       3 night stay required: Y    Living arrangements, Adls, care needs, prior to admission: Pt lives at home with her  and can feed herself and occasionally her daughter helps with grooming otherwise pt states she can groom herself     Transportation:Family     Durable Medical Equipment at home:  Walker_X_Cane__RTS__ BSC__Shower ChairX__  02__ HHN__ CPAP__  BiPap__  Hospital Bed__ W/C___ Other_Grab bars _________    Services in the home and/or outpatient, prior to admission: [facility]     Dialysis Facility (if applicable)   · Name: [facility]   · Address:  · Dialysis Schedule: T TH S 10 AM   · Phone: [phone]  · Fax: [phone]    PT/OT recs:not ordered     Hospital Exemption Notification (HEN):N/A     Barriers to discharge:None     Plan/comments:10/15/20 Current HD pt Dominic Morales, [facility] services HHA 4 times a week, pt interested in [facility] and requested a referral to [facility]. Referral made, watch for possible new o2.   Pt plans to return home  CT surgery Wednesday could have more needs     ECOC on chart for MD signature

## 2020-10-15 NOTE — FLOWSHEET NOTE
10/15/20 1930   Vital Signs   Temp 97.9 °F (36.6 °C)   Temp Source Oral   Pulse 88   Heart Rate Source Monitor   Resp 16   /61   BP Location Left upper arm   Level of Consciousness 0   MEWS Score 1   Patient Currently in Pain Denies   Oxygen Therapy   SpO2 100 %   O2 Device Nasal cannula   O2 Flow Rate (L/min) 2 L/min

## 2020-10-15 NOTE — PROGRESS NOTES
[address]   Daily Cardiovascular Progress Note    Admit Date: 10/14/2020    CC: Follow-up for coronary artery disease and cardiac catheterization    HPI: Melanie Guillory has no complaints today.       Medications/Labs all Reviewed:  Patient Active Problem List   Diagnosis    Hematuria    [facility]    Chronic calculous cholecystitis    Hydronephrosis    Acute pyelonephritis    DELMER (acute kidney injury) ([address]    Complicated UTI (urinary tract infection)    GERD (gastroesophageal reflux disease)    Hyperkalemia    Essential hypertension    Chronic kidney disease, stage 3    Chest pain    Anxiety    Acute diastolic CHF (congestive heart failure) [facility]    Acute respiratory failure with hypoxia ([facility]    Chronic diastolic CHF (congestive heart failure) ([facility]    Increased anion gap metabolic acidosis    Anemia, normocytic normochromic    Acute kidney injury superimposed on CKD ([facility]    Acute on chronic diastolic CHF (congestive heart failure) ([facility])    Mild protein-calorie malnutrition ([facility])    CHF (congestive heart failure), NYHA class I, acute on chronic, combined ([address]    CHF with unknown LVEF ([address]    Heart failure exacerbated by sotalol ([address]    Acute on chronic congestive heart failure (HCC)    ESRD (end stage renal disease) ([facility]    Acute respiratory failure with hypoxia and hypercapnia ([facility]    NSTEMI (non-ST elevated myocardial infarction) ([facility])    CAD in native artery       Medications:   darbepoetin parminder-polysorbate        heparin (porcine)        darbepoetin parminder-polysorbate  40 mcg Intravenous Weekly    carvedilol  12.5 mg Oral BID WC    hydrALAZINE  100 mg Oral TID    isosorbide mononitrate  30 mg Oral Daily    PARoxetine  40 mg Oral QAM    aspirin  81 mg Oral Daily    atorvastatin  40 mg Oral Nightly    heparin (porcine)  5,000 Units Subcutaneous 3 times per day       Infusion Medications:      Labs:  Lab Results   Component Value Date    WBC 4.2 10/15/2020    HGB 8.6 (L) 10/15/2020    HCT 27.0 (L) 10/15/2020    MCV 92.5 10/15/2020     10/15/2020     Lab Results   Component Value Date    CREATININE 1.8 (H) 10/15/2020    BUN 11 10/15/2020     (L) 10/15/2020    K 4.0 10/15/2020    CL 98 (L) 10/15/2020    CO2 24 10/15/2020     Lab Results   Component Value Date    INR 1.09 10/08/2020    PROTIME 12.7 10/08/2020        Physical Examination:    BP (!) 145/78   Pulse 110   Temp 98.2 °F (36.8 °C) (Oral)   Resp 19   Ht 4' 11\" (1.499 m)   Wt 123 lb 3.8 oz (55.9 kg)   SpO2 95%   BMI 24.89 kg/m²    Wt Readings from Last 3 Encounters:   10/15/20 123 lb 3.8 oz (55.9 kg)   10/14/20 118 lb 13.3 oz (53.9 kg)   10/09/20 135 lb 3.2 oz (61.3 kg)       Intake/Output Summary (Last 24 hours) at 10/15/2020 1657  Last data filed at 10/15/2020 1205  Gross per 24 hour   Intake 640 ml   Output 3672 ml   Net -3032 ml       Respiratory:  · Resp Assessment: Normal respiratory effort  · Resp Auscultation: Clear to auscultation bilaterally   Cardiovascular:  · Auscultation: regular rhythm and normal rate, normal S1S2, no murmur, rub or gallop  · Palpation:  Nl PMI  · JVP:  normal  · Extremities: No Edema  Abdomen:  · Soft, non-tender  · Normal bowel sounds  Extremities:  ·  No Cyanosis or Clubbing  Neurological/Psychiatric:  · Oriented to time, place, and person  · Non-anxious  Skin Warm and dry    Assessment:    Principal Problem:    NSTEMI (non-ST elevated myocardial infarction) ([facility] [facility]  Active Problems:    Chest pain    Acute kidney injury superimposed on CKD (HCC)    Acute respiratory failure with hypoxia and hypercapnia (HCC)    CAD in native artery  Resolved Problems:    * No resolved hospital problems. *      Plan:  1. Coronary angiogram 10/14 demonstrated multivessel coronary artery disease that will require bypass surgery. ~Postprocedural recovery is been stable  2. Patient no longer has chest pain  3.   Renal function remained stable on dialysis  4. Respiratory status remains stable    All questions and concerns were addressed to the patient/family. Alternatives to my treatment were discussed. The note was completed using EMR. Every effort was made to ensure accuracy; however, inadvertent computerized transcription errors may be present.     Jose Gu MD, TIARA, [facility], [address] [place] [address]  10/15/2020 4:57 PM

## 2020-10-16 NOTE — PROGRESS NOTES
Hospitalist Progress Note      PCP: Mary Alanis MD    Date of Admission: 10/14/2020    Chief Complaint:  Chest pain     History Of Present Illness: The patient is a frail, chronically-ill appearing woman with a h/o former smoking, HTN, HLD, CHF, CKD3, and bladder cancer s/p cystectomy with ileal conduit. She was not admitted frequently until about 4 months ago, when it seems like her overall health started to rapidly decline. She has had 5 admissions in the last 4 months: pyelonephritis (suspected due to ileal conduit stricture), then chest pain (attributed to anxiety), then CHF (increased diuretic regimen), then DELMER (had to start HD on 10/1). She was most recently admitted on 10/12 to [facility] when she presented with dyspnea and was found to have volume overload due to a combination of ESRD and CHF. She was initially admitted to the ICU on BiPAP, but then her dry weight was challenged and she was weaned to [facility] and dyspnea resolved. Her troponin was initially 0.32 and attributed to demand ischemia, but it increased to 0.54 and cardiology recommended transfer to [facility] for a [facility]. She was found to have severe multivessel disease and CT surgery was consulted for consideration of CABG. Hospital medicine was consulted for admission. Subjective:  No chest pain. No dyspnea.   It takes her a long time and she has to work hard to sit up in bed and get positioned for breakfast.        Medications:  Reviewed    Infusion Medications   Scheduled Medications    darbepoetin parminder-polysorbate  40 mcg Intravenous Weekly    carvedilol  12.5 mg Oral BID WC    hydrALAZINE  100 mg Oral TID    isosorbide mononitrate  30 mg Oral Daily    PARoxetine  40 mg Oral QAM    aspirin  81 mg Oral Daily    atorvastatin  40 mg Oral Nightly    heparin (porcine)  5,000 Units Subcutaneous 3 times per day     PRN Meds: acetaminophen, hydrALAZINE      Intake/Output Summary (Last 24 hours) at 10/16/2020 5264  Last data 10/13/2020    WBCUA 21-50 10/13/2020    BACTERIA 2+ 10/13/2020    RBCUA 5-10 10/13/2020    BLOODU Negative 10/13/2020    SPECGRAV 1.020 10/13/2020    GLUCOSEU Negative 10/13/2020    GLUCOSEU NEGATIVE 11/19/2011       Radiology:  VL PRE OP VEIN MAPPING   Final Result      VL DUP CAROTID BILATERAL   Final Result      CT CHEST WO CONTRAST   Final Result   1. Normal caliber aorta. 2. Pulmonary hypertension. 3. Small to moderate bilateral pleural effusions. 4. Scattered noncalcified pulmonary nodules. RECOMMENDATIONS:   Fleischner Society guidelines for follow-up and management of incidentally   detected pulmonary nodules:      Multiple Solid Nodules:      Nodule size less than 6 mm   In a low-risk patient, no routine follow-up. In a high-risk patient, optional CT at 12 months. - Low risk patients include individuals with minimal or absent history of   smoking and other known risk factors. - High risk patients include individuals with a history or smoking or known   risk factors. Radiology 2017 http://pubs. rsna.org/doi/full/10.1148/radiol. 2249823929                 Assessment/Plan:    Active Hospital Problems    Diagnosis    NSTEMI (non-ST elevated myocardial infarction) ([facility] [address] [I21.4]    CAD in native artery [I25.10]    Acute respiratory failure with hypoxia and hypercapnia (HCC) [J96.01, J96.02]    Acute kidney injury superimposed on CKD ([facility] [N17.9, N18.9]    Chest pain [R07.9]       The patient is a frail, chronically-ill appearing woman with a h/o former smoking, HTN, HLD, CHF, CKD3, and bladder cancer s/p cystectomy with ileal conduit. She was not admitted frequently until about 4 months ago, when it seems like her overall health started to rapidly decline. She has had 5 admissions in the last 4 months: pyelonephritis (suspected due to ileal conduit stricture), then chest pain (attributed to anxiety), then CHF (increased diuretic regimen), then DELMER (had to start HD on 10/1).   She was most recently admitted on 10/12 to [facility] when she presented with dyspnea and was found to have volume overload due to a combination of ESRD and CHF. She was initially admitted to the ICU on BiPAP, but then her dry weight was challenged and she was weaned to [facility] and dyspnea resolved. Her troponin was initially 0.32 and attributed to demand ischemia, but it increased to 0.54 and cardiology recommended transfer to [facility] for a C. She was found to have severe multivessel disease and CT surgery was consulted for consideration of CABG. Hospital medicine was consulted for admission.        NSTEMI, in the setting of severe multivessel disease  - LM 50%, pLAD 70%, LCx 70-90%, RCA 70-90%  - aspirin, statin, carvedilol, ISMN  - planning surgery for 10/21. CT surgery would like Hb>9 preoperatively - she could probably be transfused during HD on Tuesday.     Acute hypoxic respiratory failure due to volume overload, which in turn was due to ESRD and acute on chronic diastolic CHF  - challenging dry weight with HD. Appreciate nephrology input. Of note, her PCWP was still 25 during the 10/14 RHC. - carvedilol, ISMN. Started lisinopril.    - wean to RA as tolerated. The patient has no supplemental O2 requirement at baseline. DVT Prophylaxis: heparin SC  Diet: DIET CARDIAC;  Code Status: Full Code    PT/OT Eval Status: order postop    Dispo - pending postop course. She lives at home.         Alexandra Morgan MD

## 2020-10-16 NOTE — PROGRESS NOTES
Cardiac, Vascular and Thoracic Surgeons  Progress Note    10/16/2020 12:11 PM  Surgeon: Alejandro Haas        Chief complaint: [facility] course:  No major complaint or event overnight  Vital Signs: BP (!) 149/76   Pulse 108   Temp 97.6 °F (36.4 °C) (Oral)   Resp 14   Ht 4' 11\" (1.499 m)   Wt 123 lb 3.8 oz (55.9 kg)   SpO2 97%   BMI 24.89 kg/m²  O2 Flow Rate (L/min): 1 L/min     I/O:      Intake/Output Summary (Last 24 hours) at 10/16/2020 1211  Last data filed at 10/16/2020 0929  Gross per 24 hour   Intake 480 ml   Output 75 ml   Net 405 ml       Exam:   Cardiovascular: S1 plus S2 +0 no additional sound  Pulmonary:  Right    There is no evidence of deep or superficial venous thrombosis involving the    right common femoral or greater saphenous vein. Please refer to diameter measurements. Left    There is no evidence of deep or superficial venous thrombosis involving the    left common femoral or greater saphenous vein. Please refer to diameter measurements.         Labs:   CBC:   Recent Labs     10/14/20  0411 10/15/20  0500   WBC 3.9* 4.2   HGB 8.9* 8.6*   HCT 27.5* 27.0*   MCV 92.7 92.5    172     BMP:   Recent Labs     10/14/20  0411 10/15/20  0500 10/16/20  0518   * 133* 134*   K 3.9 4.0 3.4*   CL 95* 98* 96*   CO2 22 24 28   BUN 13 11 9   CREATININE 2.1* 1.8* 2.2*     APTT:   Recent Labs     10/13/20  1504 10/13/20  2312   APTT 105.6* 43.1*     Scheduled Meds:    lisinopril  5 mg Oral Daily    darbepoetin parminder-polysorbate  40 mcg Intravenous Weekly    carvedilol  12.5 mg Oral BID WC    hydrALAZINE  100 mg Oral TID    isosorbide mononitrate  30 mg Oral Daily    PARoxetine  40 mg Oral QAM    aspirin  81 mg Oral Daily    atorvastatin  40 mg Oral Nightly    heparin (porcine)  5,000 Units Subcutaneous 3 times per day     Continuous Infusions:   Patient Active Problem List   Diagnosis    Hematuria    Acute renal failure (ARF) (HCC)    Chronic calculous cholecystitis    Hydronephrosis    Acute pyelonephritis    DELMER (acute kidney injury) ([address]    Complicated UTI (urinary tract infection)    GERD (gastroesophageal reflux disease)    Hyperkalemia    Essential hypertension    Chronic kidney disease, stage 3    Chest pain    Anxiety    Acute diastolic CHF (congestive heart failure) (HCC)    Acute respiratory failure with hypoxia (HCC)    Chronic diastolic CH[facility]cong[facility] failure) [facility]    Increased anion gap metabolic acidosis    Anemia, normocytic normochromic    Acute kidney injury superimposed on CKD (HCC)    Acute on chronic diastolic CHF (congestive heart failure) ([facility])    Mild protein-calorie malnutrition ([facility])    CHF (congestive heart failure), NYHA class I, acute on chronic, combined ([address]    CHF with unknown LVEF ([address]    Heart failure exacerbated by sotalol ([address]    Acute on chronic congestive heart failure (HCC)    ESRD (end stage renal disease) ([facility])    Acute respiratory failure with hypoxia and hypercapnia ([facility])    NSTEMI (non-ST elevated myocardial infarction) ([address]    CAD in native artery     Pre-op testing:     PFT's scheduled for Monday 10/19    Carotid dopplers: 10/15/20  Summary          < 50% stenosis involving the internal carotid arteries bilaterally, however     these could be underestimated due to calcific shadowing and elevated     velocities demonstrated within the common carotid arteries.     There is a >50% stenosis involving the left external carotid artery.     The vertebral arteries demonstrate antegrade flow bilaterally. The left     vertebral artery demonstrated an abnormal high velocity waveform (>100 cm/s)     which may suggest stenosis or compensatory flow     Vein mapping: 10/15/20   Summary          Usable greater Saphenous vein conduit in both lower extremities. Assessment:  End-stage renal disease on dialysis  Coronary artery disease non-ST MI  Frail    Plan:     1.  Complex coronary artery disease detailed discussion with the patient (and her daughter) about the procedure. Will plan for coronary artery bypass x3 on Wednesday morning 10/21. All questions answered. 2. PT OT consult for patient aggressive rehabilitation preop  3.  Optimally patient would receive 1 unit RBC with HD on Tues (prior to surgery to obtain Hgb >9).      ____________________________________________________    Ayah Kwok CNP  10/16/20  12:05 pm

## 2020-10-16 NOTE — PROGRESS NOTES
[address]   Daily Cardiovascular Progress Note    Admit Date: 10/14/2020    CC: Follow-up for coronary artery disease and cardiac catheterization    HPI: Salomón Woodruff has no acute cardiac complaints. She does have concerns regarding the nutrition and poor tasting food.       Medications/Labs all Reviewed:  Patient Active Problem List   Diagnosis    Hematuria    Acute renal failure (ARF) [facility]    Chronic calculous cholecystitis    Hydronephrosis    Acute pyelonephritis    DELMER (acute kidney injury) ([address]    Complicated UTI (urinary tract infection)    GERD (gastroesophageal reflux disease)    Hyperkalemia    Essential hypertension    Chronic kidney disease, stage 3    Chest pain    Anxiety    Acute diastolic CHF (congestive heart failure) ([facility]    Acute respiratory failure with hypoxia ([facility]    Chronic diastolic CHF (congestive heart failure) ([facility]    Increased anion gap metabolic acidosis    Anemia, normocytic normochromic    Acute kidney injury superimposed on CKD ([facility]    Acute on chronic diastolic CHF (congestive heart failure) ([facility]    Mild protein-calorie malnutrition ([facility])    CHF (congestive heart failure), NYHA class I, acute on chronic, combined ([address]    CHF with unknown LVEF ([address]    Heart failure exacerbated by sotalol ([address]    Acute on chronic congestive heart failure ([facility]    ESRD (end stage renal disease) ([facility]    Acute respiratory failure with hypoxia and hypercapnia ([facility]    NSTEMI (non-ST elevated myocardial infarction) ([address]    CAD in native artery       Medications:   lisinopril  5 mg Oral Daily    hydrALAZINE  50 mg Oral TID    [START ON 10/17/2020] pantoprazole  40 mg Oral QAM AC    darbepoetin parminder-polysorbate  40 mcg Intravenous Weekly    carvedilol  12.5 mg Oral BID WC    isosorbide mononitrate  30 mg Oral Daily    PARoxetine  40 mg Oral QAM    aspirin  81 mg Oral Daily    atorvastatin  40 mg Oral Nightly    heparin (porcine)  5,000 Units Subcutaneous 3 times per day       Infusion Medications:      Labs:  Lab Results   Component Value Date    WBC 4.2 10/15/2020    HGB 8.6 (L) 10/15/2020    HCT 27.0 (L) 10/15/2020    MCV 92.5 10/15/2020     10/15/2020     Lab Results   Component Value Date    CREATININE 2.2 (H) 10/16/2020    BUN 9 10/16/2020     (L) 10/16/2020    K 3.4 (L) 10/16/2020    CL 96 (L) 10/16/2020    CO2 28 10/16/2020     Lab Results   Component Value Date    INR 1.09 10/08/2020    PROTIME 12.7 10/08/2020        Physical Examination:    BP (!) 92/53   Pulse 90   Temp 97.4 °F (36.3 °C) (Oral)   Resp 16   Ht 4' 11\" (1.499 m)   Wt 123 lb 3.8 oz (55.9 kg)   SpO2 96%   BMI 24.89 kg/m²    Wt Readings from Last 3 Encounters:   10/15/20 123 lb 3.8 oz (55.9 kg)   10/14/20 118 lb 13.3 oz (53.9 kg)   10/09/20 135 lb 3.2 oz (61.3 kg)       Intake/Output Summary (Last 24 hours) at 10/16/2020 1622  Last data filed at 10/16/2020 8543  Gross per 24 hour   Intake 480 ml   Output 75 ml   Net 405 ml       Respiratory:  · Resp Assessment: Normal respiratory effort  · Resp Auscultation: Clear to auscultation bilaterally   Cardiovascular:  · Auscultation: regular rhythm and normal rate, normal S1S2, no murmur, rub or gallop  · Palpation:  Nl PMI  · JVP:  normal  · Extremities: No Edema  Abdomen:  · Soft, non-tender  · Normal bowel sounds  Extremities:  ·  No Cyanosis or Clubbing  Neurological/Psychiatric:  · Oriented to time, place, and person  · Non-anxious  Skin Warm and dry    Assessment:    Principal Problem:    NSTEMI (non-ST elevated myocardial infarction) ([facility]  Active Problems:    Chest pain    Acute kidney injury superimposed on CKD (HCC)    Acute respiratory failure with hypoxia and hypercapnia (HCC)    CAD in native artery  Resolved Problems:    * No resolved hospital problems. *      Plan:  1. Coronary angiogram 10/14 demonstrated multivessel coronary artery disease that will require bypass surgery.    ~Postprocedural recovery is been stable  2. Patient no longer has chest pain  3. Renal function remained stable on dialysis  4. Respiratory status remains stable  5. Could consider changing her diet to general.    All questions and concerns were addressed to the patient/family. Alternatives to my treatment were discussed. The note was completed using EMR. Every effort was made to ensure accuracy; however, inadvertent computerized transcription errors may be present.     Andres Hunt MD, TIARA, [facility], [address] [place] [address]  10/16/2020 4:22 PM

## 2020-10-16 NOTE — PROGRESS NOTES
implant (8/26/11); other surgical history (05/08/2015); Cholecystectomy (03/06/2018); Abdomen surgery; Appendectomy; eye surgery; Hysterectomy; Colonoscopy; IR NONTUNNELED VASCULAR CATHETER > 5 YEARS (10/1/2020); and IR TUNNELED CVC PLACE WO SQ PORT/PUMP > 5 YEARS (10/8/2020). Restrictions  Restrictions/Precautions  Restrictions/Precautions: Fall Risk  Position Activity Restriction  Other position/activity restrictions: up with assist,  LHC/RHC via R radial artery 10/14/20     Vision/Hearing  Vision: Impaired  Vision Exceptions: Wears glasses for reading  Hearing: Within functional limits       Subjective  General  Chart Reviewed: Yes  Patient assessed for rehabilitation services?: Yes  Response To Previous Treatment: Not applicable  Family / Caregiver Present: No  Referring Practitioner: Nena Oneill MD  Referral Date : 10/15/20  Diagnosis: Pt to ED with c/o SOB, NSTEMI. Cardiac catheterization demonstrated severe multivessel CAD and pulmonary HTN. Pt is scheduled for a tentative CABG 10/21 and was referred for PT for pre-op rehabilitation. Follows Commands: Within Functional Limits  General Comment  Comments: Cleared for therapy by RN  Subjective  Subjective: Pt seated at EOB upon arrival, agreeable to PT Evaluation.   Pain Screening  Patient Currently in Pain: Denies  Vital Signs  Pulse: 108  Heart Rate Source: Monitor  BP: (!) 149/76  BP Location: Left upper arm  Patient Position: Sitting  Patient Currently in Pain: Denies  Oxygen Therapy  SpO2: 97 %  Pulse Oximeter Device Mode: Intermittent  O2 Device: Nasal cannula  O2 Flow Rate (L/min): 1 L/min       Orientation  Orientation  Overall Orientation Status: Within Functional Limits     Social/Functional History  Social/Functional History  Lives With: Spouse(Dtr comes in to assist patient at home)  Type of Home: House  Home Layout: One level  Home Access: Stairs to enter without rails  Entrance Stairs - Number of Steps: 1 (pt holds door frame)  Bathroom Shower/Tub: Tub/Shower unit  Bathroom Toilet: Handicap height  Bathroom Equipment: Shower chair, Grab bars in shower, 3-in-1 commode  Home Equipment: [address], Quad cane, [place], Alert [employer], Reacher, [employer] walker  ADL Assistance: Independent  Homemaking Responsibilities: Yes  Meal Prep Responsibility: Secondary  Laundry Responsibility: Secondary(Family helps carry laundry)  Cleaning Responsibility: Secondary(Light chores)  Shopping Responsibility: No(Daughter performs)  Ambulation Assistance: Independent(Prn cane use)  Transfer Assistance: Independent  Active : No       Observation/Palpation  Posture: Fair  Observation: Excessive thoracic kyphosis, BUE tremors present at baseline but increased recently. RLE AROM: WFL  LLE AROM : WFL  Strength RLE: WFL  Comment: Grossly 5/5 in sitting  Strength LLE: WFL  Comment: Grossly 5/5 in sitting     Sensation  Overall Sensation Status: Impaired(Numbness in B feet, reports diminished sensation L vs R L5-S1 dermatome)     Bed mobility  Supine to Sit: Unable to assess  Sit to Supine: Unable to assess  Comment: Seated at EOB upon arrival and in chair at end of session     Transfers  Sit to Stand: Contact guard assistance  Stand to sit: Contact guard assistance  Comment: Pt with good understanding of hand placement     Ambulation  Ambulation?: Yes  WB Status: No restrictions  Ambulation 1  Surface: level tile  Device: Standard Walker  Assistance: Contact guard assistance  Quality of Gait: Pt ambulates with reciprocal pattern, decreased step length and dakota. Steady with no LOB throughout. Distance: 30ft  Stairs/Curb  Stairs?: No     Balance  Posture: Fair  Sitting - Static: Good  Sitting - Dynamic: Good  Standing - Static: Fair  Standing - Dynamic: Fair  Comments: Pt stood at sink with 0-1 UE support washing face x 3 minutes without LOB.      Exercises  Hip Flexion: x10 B  Knee Long Arc Quad: x10 B  Ankle Pumps: x10 B  Other exercises  Other exercises?: Yes  Other exercises 1: Hip adduction sets x 10 BLE     Plan   Plan  Times per week: 2-4x/wk  Times per day: Daily  Current Treatment Recommendations: Strengthening, Transfer Training, Endurance Training, Patient/Caregiver Education & Training, Balance Training, Gait Training, Home Exercise Program, Functional Mobility Training, Stair training, Safety Education & Training  Safety Devices  Type of devices: Left in chair, Nurse notified, Gait belt  Restraints  Initially in place: No      AM-PAC Score  AM-PAC Inpatient Mobility Raw Score : 19 (10/16/20 1158)  AM-PAC Inpatient T-Scale Score : 45.44 (10/16/20 1158)  Mobility Inpatient CMS 0-100% Score: 41.77 (10/16/20 1158)  Mobility Inpatient CMS G-Code Modifier : CK (10/16/20 1158)          Goals  Short term goals  Time Frame for Short term goals: 1 week 10/23  Short term goal 1: Pt will perform sit<>stand transfers with mod I  Short term goal 2: Pt will ambulate 50ft with LRAD and mod I  Short term goal 3: Pt will ascend/descend 1 step with UE support and SBA  Short term goal 4: By 10/18, pt will tolerate x15-20 reps BLE exercise to assist with functional transfers and ambulation. Patient Goals   Patient goals : \"To go home after surgery. \"       Therapy Time   Individual Concurrent Group Co-treatment   Time In 0928         Time Out 1001         Minutes 33         Timed Code Treatment Minutes: 23 Minutes(10 minute evaluation)       Nell Brown, PT, DPT    If pt is unable to be seen after this session, please let this note serve as discharge summary. Please see case management note for discharge disposition. Thank you.

## 2020-10-16 NOTE — PROGRESS NOTES
Dr. Ollie Moncada paged. \"I called [facility].  they just have clonazepam 0.5mg BID; and the other pharmacy she gave me is currently closed\"

## 2020-10-16 NOTE — PROGRESS NOTES
Ender Gross returned phone call requesting to follow up with home medications through her normal pharmacy. Melatonin 5mg ordered per Dr. Ender Gross.

## 2020-10-17 NOTE — PROGRESS NOTES
[address]  Cardiology  Progress Note    Admission date:  10/14/2020    Reason for follow up visit: CAD, CHF    HPI/CC: Jimmie Del Rio is a 66 y.o. female who was admitted 10/14/2020 for shortness of breath. Troponin elevated. LHC/RHC showed multi vessel CAD, moderate pulmonary HTN, referred for CABG. Rhythm has been sinus, intermittent tachycardia. Subjective: Denies chest pain, shortness of breath, palpitations and dizziness. Vitals:  Blood pressure (!) 151/75, pulse 85, temperature 98.3 °F (36.8 °C), temperature source Oral, resp. rate 16, height 4' 11\" (1.499 m), weight 126 lb 8.7 oz (57.4 kg), SpO2 94 %, not currently breastfeeding.   Temp  Av.9 °F (36.6 °C)  Min: 97.4 °F (36.3 °C)  Max: 98.3 °F (36.8 °C)  Pulse  Av.2  Min: 83  Max: 108  BP  Min: 92/53  Max: 151/75  SpO2  Av.3 %  Min: 94 %  Max: 98 %    24 hour I/O    Intake/Output Summary (Last 24 hours) at 10/17/2020 0707  Last data filed at 10/17/2020 0515  Gross per 24 hour   Intake 480 ml   Output 100 ml   Net 380 ml     Current Facility-Administered Medications   Medication Dose Route Frequency Provider Last Rate Last Dose    lisinopril (PRINIVIL;ZESTRIL) tablet 5 mg  5 mg Oral Daily Mal Flores MD   5 mg at 10/16/20 0936    hydrALAZINE (APRESOLINE) tablet 50 mg  50 mg Oral TID Nikia Ron MD   Stopped at 10/16/20 2100    calcium carbonate (TUMS) chewable tablet 1,000 mg  1,000 mg Oral TID PRN Mal Flores MD   1,000 mg at 10/16/20 1542    pantoprazole (PROTONIX) tablet 40 mg  40 mg Oral QAM AC Mal Flores MD   40 mg at 10/17/20 0521    melatonin tablet 5 mg  5 mg Oral Nightly PRN Tacos Malhotra MD   5 mg at 10/16/20 205    clonazePAM (KLONOPIN) tablet 0.5 mg  0.5 mg Oral BID PRN Tacos Malhotra MD   0.5 mg at 10/16/20 2058    acetaminophen (TYLENOL) tablet 650 mg  650 mg Oral Q4H PRN ANNELIESE Mosley - CNP   650 mg at 10/15/20 2256    darbepoetin parminder-polysorbate (ARANESP) injection 40 mcg  40 mcg Intravenous Weekly Shaylee Lopez MD   40 mcg at 10/15/20 1036    carvedilol (COREG) tablet 12.5 mg  12.5 mg Oral BID WC Shaylee Lopez MD   12.5 mg at 10/16/20 9892    isosorbide mononitrate (IMDUR) extended release tablet 30 mg  30 mg Oral Daily Shaylee Lopez MD   30 mg at 10/16/20 0936    PARoxetine (PAXIL) tablet 40 mg  40 mg Oral QAM Shaylee Lopez MD   40 mg at 10/16/20 0936    hydrALAZINE (APRESOLINE) injection 10 mg  10 mg Intravenous Q10 Min PRN Shaylee Lopez MD        aspirin chewable tablet 81 mg  81 mg Oral Daily Shaylee Lopez MD   81 mg at 10/16/20 0936    atorvastatin (LIPITOR) tablet 40 mg  40 mg Oral Nightly Shaylee Lopez MD   40 mg at 10/16/20 2058    heparin (porcine) injection 5,000 Units  5,000 Units Subcutaneous 3 times per day Ambre Patel MD   5,000 Units at 10/17/20 9443       Review of Systems   Constitutional: Positive for fatigue. Respiratory: Negative. Cardiovascular: Negative. Gastrointestinal: Negative. Neurological: Negative. Objective:     Telemetry monitor: SR- ST, iLBBB    Physical Exam:  Constitutional:  Comfortable and alert, NAD, appears older than stated age  Eyes: PERRL, sclera nonicteric  Neck:  Supple, no masses, no thyroidmegaly, no JVD  Skin:  Warm and dry; no rash or lesions  Heart: Regular, normal apex, S1 and S2 normal, no M/G/R  Lungs:  Normal respiratory effort; clear; no wheezing/rhonchi/rales  Abdomen: soft, non tender, + bowel sounds  Extremities:  No edema or cyanosis; no clubbing  Neuro: alert and oriented, moves legs and arms equally, normal mood and affect    Data Reviewed:    Coronary angiogram 10/14/2020:  1. Left heart catheterization  2. Selective left and right coronary angiogram  3. Right heart catheterization  Procedure Findings:  1. Severe multi vessel coronary artery diease  2. Normal left heart hemodynamics  3.   Decompensated right heart hemodynamics with moderate pulmonary hypertension  Details:              Jacoby Schmitz was brought to the cardiac catheterization lab in a fasting state after informed consent was obtained. If the patient was able to provide written consent, it was obtained. The patient's vitals were monitored through out the procedure. The patient was sedated using the appropriate levels of sedation and ASA guidelines. The appropriate access site area was prepped and drapped in a sterile fashion. The area was anesthetized with 2% lidocaine. Using the modified Seldinger technique, an arterial sheath was introduced into the arterial access site using a single anterior wall puncture. The sheath was flushed and prepped in usual fashion. Catheters used during the procedure included 5 [place] TIG 4.0 catheter. The catheters were advanced and removed over a .035\" wire, into the appropriate positions. Multiple angiographic views were obtained. An LV gram was not obtained. Findings:  1. Left main coronary artery is diseased. There is 50% distal stenosis. It gave off the left anterior descending artery and left circumflex. 2. Left anterior descending artery has severe atherosclerotic disease. It was moderate in size. It gave off septal perforators and a moderate sized diagonal branch. The LAD covered the entire apex of the left ventricle.               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 3. Left circumflex has severe atherosclerotic disease. It was moderate in size. There was a moderate sized obtuse marginal branch.              ~70-90%  4. Right coronary artery has severe 70-90% atherosclerotic disease. It was moderate in size and was the dominant artery. 5. Left ventriculogram was not performed. Left ventricular end diastolic pressure is 18.   Right heart catheterization  Right atrial pressure of 10  Right ventricular pressure of 44/10  Pulmonary artery pressure of 48/19 with a mean of 36  Pulmonary artery pressure wedge pressure of 25 with large V wave  Cardiac output 5.78  Cardiac index 3.92  SVR of 1052  PVR of 152  PA saturation 66%  RA saturation 69%  Aortic saturation 97%  CONCLUSIONS:  1. Severe multi-vessel coronary artery disease  ASSESSMENT/RECOMMENDATIONS:  1. Admit to the hospital for medical stabilization and cardiac surgery consultation for coronary artery bypass grafting surgery. Echo 4/88/8235:  LV systolic function is normal with EF estimated at 55%. No regional wall motion abnormalities are noted. There is mild concentric left ventricular hypertrophy. Grade II diastolic dysfunction with elevated filing pressure. The left atrium is mildly dilated. Mild mitral annular calcification is present. Mild mitral, aortic, tricuspid, and pulmonic regurgitation. Systolic pulmonary artery pressure (SPAP) estimated at 41 mmHg (RA pressure   8 mmHg) c/w mild pulm HTN.     Lab Reviewed:     Renal Profile:  Lab Results   Component Value Date    CREATININE 3.5 10/17/2020    BUN 15 10/17/2020     10/17/2020    K 3.8 10/17/2020    K 3.4 10/16/2020    CL 98 10/17/2020    CO2 28 10/17/2020     CBC:    Lab Results   Component Value Date    WBC 4.2 10/17/2020    RBC 3.20 10/17/2020    HGB 9.4 10/17/2020    HCT 29.2 10/17/2020    MCV 91.3 10/17/2020    RDW 16.4 10/17/2020     10/17/2020     BNP:    Lab Results   Component Value Date    PROBNP 34,575 10/12/2020    PROBNP 23,650 09/30/2020    PROBNP 5,200 09/20/2020    PROBNP 1,463 12/30/2017     Fasting Lipid Panel:    Lab Results   Component Value Date    CHOL 145 10/13/2020    HDL 53 10/13/2020    TRIG 122 10/13/2020     Cardiac Enzymes:  CK/MbTroponin  Lab Results   Component Value Date    TROPONINI 0.54 10/13/2020     PT/ INR   Lab Results   Component Value Date    INR 1.09 10/08/2020    INR 1.16 10/01/2020    INR 1.22 06/11/2020    PROTIME 12.7 10/08/2020    PROTIME 13.5 10/01/2020    PROTIME 14.1 06/11/2020     PTT No results found for: PTT   Lab

## 2020-10-17 NOTE — PROGRESS NOTES
Received callback from [facility] regarding run of v-tach. He advised to get another BMP and Mag level when patient arrives back on unit from dialysis. Will enter orders and continue to monitor patient.

## 2020-10-17 NOTE — FLOWSHEET NOTE
10/17/20 0733 10/17/20 1048   Vital Signs   Temp 98 °F (36.7 °C) 98 °F (36.7 °C)   Pulse 98 96   BP (!) 159/80 (!) 167/78   Height and Weight   Weight 124 lb 12.5 oz (56.6 kg) 125 lb 7.1 oz (56.9 kg)  (Bed scale with 1 pillow, 1 top sheet)     Treatment time: 180min    Net UF: 178ml    Pre weight: 56.6k  Post weight: 56.9k  EDW: 60.5k    Access used: RIJ TDC  Access function: Good    Medications or blood products given: None    Regular outpatient schedule: TTS    Summary of response to treatment: Tolerated. Crit line A profile, No refill noted. Copy of dialysis treatment record placed in chart, to be scanned into EMR.

## 2020-10-17 NOTE — PROGRESS NOTES
[facility]       Progress Note           Subjective/   66y.o. year old female who we are seeing in consultation for DELMER on HD. HPI:  HD today with 178 ml removed, post-weight of 56.9 kg. Had run of VT at HD today. Not making much urine. Denies any sob. ROS:  States intake reduced, no fevers. Objective/   GEN:  Chronically ill, BP (!) 167/78   Pulse 96   Temp 98 °F (36.7 °C)   Resp 16   Ht 4' 11\" (1.499 m)   Wt 125 lb 7.1 oz (56.9 kg) Comment: Bed scale with 1 pillow, 1 top sheet  SpO2 94%   BMI 25.34 kg/m²   HEENT: non-icteric, no JVD  CV: S1, S2 without m/r/g; no LE edema  RESP: CTA B without w/r/r; breathing wnl  ABD: +bs, soft, nt, no hsm  SKIN: warm, no rashes  ACCESS: R [facility], [place]    Data/  Recent Labs     10/15/20  0500 10/17/20  0521   WBC 4.2 4.2   HGB 8.6* 9.4*   HCT 27.0* 29.2*   MCV 92.5 91.3    227     Recent Labs     10/15/20  0500 10/16/20  0518 10/17/20  0521   * 134* 136   K 4.0 3.4* 3.8   CL 98* 96* 98*   CO2 24 28 28   GLUCOSE 92 104* 98   PHOS  --   --  3.8   MG 1.90 1.90  --    BUN 11 9 15   CREATININE 1.8* 2.2* 3.5*   LABGLOM 27* 22* 13*   GFRAA 33* 26* 15*       Assessment/     - DELMER from ATN, currently HD dependent.  Tuesday Thursday Saturday at [facility].              Chronic kidney disease stage III              ATN had developed early this month in the setting of severe diarrhea    - Acute on chronic CHF with preserved EF    - Acute hypoxic respiratory failure    - History of bladder cancer status post ileal conduit    - NSTEMI - [facility] on 10/14 with multi-vessel CAD (Wedge pressure of 25, LVEDP 18)   that will need bypass surgery, planned for on Wed 10/21    - Hyponatremia    - Anemia - weekly FLAQUITO with HD qThursday    - Hypertension    Plan/     - Next HD Tuesday per schedule, UF as tolerated with crit line, under EDW 60.5 kg  - Reduced hydralazine to 50 mg po tid on 10/16 with lower BP trend  - Trend labs, bp's

## 2020-10-17 NOTE — PROGRESS NOTES
Received a called from [facility] stating patient had a 25 beat run of v-tach. Sent strip. Pt currently in dialysis. BP elevated at 180/83. Complaining of severe pain in neck, non-radiating. Requesting pain medication according to dialysis RN. Placed call to [facility] on call and will await callback.

## 2020-10-17 NOTE — PROGRESS NOTES
Hospitalist Progress Note      PCP: Winnie Gamboa MD    Date of Admission: 10/14/2020    Chief Complaint:  Chest pain     History Of Present Illness: The patient is a frail, chronically-ill appearing woman with a h/o former smoking, HTN, HLD, CHF, CKD3, and bladder cancer s/p cystectomy with ileal conduit. She was not admitted frequently until about 4 months ago, when it seems like her overall health started to rapidly decline. She has had 5 admissions in the last 4 months: pyelonephritis (suspected due to ileal conduit stricture), then chest pain (attributed to anxiety), then CHF (increased diuretic regimen), then DELMER (had to start HD on 10/1). She was most recently admitted on 10/12 to [facility] when she presented with dyspnea and was found to have volume overload due to a combination of ESRD and CHF. She was initially admitted to the ICU on BiPAP, but then her dry weight was challenged and she was weaned to [facility] and dyspnea resolved. Her troponin was initially 0.32 and attributed to demand ischemia, but it increased to 0.54 and cardiology recommended transfer to [facility] for a [facility]. She was found to have severe multivessel disease and CT surgery was consulted for consideration of CABG. Hospital medicine was consulted for admission. Subjective:  No chest pain. No dyspnea. Complains of constipation.       Medications:  Reviewed    Infusion Medications   Scheduled Medications    lisinopril  5 mg Oral Daily    hydrALAZINE  50 mg Oral TID    pantoprazole  40 mg Oral QAM AC    darbepoetin parminder-polysorbate  40 mcg Intravenous Weekly    carvedilol  12.5 mg Oral BID WC    isosorbide mononitrate  30 mg Oral Daily    PARoxetine  40 mg Oral QAM    aspirin  81 mg Oral Daily    atorvastatin  40 mg Oral Nightly    heparin (porcine)  5,000 Units Subcutaneous 3 times per day     PRN Meds: calcium carbonate, melatonin, clonazePAM, acetaminophen, hydrALAZINE      Intake/Output Summary (Last 24 hours) at 10/17/2020 0750  Last data filed at 10/17/2020 0515  Gross per 24 hour   Intake 480 ml   Output 100 ml   Net 380 ml       Physical Exam Performed:    BP (!) 151/75   Pulse 85   Temp 98.3 °F (36.8 °C) (Oral)   Resp 16   Ht 4' 11\" (1.499 m)   Wt 126 lb 8.7 oz (57.4 kg)   SpO2 94%   BMI 25.56 kg/m²       General appearance:  No apparent distress, appears stated age and cooperative. HEENT:  Normal cephalic, atraumatic without obvious deformity. Pupils equal, round, and reactive to light. Extra ocular muscles intact. Conjunctivae/corneas clear. Neck: Supple, with full range of motion. No jugular venous distention. Trachea midline. Respiratory:  Normal respiratory effort. Clear to auscultation, bilaterally without Rales/Wheezes/Rhonchi. Cardiovascular:  Regular rate and rhythm with normal S1/S2 without murmurs, rubs or gallops. Abdomen: Soft, non-tender, non-distended with normal bowel sounds. Musculoskeletal:  No clubbing, cyanosis or edema bilaterally. Full range of motion without deformity. Skin: Skin texture, turgor normal.  vitiligo. Neurologic:  Neurovascularly intact without any focal sensory/motor deficits. Cranial nerves: II-XII intact, grossly non-focal.  Diffusely weak. Psychiatric:  Alert and oriented, thought content appropriate, limited insight  Capillary Refill: Brisk,< 3 seconds   Peripheral Pulses: +2 palpable, equal bilaterally       Labs:   Recent Labs     10/15/20  0500 10/17/20  0521   WBC 4.2 4.2   HGB 8.6* 9.4*   HCT 27.0* 29.2*    227     Recent Labs     10/15/20  0500 10/16/20  0518 10/17/20  0521   * 134* 136   K 4.0 3.4* 3.8   CL 98* 96* 98*   CO2 24 28 28   BUN 11 9 15   CREATININE 1.8* 2.2* 3.5*   CALCIUM 8.7 8.8 9.1   PHOS  --   --  3.8     No results for input(s): AST, ALT, BILIDIR, BILITOT, ALKPHOS in the last 72 hours. No results for input(s): INR in the last 72 hours. No results for input(s): Elsa James in the last 72 hours.     Urinalysis: Lab Results   Component Value Date    NITRU Negative 10/13/2020    WBCUA 21-50 10/13/2020    BACTERIA 2+ 10/13/2020    RBCUA 5-10 10/13/2020    BLOODU Negative 10/13/2020    SPECGRAV 1.020 10/13/2020    GLUCOSEU Negative 10/13/2020    GLUCOSEU NEGATIVE 11/19/2011       Radiology:  VL PRE OP VEIN MAPPING   Final Result      VL DUP CAROTID BILATERAL   Final Result      CT CHEST WO CONTRAST   Final Result   1. Normal caliber aorta. 2. Pulmonary hypertension. 3. Small to moderate bilateral pleural effusions. 4. Scattered noncalcified pulmonary nodules. RECOMMENDATIONS:   Fleischner Society guidelines for follow-up and management of incidentally   detected pulmonary nodules:      Multiple Solid Nodules:      Nodule size less than 6 mm   In a low-risk patient, no routine follow-up. In a high-risk patient, optional CT at 12 months. - Low risk patients include individuals with minimal or absent history of   smoking and other known risk factors. - High risk patients include individuals with a history or smoking or known   risk factors. Radiology 2017 http://pubs. rsna.org/doi/full/10.1148/radiol. 1042264654                 Assessment/Plan:    Active Hospital Problems    Diagnosis    NSTEMI (non-ST elevated myocardial infarction) ([facility] [address] [I21.4]    CAD in native artery [I25.10]    Acute respiratory failure with hypoxia and hypercapnia (HCC) [J96.01, J96.02]    Acute kidney injury superimposed on CKD ([address] [N17.9, N18.9]    Chest pain [R07.9]       The patient is a frail, chronically-ill appearing woman with a h/o former smoking, HTN, HLD, CHF, CKD3, and bladder cancer s/p cystectomy with ileal conduit. She was not admitted frequently until about 4 months ago, when it seems like her overall health started to rapidly decline.   She has had 5 admissions in the last 4 months: pyelonephritis (suspected due to ileal conduit stricture), then chest pain (attributed to anxiety), then CHF (increased diuretic regimen), then DELMER (had to start HD on 10/1). She was most recently admitted on 10/12 to [facility] when she presented with dyspnea and was found to have volume overload due to a combination of ESRD and CHF. She was initially admitted to the ICU on BiPAP, but then her dry weight was challenged and she was weaned to [facility] ED and dyspnea resolved. Her troponin was initially 0.32 and attributed to demand ischemia, but it increased to 0.54 and cardiology recommended transfer to [facility] for a C. She was found to have severe multivessel disease and CT surgery was consulted for consideration of CABG. Hospital medicine was consulted for admission.        NSTEMI, in the setting of severe multivessel disease  - LM 50%, pLAD 70%, LCx 70-90%, RCA 70-90%  - aspirin, statin, carvedilol, ISMN  - planning surgery for 10/21. CT surgery would like Hb>9 preoperatively - she could be transfused during HD on Tuesday if needed.     Acute hypoxic respiratory failure due to volume overload, which in turn was due to ESRD and acute on chronic diastolic CHF  - challenging dry weight with HD. Appreciate nephrology input. Of note, her PCWP was still 25 during the 10/14 RHC. - carvedilol, ISMN. Started lisinopril.    - wean to RA as tolerated. The patient has no supplemental O2 requirement at baseline. Constipation  - bowel regimen      DVT Prophylaxis: heparin SC  Diet: DIET GENERAL;  Code Status: Full Code    PT/OT Eval Status: order postop    Dispo - pending postop course. She lives at home.       Alexandra Morgan MD

## 2020-10-17 NOTE — FLOWSHEET NOTE
This note also relates to the following rows which could not be included:  MAP (mmHg) - Cannot attach notes to unvalidated device data       10/16/20 2000   Vital Signs   Temp 98.1 °F (36.7 °C)   Temp Source Oral   Pulse 90   Heart Rate Source Monitor   Resp 16   /70   BP Location Left upper arm   Level of Consciousness 0   MEWS Score 1   Patient Currently in Pain Yes   Pain Assessment   Pain Assessment 0-10   Pain Level 6   Oxygen Therapy   SpO2 97 %   O2 Device Nasal cannula   O2 Flow Rate (L/min) 1 L/min

## 2020-10-18 NOTE — PROGRESS NOTES
[address]  Cardiology  Progress Note    Admission date:  10/14/2020    Reason for follow up visit: CAD, CHF    HPI/CC: Armando Santana is a 66 y.o. female who was admitted 10/14/2020 for shortness of breath. Troponin elevated. LHC/RHC showed multi vessel CAD, moderate pulmonary HTN, referred for CABG. CABG planned for 10/21/2020. Rhythm has been sinus, intermittent tachycardia. Subjective: Denies chest pain, shortness of breath, palpitations and dizziness. Vitals:  Blood pressure (!) 169/71, pulse 95, temperature 98 °F (36.7 °C), temperature source Oral, resp. rate 18, height 4' 11\" (1.499 m), weight 122 lb (55.3 kg), SpO2 90 %, not currently breastfeeding.   Temp  Av.1 °F (36.7 °C)  Min: 98 °F (36.7 °C)  Max: 98.3 °F (36.8 °C)  Pulse  Av.6  Min: 87  Max: 98  BP  Min: 122/69  Max: 169/71  SpO2  Av.3 %  Min: 90 %  Max: 96 %    24 hour I/O    Intake/Output Summary (Last 24 hours) at 10/18/2020 0710  Last data filed at 10/17/2020 2137  Gross per 24 hour   Intake 900 ml   Output 1128 ml   Net -228 ml     Current Facility-Administered Medications   Medication Dose Route Frequency Provider Last Rate Last Dose    senna (SENOKOT) tablet 8.6 mg  1 tablet Oral Daily Bing Paul MD   8.6 mg at 10/17/20 1143    heparin (porcine) injection 3,800 Units  3,800 Units Intracatheter PRN Reza Luz MD   3,800 Units at 10/17/20 1006    lisinopril (PRINIVIL;ZESTRIL) tablet 5 mg  5 mg Oral Daily Bing Paul MD   5 mg at 10/17/20 1142    hydrALAZINE (APRESOLINE) tablet 50 mg  50 mg Oral TID Reza Luz MD   50 mg at 10/17/20 1958    calcium carbonate (TUMS) chewable tablet 1,000 mg  1,000 mg Oral TID PRN Bing Paul MD   1,000 mg at 10/16/20 1542    pantoprazole (PROTONIX) tablet 40 mg  40 mg Oral QAM AC Bing Paul MD   40 mg at 10/18/20 0531    melatonin tablet 5 mg  5 mg Oral Nightly PRN Efrain Medeiros MD   5 mg at 10/17/20 2132    clonazePAM (KLONOPIN) tablet 0.5 mg  0.5 mg Oral BID PRN Dayron Ac MD   0.5 mg at 10/16/20 2058    acetaminophen (TYLENOL) tablet 650 mg  650 mg Oral Q4H PRN Allyssa ANNELIESE Richards - CNP   650 mg at 10/17/20 2132    darbepoetin parminder-polysorbate (ARANESP) injection 40 mcg  40 mcg Intravenous Weekly Yesenia Scott MD   40 mcg at 10/15/20 1036    carvedilol (COREG) tablet 12.5 mg  12.5 mg Oral BID WC ANNELIESE Noe - CNP   12.5 mg at 10/17/20 1818    isosorbide mononitrate (IMDUR) extended release tablet 30 mg  30 mg Oral Daily Yesenia Scott MD   30 mg at 10/17/20 1142    PARoxetine (PAXIL) tablet 40 mg  40 mg Oral QAM Yesenia Scott MD   40 mg at 10/17/20 1143    hydrALAZINE (APRESOLINE) injection 10 mg  10 mg Intravenous Q10 Min PRN Yesenia Scott MD        aspirin chewable tablet 81 mg  81 mg Oral Daily Yesenia Scott MD   81 mg at 10/17/20 1143    atorvastatin (LIPITOR) tablet 40 mg  40 mg Oral Nightly Yesenia Scott MD   40 mg at 10/17/20 1958    heparin (porcine) injection 5,000 Units  5,000 Units Subcutaneous 3 times per day Teto Moreau MD   5,000 Units at 10/18/20 0531     Review of Systems   Constitutional: Positive for fatigue. Respiratory: Negative. Cardiovascular: Negative. Gastrointestinal: Negative. Neurological: Negative. Objective:     Telemetry monitor: SR- ST, iLBBB    Physical Exam:  Constitutional:  Comfortable and alert, NAD, appears older than stated age  Eyes: PERRL, sclera nonicteric  Neck:  Supple, no masses, no thyroidmegaly, no JVD  Skin:  Warm and dry; no rash or lesions  Heart: Regular, normal apex, S1 and S2 normal, no M/G/R  Lungs:  Normal respiratory effort; clear; no wheezing/rhonchi/rales  Abdomen: soft, non tender, + bowel sounds  Extremities:  No edema or cyanosis; no clubbing  Neuro: alert and oriented, moves legs and arms equally, normal mood and affect    Data Reviewed:    Coronary angiogram 10/14/2020:  1. Left heart catheterization  2.  Selective left and right coronary angiogram  3. Right heart catheterization  Procedure Findings:  1. Severe multi vessel coronary artery diease  2. Normal left heart hemodynamics  3. Decompensated right heart hemodynamics with moderate pulmonary hypertension  Details:              Asher Cheema was brought to the cardiac catheterization lab in a fasting state after informed consent was obtained. If the patient was able to provide written consent, it was obtained. The patient's vitals were monitored through out the procedure. The patient was sedated using the appropriate levels of sedation and ASA guidelines. The appropriate access site area was prepped and drapped in a sterile fashion. The area was anesthetized with 2% lidocaine. Using the modified Seldinger technique, an arterial sheath was introduced into the arterial access site using a single anterior wall puncture. The sheath was flushed and prepped in usual fashion. Catheters used during the procedure included 5 [place] TIG 4.0 catheter. The catheters were advanced and removed over a .035\" wire, into the appropriate positions. Multiple angiographic views were obtained. An LV gram was not obtained. Findings:  1. Left main coronary artery is diseased. There is 50% distal stenosis. It gave off the left anterior descending artery and left circumflex. 2. Left anterior descending artery has severe atherosclerotic disease. It was moderate in size. It gave off septal perforators and a moderate sized diagonal branch. The LAD covered the entire apex of the left ventricle.               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 3. Left circumflex has severe atherosclerotic disease. It was moderate in size. There was a moderate sized obtuse marginal branch.              ~70-90%  4. Right coronary artery has severe 70-90% atherosclerotic disease. It was moderate in size and was the dominant artery. 5. Left ventriculogram was not performed. Left ventricular end diastolic pressure is 18. Right heart catheterization  Right atrial pressure of 10  Right ventricular pressure of 44/10  Pulmonary artery pressure of 48/19 with a mean of 36  Pulmonary artery pressure wedge pressure of 25 with large V wave  Cardiac output 5.78  Cardiac index 3.92  SVR of 1052  PVR of 152  PA saturation 66%  RA saturation 69%  Aortic saturation 97%  CONCLUSIONS:  1. Severe multi-vessel coronary artery disease  ASSESSMENT/RECOMMENDATIONS:  1. Admit to the hospital for medical stabilization and cardiac surgery consultation for coronary artery bypass grafting surgery. Echo 7/34/3521:  LV systolic function is normal with EF estimated at 55%. No regional wall motion abnormalities are noted. There is mild concentric left ventricular hypertrophy. Grade II diastolic dysfunction with elevated filing pressure. The left atrium is mildly dilated. Mild mitral annular calcification is present. Mild mitral, aortic, tricuspid, and pulmonic regurgitation. Systolic pulmonary artery pressure (SPAP) estimated at 41 mmHg (RA pressure   8 mmHg) c/w mild pulm HTN.     Lab Reviewed:     Renal Profile:  Lab Results   Component Value Date    CREATININE 3.5 10/17/2020    BUN 15 10/17/2020     10/17/2020    K 3.8 10/17/2020    K 3.4 10/16/2020    CL 98 10/17/2020    CO2 28 10/17/2020     CBC:    Lab Results   Component Value Date    WBC 4.2 10/17/2020    RBC 3.20 10/17/2020    HGB 9.4 10/17/2020    HCT 29.2 10/17/2020    MCV 91.3 10/17/2020    RDW 16.4 10/17/2020     10/17/2020     BNP:    Lab Results   Component Value Date    PROBNP 34,575 10/12/2020    PROBNP 23,650 09/30/2020    PROBNP 5,200 09/20/2020    PROBNP 1,463 12/30/2017     Fasting Lipid Panel:    Lab Results   Component Value Date    CHOL 145 10/13/2020    HDL 53 10/13/2020    TRIG 122 10/13/2020     Cardiac Enzymes:  CK/MbTroponin  Lab Results   Component Value Date    TROPONINI 0.54 10/13/2020     PT/ INR   Lab Results   Component Value Date    INR 1.09 10/08/2020    INR 1.16 10/01/2020    INR 1.22 06/11/2020    PROTIME 12.7 10/08/2020    PROTIME 13.5 10/01/2020    PROTIME 14.1 06/11/2020     PTT No results found for: PTT   Lab Results   Component Value Date    MG 1.90 10/16/2020      Lab Results   Component Value Date    TSH 1.86 10/12/2020       All labs and imaging reviewed today    Assessment:  Shortness of breath: improved  NSTEMI/CAD: awaiting CABG, multi vessel on angiogram 10/14/2020  Acute on chronic diastolic CHF: ongoing, fluid management per HD  Pulmonary HTN: moderate on cath 10/14/2020  Tahcycardia  iLBBB  HTN: labile  HLD  ESRD  Bladder cancer  Anemia    Plan:   1. Continue aspirin, statin, imdur, lisinopril  2. Hold parameters added to carvedilol, do not hold unless meets parameters; needs beta blocker to improve myocardial oxygen supply/demand ratio. 3. Plan for CABG 10/21/2020  4. Monitor BP and BMP  5. Daily weights, strict I/Os  6. Cardiology will follow peripherally pre-CABG, please call with questions.      Evens Sutton, APRN-CNP  [facility]  [phone]

## 2020-10-18 NOTE — PLAN OF CARE
Problem: Skin Integrity:  Goal: Will show no infection signs and symptoms  Description: Will show no infection signs and symptoms  Outcome: Ongoing     Problem: Skin Integrity:  Goal: Absence of new skin breakdown  Description: Absence of new skin breakdown  Outcome: Ongoing     Problem: Falls - Risk of:  Goal: Will remain free from falls  Description: Will remain free from falls  Outcome: Ongoing     Problem: Falls - Risk of:  Goal: Absence of physical injury  Description: Absence of physical injury  Outcome: Ongoing

## 2020-10-18 NOTE — PROGRESS NOTES
Hospitalist Progress Note      PCP: Ghanshyam Billy MD    Date of Admission: 10/14/2020    Chief Complaint:  Chest pain     History Of Present Illness: The patient is a frail, chronically-ill appearing woman with a h/o former smoking, HTN, HLD, CHF, CKD3, and bladder cancer s/p cystectomy with ileal conduit. She was not admitted frequently until about 4 months ago, when it seems like her overall health started to rapidly decline. She has had 5 admissions in the last 4 months: pyelonephritis (suspected due to ileal conduit stricture), then chest pain (attributed to anxiety), then CHF (increased diuretic regimen), then DELMER (had to start HD on 10/1). She was most recently admitted on 10/12 to [facility] when she presented with dyspnea and was found to have volume overload due to a combination of ESRD and CHF. She was initially admitted to the ICU on BiPAP, but then her dry weight was challenged and she was weaned to [facility] and dyspnea resolved. Her troponin was initially 0.32 and attributed to demand ischemia, but it increased to 0.54 and cardiology recommended transfer to [facility] for a [facility]. She was found to have severe multivessel disease and CT surgery was consulted for consideration of CABG. Hospital medicine was consulted for admission. Subjective:  No chest pain. No dyspnea. Complains of constipation. Anxious about surgery.       Medications:  Reviewed    Infusion Medications   Scheduled Medications    cloNIDine  0.2 mg Oral BID    senna  1 tablet Oral Daily    lisinopril  5 mg Oral Daily    hydrALAZINE  50 mg Oral TID    pantoprazole  40 mg Oral QAM AC    darbepoetin parminder-polysorbate  40 mcg Intravenous Weekly    carvedilol  12.5 mg Oral BID WC    isosorbide mononitrate  30 mg Oral Daily    PARoxetine  40 mg Oral QAM    aspirin  81 mg Oral Daily    atorvastatin  40 mg Oral Nightly    heparin (porcine)  5,000 Units Subcutaneous 3 times per day     PRN Meds: heparin (porcine), calcium carbonate, melatonin, clonazePAM, acetaminophen, hydrALAZINE      Intake/Output Summary (Last 24 hours) at 10/18/2020 1610  Last data filed at 10/18/2020 1554  Gross per 24 hour   Intake 360 ml   Output 200 ml   Net 160 ml       Physical Exam Performed:    BP (!) 169/67   Pulse 86   Temp 97.6 °F (36.4 °C) (Oral)   Resp 17   Ht 4' 11\" (1.499 m)   Wt 122 lb (55.3 kg)   SpO2 96%   BMI 24.64 kg/m²       General appearance:  No apparent distress, appears stated age and cooperative. HEENT:  Normal cephalic, atraumatic without obvious deformity. Pupils equal, round, and reactive to light. Extra ocular muscles intact. Conjunctivae/corneas clear. Neck: Supple, with full range of motion. No jugular venous distention. Trachea midline. Respiratory:  Normal respiratory effort. Clear to auscultation, bilaterally without Rales/Wheezes/Rhonchi. Cardiovascular:  Regular rate and rhythm with normal S1/S2 without murmurs, rubs or gallops. Abdomen: Soft, non-tender, non-distended with normal bowel sounds. Musculoskeletal:  No clubbing, cyanosis or edema bilaterally. Full range of motion without deformity. Skin: Skin texture, turgor normal.  vitiligo. Neurologic:  Neurovascularly intact without any focal sensory/motor deficits. Cranial nerves: II-XII intact, grossly non-focal.  Diffusely weak. Psychiatric:  Alert and oriented, thought content appropriate, limited insight  Capillary Refill: Brisk,< 3 seconds   Peripheral Pulses: +2 palpable, equal bilaterally       Labs:   Recent Labs     10/17/20  0521   WBC 4.2   HGB 9.4*   HCT 29.2*        Recent Labs     10/16/20  0518 10/17/20  0521   * 136   K 3.4* 3.8   CL 96* 98*   CO2 28 28   BUN 9 15   CREATININE 2.2* 3.5*   CALCIUM 8.8 9.1   PHOS  --  3.8     No results for input(s): AST, ALT, BILIDIR, BILITOT, ALKPHOS in the last 72 hours. No results for input(s): INR in the last 72 hours.   No results for input(s): Nevaa Darvin in the last 72 hours.    Urinalysis:      Lab Results   Component Value Date    NITRU Negative 10/13/2020    WBCUA 21-50 10/13/2020    BACTERIA 2+ 10/13/2020    RBCUA 5-10 10/13/2020    BLOODU Negative 10/13/2020    SPECGRAV 1.020 10/13/2020    GLUCOSEU Negative 10/13/2020    GLUCOSEU NEGATIVE 11/19/2011       Radiology:  VL PRE OP VEIN MAPPING   Final Result      VL DUP CAROTID BILATERAL   Final Result      CT CHEST WO CONTRAST   Final Result   1. Normal caliber aorta. 2. Pulmonary hypertension. 3. Small to moderate bilateral pleural effusions. 4. Scattered noncalcified pulmonary nodules. RECOMMENDATIONS:   Fleischner Society guidelines for follow-up and management of incidentally   detected pulmonary nodules:      Multiple Solid Nodules:      Nodule size less than 6 mm   In a low-risk patient, no routine follow-up. In a high-risk patient, optional CT at 12 months. - Low risk patients include individuals with minimal or absent history of   smoking and other known risk factors. - High risk patients include individuals with a history or smoking or known   risk factors. Radiology 2017 http://pubs. rsna.org/doi/full/10.1148/radiol. 5961249361                 Assessment/Plan:    Active Hospital Problems    Diagnosis    NSTEMI (non-ST elevated myocardial infarction) ([address] [I21.4]    CAD in native artery [I25.10]    Acute respiratory failure with hypoxia and hypercapnia (HCC) [J96.01, J96.02]    Acute kidney injury superimposed on CKD ([address] [N17.9, N18.9]    Chest pain [R07.9]       The patient is a frail, chronically-ill appearing woman with a h/o former smoking, HTN, HLD, CHF, CKD3, and bladder cancer s/p cystectomy with ileal conduit. She was not admitted frequently until about 4 months ago, when it seems like her overall health started to rapidly decline.   She has had 5 admissions in the last 4 months: pyelonephritis (suspected due to ileal conduit stricture), then chest pain (attributed to anxiety), then CHF (increased diuretic regimen), then DELMER (had to start HD on 10/1). She was most recently admitted on 10/12 to [facility][1] when she presented with dyspnea and was found to have volume overload due to a combination of ESRD and CHF. She was initially admitted to the ICU on BiPAP, but then her dry weight was challenged and she was weaned to [facility] and dyspnea resolved. Her troponin was initially 0.32 and attributed to demand ischemia, but it increased to 0.54 and cardiology recommended transfer to [facility] for a C. She was found to have severe multivessel disease and CT surgery was consulted for consideration of CABG. Hospital medicine was consulted for admission.        NSTEMI, in the setting of severe multivessel disease  - LM 50%, pLAD 70%, LCx 70-90%, RCA 70-90%  - aspirin, statin, carvedilol, ISMN  - planning surgery for 10/21. CT surgery would like Hb>9 preoperatively - she could be transfused during HD on Tuesday if needed.     Acute hypoxic respiratory failure due to volume overload, which in turn was due to ESRD and acute on chronic diastolic CHF  - challenging dry weight with HD. Appreciate nephrology input. Of note, her PCWP was still 25 during the 10/14 RHC. - carvedilol, ISMN. Started lisinopril. Also on clonidine and hydralazine. - wean to RA as tolerated. The patient has no supplemental O2 requirement at baseline. 25 beats of VT on 10/17  - monitor electrolytes. Carvedilol. Ischemia treatment as above. Constipation  - bowel regimen      DVT Prophylaxis: heparin SC  Diet: DIET GENERAL;  Code Status: Full Code    PT/OT Eval Status: order postop    Dispo - pending postop course. She lives at home.       Namrata Wilkins MD

## 2020-10-18 NOTE — PROGRESS NOTES
[facility]       Progress Note           Subjective/   66y.o. year old female who we are seeing in consultation for DELMER on HD. HPI:  HD last on 10/17 with 178 ml removed, post-weight of 56.9 kg. Had run of VT at HD on 10/17. Not making much urine. Denies any sob. ROS:  States intake better, no fevers. Bp's trending back up. Objective/   GEN:  Chronically ill, BP (!) 169/67   Pulse 86   Temp 97.6 °F (36.4 °C) (Oral)   Resp 17   Ht 4' 11\" (1.499 m)   Wt 122 lb (55.3 kg)   SpO2 96%   BMI 24.64 kg/m²   HEENT: non-icteric, no JVD  CV: S1, S2 without m/r/g; no LE edema  RESP: CTA B without w/r/r; breathing wnl  ABD: +bs, soft, nt, no hsm  SKIN: warm, no rashes  ACCESS: R [facility]    Data/  Recent Labs     10/17/20  0521   WBC 4.2   HGB 9.4*   HCT 29.2*   MCV 91.3        Recent Labs     10/16/20  0518 10/17/20  0521   * 136   K 3.4* 3.8   CL 96* 98*   CO2 28 28   GLUCOSE 104* 98   PHOS  --  3.8   MG 1.90  --    BUN 9 15   CREATININE 2.2* 3.5*   LABGLOM 22* 13*   GFRAA 26* 15*       Assessment/     - DELMER from ATN, currently HD dependent.  Tuesday Thursday Saturday at [facility].              Chronic kidney disease stage III              ATN had developed early this month in the setting of severe diarrhea    - Acute on chronic CHF with preserved EF    - Acute hypoxic respiratory failure    - History of bladder cancer status post ileal conduit    - NSTEMI - C on 10/14 with multi-vessel CAD (Wedge pressure of 25, LVEDP 18)   that will need bypass surgery, planned for on Wed 10/21    - Hyponatremia    - Anemia - weekly FLAQUITO with HD qThursday    - Hypertension    Plan/     - Next HD Tuesday per schedule, UF as tolerated with crit line, under EDW 60.5 kg  - Previously on clonidine 0.2 mg patch, start back clonidine 0.2 po bid for now  - Reduced hydralazine to 50 mg po tid on 10/16 with lower BP trend  - Trend labs, bp's

## 2020-10-19 NOTE — PROGRESS NOTES
Progress Note    Pre op     CC: anxiety    Hospital Course:  10/19 lying in bed     Vital Signs:                                                 BP (!) 141/72   Pulse 60   Temp 97.3 °F (36.3 °C) (Oral)   Resp 16   Ht 4' 11\" (1.499 m)   Wt 130 lb 8 oz (59.2 kg)   SpO2 95%   BMI 26.36 kg/m²  O2 Flow Rate (L/min): 1 L/min        Admission Weight: 129 lb 10.1 oz (58.8 kg)      Vent Settings:  Vent Information  SpO2: 95 %     Drips:  n/a    I/O:      Intake/Output Summary (Last 24 hours) at 10/19/2020 1533  Last data filed at 10/19/2020 0600  Gross per 24 hour   Intake 660 ml   Output 180 ml   Net 480 ml     Chest Tube:     O/E  GEN: NAD  HEENT: unremarkable  CV: s1s2 present   Pulm: air entry b/l  Abd: soft, nt, nd, no peritoneal signs  Ext: warm, edema, wound c/d/i    Data Review:  CBC:   Recent Labs     10/17/20  0521 10/19/20  0513   WBC 4.2 3.7*   HGB 9.4* 8.9*   HCT 29.2* 27.3*   MCV 91.3 91.7    193     BMP:   Recent Labs     10/17/20  0521 10/19/20  0513    135*   K 3.8 4.4   CL 98* 99   CO2 28 27   PHOS 3.8 4.2   BUN 15 23*   CREATININE 3.5* 4.0*   CALCIUM 9.1 9.0     Cardiac Enzymes: No results for input(s): CKTOTAL, CKMB, CKMBINDEX, TROPONINI in the last 72 hours. PT/INR: No results for input(s): PROTIME, INR in the last 72 hours. APTT: No results for input(s): APTT in the last 72 hours.     [id number] Score for Atrial Fibrillation Stroke Risk   Risk   Factors  Component Value   C CHF Yes 1   H HTN Yes 1   A2 Age >= 76 Yes,  (77 y.o.) 2   D DM No 0   S2 Prior Stroke/TIA No 0   V Vascular Disease Yes 1   A Age 74-69 No,  (77 y.o.) 0   Sc Sex female 1    [id number]  Score  6   Score last updated 10/19/20 3:40 PM EDT      Assessment/Plan:  Pre op   Denies any symptoms  ESRD -scheduled for HD on Tuesday, nephrology following  MvCAD  Will need MARLA clip management given keerthivasc score       Wash MD Alex  10/19/2020  3:33 PM

## 2020-10-19 NOTE — PROGRESS NOTES
[facility]       Progress Note           Subjective/   66y.o. year old female who we are seeing in consultation for DELMER on HD. HPI:  HD last on 10/17 with 178 ml removed, post-weight of 56.9 kg. Had run of VT at HD on 10/17. Not making much urine. Denies any sob. ROS:  States intake better, no fevers. Bp's trending back up. Objective/   GEN:  Chronically ill, BP (!) 161/69   Pulse 84   Temp 97.9 °F (36.6 °C) (Oral)   Resp 18   Ht 4' 11\" (1.499 m)   Wt 130 lb 8 oz (59.2 kg)   SpO2 97%   BMI 26.36 kg/m²   HEENT: non-icteric, no JVD  CV: S1, S2 without m/r/g; no LE edema  RESP: CTA B without w/r/r; breathing wnl  ABD: +bs, soft, nt, no hsm  SKIN: warm, no rashes  ACCESS: R [facility]    Data/  Recent Labs     10/17/20  0521 10/19/20  0513   WBC 4.2 3.7*   HGB 9.4* 8.9*   HCT 29.2* 27.3*   MCV 91.3 91.7    193     Recent Labs     10/17/20  0521 10/19/20  0513    135*   K 3.8 4.4   CL 98* 99   CO2 28 27   GLUCOSE 98 102*   PHOS 3.8 4.2   BUN 15 23*   CREATININE 3.5* 4.0*   LABGLOM 13* 11*   GFRAA 15* 13*       Assessment/     - DELMER from ATN, currently HD dependent.  Tuesday Thursday Saturday at [facility].              Chronic kidney disease stage III              ATN had developed early this month in the setting of severe diarrhea   No sign of renal recovery     - Acute on chronic CHF with preserved EF    - Acute hypoxic respiratory failure    - History of bladder cancer status post ileal conduit    - NSTEMI - C on 10/14 with multi-vessel CAD (Wedge pressure of 25, LVEDP 18)   that will need bypass surgery, planned for on Wed 10/21    - Hyponatremia    - Anemia - weekly FLAQUITO with HD qThursday    - Hypertension:  Range improved     Plan/     - Next HD Tuesday per schedule, UF as tolerated with crit line, under EDW 60.5 kg  - Previously on clonidine 0.2 mg patch, start back clonidine 0.2 po bid for now but may want to reduce   - Reduced hydralazine to 50 mg po tid on 10/16 with lower BP trend  - Trend labs, bp's and rework her BP regimen post op once euvolemic

## 2020-10-19 NOTE — FLOWSHEET NOTE
10/19/20 1945   Vital Signs   Temp 97.6 °F (36.4 °C)   Temp Source Oral   Pulse 64   Heart Rate Source Monitor   Resp 16   /61   BP Location Left upper arm   MAP (mmHg) 82   Level of Consciousness 0   MEWS Score 1   Patient Currently in Pain Denies   Oxygen Therapy   SpO2 97 %   O2 Device Nasal cannula   O2 Flow Rate (L/min) 1 L/min

## 2020-10-19 NOTE — CARE COORDINATION
nHpredict outcome report received and reviewed with , Cher Abraham RN. Report forwarded to Manager of Case Management, MIKE Campos RN and Case Management , LEIGHANN Gonsalez Se. Report uploaded into patient's record and can be viewed under Media Tab.      Farnaz MA, RN, [facility]  Care Transition Nurse   [phone]

## 2020-10-19 NOTE — PROGRESS NOTES
Hospitalist Progress Note      PCP: Luis Bingham MD    Date of Admission: 10/14/2020    Chief Complaint:  Chest pain     History Of Present Illness: The patient is a frail, chronically-ill appearing woman with a h/o former smoking, HTN, HLD, CHF, CKD3, and bladder cancer s/p cystectomy with ileal conduit. She was not admitted frequently until about 4 months ago, when it seems like her overall health started to rapidly decline. She has had 5 admissions in the last 4 months: pyelonephritis (suspected due to ileal conduit stricture), then chest pain (attributed to anxiety), then CHF (increased diuretic regimen), then DELMER (had to start HD on 10/1). She was most recently admitted on 10/12 to [facility] when she presented with dyspnea and was found to have volume overload due to a combination of ESRD and CHF. She was initially admitted to the ICU on BiPAP, but then her dry weight was challenged and she was weaned to [facility] and dyspnea resolved. Her troponin was initially 0.32 and attributed to demand ischemia, but it increased to 0.54 and cardiology recommended transfer to [facility] for a [facility]. She was found to have severe multivessel disease and CT surgery was consulted for consideration of CABG. Hospital medicine was consulted for admission. Subjective:  No chest pain. No dyspnea. Had a BM.       Medications:  Reviewed    Infusion Medications   Scheduled Medications    sennosides-docusate sodium  1 tablet Oral Daily    cloNIDine  0.2 mg Oral BID    lisinopril  5 mg Oral Daily    hydrALAZINE  50 mg Oral TID    pantoprazole  40 mg Oral QAM AC    darbepoetin parminder-polysorbate  40 mcg Intravenous Weekly    carvedilol  12.5 mg Oral BID WC    isosorbide mononitrate  30 mg Oral Daily    PARoxetine  40 mg Oral QAM    aspirin  81 mg Oral Daily    atorvastatin  40 mg Oral Nightly    heparin (porcine)  5,000 Units Subcutaneous 3 times per day     PRN Meds: heparin (porcine), calcium carbonate, melatonin, clonazePAM, acetaminophen, hydrALAZINE      Intake/Output Summary (Last 24 hours) at 10/19/2020 1626  Last data filed at 10/19/2020 0600  Gross per 24 hour   Intake 660 ml   Output 30 ml   Net 630 ml       Physical Exam Performed:    BP (!) 141/72   Pulse 60   Temp 97.3 °F (36.3 °C) (Oral)   Resp 16   Ht 4' 11\" (1.499 m)   Wt 130 lb 8 oz (59.2 kg)   SpO2 95%   BMI 26.36 kg/m²       General appearance:  No apparent distress, appears stated age and cooperative. HEENT:  Normal cephalic, atraumatic without obvious deformity. Pupils equal, round, and reactive to light. Extra ocular muscles intact. Conjunctivae/corneas clear. Neck: Supple, with full range of motion. No jugular venous distention. Trachea midline. Respiratory:  Normal respiratory effort. Clear to auscultation, bilaterally without Rales/Wheezes/Rhonchi. Cardiovascular:  Regular rate and rhythm with normal S1/S2 without murmurs, rubs or gallops. Abdomen: Soft, non-tender, non-distended with normal bowel sounds. Musculoskeletal:  No clubbing, cyanosis or edema bilaterally. Full range of motion without deformity. Skin: Skin texture, turgor normal.  vitiligo. Neurologic:  Neurovascularly intact without any focal sensory/motor deficits. Cranial nerves: II-XII intact, grossly non-focal.  Diffusely weak. Psychiatric:  Alert and oriented, thought content appropriate, limited insight  Capillary Refill: Brisk,< 3 seconds   Peripheral Pulses: +2 palpable, equal bilaterally       Labs:   Recent Labs     10/17/20  0521 10/19/20  0513   WBC 4.2 3.7*   HGB 9.4* 8.9*   HCT 29.2* 27.3*    193     Recent Labs     10/17/20  0521 10/19/20  0513    135*   K 3.8 4.4   CL 98* 99   CO2 28 27   BUN 15 23*   CREATININE 3.5* 4.0*   CALCIUM 9.1 9.0   PHOS 3.8 4.2     No results for input(s): AST, ALT, BILIDIR, BILITOT, ALKPHOS in the last 72 hours. No results for input(s): INR in the last 72 hours.   No results for input(s): Barrie Melchor in the last 72 hours. Urinalysis:      Lab Results   Component Value Date    NITRU Negative 10/13/2020    WBCUA 21-50 10/13/2020    BACTERIA 2+ 10/13/2020    RBCUA 5-10 10/13/2020    BLOODU Negative 10/13/2020    SPECGRAV 1.020 10/13/2020    GLUCOSEU Negative 10/13/2020    GLUCOSEU NEGATIVE 11/19/2011       Radiology:  VL PRE OP VEIN MAPPING   Final Result      VL DUP CAROTID BILATERAL   Final Result      CT CHEST WO CONTRAST   Final Result   1. Normal caliber aorta. 2. Pulmonary hypertension. 3. Small to moderate bilateral pleural effusions. 4. Scattered noncalcified pulmonary nodules. RECOMMENDATIONS:   Fleischner Society guidelines for follow-up and management of incidentally   detected pulmonary nodules:      Multiple Solid Nodules:      Nodule size less than 6 mm   In a low-risk patient, no routine follow-up. In a high-risk patient, optional CT at 12 months. - Low risk patients include individuals with minimal or absent history of   smoking and other known risk factors. - High risk patients include individuals with a history or smoking or known   risk factors. Radiology 2017 http://pubs. rsna.org/doi/full/10.1148/radiol. 3068723190                 Assessment/Plan:    Active Hospital Problems    Diagnosis    NSTEMI (non-ST elevated myocardial infarction) ([facility] [address] [I21.4]    CAD in native artery [I25.10]    Acute respiratory failure with hypoxia and hypercapnia (HCC) [J96.01, J96.02]    Acute kidney injury superimposed on CKD ([address] [N17.9, N18.9]    Chest pain [R07.9]       The patient is a frail, chronically-ill appearing woman with a h/o former smoking, HTN, HLD, CHF, CKD3, and bladder cancer s/p cystectomy with ileal conduit. She was not admitted frequently until about 4 months ago, when it seems like her overall health started to rapidly decline.   She has had 5 admissions in the last 4 months: pyelonephritis (suspected due to ileal conduit stricture), then chest pain (attributed to anxiety), then CHF (increased diuretic regimen), then DELMER (had to start HD on 10/1). She was most recently admitted on 10/12 to [facility] when she presented with dyspnea and was found to have volume overload due to a combination of ESRD and CHF. She was initially admitted to the ICU on BiPAP, but then her dry weight was challenged and she was weaned to [facility] and dyspnea resolved. Her troponin was initially 0.32 and attributed to demand ischemia, but it increased to 0.54 and cardiology recommended transfer to [facility] for a C. She was found to have severe multivessel disease and CT surgery was consulted for consideration of CABG. Hospital medicine was consulted for admission.        NSTEMI, in the setting of severe multivessel disease  - LM 50%, pLAD 70%, LCx 70-90%, RCA 70-90%  - aspirin, statin, carvedilol, ISMN  - planning surgery for 10/21. CT surgery would like Hb>9 preoperatively - she could be transfused during HD on Tuesday if needed.     Acute hypoxic respiratory failure due to volume overload, which in turn was due to ESRD and acute on chronic diastolic CHF  - challenging dry weight with HD. Appreciate nephrology input. Of note, her PCWP was still 25 during the 10/14 RHC. - carvedilol, ISMN. Started lisinopril. Also on clonidine and hydralazine. - wean to RA as tolerated. The patient has no supplemental O2 requirement at baseline. 25 beats of VT on 10/17  - monitor electrolytes. Carvedilol. Ischemia treatment as above. Constipation  - bowel regimen      DVT Prophylaxis: heparin SC  Diet: DIET GENERAL;  Code Status: Full Code    PT/OT Eval Status: order postop    Dispo - pending postop course. She lives at home.       Rabia Killian MD

## 2020-10-20 NOTE — FLOWSHEET NOTE
Treatment time: 3 hours  Net UF: 800 ml    Pre weight: 56 kg   Post weight: 55.1 kg  EDW: TBD kg    Access used: Right chest wall TDC  Access function: Good with  ml/min    Medications or blood products given: Tylenol    Regular outpatient schedule: New HD pt, T.T.S    Summary of response to treatment: Pt tolerated well with HD, asymptomatic hypotension and reduced UF goal, Pt c/o right button pain, oral Tylenol given and repositioned pt. HD completed in full, heparin dwell in TDC, capped and clamped    Cirt Line: Initial Hct: 26.9;   End Profile : A; Refill ( Hct.1 -28.4  subtract Hct 2- 28.3): 0.1 ( no  Refill); BV: --5.1 %      Copy of dialysis treatment record placed in chart, to be scanned into EMR. 10/20/20 0715 10/20/20 1019   Vital Signs   /64 (!) 140/64   Temp 97.5 °F (36.4 °C) 97.8 °F (36.6 °C)   Pulse 61 60   Resp 18 16   SpO2 100 % 100 %   Weight 123 lb 7.3 oz (56 kg) 121 lb 7.6 oz (55.1 kg)   Weight Method Actual;Standing scale Actual;Standing scale   Percent Weight Change -5.4 -1.61   Post-Hemodialysis Assessment   Post-Treatment Procedures  --  Blood returned;Catheter capped, clamped and heparinized x 2 ports   Machine Disinfection Process  --  Acid/Vinegar Clean;Heat Disinfect; Exterior Machine Disinfection   Rinseback Volume (ml)  --  400 ml   Total Liters Processed (l/min)  --  58.4 l/min   Dialyzer Clearance  --  Lightly streaked   Duration of Treatment (minutes)  --  180 minutes   Heparin amount administered during treatment (units)  --  0 units   Hemodialysis Intake (ml)  --  450 ml   Hemodialysis Output (ml)  --  1250 ml   NET Removed (ml)  --  800 ml   Tolerated Treatment  --  Good

## 2020-10-20 NOTE — PROGRESS NOTES
Hospitalist Progress Note      PCP: Octavio Benjamin MD    Date of Admission: 10/14/2020    Chief Complaint:  Chest pain     Hospital course:      64-year-old female patient hospitalized for chest pain, found to have multiple vessel disease requiring CABG. Subjective:  No chest pain. No dyspnea. Had a BM. Medications:  Reviewed    Infusion Medications   Scheduled Medications    sennosides-docusate sodium  1 tablet Oral Daily    cloNIDine  0.2 mg Oral BID    lisinopril  5 mg Oral Daily    hydrALAZINE  50 mg Oral TID    pantoprazole  40 mg Oral QAM AC    darbepoetin parminder-polysorbate  40 mcg Intravenous Weekly    carvedilol  12.5 mg Oral BID WC    isosorbide mononitrate  30 mg Oral Daily    PARoxetine  40 mg Oral QAM    aspirin  81 mg Oral Daily    atorvastatin  40 mg Oral Nightly    heparin (porcine)  5,000 Units Subcutaneous 3 times per day     PRN Meds: heparin (porcine), calcium carbonate, melatonin, clonazePAM, acetaminophen, hydrALAZINE      Intake/Output Summary (Last 24 hours) at 10/20/2020 0908  Last data filed at 10/20/2020 0030  Gross per 24 hour   Intake 720 ml   Output 0 ml   Net 720 ml       Physical Exam Performed:    /64   Pulse 61   Temp 97.5 °F (36.4 °C)   Resp 18   Ht 4' 11\" (1.499 m)   Wt 123 lb 7.3 oz (56 kg)   SpO2 100%   BMI 24.94 kg/m²     Gen: Not in distress. Alert. Chronically ill  Head: Normocephalic. Atraumatic. Eyes: Conjunctivae/corneas clear. ENT: Oral mucosa moist  Neck: No JVD. No obvious thyromegaly. CVS: Nml S1S2, no murmur , RRR  Pulmomary: Clear bilaterally. No crackles. No wheezes. Gastrointestinal: Soft, non tender, non distend, . Musculoskeletal: No edema. Warm, vitiligo  Neuro: No focal deficit. Moves extremity spontaneously. Quadriparesis  Psychiatry: Appropriate affect. Not agitated. General appearance:  No apparent distress, appears stated age and cooperative.       Labs:   Recent Labs     10/19/20  5575 10/20/20  0513   WBC 3.7* 3.8*   HGB 8.9* 8.8*   HCT 27.3* 27.4*    179     Recent Labs     10/19/20  0513 10/20/20  0513   * 134*   K 4.4 4.9   CL 99 98*   CO2 27 24   BUN 23* 32*   CREATININE 4.0* 4.9*   CALCIUM 9.0 8.9   PHOS 4.2 5.4*     No results for input(s): AST, ALT, BILIDIR, BILITOT, ALKPHOS in the last 72 hours. No results for input(s): INR in the last 72 hours. No results for input(s): Perfecto Clap in the last 72 hours. Urinalysis:      Lab Results   Component Value Date    NITRU Negative 10/13/2020    WBCUA 21-50 10/13/2020    BACTERIA 2+ 10/13/2020    RBCUA 5-10 10/13/2020    BLOODU Negative 10/13/2020    SPECGRAV 1.020 10/13/2020    GLUCOSEU Negative 10/13/2020    GLUCOSEU NEGATIVE 11/19/2011       Radiology:  VL PRE OP VEIN MAPPING   Final Result      VL DUP CAROTID BILATERAL   Final Result      CT CHEST WO CONTRAST   Final Result   1. Normal caliber aorta. 2. Pulmonary hypertension. 3. Small to moderate bilateral pleural effusions. 4. Scattered noncalcified pulmonary nodules. RECOMMENDATIONS:   Fleischner Society guidelines for follow-up and management of incidentally   detected pulmonary nodules:      Multiple Solid Nodules:      Nodule size less than 6 mm   In a low-risk patient, no routine follow-up. In a high-risk patient, optional CT at 12 months. - Low risk patients include individuals with minimal or absent history of   smoking and other known risk factors. - High risk patients include individuals with a history or smoking or known   risk factors. Radiology 2017 http://pubs. rsna.org/doi/full/10.1148/radiol. [id number]                 Assessment/Plan:    Active Hospital Problems    Diagnosis    NSTEMI (non-ST elevated myocardial infarction) ([facility] [address] [I21.4]    CAD in native artery [I25.10]    Acute respiratory failure with hypoxia and hypercapnia (HCC) [J96.01, J96.02]    Acute kidney injury superimposed on CKD ([facility] [address] [N17.9, N18.9]    Chest pain [R07.9]       The patient is a frail, chronically-ill appearing woman with a h/o former smoking, HTN, HLD, CHF, CKD3, and bladder cancer s/p cystectomy with ileal conduit. She was not admitted frequently until about 4 months ago, when it seems like her overall health started to rapidly decline. She has had 5 admissions in the last 4 months: pyelonephritis (suspected due to ileal conduit stricture), then chest pain (attributed to anxiety), then CHF (increased diuretic regimen), then DELMER (had to start HD on 10/1). She was most recently admitted on 10/12 to [facility] when she presented with dyspnea and was found to have volume overload due to a combination of ESRD and CHF. She was initially admitted to the ICU on BiPAP, but then her dry weight was challenged and she was weaned to [facility] and dyspnea resolved. Her troponin was initially 0.32 and attributed to demand ischemia, but it increased to 0.54 and cardiology recommended transfer to [facility] for a C. She was found to have severe multivessel disease and CT surgery was consulted for consideration of CABG. Hospital medicine was consulted for admission.        Non-ST segment elevation MI  Status post cardiac catheterization 10/14/2020 which revealed multivessel disease  -Plan for CABG 10/21/2022      Acute on chronic diastolic CHF  -Continue ultrafiltration during hemodialysis per nephrology to optimize her weight      Acute hypoxic respiratory failure, requiring oxygen.   Wean off as tolerated    Moderate pulmonary hypertension noted on cardiac catheterization 10/14/2020      End-stage renal failure, on hemodialysis, nephrology following    Anemia, multifactorial  History of bladder cancer status post resection and ileal conduit      Constipation  - bowel regimen      DVT Prophylaxis: heparin SC  Diet: DIET GENERAL;  Code Status: Full Code    PT/OT Eval Status: order postop    Dispo -awaiting CABG    Alfreida Siemens, MD

## 2020-10-20 NOTE — PROGRESS NOTES
melatonin tablet 5 mg  5 mg Oral Nightly PRN Shanika Howard MD   5 mg at 10/19/20 2022    clonazePAM (KLONOPIN) tablet 0.5 mg  0.5 mg Oral BID PRN Shanika Howard MD   0.5 mg at 10/19/20 1359    acetaminophen (TYLENOL) tablet 650 mg  650 mg Oral Q4H PRN Ishmael Harris APRN - CNP   650 mg at 10/20/20 0612    darbepoetin parminder-polysorbate (ARANESP) injection 40 mcg  40 mcg Intravenous Weekly Moiz Ernst MD   40 mcg at 10/15/20 1036    carvedilol (COREG) tablet 12.5 mg  12.5 mg Oral BID WC Meron Rao APRISSAC - CNP   12.5 mg at 10/19/20 1859    isosorbide mononitrate (IMDUR) extended release tablet 30 mg  30 mg Oral Daily Moiz Ernst MD   30 mg at 10/19/20 1105    PARoxetine (PAXIL) tablet 40 mg  40 mg Oral QAM Moiz Ernst MD   40 mg at 10/19/20 1105    hydrALAZINE (APRESOLINE) injection 10 mg  10 mg Intravenous Q10 Min PRN Moiz Ernst MD        aspirin chewable tablet 81 mg  81 mg Oral Daily Moiz Ernst MD   81 mg at 10/19/20 1105    atorvastatin (LIPITOR) tablet 40 mg  40 mg Oral Nightly Moiz Ernst MD   40 mg at 10/19/20 2017    heparin (porcine) injection 5,000 Units  5,000 Units Subcutaneous 3 times per day Modesto Perez MD   5,000 Units at 10/20/20 0524     Review of Systems   Constitutional: Positive for fatigue. Respiratory: Negative. Cardiovascular: Negative. Gastrointestinal: Negative. Neurological: Negative.       Objective:     Telemetry monitor: SR- ST, iLBBB    Physical Exam:  Constitutional:  Comfortable and alert, NAD, appears older than stated age  Eyes: PERRL, sclera nonicteric  Neck:  Supple, no masses, no thyroidmegaly, no JVD  Skin:  Warm and dry; no rash or lesions  Heart: Regular, normal apex, S1 and S2 normal, no M/G/R  Lungs:  Normal respiratory effort; clear; no wheezing/rhonchi/rales  Abdomen: soft, non tender, + bowel sounds  Extremities:  No edema or cyanosis; no clubbing  Neuro: alert and oriented, moves legs and arms equally, normal mood and affect    Data Reviewed:    Coronary angiogram 10/14/2020:  1. Left heart catheterization  2. Selective left and right coronary angiogram  3. Right heart catheterization  Procedure Findings:  1. Severe multi vessel coronary artery diease  2. Normal left heart hemodynamics  3. Decompensated right heart hemodynamics with moderate pulmonary hypertension  Details:              Federico Rob was brought to the cardiac catheterization lab in a fasting state after informed consent was obtained. If the patient was able to provide written consent, it was obtained. The patient's vitals were monitored through out the procedure. The patient was sedated using the appropriate levels of sedation and ASA guidelines. The appropriate access site area was prepped and drapped in a sterile fashion. The area was anesthetized with 2% lidocaine. Using the modified Seldinger technique, an arterial sheath was introduced into the arterial access site using a single anterior wall puncture. The sheath was flushed and prepped in usual fashion. Catheters used during the procedure included 5 [place] TIG 4.0 catheter. The catheters were advanced and removed over a .035\" wire, into the appropriate positions. Multiple angiographic views were obtained. An LV gram was not obtained. Findings:  1. Left main coronary artery is diseased. There is 50% distal stenosis. It gave off the left anterior descending artery and left circumflex. 2. Left anterior descending artery has severe atherosclerotic disease. It was moderate in size. It gave off septal perforators and a moderate sized diagonal branch. The LAD covered the entire apex of the left ventricle.               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 3. Left circumflex has severe atherosclerotic disease. It was moderate in size. There was a moderate sized obtuse marginal branch.              ~70-90%  4.  Right coronary artery has severe 70-90% atherosclerotic disease. It was moderate in size and was the dominant artery. 5. Left ventriculogram was not performed. Left ventricular end diastolic pressure is 18. Right heart catheterization  Right atrial pressure of 10  Right ventricular pressure of 44/10  Pulmonary artery pressure of 48/19 with a mean of 36  Pulmonary artery pressure wedge pressure of 25 with large V wave  Cardiac output 5.78  Cardiac index 3.92  SVR of 1052  PVR of 152  PA saturation 66%  RA saturation 69%  Aortic saturation 97%  CONCLUSIONS:  1. Severe multi-vessel coronary artery disease  ASSESSMENT/RECOMMENDATIONS:  1. Admit to the hospital for medical stabilization and cardiac surgery consultation for coronary artery bypass grafting surgery. Echo 4/55/2921:  LV systolic function is normal with EF estimated at 55%. No regional wall motion abnormalities are noted. There is mild concentric left ventricular hypertrophy. Grade II diastolic dysfunction with elevated filing pressure. The left atrium is mildly dilated. Mild mitral annular calcification is present. Mild mitral, aortic, tricuspid, and pulmonic regurgitation. Systolic pulmonary artery pressure (SPAP) estimated at 41 mmHg (RA pressure   8 mmHg) c/w mild pulm HTN.     Lab Reviewed:     Renal Profile:  Lab Results   Component Value Date    CREATININE 4.9 10/20/2020    BUN 32 10/20/2020     10/20/2020    K 4.9 10/20/2020    K 3.4 10/16/2020    CL 98 10/20/2020    CO2 24 10/20/2020     CBC:    Lab Results   Component Value Date    WBC 3.8 10/20/2020    RBC 2.99 10/20/2020    HGB 8.8 10/20/2020    HCT 27.4 10/20/2020    MCV 91.5 10/20/2020    RDW 16.2 10/20/2020     10/20/2020     BNP:    Lab Results   Component Value Date    PROBNP 34,575 10/12/2020    PROBNP 23,650 09/30/2020    PROBNP 5,200 09/20/2020    PROBNP 1,463 12/30/2017     Fasting Lipid Panel:    Lab Results   Component Value Date    CHOL 145 10/13/2020    HDL 53 10/13/2020 TRIG 122 10/13/2020     Cardiac Enzymes:  CK/MbTroponin  Lab Results   Component Value Date    TROPONINI 0.54 10/13/2020     PT/ INR   Lab Results   Component Value Date    INR 1.09 10/08/2020    INR 1.16 10/01/2020    INR 1.22 06/11/2020    PROTIME 12.7 10/08/2020    PROTIME 13.5 10/01/2020    PROTIME 14.1 06/11/2020     PTT No results found for: PTT   Lab Results   Component Value Date    MG 1.90 10/16/2020      Lab Results   Component Value Date    TSH 1.86 10/12/2020       All labs and imaging reviewed today    Assessment:  Shortness of breath: improved  NSTEMI/CAD: awaiting CABG, multi vessel on angiogram 10/14/2020  Acute on chronic diastolic CHF: ongoing, fluid management per HD  Pulmonary HTN: moderate on cath 10/14/2020  Tahcycardia  iLBBB  HTN: labile  HLD  ESRD  Bladder cancer  Anemia    Plan:   1. Continue aspirin, statin, imdur, lisinopril, carvedilol  2. Plan for CABG 10/21/2020  3. Monitor BP and BMP  4.  Daily weights, strict I/Os    ANNELIESE Nava-CNP  [employer]  [phone]

## 2020-10-20 NOTE — PROGRESS NOTES
Nephrology Progress Note   [email or web address]      This patient is a 66year old female whom we are following for DELMER, HD dependent. Subjective: The patient was seen and examined on HD. BP stable but borderline low. O2 sat 100% on 1L nc. Denies any chest pain or SOB. Family History: No family at bedside  ROS: No nausea or vomiting      Vitals:  /64   Pulse 61   Temp 97.5 °F (36.4 °C)   Resp 18   Ht 4' 11\" (1.499 m)   Wt 123 lb 7.3 oz (56 kg)   SpO2 100%   BMI 24.94 kg/m²   I/O last 3 completed shifts: In: 720 [P.O.:720]  Out: 0   No intake/output data recorded. Physical Exam:  Physical Exam  Vitals signs reviewed. Constitutional:       General: She is not in acute distress. Appearance: Normal appearance. HENT:      Head: Normocephalic and atraumatic. Mouth/Throat:      Mouth: Mucous membranes are moist.   Eyes:      General: No scleral icterus. Conjunctiva/sclera: Conjunctivae normal.   Cardiovascular:      Rate and Rhythm: Normal rate. Rhythm irregular. Heart sounds: No friction rub. Pulmonary:      Effort: Pulmonary effort is normal. No respiratory distress. Breath sounds: No wheezing. Abdominal:      General: Bowel sounds are normal. There is no distension. Tenderness: There is no abdominal tenderness. Musculoskeletal:      Right lower leg: No edema. Left lower leg: No edema. Skin:     General: Skin is warm. Neurological:      Mental Status: She is alert.        Access: RIJ TDC      Medications:   sennosides-docusate sodium  1 tablet Oral Daily    cloNIDine  0.2 mg Oral BID    lisinopril  5 mg Oral Daily    hydrALAZINE  50 mg Oral TID    pantoprazole  40 mg Oral QAM AC    darbepoetin parminder-polysorbate  40 mcg Intravenous Weekly    carvedilol  12.5 mg Oral BID WC    isosorbide mononitrate  30 mg Oral Daily    PARoxetine  40 mg Oral QAM    aspirin  81 mg Oral Daily    atorvastatin  40 mg Oral Nightly    heparin (porcine)  5,000 Units Subcutaneous 3 times per day         Labs:  Recent Labs     10/19/20  0513 10/20/20  0513   WBC 3.7* 3.8*   HGB 8.9* 8.8*   HCT 27.3* 27.4*   MCV 91.7 91.5    179     Recent Labs     10/19/20  0513 10/20/20  0513   * 134*   K 4.4 4.9   CL 99 98*   CO2 27 24   GLUCOSE 102* 103*   PHOS 4.2 5.4*   BUN 23* 32*   CREATININE 4.0* 4.9*   LABGLOM 11* 9*   GFRAA 13* 10*           Assessment/Plan:    DELMER on CKD Stage 3.  - DELMER from ATN, currently HD dependent Tuesday Thursday Saturday at [facility]. ATN had developed early this month in the setting of severe diarrhea. RI TDc as access. - Chronic kidney disease stage III from obstructive nephropathy. Follows with Dr. Anisa Kwok in the office.  - No signs of renal recovery.            NSTEMI. - C on 10/14 with multi-vessel CAD (Wedge pressure of 25, LVEDP 18)  - Will need bypass surgery, planned for on Wed 10/21.          Acute on chronic CHF.     History of bladder cancer status post ileal conduit.     Hyponatremia.  - Stable on dialysis.     Anemia. - Weekly FLAQUITO with HD qThursday     Hypertension.  - On Lisinopril and Hydralazine.  - Decrease Clonidine to 0.1mg BID. Please do not hesitate to contact me at [phone] if with questions. Thank you!     Ashely Winslow MD  10/20/2020  [facility]

## 2020-10-20 NOTE — PROGRESS NOTES
Physical Therapy  Facility/Department: [facility] C4 PCU  Daily Treatment Note  NAME: Salomón Woodruff  : 1945  MRN: [id number]    Date of Service: 10/20/2020    Discharge Recommendations:  Continue to assess pending progress   PT Equipment Recommendations  Equipment Needed: No    Assessment   Body structures, Functions, Activity limitations: Decreased functional mobility ; Decreased strength;Decreased balance;Decreased endurance  Assessment: Pt demos cga for all mobility and was able to maintain O2 sat > 93% on RA. Pt will benefit from skilled PT while admitted in acute care to maximize functional mobility prior to surgery. Will continue to assess for d/c rec following surgery. Treatment Diagnosis: Impaired mobility  Prognosis: Good  Decision Making: Medium Complexity  Barriers to Learning: None  REQUIRES PT FOLLOW UP: Yes  Activity Tolerance  Activity Tolerance: Patient limited by endurance; Patient Tolerated treatment well  Activity Tolerance: able to maintain O2 sat > 93% on RA. Patient Diagnosis(es): There were no encounter diagnoses. has a past medical history of Acid reflux, Arthritis, CAD in native artery, Cancer ([facility] [address] CHF (congestive heart failure) ([facility] [address] Chronic kidney disease, Hemodialysis patient ([facility] [address] Hx of blood clots, Hyperlipidemia, Hypertension, and Kidney stone. has a past surgical history that includes Bladder stone removal; Bladder surgery; Cataract removal with implant (11); other surgical history (2015); Cholecystectomy (2018); Abdomen surgery; Appendectomy; eye surgery; Hysterectomy; Colonoscopy; IR NONTUNNELED VASCULAR CATHETER > 5 YEARS (10/1/2020); and IR TUNNELED CVC PLACE WO SQ PORT/PUMP > 5 YEARS (10/8/2020).     Restrictions  Restrictions/Precautions  Restrictions/Precautions: Fall Risk  Position Activity Restriction  Other position/activity restrictions: up with assist,  LHC/RHC via R radial artery 10/14/20  Subjective   General  Chart Reviewed: Yes  Response To Previous Treatment: Patient with no complaints from previous session. Family / Caregiver Present: No  Referring Practitioner: Lilliam Jain MD  Subjective  Subjective: Pt agreeable to PT  General Comment  Comments: Cleared for therapy by RN  Pain Screening  Patient Currently in Pain: Denies  Vital Signs  Patient Currently in Pain: Denies       Orientation  Orientation  Overall Orientation Status: Within Functional Limits  Cognition      Objective   Bed mobility  Supine to Sit: Contact guard assistance  Transfers  Sit to Stand: Contact guard assistance  Stand to sit: Contact guard assistance  Ambulation  Ambulation?: Yes  More Ambulation?: No(pt c/o fatigue)  Ambulation 1  Surface: level tile  Device: Rolling Walker  Assistance: Contact guard assistance  Quality of Gait: Pt ambulates with reciprocal pattern, decreased step length and dakota. Steady with no LOB throughout. Distance: 50ft     Balance  Posture: Fair  Sitting - Static: Good  Sitting - Dynamic: Good  Standing - Static: Fair  Standing - Dynamic: Fair  Exercises  Heelslides: 10 B  Gluteal Sets: 10 B  Hip Flexion: 10 B  Knee Long Arc Quad: 10 B  Ankle Pumps: 10 B             AM-PAC Score  AM-PAC Inpatient Mobility Raw Score : 19 (10/20/20 [facility])  AM-PAC Inpatient T-Scale Score : 45.44 (10/20/20 [facility])  Mobility Inpatient CMS 0-100% Score: 41.77 (10/20/20 [facility])  Mobility Inpatient CMS G-Code Modifier : CK (10/20/20 1231)          Goals  Short term goals  Time Frame for Short term goals: 1 week 10/23  Short term goal 1: Pt will perform sit<>stand transfers with mod I; -10/20 cga  Short term goal 2: Pt will ambulate 50ft with LRAD and mod I; -10/20 cga 50 ft rw  Short term goal 3: Pt will ascend/descend 1 step with UE support and SBA; -10/20 N/T  Short term goal 4: By 10/18, pt will tolerate x15-20 reps BLE exercise to assist with functional transfers and ambulation. ; -10/20 on-going  Patient Goals   Patient goals : \"To go home after surgery. \"    Plan Plan  Times per week: 2-4x/wk  Times per day: Daily  Current Treatment Recommendations: Strengthening, Transfer Training, Endurance Training, Patient/Caregiver Education & Training, Balance Training, Gait Training, Home Exercise Program, Functional Mobility Training, Stair training, Safety Education & Training  Safety Devices  Type of devices: Left in chair, Nurse notified, Gait belt, Call light within reach, Chair alarm in place, All fall risk precautions in place  Restraints  Initially in place: No     Therapy Time   Individual Concurrent Group Co-treatment   Time In 1130         Time Out 1200         Minutes 30         Timed Code Treatment Minutes: Luis E 36

## 2020-10-20 NOTE — PLAN OF CARE
Problem: Skin Integrity:  Goal: Will show no infection signs and symptoms  Description: Will show no infection signs and symptoms  10/20/2020 1042 by Lizette Arcos RN  Outcome: Ongoing     Problem: Skin Integrity:  Goal: Absence of new skin breakdown  Description: Absence of new skin breakdown  10/20/2020 1042 by Lizette Arcos RN  Outcome: Ongoing     Problem: Falls - Risk of:  Goal: Will remain free from falls  Description: Will remain free from falls  10/20/2020 1042 by Lizette Arcos RN  Outcome: Ongoing     Problem: Falls - Risk of:  Goal: Absence of physical injury  Description: Absence of physical injury  10/20/2020 1042 by Lizette Arcos RN  Outcome: Ongoing   Patient instructed to use call light if assistance is needed. Call light within reach. Bed locked and in lowest position.

## 2020-10-20 NOTE — PROGRESS NOTES
Progress Note    Pre op     CC: anxiety    Hospital Course:  Status post cath with complex coronary artery disease awaiting for surgery  Vital Signs:                                                 BP (!) 145/71   Pulse 72   Temp 97.8 °F (36.6 °C) (Oral)   Resp 16   Ht 4' 11\" (1.499 m)   Wt 121 lb 7.6 oz (55.1 kg)   SpO2 98%   BMI 24.53 kg/m²  O2 Flow Rate (L/min): 1 L/min        Admission Weight: 129 lb 10.1 oz (58.8 kg)      Vent Settings:  Vent Information  SpO2: 98 %     Drips:  n/a    I/O:      Intake/Output Summary (Last 24 hours) at 10/20/2020 1421  Last data filed at 10/20/2020 1224  Gross per 24 hour   Intake 1170 ml   Output 1350 ml   Net -180 ml     Chest Tube:     O/E  GEN: NAD  HEENT: unremarkable  CV: s1s2 present   Pulm: air entry b/l  Abd: soft, nt, nd, no peritoneal signs  Ext: warm, edema, wound c/d/i    Data Review:  CBC:   Recent Labs     10/19/20  0513 10/20/20  0513   WBC 3.7* 3.8*   HGB 8.9* 8.8*   HCT 27.3* 27.4*   MCV 91.7 91.5    179     BMP:   Recent Labs     10/19/20  0513 10/20/20  0513   * 134*   K 4.4 4.9   CL 99 98*   CO2 27 24   PHOS 4.2 5.4*   BUN 23* 32*   CREATININE 4.0* 4.9*   CALCIUM 9.0 8.9     Cardiac Enzymes: No results for input(s): CKTOTAL, CKMB, CKMBINDEX, TROPONINI in the last 72 hours. PT/INR: No results for input(s): PROTIME, INR in the last 72 hours. APTT: No results for input(s): APTT in the last 72 hours.     [id number] Score for Atrial Fibrillation Stroke Risk   Risk   Factors  Component Value   C CHF Yes 1   H HTN Yes 1   A2 Age >= 76 Yes,  (77 y.o.) 2   D DM No 0   S2 Prior Stroke/TIA No 0   V Vascular Disease Yes 1   A Age 74-69 No,  (77 y.o.) 0   Sc Sex female 1    [id number]  Score  6   Score last updated 10/19/20 3:40 PM EDT      Assessment/Plan:  Pre op   Denies any symptoms  ESRD -scheduled for HD on Tuesday, nephrology following  MvCAD  Will need MARLA clip      Plan for surgery tomorrow

## 2020-10-20 NOTE — CARE COORDINATION
Chart reviewed day #6. Plan is for patient to get CABG tomorrow. [facility] is following. Attempted to see patient to check in before surgery tomorrow but she is currently working with therapy. CM will continue to follow and assess for further needs.

## 2020-10-21 NOTE — PROGRESS NOTES
Patient underwent CABG today, hospitalist will sign off.     Electronically signed by Pavan Celis MD on 10/21/2020 at 11:52 AM

## 2020-10-21 NOTE — PLAN OF CARE
Problem: Skin Integrity:  Goal: Will show no infection signs and symptoms  Description: Will show no infection signs and symptoms  Outcome: Ongoing  Goal: Absence of new skin breakdown  Description: Absence of new skin breakdown  Outcome: Ongoing     Problem: Falls - Risk of:  Goal: Will remain free from falls  Description: Will remain free from falls  Outcome: Ongoing  Goal: Absence of physical injury  Description: Absence of physical injury  Outcome: Ongoing     Problem: Nutrition  Goal: Optimal nutrition therapy  Outcome: Ongoing     Problem: Pain:  Description: Pain management should include both nonpharmacologic and pharmacologic interventions. Goal: Pain level will decrease  Description: Pain level will decrease  Outcome: Ongoing  Goal: Control of acute pain  Description: Control of acute pain  Outcome: Ongoing  Goal: Control of chronic pain  Description: Control of chronic pain  Outcome: Ongoing     Problem: Discharge Planning:  Goal: Discharged to appropriate level of care  Description: Discharged to appropriate level of care  Outcome: Ongoing     Problem:  Activity Intolerance:  Goal: Able to perform prescribed physical activity  Description: Able to perform prescribed physical activity  Outcome: Ongoing  Goal: Ability to tolerate increased activity will improve  Description: Ability to tolerate increased activity will improve  Outcome: Ongoing     Problem: Anxiety:  Goal: Level of anxiety will decrease  Description: Level of anxiety will decrease  Outcome: Ongoing     Problem: Cardiac Output - Decreased:  Goal: Cardiac output within specified parameters  Description: Cardiac output within specified parameters  Outcome: Ongoing  Goal: Hemodynamic stability will improve  Description: Hemodynamic stability will improve  Outcome: Ongoing     Problem: Fluid Volume - Imbalance:  Goal: Ability to achieve a balanced intake and output will improve  Description: Ability to achieve a balanced intake and output will improve  Outcome: Ongoing  Goal: Chest tube drainage is within specified parameters  Description: Chest tube drainage is within specified parameters  Outcome: Ongoing     Problem: Gas Exchange - Impaired:  Goal: Levels of oxygenation will improve  Description: Levels of oxygenation will improve  Outcome: Ongoing  Goal: Ability to maintain adequate ventilation will improve  Description: Ability to maintain adequate ventilation will improve  Outcome: Ongoing     Problem: Pain:  Description: Pain management should include both nonpharmacologic and pharmacologic interventions.   Goal: Pain level will decrease  Description: Pain level will decrease  Outcome: Ongoing  Goal: Control of acute pain  Description: Control of acute pain  Outcome: Ongoing  Goal: Control of chronic pain  Description: Control of chronic pain  Outcome: Ongoing     Problem: Tissue Perfusion - Cardiopulmonary, Altered:  Goal: Absence of angina  Description: Absence of angina  Outcome: Ongoing  Goal: Hemodynamic stability will improve  Description: Hemodynamic stability will improve  Outcome: Ongoing  Goal: Will show no evidence of cardiac arrhythmias  Description: Will show no evidence of cardiac arrhythmias  Outcome: Ongoing     Problem: Tobacco Use:  Goal: Will participate in inpatient tobacco-use cessation counseling  Description: Will participate in inpatient tobacco-use cessation counseling  Outcome: Ongoing     Problem: Tobacco Use:  Goal: Will participate in inpatient tobacco-use cessation counseling  Description: Will participate in inpatient tobacco-use cessation counseling  Outcome: Ongoing

## 2020-10-21 NOTE — ANESTHESIA POSTPROCEDURE EVALUATION
Department of Anesthesiology  Postprocedure Note    Patient: Salomón Woodruff  MRN: [id number]  YOB: 1945  Date of evaluation: 10/21/2020  Time:  12:21 PM     Procedure Summary     Date:  10/21/20 Room / Location:  [address]    Anesthesia Start:  4026 Anesthesia Stop:  7014    Procedure:  CORONARY ARTERY BYPASS GRAFTING X3, INTERNAL MAMMARY ARTERY, SAPHENOUS VEIN GRAFT, LEFT ATRIAL APPENDAGE CLIP, ON PUMP, WITH BILATERAL 5 LEVEL INTERCOSTAL NERVE BLOCK (N/A Chest) Diagnosis:  (-)    Surgeon:  Ariella Rios MD Responsible Provider:  Suzanne Rebollar MD    Anesthesia Type:  general ASA Status:  4          Anesthesia Type: general    Will Phase I:      Will Phase II:      Last vitals: Reviewed and per EMR flowsheets.        Anesthesia Post Evaluation    Patient location during evaluation: ICU  Patient participation: complete - patient cannot participate  Level of consciousness: sedated and ventilated  Pain score: 0  Airway patency: patent  Nausea & Vomiting: no nausea and no vomiting  Complications: no  Cardiovascular status: vasoactive/inotropes  Respiratory status: acceptable  Hydration status: stable

## 2020-10-21 NOTE — PLAN OF CARE
Nutrition Problem #1: Inadequate energy intake  Intervention: Food and/or Nutrient Delivery: (Start oral diet once extubated and transitioned post OP CABG vs TF if stays intubated)  Nutritional Goals:  Tolerate most appropriate form of nutrition therapy this admission

## 2020-10-21 NOTE — PROGRESS NOTES
Pt had large liquid, possible bloody stool. Dr. Ranjith Woods at bedside and ordered to send as patsy.  Olya Arnett

## 2020-10-21 NOTE — BRIEF OP NOTE
Brief Postoperative Note      Patient: Reena Ac  YOB: 1945  MRN: [id number]    Date of Procedure: 10/21/2020    Pre-Op Diagnosis: -    Post-Op Diagnosis: Same       Procedure(s):  CORONARY ARTERY BYPASS GRAFTING X3, INTERNAL MAMMARY ARTERY, SAPHENOUS VEIN GRAFT, LEFT ATRIAL APPENDAGE CLIP, ON PUMP, WITH BILATERAL 5 LEVEL INTERCOSTAL NERVE BLOCK    Surgeon(s):  Cony Rice MD    Assistant:  Surgical Assistant: Nilda Fabry; Danyelle Hutchison    Anesthesia: General    Estimated Blood Loss (mL): Minimal    Complications: None    Specimens:   * No specimens in log *    Implants:  Implant Name Type Inv. Item Serial No.  Lot No. LRB No. Used Action   CLIP LIGATION ATRICURE FLEX V 40MM Fastener CLIP LIGATION ATRICURE FLEX V 40MM  ATRICURE [id number] N/A 1 Implanted         Drains:   Chest Tube 1 Mediastinal 24 [place] (Active)       Chest Tube 2 Left Pleural 24 [place] (Active)       Urostomy Ileal conduit RLQ (Active)       Urostomy (Active)   Stomal Appliance Intact 10/20/20 0030   Stoma  Assessment Pink 10/21/20 0454   Peristomal Assessment Clean; Intact; Pink 10/21/20 0454   Urine Color Jenniffer 10/21/20 0454   Urine Appearance Clear 10/21/20 0454   Urine Odor Other (Comment) 10/19/20 0600   Output (ml) 25 ml 10/21/20 0548       [REMOVED] Urostomy Ileal conduit RLQ (Removed)   Stomal Appliance 1 piece;Clean;Dry; Intact 09/22/20 2028   Stoma  Assessment Pink 09/22/20 2028   Mucocutaneous Junction Intact 09/22/20 2028   Peristomal Assessment Clean; Intact; Pink 09/22/20 2028   Urine Color Yellow 09/22/20 2028   Urine Appearance Clear 09/30/20 2049   Urine Odor Malodorous 09/22/20 2028   Output (ml) 50 ml 09/30/20 1830       Findings:     Electronically signed by Cony Rice MD on 10/21/2020 at 11:54 AM

## 2020-10-21 NOTE — PROGRESS NOTES
10/21/20 1222   Vent Information   $Ventilation $Initial Day   Skin Assessment Clean, dry, & intact   Vent Type 840   Vent Mode SIMV/VC+   Vt Ordered 500 mL   Rate Set 14 bmp   Pressure Support 10 cmH20   FiO2  100 %  (decreased to 60% at this time.)   SpO2 100 %   SpO2/FiO2 ratio 100   Sensitivity 3   PEEP/CPAP 5   I Time/ I Time % 1 s   Humidification Source HME   Vent Patient Data   Peak Inspiratory Pressure 26 cmH2O   Mean Airway Pressure 10 cmH20   Rate Measured 14 br/min   Vt Exhaled 526 mL   Spontaneous VT 0 mL   Minute Volume 7.57 Liters   I:E Ratio 1:3.3   Plateau Pressure 21 AQM94   Static Compliance 32.88 mL/cmH2O   Dynamic Compliance 25.04 mL/cmH2O   Cough/Sputum   Sputum How Obtained Endotracheal;Suctioned   Cough None;Non-productive   Sputum Amount None   Spontaneous Breathing Trial (SBT) RT Doc   Pulse 76   RSBI Calculated 0   Breath Sounds   Right Upper Lobe Diminished   Right Middle Lobe Diminished   Right Lower Lobe Diminished   Left Upper Lobe Diminished   Left Lower Lobe Diminished   Additional Respiratory  Assessments   Resp 14   $End Tidal CO2 30   Alarm Settings   High Pressure Alarm 40 cmH2O   Low Minute Volume Alarm 3 L/min   Apnea (secs) 20 secs   High Respiratory Rate 40 br/min   Low Exhaled Vt  300 mL   Patient Observation   Observations ambu @ bedside   [REMOVED] ETT (adult)   Removal Date/Time: 10/21/20 1212  Placement Date/Time: 10/21/20 0751   Preoxygenation: Yes  Mask Ventilation: Ventilated by mask (1)  Technique: Video laryngoscopy  Type: Cuffed  Tube Size: 7 mm  Laryngoscope: GlideScope  Blade Size: 3  Location: Oral...    Secured at 21 cm   Measured From Lips   ET Placement Right   Secured By Clip Interactive tape   Site Condition Cool;Dry   Cuff Pressure 30 cm H2O

## 2020-10-21 NOTE — PROGRESS NOTES
SBT attempted at this time with a PS of 10 cmH2O. Patient following simple commands well however, she did go apenic 2 times while on SBT. Patient placed back in ordered settings. Will re-attempt as time allows.      Electronically signed by Bailey Villa RCP, RRT, RRT-ACCS on 10/21/2020 at 3:13 PM

## 2020-10-21 NOTE — PROGRESS NOTES
Comprehensive Nutrition Assessment    Type and Reason for Visit:  Initial, RD Nutrition Re-Screen/LOS    Nutrition Recommendations/Plan:   1. Oral diet progression per MD s/p CABG   2. Monitor ability to extubate vs need for nutrition support  3. Diet education when appropriate   4. Monitor nutrition adequacy, pertinent labs, bowel habits, wt changes, and clinical progress    Nutrition Assessment:  LOS assessment: Pt is s/p CABG procedure this am. Remains intubated post-op in the ICU. History of CHF and CAD. Remains NPO at this time. N/a for education currently 2/2 vent status. Will monitor ongoing nutrition needs this admission. Malnutrition Assessment:  Malnutrition Status:  No malnutrition        Estimated Daily Nutrient Needs:  Energy (kcal):  4166-4558; Weight Used for Energy Requirements:  Current(56 kg)     Protein (g):   g; Weight Used for Protein Requirements:  Current(1.2-2)        Fluid (ml/day):  1 mL/kcal; Weight Used for Fluid Requirements:  Current      Nutrition Related Findings:  +1 BLE edema. Poor skin integrity. x2 chest tubes in place. Wounds:  Pressure Injury       Current Nutrition Therapies:    Diet NPO Effective Now    Anthropometric Measures:  · Height: 4' 11\" (149.9 cm)  · Current Body Weight: 123 lb (55.8 kg)   · Ideal Body Weight: 95 lbs  · BMI: 24.8   ·   Nutrition Diagnosis:   · Inadequate energy intake related to catabolic illness as evidenced by NPO or clear liquid status due to medical condition      Nutrition Interventions:   Food and/or Nutrient Delivery:  (Start oral diet once extubated and transitioned post OP CABG vs TF if stays intubated)  Nutrition Education/Counseling:  No recommendation at this time   Coordination of Nutrition Care:  Continued Inpatient Monitoring    Goals:   Tolerate most appropriate form of nutrition therapy this admission       Nutrition Monitoring and Evaluation:   Food/Nutrient Intake Outcomes:  Diet Advancement/Tolerance  Physical Signs/Symptoms Outcomes:  Biochemical Data     Discharge Planning: Too soon to determine     Electronically signed by Natasha Fairbanks.  Anuja Estrada RD, LD on 10/21/20 at 1:45 PM EDT    Contact: [phone]

## 2020-10-21 NOTE — PROGRESS NOTES
Spoke with Dr. Gypsy Varghese via phone and informed that pt successful wean, ABG WNL, Dr. Bladimir Brady looked at post op pcxry and post HD chest xray and confirmed OK to extubate. Informed Dr. Gypsy Varghese that pt had very large questionable bloody BM and Dr. Bladimir Brady order to send for guiac. Dr. Gypsy Varghese order to send potassium and CBC to lab now.  Edna Rose

## 2020-10-21 NOTE — PROGRESS NOTES
Dr. Lucila Taylor and Dr. Patricio Martinez at bedside. Discussed HD for pt for electrolyte management. Short run will be ordered for brody Keen

## 2020-10-21 NOTE — ANESTHESIA PRE PROCEDURE
Department of Anesthesiology  Preprocedure Note       Name:  Vannesa Knight   Age:  66 y.o.  :  1945                                          MRN:  [id number]         Date:  10/21/2020      Surgeon: Kristin Ram):  Alta Drew MD    Procedure: Procedure(s):  CORONARY ARTERY BYPASS GRAFTING X3, INTERNAL MAMMARY ARTERY, SAPHENOUS VEIN GRAFT, ON PUMP    Medications prior to admission:   Prior to Admission medications    Medication Sig Start Date End Date Taking? Authorizing Provider   hydrALAZINE (APRESOLINE) 100 MG tablet Take 1 tablet by mouth 3 times daily 10/9/20  Yes Vida Peter MD   carvedilol (COREG) 12.5 MG tablet Take 1 tablet by mouth 2 times daily (with meals) 10/9/20  Yes Vida Peter MD   isosorbide mononitrate (IMDUR) 30 MG extended release tablet Take 1 tablet by mouth daily 10/10/20  Yes Vida Peter MD   PARoxetine (PAXIL) 20 MG tablet Take 2 tablets by mouth every morning 10/9/20  Yes Vida Peter MD   cloNIDine (CATAPRES) 0.2 MG/24HR PTWK Place 1 patch onto the skin once a week 10/10/20   Vida Peter MD   lamoTRIgine (LAMICTAL) 25 MG tablet Take 25 mg by mouth 3 times daily    Historical Provider, MD   doxazosin (CARDURA) 4 MG tablet Take 4 mg by mouth 2 times daily    Historical Provider, MD   primidone (MYSOLINE) 50 MG tablet Take 100 mg by mouth daily    Historical Provider, MD   [facility]) 8.6-50 MG per tablet Take 2 tablets by mouth daily 3/6/18   Willian Fleischer, MD   pantoprazole (PROTONIX) 40 MG tablet Take 40 mg by mouth daily    Historical Provider, MD   B Complex Vitamins (B-COMPLEX/B-12) TABS Take 100 mg by mouth daily    Historical Provider, MD   gemfibrozil (LOPID) 600 MG tablet Take 600 mg by mouth 2 times daily (before meals).       Historical Provider, MD       Current medications:    Current Facility-Administered Medications   Medication Dose Route Frequency Provider Last Rate Last Dose    cloNIDine (CATAPRES) Weekly Genevieve Boyd MD   40 mcg at 10/15/20 1036    carvedilol (COREG) tablet 12.5 mg  12.5 mg Oral BID WC Re ANNELIESE Alaniz - CNP   12.5 mg at 10/20/20 1715    isosorbide mononitrate (IMDUR) extended release tablet 30 mg  30 mg Oral Daily Genevieve Boyd MD   30 mg at 10/20/20 1106    PARoxetine (PAXIL) tablet 40 mg  40 mg Oral QAM Genevieve Boyd MD   40 mg at 10/20/20 1106    hydrALAZINE (APRESOLINE) injection 10 mg  10 mg Intravenous Q10 Min PRN Genevieve Boyd MD        aspirin chewable tablet 81 mg  81 mg Oral Daily Genevieve Boyd MD   81 mg at 10/20/20 1107    atorvastatin (LIPITOR) tablet 40 mg  40 mg Oral Nightly Genevieve Boyd MD   40 mg at 10/20/20 2051    heparin (porcine) injection 5,000 Units  5,000 Units Subcutaneous 3 times per day Darrell Triana MD   5,000 Units at 10/21/20 0458       Allergies:     Allergies   Allergen Reactions    Ciprofloxacin Hives       Problem List:    Patient Active Problem List   Diagnosis Code    Hematuria R31.9    Acute renal failure (ARF) ([facility]) N17.9    Chronic calculous cholecystitis K80.10    Hydronephrosis N13.30    Acute pyelonephritis N10    DELMER (acute kidney injury) ([facility] [address] W00.5    Complicated UTI (urinary tract infection) N39.0    GERD (gastroesophageal reflux disease) K21.9    Hyperkalemia E87.5    Essential hypertension I10    Chronic kidney disease, stage 3 N18.30    Chest pain R07.9    Anxiety F41.9    Acute diastolic CHF (congestive heart failure) ([facility]) I50.31    Acute respiratory failure with hypoxia ([facility]) J96.01    Chronic diastolic CHF (congestive heart failure) ([facility]) I50.32    Increased anion gap metabolic acidosis Z77.4    Anemia, normocytic normochromic D64.9    Acute kidney injury superimposed on CKD ([facility]) N17.9, N18.9    Acute on chronic diastolic CHF (congestive heart failure) ([facility]) I50.33    Mild protein-calorie malnutrition ([facility]) E44.1    CHF (congestive heart failure), NYHA class I, acute on chronic, combined ([facility] I50.43    CHF with unknown LVEF ([facility]) I50.9    Heart failure exacerbated by sotalol (HCC) I50.9    Acute on chronic congestive heart failure (HCC) I50.9    ESRD (end stage renal disease) ([facility] N18.6    Acute respiratory failure with hypoxia and hypercapnia ([facility]) J96.01, J96.02    NSTEMI (non-ST elevated myocardial infarction) ([facility]) I21.4    CAD in native artery I25.10       Past Medical History:        Diagnosis Date    Acid reflux     Arthritis     CAD in native artery 10/14/2020    Cancer [facility])     bladder cancer    CHF (congestive heart failure) ([facility])     Chronic kidney disease     Hemodialysis patient ([facility]     Hx of blood clots     Hyperlipidemia     Hypertension     Kidney stone        Past Surgical History:        Procedure Laterality Date    ABDOMEN SURGERY      APPENDECTOMY      BLADDER STONE REMOVAL      BLADDER SURGERY      pt had bladder cancer    CATARACT REMOVAL WITH IMPLANT  8/26/11    RIGHT    CHOLECYSTECTOMY  03/06/2018    COLONOSCOPY      EYE SURGERY      HYSTERECTOMY      IR NONTUNNELED VASCULAR CATHETER  10/1/2020    IR NONTUNNELED VASCULAR CATHETER 10/1/2020 [facility] PROC[facility]    IR TUNNELED CATHETER PLACEMENT GREATER THAN 5 YEARS  10/8/2020    IR TUNNELED CATHETER PLACEMENT GREATER THAN 5 YEARS 10/8/2020 [address] SPECIAL PROCEDURES    OTHER SURGICAL HISTORY  05/08/2015    phacemilsification of cataract with intraocular lens implant left eye       Social History:    Social History     Tobacco Use    Smoking status: Former Smoker    Smokeless tobacco: Never Used    Tobacco comment: quit 25 years ago   Substance Use Topics    Alcohol use:  No                                Counseling given: Not Answered  Comment: quit 25 years ago      Vital Signs (Current):   Vitals:    10/20/20 2312 10/21/20 0454 10/21/20 0455 10/21/20 0548   BP: (!) 142/62 (!) 156/65 (!) 149/59    Pulse: 67 67     Resp: 16 16     Temp: 97.9 °F (36.6 °C) 98.3 °F (36.8 °C) TempSrc: Oral Oral     SpO2: 94% 94%     Weight:    123 lb 7.3 oz (56 kg)   Height:                                                  BP Readings from Last 3 Encounters:   10/21/20 (!) 149/59   10/14/20 (!) 164/80   10/09/20 (!) 159/53       NPO Status:                                                                                 BMI:   Wt Readings from Last 3 Encounters:   10/21/20 123 lb 7.3 oz (56 kg)   10/14/20 118 lb 13.3 oz (53.9 kg)   10/09/20 135 lb 3.2 oz (61.3 kg)     Body mass index is 24.94 kg/m². CBC:   Lab Results   Component Value Date    WBC 3.7 10/21/2020    RBC 3.02 10/21/2020    HGB 8.8 10/21/2020    HCT 27.8 10/21/2020    MCV 92.0 10/21/2020    RDW 16.1 10/21/2020     10/21/2020       CMP:   Lab Results   Component Value Date     10/21/2020    K 4.7 10/21/2020    K 3.4 10/16/2020    CL 98 10/21/2020    CO2 27 10/21/2020    BUN 15 10/21/2020    CREATININE 3.3 10/21/2020    GFRAA 16 10/21/2020    GFRAA 41 11/19/2011    AGRATIO 1.4 10/12/2020    LABGLOM 14 10/21/2020    GLUCOSE 99 10/21/2020    PROT 5.7 10/21/2020    PROT 8.0 11/19/2011    CALCIUM 8.8 10/21/2020    BILITOT <0.2 10/21/2020    ALKPHOS 97 10/21/2020    AST 19 10/21/2020    ALT 9 10/21/2020       POC Tests: No results for input(s): POCGLU, POCNA, POCK, POCCL, POCBUN, POCHEMO, POCHCT in the last 72 hours.     Coags:   Lab Results   Component Value Date    PROTIME 11.6 10/21/2020    INR 1.00 10/21/2020    APTT 43.1 10/13/2020       HCG (If Applicable): No results found for: PREGTESTUR, PREGSERUM, HCG, HCGQUANT     ABGs: No results found for: PHART, PO2ART, CKY5IMU, VLG5OEW, BEART, K2IAKNHA     Type & Screen (If Applicable):  No results found for: LABABO, LABRH    Drug/Infectious Status (If Applicable):  No results found for: HIV, HEPCAB    COVID-19 Screening (If Applicable):   Lab Results   Component Value Date    COVID19 Not Detected 10/13/2020         Anesthesia Evaluation  Patient summary reviewed and Nursing notes reviewed  Airway: Mallampati: IV  TM distance: >3 FB   Neck ROM: full  Mouth opening: > = 3 FB Dental:    (+) edentulous      Pulmonary:   (+) decreased breath sounds,                            ROS comment: Former smoker   Cardiovascular:    (+) hypertension:, past MI:, CAD:, CHF:, murmur,         Rhythm: regular  Rate: normal                 ROS comment: Echo EF 55%, mild MR     Neuro/Psych:   Negative Neuro/Psych ROS              GI/Hepatic/Renal:   (+) GERD: well controlled, renal disease: ESRD,           Endo/Other: Negative Endo/Other ROS                    Abdominal:           Vascular:   + DVT, . Anesthesia Plan      general     ASA 4       Induction: intravenous. central line, arterial line, CVP, PA catheter and FABIO  MIPS: Postoperative opioids intended and Prophylactic antiemetics administered. Anesthetic plan and risks discussed with patient.                       Eleno Groves MD   10/21/2020

## 2020-10-21 NOTE — PROGRESS NOTES
10/21/20 1524   Vent Information   Vent Type 840   Vent Mode SIMV/VC+   Vt Ordered 500 mL   Rate Set 14 bmp   Pressure Support 10 cmH20   FiO2  60 %  (decreased to 40% at this time.)   SpO2 100 %   SpO2/FiO2 ratio 166.67   Sensitivity 3   PEEP/CPAP 5   I Time/ I Time % 1 s   Humidification Source HME   Vent Patient Data   Peak Inspiratory Pressure 28 cmH2O   Mean Airway Pressure 10 cmH20   Rate Measured 14 br/min   Vt Exhaled 535 mL   Minute Volume 7.27 Liters   I:E Ratio 1:3.3   Plateau Pressure 23 UAG11   Static Compliance 29.72 mL/cmH2O   Dynamic Compliance 23.26 mL/cmH2O   Cough/Sputum   Sputum How Obtained Endotracheal;Suctioned   Cough Weak;Non-productive   Sputum Amount Small   Sputum Color Bright red   Tenacity Thin   Spontaneous Breathing Trial (SBT) RT Doc   Contraindications to SBT? None   Pulse 69   Breath Sounds   Right Upper Lobe Clear   Right Middle Lobe Diminished   Right Lower Lobe Diminished   Left Upper Lobe Clear   Left Lower Lobe Diminished   Additional Respiratory  Assessments   Resp 14   $End Tidal CO2 30   Alarm Settings   High Pressure Alarm 40 cmH2O   Press Low Alarm 10.5 cmH2O   Low Minute Volume Alarm 3 L/min   Apnea (secs) 20 secs   High Respiratory Rate 40 br/min   Low Exhaled Vt  300 mL   Patient Observation   Observations ambu @ bedside   [REMOVED] ETT (adult)   Removal Date/Time: 10/21/20 1212  Placement Date/Time: 10/21/20 0751   Preoxygenation: Yes  Mask Ventilation: Ventilated by mask (1)  Technique: Video laryngoscopy  Type: Cuffed  Tube Size: 7 mm  Laryngoscope: GlideScope  Blade Size: 3  Location: Oral...    Secured at 21 cm   Measured From [address] [place] [address],[address] [employer]   Site Condition Cool;Dry

## 2020-10-21 NOTE — PROGRESS NOTES
Occupational Therapy/Physical Therapy  Pt presently in OR for CABG. Please reorder OT/PT post-op.   Halie Badillo OT  Vito Perkins PT

## 2020-10-21 NOTE — PROGRESS NOTES
Nephrology Progress Note   [email or web address]      This patient is a 66year old female whom we are following for DELMER, HD dependent. Subjective:    Patient is seen after CABG. She is on vent. BP on the low side. Has been hyperkalemic with low HR. CVP is 5-7 but mild pulmonary edema     Family History: No family at bedside  ROS: No nausea or vomiting, oliguric       Vitals:  BP (!) 149/59   Pulse 69   Temp 96.8 °F (36 °C) (Core)   Resp 14   Ht 4' 11\" (1.499 m)   Wt 123 lb 7.3 oz (56 kg)   SpO2 100%   BMI 24.94 kg/m²   I/O last 3 completed shifts: In: 184 [P.O.:120; I.V.:800]  Out: 315 [Urine:70; Blood:100; Chest Tube:145]  No intake/output data recorded. Physical Exam:  Vitals signs reviewed. Constitutional:       General: She is not in acute distress. Intubated      Appearance: Normal appearance. HENT:      Head: Normocephalic and atraumatic. Mouth/Throat:      Mouth: Mucous membranes are moist.   Eyes:      General: No scleral icterus. Conjunctiva/sclera: Conjunctivae normal.   Cardiovascular:      Rate and Rhythm: Normal rate. Rhythm irregular. Heart sounds: No friction rub. Pulmonary:      Effort: Pulmonary effort is normal. No respiratory distress. Breath sounds: No wheezing. Abdominal:      General: Bowel sounds are normal. There is no distension. Tenderness: There is no abdominal tenderness. Musculoskeletal:      Right lower leg: No edema. Left lower leg: No edema. Skin:     General: Skin is warm.    Neurological:      Mental Status: She is somnolent on vent     Access: RIJ TDC      Medications:   sodium chloride flush  10 mL Intravenous 2 times per day    ceFAZolin (ANCEF) IVPB  2 g Intravenous Q8H    vancomycin (VANCOCIN) IV  1,000 mg Intravenous Q12H    [START ON 10/22/2020] acetaminophen  650 mg Oral 4 times per day    [START ON 10/22/2020] polyethylene glycol  17 g Oral Daily    [START ON 10/22/2020] famotidine  20 mg Oral BID    [START ON 10/22/2020] metoprolol tartrate  12.5 mg Oral BID    chlorhexidine  15 mL Mouth/Throat BID    [START ON 10/22/2020] furosemide  20 mg Intravenous 4x Daily    [START ON 10/22/2020] magnesium oxide  400 mg Oral BID    mupirocin   Nasal BID    [START ON 10/22/2020] potassium chloride  10 mEq Oral TID WC    [START ON 10/22/2020] atorvastatin  40 mg Oral Nightly    [START ON 10/22/2020] aspirin  81 mg Oral Daily    aspirin  300 mg Rectal Daily    [START ON 10/22/2020] clopidogrel  75 mg Oral Daily    albuterol  2.5 mg Nebulization Q4H WA    [START ON 10/22/2020] insulin glargine  0.25 Units/kg Subcutaneous Nightly    [START ON 10/23/2020] insulin lispro  0-6 Units Subcutaneous TID WC    [START ON 10/23/2020] insulin lispro  0-3 Units Subcutaneous Nightly         Labs:  Recent Labs     10/20/20  0513 10/21/20  0353  10/21/20  1115 10/21/20  1217 10/21/20  1225   WBC 3.8* 3.7*  --   --   --  11.6*   HGB 8.8* 8.8*   < > 6.2* 9.2* 9.3*   HCT 27.4* 27.8*  --   --   --  28.9*   MCV 91.5 92.0  --   --   --  92.4    173  --   --   --  160    < > = values in this interval not displayed. Recent Labs     10/19/20  0513 10/20/20  0513 10/21/20  0353 10/21/20  1225   * 134* 134* 133*   K 4.4 4.9 4.7 6.1*   CL 99 98* 98* 99   CO2 27 24 27 21   GLUCOSE 102* 103* 99 189*   PHOS 4.2 5.4* 4.0  --    BUN 23* 32* 15 14   CREATININE 4.0* 4.9* 3.3* 3.0*   LABGLOM 11* 9* 14* 15*   GFRAA 13* 10* 16* 18*           Assessment/Plan:    DELMER on CKD Stage 3.  - DELMER from ATN, currently HD dependent Tuesday Thursday Saturday at [facility]. ATN had developed early this month in the setting of severe diarrhea. RI TD as access. - Chronic kidney disease stage III from obstructive nephropathy. Follows with Dr. Asha Ballard in the office.  - No signs of renal recovery.            NSTEMI.    - C on 10/14 with multi-vessel CAD (Wedge pressure of 25, LVEDP 18)  - S/p CABG          Acute on chronic CHF.     History of bladder cancer status post ileal conduit.     Hyponatremia.  - Stable on dialysis.     Anemia. - Weekly FLAQUITO with HD qThursday     Hypertension.  - On Lisinopril and Hydralazine.  - Decrease Clonidine to 0.1mg BID. PLAN:  - Post op with hyperkalemia and mild pulmonary edema  - Will try 90 minutes dialysis urgently with 1K bath and 1 kg UF  - Monitor how she tolerates procedure, we have pressors for support   - D/w Dr. Rico Caballero       Please do not hesitate to contact me at [phone] if with questions. Thank you!     Paulette Santiago MD  10/21/2020  [facility]

## 2020-10-21 NOTE — PLAN OF CARE
Problem: Skin Integrity:  Goal: Will show no infection signs and symptoms  Description: Will show no infection signs and symptoms  10/20/2020 2313 by Verenice Michael RN  Outcome: Ongoing     Problem: Skin Integrity:  Goal: Absence of new skin breakdown  Description: Absence of new skin breakdown  10/20/2020 2313 by Verenice Michael RN  Outcome: Ongoing     Problem: Falls - Risk of:  Goal: Will remain free from falls  Description: Will remain free from falls  10/20/2020 2313 by Verenice Michael RN  Outcome: Ongoing     Problem: Falls - Risk of:  Goal: Absence of physical injury  Description: Absence of physical injury  10/20/2020 2313 by Verenice Michael RN  Outcome: Ongoing

## 2020-10-21 NOTE — PROGRESS NOTES
Call to nephrology. Dr. Clarita Fisher would like pt o have HD tonight r/t high potassium level. OK with using levophed to support BP as needed.  Lg Daly

## 2020-10-21 NOTE — PROGRESS NOTES
RN informed RT at this time that patient is being placed in SBT.      Electronically signed by Katerin Harrington RCP, RRT, RRT-ACCS on 10/21/2020 at 5:28 PM

## 2020-10-21 NOTE — PROGRESS NOTES
Patient admitted to CVU from [address] [place] [address] monitoring commenced. Mechanical ventilation. Labs drawn and sent, pcxry ordered.  Recovery period started:1219  [facility]

## 2020-10-21 NOTE — PROGRESS NOTES
Pt opens eyes and follows commands. RTD placed on SBT, pt failed. Apnea. Continue to attempt wean trials.  Xiao Saucedo

## 2020-10-21 NOTE — FLOWSHEET NOTE
Treatment time: 1.5 hours  Net UF: -50 ml ( fluid positive)    Pre weight: 59.4 kg   Post weight: 59.6 kg  EDW: BD kg    Access used: Right chest wall TDC  Access function: Good with  ml/min    Medications or blood products given: no    Regular outpatient schedule: DELMER    Summary of response to treatment: Pt received urgent HD due to hyperkalemia after surgery. No fluid removed due to hypotension, pressures given and titered by ICU nurse. HD completed in full. Heparin dwell in [address], capped and clamped     Cirt Line: Initial Hct: 27.6;   End Profile : B; Refill ( Hct.1 -29.7  subtract Hct 2- 29.6): 0.1 ( no Refill); BV: -6.9 %      Copy of dialysis treatment record placed in chart, to be scanned into EMR. 10/21/20 1607 10/21/20 1757   Vital Signs   BP (!) 114/47 (!) 115/46   Temp 97.9 °F (36.6 °C) 97.5 °F (36.4 °C)   Pulse 70 82   Resp 17 26   SpO2 97 % 99 %   Weight 130 lb 15.3 oz (59.4 kg) 131 lb 6.3 oz (59.6 kg)   Weight Method Actual;Bed scale  (Reported from primary nurse ) Actual;Bed scale   Percent Weight Change 0 0.34   Post-Hemodialysis Assessment   Post-Treatment Procedures  --  Blood returned;Catheter capped, clamped and heparinized x 2 ports   Machine Disinfection Process  --  Acid/Vinegar Clean;Heat Disinfect; Exterior Machine Disinfection   Rinseback Volume (ml)  --  300 ml   Total Liters Processed (l/min)  --  30 l/min   Dialyzer Clearance  --  Lightly streaked   Duration of Treatment (minutes)  --  90 minutes   Heparin amount administered during treatment (units)  --  0 units   Hemodialysis Intake (ml)  --  600 ml   Hemodialysis Output (ml)  --  550 ml   NET Removed (ml)  --  -50 ml   Tolerated Treatment  --  Fair

## 2020-10-22 NOTE — PROGRESS NOTES
Occupational Therapy   Occupational Therapy Initial Assessment/Treatment   Date: 10/22/2020   Patient Name: Keny Gandhi  MRN: [id number]     : 1945    Date of Service: 10/22/2020    Discharge Recommendations:  Continue to assess pending progress       Assessment   Performance deficits / Impairments: Decreased functional mobility ; Decreased ADL status; Decreased safe awareness;Decreased balance;Decreased endurance  Assessment: Pt pleasant and agreeable to therapy. Pt seen for re-evaluation post CABG x3. Pt reports INDP PLOF and lives with family. Pt is currently requiring mod A x2 for bed mobility and transfers. Pt is limited at this time due to generalized weakness and incontinence of bowel. Pt required total assist for pericare. Continue to assess discharge plan based on further therapy. Prognosis: Good  OT Education: OT Role;Plan of Care;Energy Conservation;Home Exercise Program;Family Education  Patient Education: disease specific ed:  energy conservation, activity pacing, safe mobility  REQUIRES OT FOLLOW UP: No  Activity Tolerance  Activity Tolerance: Patient Tolerated treatment well;Treatment limited secondary to medical complications (free text)  Activity Tolerance: Pt hypotensive up in chair, returned to bed at end of session  Safety Devices  Safety Devices in place: Yes  Type of devices: Call light within reach;Nurse notified; Bed alarm in place; Left in bed           Patient Diagnosis(es): There were no encounter diagnoses. has a past medical history of Acid reflux, Arthritis, CAD in native artery, Cancer ([facility] [address] CHF (congestive heart failure) ([facility] [address] Chronic kidney disease, Hemodialysis patient ([facility] [address] Hx of blood clots, Hyperlipidemia, Hypertension, and Kidney stone. has a past surgical history that includes Bladder stone removal; Bladder surgery; Cataract removal with implant (11); other surgical history (2015); Cholecystectomy (2018); Abdomen surgery;  Appendectomy; eye Orientation  Overall Orientation Status: Within Functional Limits  Observation/Palpation  Posture: Fair  Observation: Excessive thoracic kyphosis, BUE tremors present at baseline but increased recently. Balance  Sitting Balance: Supervision  Standing Balance: Dependent/Total(mod A x2)  ADL  LE Dressing: Dependent/Total  Toileting: Dependent/Total  Additional Comments: incontinent of bowels througout session requiring multiple attempts at LB dressing  Tone RUE  RUE Tone: Normotonic  Tone LUE  LUE Tone: Normotonic  Coordination  Movements Are Fluid And Coordinated: Yes     Bed mobility  Supine to Sit: Moderate assistance;2 Person assistance  Sit to Supine: Maximum assistance;2 Person assistance  Transfers  Stand Pivot Transfers: Moderate assistance;2 Person assistance  Sit to stand: Dependent/Total  Stand to sit: Dependent/Total  Transfer Comments: mod A x2 for all transfers with multiple attempts     Cognition  Overall Cognitive Status: Exceptions  Following Commands:  Follows one step commands consistently  Attention Span: Attends with cues to redirect  Memory: Decreased recall of precautions  Problem Solving: Assistance required to correct errors made        Sensation  Overall Sensation Status: Impaired        LUE AROM (degrees)  LUE AROM : WFL  RUE AROM (degrees)  RUE AROM : WFL  LUE Strength  Gross LUE Strength: WFL  LUE Strength Comment: not formally assessed due to sternal precautions  RUE Strength  Gross RUE Strength: WFL  RUE Strength Comment: not formally assessed due to sternal precautions                   Plan   Plan  Times per week: 4-5x/wk  Current Treatment Recommendations: Self-Care / ADL, Functional Mobility Training, Endurance Training, Patient/Caregiver Education & Training, Safety Education & Training      AM-PAC Score        AM-[facility] Inpatient Daily Activity Raw Score: 13 (10/22/20 1549)  AM-PAC Inpatient ADL T-Scale Score : 32.03 (10/22/20 1549)  ADL Inpatient CMS 0-100% Score: 63.03 (10/22/20 1549)  ADL Inpatient CMS G-Code Modifier : CL (10/22/20 1549)    Goals  Short term goals  Time Frame for Short term goals: 1 week (10/22) unless noted  Short term goal 1: Perform functional transfers with Supervision with walker by 10/19  Short term goal 2: Perform bathroom mobility with Supervision with walker  Short term goal 3: Perform LE ADL tasks with SBA  Short term goal 4: Perform UE exer as tolerated 15x each  Patient Goals   Patient goals : \"Be able to go home\"       Therapy Time   Individual Concurrent Group Co-treatment   Time In 1144         Time Out 1220         Minutes 36         Timed Code Treatment Minutes: 26 Minutes(10 minutes for evaluation)       Radha Apodaca OTR/L    If pt is unable to be seen after this session, please let this note serve as discharge summary. Please see case management note for discharge disposition. Thank you.

## 2020-10-22 NOTE — PROGRESS NOTES
Nephrology Progress Note   [email or web address]      This patient is a 66year old female whom we are following for DELMER, HD dependent. Subjective:    Overnight she was extubated. She did have dialysis late last night. No fluid able to be removed. Labs are stable this morning    Family History: No family at bedside  ROS: No nausea or vomiting, oliguric       Vitals:  BP (!) 115/39   Pulse 83   Temp 97.5 °F (36.4 °C) (Temporal)   Resp 21   Ht 4' 11\" (1.499 m)   Wt 129 lb 10.1 oz (58.8 kg)   SpO2 100%   BMI 26.18 kg/m²   I/O last 3 completed shifts: In: 6520 [I.V.:1598; IV Piggyback:1000]  Out: 2082 [Urine:65; Blood:100; Chest Tube:650]  No intake/output data recorded. Physical Exam:  Vitals signs reviewed. Constitutional:       General: She is not in acute distress. Intubated      Appearance: Normal appearance. HENT:      Head: Normocephalic and atraumatic. Mouth/Throat:      Mouth: Mucous membranes are moist.   Eyes:      General: No scleral icterus. Conjunctiva/sclera: Conjunctivae normal.   Cardiovascular:      Rate and Rhythm: Normal rate. Rhythm irregular. Heart sounds: No friction rub. Pulmonary:      Effort: Pulmonary effort is normal. No respiratory distress. Breath sounds: No wheezing. Abdominal:      General: Bowel sounds are normal. There is no distension. Tenderness: There is no abdominal tenderness. Musculoskeletal:      Right lower leg: No edema. Left lower leg: No edema. Skin:     General: Skin is warm.    Neurological:      Mental Status: She is somnolent on vent     Access: RIJ TDC      Medications:   sodium chloride  20 mL Intravenous Once    vancomycin  750 mg Intravenous Q24H    sodium chloride flush  10 mL Intravenous 2 times per day    acetaminophen  650 mg Oral 4 times per day    polyethylene glycol  17 g Oral Daily    metoprolol tartrate  12.5 mg Oral BID    chlorhexidine  15 mL Mouth/Throat BID    mupirocin   Nasal BID    atorvastatin  40 mg Oral Nightly    aspirin  81 mg Oral Daily    aspirin  300 mg Rectal Daily    clopidogrel  75 mg Oral Daily    insulin glargine  0.25 Units/kg Subcutaneous Nightly    [START ON 10/23/2020] insulin lispro  0-6 Units Subcutaneous TID WC    [START ON 10/23/2020] insulin lispro  0-3 Units Subcutaneous Nightly    albuterol  2.5 mg Nebulization Q4H WA    ceFAZolin (ANCEF) IVPB  1 g Intravenous Q24H    famotidine  20 mg Oral Daily         Labs:  Recent Labs     10/21/20  1855 10/21/20  2226 10/22/20  0416   WBC 13.0* 13.9* 13.8*   HGB 9.4* 8.9* 7.7*   HCT 29.0* 27.8* 24.1*   MCV 90.9 91.3 91.8    142 134*     Recent Labs     10/20/20  0513 10/21/20  0353 10/21/20  1225 10/21/20  1855 10/21/20  2226 10/22/20  0416   * 134* 133* 139  --  139   K 4.9 4.7 6.1* 3.4* 3.9 3.5   CL 98* 98* 99 102  --  101   CO2 24 27 21 24  --  25   GLUCOSE 103* 99 189* 149*  --  95   PHOS 5.4* 4.0  --   --   --   --    MG  --   --   --  2.30  --  2.20   BUN 32* 15 14 10  --  14   CREATININE 4.9* 3.3* 3.0* 2.0*  --  2.8*   LABGLOM 9* 14* 15* 24*  --  16*   GFRAA 10* 16* 18* 29*  --  20*           Assessment/Plan:    DELMER on CKD Stage 3.  - DELMER from ATN, currently HD dependent Tuesday Thursday Saturday at [facility]. ATN had developed early this month in the setting of severe diarrhea. [facility] as access. - Chronic kidney disease stage III from obstructive nephropathy. Follows with Dr. Elma Potter in the office.  - No signs of renal recovery.  Remains dialysis dependent          NSTEMI. - [facility] on 10/14 with multi-vessel CAD (Wedge pressure of 25, LVEDP 18)  - S/p CABG          Acute on chronic CHF.     History of bladder cancer status post ileal conduit.     Hyponatremia.  - Stable on dialysis.     Anemia. - Weekly FLAQUITO with HD qThursday     Hypertension.  - On Lisinopril and Hydralazine.  - Decrease Clonidine to 0.1mg BID.     PLAN:  - Labs are great and she is not fluid overloaded, we can hold HD today  - I agree with blood transfusion  - Replace K with 10 mEq and we can recheck labs after blood transfusion   - HD tomorrow unless acute indication develops   - D/w Dr. Julien Nieves     Please do not hesitate to contact me at [phone] if with questions. Thank you!     Felix Wong MD  10/22/2020  [facility]

## 2020-10-22 NOTE — PROGRESS NOTES
Random Vanco this evening is 10.9 after a 1g dose of Vanco this AM and a dialysis session. Will adjust IV Vanco to 750mg IV Q24hrs x3 doses for postOp coverage will supplement by giving a dose this evening. Ancef was adjusted for renal impairment as well. Pharmacy will continue to Monitor for additional HD sessions and adjust as needed.   Luther Schaumann PharmD 10/21/2020 10:07 PM

## 2020-10-22 NOTE — PROGRESS NOTES
[address]  Cardiology  Progress Note    Admission date:  10/14/2020    Reason for follow up visit: CAD, CHF    HPI/CC: Marie Mendes is a 66 y.o. female who was admitted 10/14/2020 for shortness of breath. Troponin elevated. LHC/RHC showed multi vessel CAD, moderate pulmonary HTN, referred for CABG. On 10/21/2020 she had CABG x3 MARLA clip. Rhythm has been sinus. Subjective: Post op day 1, sternal discomfort, overall stable. Vitals:  Blood pressure (!) 114/43, pulse 81, temperature 97.8 °F (36.6 °C), temperature source Temporal, resp. rate 23, height 4' 11\" (1.499 m), weight 129 lb 10.1 oz (58.8 kg), SpO2 100 %, not currently breastfeeding.   Temp  Av.6 °F (36.4 °C)  Min: 96.3 °F (35.7 °C)  Max: 98.8 °F (37.1 °C)  Pulse  Av.3  Min: 65  Max: 86  BP  Min: 105/38  Max: 123/46  SpO2  Av.5 %  Min: 94 %  Max: 100 %  FiO2   Av.4 %  Min: 21 %  Max: 100 %    24 hour I/O    Intake/Output Summary (Last 24 hours) at 10/22/2020 1144  Last data filed at 10/22/2020 1037  Gross per 24 hour   Intake 2703 ml   Output 1280 ml   Net 1423 ml     Current Facility-Administered Medications   Medication Dose Route Frequency Provider Last Rate Last Dose    vancomycin (VANCOCIN) 750 mg in dextrose 5 % 250 mL IVPB  750 mg Intravenous Q24H Nellie Co, APRN - CNP        sodium chloride flush 0.9 % injection 10 mL  10 mL Intravenous 2 times per day Francina Krabbe, MD   10 mL at 10/22/20 0900    sodium chloride flush 0.9 % injection 10 mL  10 mL Intravenous PRN Venus Rivas MD        calcium chloride 1 g in sodium chloride 0.9 % 100 mL IVPB  1 g Intravenous PRN Venus Rivas MD        calcium chloride 2 g in sodium chloride 0.9 % 100 mL IVPB  2 g Intravenous PRN Venus Rivas MD        acetaminophen (TYLENOL) tablet 650 mg  650 mg Oral 4 times per day Francina Krabbe, MD   Stopped at 10/22/20 0507    acetaminophen (TYLENOL) suppository 650 mg  650 mg Rectal Q4H PRN Venus Rivas MD        oxyCODONE (ROXICODONE) immediate release tablet 5 mg  5 mg Oral Q4H PRN Venus Fletcher MD   5 mg at 10/22/20 1014    Or    oxyCODONE (ROXICODONE) immediate release tablet 10 mg  10 mg Oral Q4H PRN Venus Fletcher MD        morphine (PF) injection 2 mg  2 mg Intravenous Q2H PRN Venus Fletcher MD   2 mg at 10/21/20 2309    Or    morphine (PF) injection 4 mg  4 mg Intravenous Q2H PRN Venus Fletcher MD        midazolam (VERSED) injection 1 mg  1 mg Intravenous Q1H PRN Venus Moreno MD        polyethylene glycol (GLYCOLAX) packet 17 g  17 g Oral Daily Venus Moreno MD   17 g at 10/22/20 0846    ondansetron (ZOFRAN) injection 4 mg  4 mg Intravenous Q8H PRN Venus Fletcher MD        metoprolol tartrate (LOPRESSOR) tablet 12.5 mg  12.5 mg Oral BID Venus Moreno MD   12.5 mg at 10/22/20 1041    chlorhexidine (PERIDEX) 0.12 % solution 15 mL  15 mL Mouth/Throat BID Venus Moreno MD   15 mL at 10/22/20 0846    metoprolol (LOPRESSOR) injection 2.5 mg  2.5 mg Intravenous Q10 Min PRN Venus Fletcher MD        mupirocin (BACTROBAN) 2 % ointment   Nasal BID Venus Moreno MD        atorvastatin (LIPITOR) tablet 40 mg  40 mg Oral Nightly Venus Fletcher MD        aspirin EC tablet 81 mg  81 mg Oral Daily Venus Moreno MD   81 mg at 10/22/20 0846    clopidogrel (PLAVIX) tablet 75 mg  75 mg Oral Daily Venus Moreno MD   75 mg at 10/22/20 0846    albumin human 5 % IV solution 25 g  25 g Intravenous PRN Venus Moreno MD   25 g at 10/22/20 0304    norepinephrine (LEVOPHED) 16 mg in dextrose 5 % 250 mL infusion  2 mcg/min Intravenous Continuous PRN Kelly Cai MD   Stopped at 10/21/20 2007    niCARdipine (CARDENE) 50 mg in dextrose 5 % 250 mL infusion  5 mg/hr Intravenous Continuous PRN Venus Moreno MD        insulin regular (HUMULIN R;NOVOLIN R) 100 Units in sodium chloride 0.9 % 100 mL infusion  1 Units/hr Intravenous Titrated Venus Moreno MD 1.1 mL/hr at 10/22/20 0633 1.06 Units/hr at 10/22/20 0633    insulin glargine (LANTUS) injection vial 14 Units  0.25 Units/kg Subcutaneous Nightly Venus Moreno MD        [START ON 10/23/2020] insulin lispro (HUMALOG) injection vial 0-6 Units  0-6 Units Subcutaneous TID WC Venus Moreno MD        [START ON 10/23/2020] insulin lispro (HUMALOG) injection vial 0-3 Units  0-3 Units Subcutaneous Nightly Venus Moreno MD        glucose (GLUTOSE) 40 % oral gel 15 g  15 g Oral PRN Venus Moreno MD        dextrose 50 % IV solution  12.5 g Intravenous PRN Venus Cook MD        glucagon (rDNA) injection 1 mg  1 mg Intramuscular PRN Venus Moreno MD        dextrose 5 % solution  100 mL/hr Intravenous PRN Venus Cook MD        vasopressin 20 Units in dextrose 5 % 100 mL infusion  0.01 Units/min Intravenous Continuous Venus Moreno MD 6 mL/hr at 10/22/20 0550 0.02 Units/min at 10/22/20 0550    heparin (porcine) injection 3,800 Units  3,800 Units Intracatheter PRN Paulette Santiago MD   3,800 Units at 10/21/20 1745    albuterol (PROVENTIL) nebulizer solution 2.5 mg  2.5 mg Nebulization Q4H PRN Venus Cook MD        albuterol (PROVENTIL) nebulizer solution 2.5 mg  2.5 mg Nebulization Q4H WA Venus Moreno MD   2.5 mg at 10/22/20 0745    ceFAZolin (ANCEF) 1 g in dextrose 5 % 50 mL IVPB (mini-bag)  1 g Intravenous Q24H Venus Cook MD   Stopped at 10/21/20 2313    famotidine (PEPCID) tablet 20 mg  20 mg Oral Daily Venus Moreno MD   20 mg at 10/22/20 0846     Review of Systems   Constitutional: Positive for fatigue. Respiratory: Negative. Cardiovascular: Negative. Gastrointestinal: Negative. Neurological: Negative.       Objective:     Telemetry monitor: SR- ST, iLBBB    Physical Exam:  Constitutional:  Comfortable and alert, NAD, appears older than stated age  Eyes: PERRL, sclera nonicteric  Neck:  Supple, no masses, no thyroidmegaly, no JVD  Skin:  Warm and dry; no rash or lesions  Heart: Regular, normal apex, S1 and S2 normal, no M/G/R  Lungs:  Normal respiratory effort; clear; no wheezing/rhonchi/rales  Abdomen: soft, non tender, + bowel sounds  Extremities:  No edema or cyanosis; no clubbing  Neuro: alert and oriented, moves legs and arms equally, normal mood and affect    Data Reviewed:    CABG 10/21/2020:  CORONARY ARTERY BYPASS GRAFTING X3, INTERNAL MAMMARY ARTERY, SAPHENOUS VEIN GRAFT, LEFT ATRIAL APPENDAGE CLIP, ON PUMP, WITH BILATERAL 5 LEVEL INTERCOSTAL NERVE BLOCK    Coronary angiogram 10/14/2020:  1. Left heart catheterization  2. Selective left and right coronary angiogram  3. Right heart catheterization  Procedure Findings:  1. Severe multi vessel coronary artery diease  2. Normal left heart hemodynamics  3. Decompensated right heart hemodynamics with moderate pulmonary hypertension  Details:              Melanie Guillory was brought to the cardiac catheterization lab in a fasting state after informed consent was obtained. If the patient was able to provide written consent, it was obtained. The patient's vitals were monitored through out the procedure. The patient was sedated using the appropriate levels of sedation and ASA guidelines. The appropriate access site area was prepped and drapped in a sterile fashion. The area was anesthetized with 2% lidocaine. Using the modified Seldinger technique, an arterial sheath was introduced into the arterial access site using a single anterior wall puncture. The sheath was flushed and prepped in usual fashion. Catheters used during the procedure included 5 Frisian TIG 4.0 catheter. The catheters were advanced and removed over a .035\" wire, into the appropriate positions. Multiple angiographic views were obtained. An LV gram was not obtained. Findings:  1. Left main coronary artery is diseased. There is 50% distal stenosis. It gave off the left anterior descending artery and left circumflex. 2. Left anterior descending artery has severe atherosclerotic disease.   It was moderate in HGB 7.7 10/22/2020    HCT 24.1 10/22/2020    MCV 91.8 10/22/2020    RDW 16.0 10/22/2020     10/22/2020     BNP:    Lab Results   Component Value Date    PROBNP 34,575 10/12/2020    PROBNP 23,650 09/30/2020    PROBNP 5,200 09/20/2020    PROBNP 1,463 12/30/2017     Fasting Lipid Panel:    Lab Results   Component Value Date    CHOL 104 10/21/2020    HDL 44 10/21/2020    TRIG 142 10/21/2020     Cardiac Enzymes:  CK/MbTroponin  Lab Results   Component Value Date    TROPONINI 0.54 10/13/2020     PT/ INR   Lab Results   Component Value Date    INR 1.20 10/21/2020    INR 1.00 10/21/2020    INR 1.09 10/08/2020    PROTIME 14.0 10/21/2020    PROTIME 11.6 10/21/2020    PROTIME 12.7 10/08/2020     PTT No results found for: PTT   Lab Results   Component Value Date    MG 2.20 10/22/2020      Lab Results   Component Value Date    TSH 1.86 10/12/2020       All labs and imaging reviewed today    Assessment:  Shortness of breath  NSTEMI/CAD: s/p CABG x3 10/21/2020  S/p MARLA clip 10/21/2020  Acute on chronic diastolic CHF: ongoing, fluid management per HD  Pulmonary HTN: moderate on cath 10/14/2020  Tahcycardia  iLBBB  HTN: labile  HLD  ESRD  Bladder cancer  Anemia    Plan:   1. Continue aspirin, statin, plavix  2. Beta blocker as hemodynamics allow  3. HD tomorrow  4.  Post op care per CT surgery    Beth Morales, APRN-CNP  [facility]  [phone]

## 2020-10-22 NOTE — PROGRESS NOTES
Dr. Julien Nieves notified of positive occult blood in stool still having large watery stools and potasium level up dated on numbers drips orders received to call Dr. Chau Pires with potasium level and get another CBC at 10 pm to check hgb and hct again.

## 2020-10-22 NOTE — PROGRESS NOTES
Shift assessment complete skin assessment done large watery bowel movement repositioned for comfort 100 cc dump out of chest tube 1 very little urine output HD complete around 6 and potasium level sent around 7 noted at 3.4 occult stool came back positive.

## 2020-10-22 NOTE — PROGRESS NOTES
Spoke with MD covering for Dr. Reji Carter and wanted another potasium level done around 10 pm order placed.

## 2020-10-22 NOTE — PLAN OF CARE
Nutrition Problem #1: Inadequate energy intake  Intervention: Food and/or Nutrient Delivery: Continue Current Diet, Start Oral Nutrition Supplement  Nutritional Goals:  Tolerate most appropriate form of nutrition therapy this admission

## 2020-10-22 NOTE — PROGRESS NOTES
Sitting at side of bed per PT and OT. Incontinent of stool upon standing. Became hypotensive, c/o mild dizziness when in chair; returned to bed.

## 2020-10-22 NOTE — PROGRESS NOTES
Diagnosis    Hematuria    Acute renal failure (ARF) ([facility])    Chronic calculous cholecystitis    Hydronephrosis    Acute pyelonephritis    DELMER (acute kidney injury) ([address]    Complicated UTI (urinary tract infection)    GERD (gastroesophageal reflux disease)    Hyperkalemia    Essential hypertension    Chronic kidney disease, stage 3    Chest pain    Anxiety    Acute diastolic CHF (congestive heart failure) ([facility])    Acute respiratory failure with hypoxia (HCC)    Chronic diastolic CHF (congestive heart failure) ([facility])    Increased anion gap metabolic acidosis    Anemia, normocytic normochromic    Acute kidney injury superimposed on CKD (HCC)    Acute on chronic diastolic CHF (congestive heart failure) ([facility])    Mild protein-calorie malnutrition ([facility])    CHF (congestive heart failure), NYHA class I, acute on chronic, combined ([facility]    CHF with unknown LVEF ([facility]    Heart failure exacerbated by sotalol ([facility]    Acute on chronic congestive heart failure (HCC)    ESRD (end stage renal disease) ([facility])    Acute respiratory failure with hypoxia and hypercapnia ([facility])    NSTEMI (non-ST elevated myocardial infarction) ([facility]    CAD in native artery        Vital Signs: BP (!) 114/44   Pulse 82   Temp 98.8 °F (37.1 °C) (Temporal)   Resp 20   Ht 4' 11\" (1.499 m)   Wt 129 lb 10.1 oz (58.8 kg)   SpO2 99%   BMI 26.18 kg/m²  O2 Flow Rate (L/min): 4 L/min     Admission Weight: Weight: 129 lb 10.1 oz (58.8 kg)    Weight on 10/21 (56 kg) Pre-op   Weight on 10/22 (58.8 kg)    Intake/Output:     Intake/Output Summary (Last 24 hours) at 10/22/2020 0948  Last data filed at 10/22/2020 0600  Gross per 24 hour   Intake 2398 ml   Output 1365 ml   Net 1033 ml      Extubation Time: 10/21 @ 19:07   Transition Time: 0/22 @ 07:30    LABORATORY DATA:     CBC:   Recent Labs     10/21/20  1855 10/21/20  2226 10/22/20  0416   WBC 13.0* 13.9* 13.8*   HGB 9.4* 8.9* 7.7*   HCT 29.0* 27.8* 24.1*   MCV 90.9 91.3 91.8   PLT 164 142 134*     BMP:   Recent Labs     10/20/20  0513 10/21/20  0353 10/21/20  1225 10/21/20  1855 10/21/20  2226 10/22/20  0416   * 134* 133* 139  --  139   K 4.9 4.7 6.1* 3.4* 3.9 3.5   CL 98* 98* 99 102  --  101   CO2 24 27 21 24  --  25   PHOS 5.4* 4.0  --   --   --   --    BUN 32* 15 14 10  --  14   CREATININE 4.9* 3.3* 3.0* 2.0*  --  2.8*     MG:    Recent Labs     10/21/20  1855 10/22/20  0416   MG 2.30 2.20      PT/INR:   Recent Labs     10/21/20  0353 10/21/20  1225   PROTIME 11.6 14.0*   INR 1.00 1.20*     APTT:   Recent Labs     10/21/20  1225   APTT 35.6     CXR:10/22/20   Impression    Stable appropriate life support device positioning.         Ongoing small bilateral effusions and moderate interstitial pulmonary edema.    ____________________________________________________    Objective:   General appearance: awake, in NAD  Lungs: diminished in bases  Heart: S1S2 RRR; SR on monitor  Chest: symmetrical expansion with inspiration and expirations; no rocking of sternum noted   Abdomen: soft, non-tender  Bowel sounds: hypoactive  Kidneys: UOP 65 ml (CKD on HD) Cr 2.8   Wound/Incisions: Midsternal incision with dressing CDI; RLE incisions with dressings CDI; Pacing wires taped and secured  Extremities: BLE pulses palpable; minimal tibial swelling noted   Neurological: intact, non focal exam   Chest tubes/Drains: Chest tube # 1 with 120-280-150= 550 ml serosanguinous drainage in 24 hours overnight; Chest tube # 2 with 25-60-15= 100 ml serosanguinous drainage in 24 hours overnight; no airleaks noted in either tube     Assessment:   Post-op: 1 days. Condition: In stable condition. Plan:   1. Cardiovascular: s/p CABG- BB, ASA, Statin    2. Pulmonary: needs expansion- IS, OOBTC, PT/OT    3. Neurology: analgesia as needed    4. Nephrology: diurese as tolerated  CKD (HD dependent) - pt was dialyzed 10/21 evening, net zero. Will hold HD today, plan for tomorrow.       5. Endocrinology: insulin gtt    6. Hematology: acute blood loss anemia; Hgb 7.7, will give 1 unit RBC today. 7. Microbiology: nothing at this time    8. Nutrition: ADAT    9. Labs: monitor    10. Post-op Drains/Wires: keeping all for now. Placed chest tubes to water seal.     11. D/C Goals: DCP following, too soon to tell.      12. Continue post-op care of patient in the ICU    Meds:    The patient is on a beta-blocker   The patient is not on an ace-i/ARB- 2/2 kidney function   The patient is on a statin   The patient is on oral antiplatelet therapy   ____________________________________________________    Chico Lujan CNP  10/22/2020  9:48 AM      Patient was seen examined chart was reviewed   I personally reviewed all image I agree with NP plan     Demetrius Castañeda MD [facility] [place]

## 2020-10-22 NOTE — PROGRESS NOTES
Spoke with pharmacy r\t vancomycin and ancef random vancomycin level sent and sent Anna Roof a message r\t this and orders received for pharmacy to dose vancomycin and ancef called pharmacy and them that we got the order for them to dose these 2 medications and order placed.

## 2020-10-22 NOTE — PROGRESS NOTES
Physical Therapy    Facility/Department: [facility] B2 - ICU  Re-Evaluation and Treatment    NAME: Mamadou Allison  : 1945  MRN: [id number]    Date of Service: 10/22/2020    Discharge Recommendations:  Continue to assess pending progress   PT Equipment Recommendations  Equipment Needed: No    Assessment   Body structures, Functions, Activity limitations: Decreased functional mobility ; Decreased strength;Decreased balance;Decreased endurance;Decreased sensation;Decreased posture  Assessment: Pt is 67 yo female who presents for re-evaluation s/p CABG x 3 10/21. Pt mod I with mobility at baseline. Grossly mod A x 2 for mobility this date. Increased time required for mobility tasks. Ax2 for balance with mobility with frequent cues for sequencing. Pt incontinent of bowel during session requiring total A for pericare multiple times. Pt would benefit from continued skilled therapy to address deficits. Will continue to assess for d/c recs pending further mobility. Treatment Diagnosis: Impaired mobility  Prognosis: Good  Decision Making: High Complexity  PT Education: Goals;PT Role;Home Exercise Program;General Safety;Transfer Training;Gait Training;Disease Specific Education; Functional Mobility Training  Patient Education: Pt educated on sternal precautions throughout mobility s/p CABG x 3 -- pt verbalized understanding  Barriers to Learning: None  REQUIRES PT FOLLOW UP: Yes  Activity Tolerance  Activity Tolerance: Patient limited by endurance; Patient Tolerated treatment well;Patient limited by fatigue  Activity Tolerance: Low BP seated in chair therefore pt returned to bed. Reports feeling \"a little\" dizzy       Patient Diagnosis(es): There were no encounter diagnoses. has a past medical history of Acid reflux, Arthritis, CAD in native artery, Cancer ([facility] CHF (congestive heart failure) ([facility] Chronic kidney disease, Hemodialysis patient ([facility] Hx of blood clots, Hyperlipidemia, Hypertension, and Kidney stone.    has a past surgical history that includes Bladder stone removal; Bladder surgery; Cataract removal with implant (8/26/11); other surgical history (05/08/2015); Cholecystectomy (03/06/2018); Abdomen surgery; Appendectomy; eye surgery; Hysterectomy; Colonoscopy; IR NONTUNNELED VASCULAR CATHETER > 5 YEARS (10/1/2020); IR TUNNELED CVC PLACE WO SQ PORT/PUMP > 5 YEARS (10/8/2020); and Coronary artery bypass graft (N/A, 10/21/2020).     Restrictions  Restrictions/Precautions  Restrictions/Precautions: General Precautions, Fall Risk  Position Activity Restriction  Sternal Precautions: No Pushing, No Pulling, 5# Lifting Restrictions  Other position/activity restrictions: up with assist,  LHC/RHC via R radial artery 10/14/20, CABG 10/21  Vision/Hearing  Vision: Impaired  Vision Exceptions: Wears glasses for reading  Hearing: Within functional limits     Subjective  General  Chart Reviewed: Yes  Patient assessed for rehabilitation services?: Yes  Response To Previous Treatment: Not applicable  Family / Caregiver Present: No  Referring Practitioner: Shannan Mandel MD  Referral Date : 10/22/20  Diagnosis: s/p CABG x 3 10/21  Follows Commands: Within Functional Limits  General Comment  Comments: Cleared for therapy by RN to get up to chair, Pt resting in bed on approach  Subjective  Subjective: Pt agreeable to PT  Pain Screening  Patient Currently in Pain: Denies    Orientation  Orientation  Overall Orientation Status: Within Functional Limits  Social/Functional History  Social/Functional History  Lives With: Spouse(Dtr comes in to assist patient at home)  Type of Home: House  Home Layout: One level  Home Access: Stairs to enter without rails  Entrance Stairs - Number of Steps: 1 (pt holds door frame)  Bathroom Shower/Tub: Tub/Shower unit  Bathroom Toilet: Handicap height  Bathroom Equipment: Shower chair, Grab bars in shower, 3-in-1 commode  Home Equipment: [employer], [employer] cane, Linward Flick, [employer], [employer], [employer] walker  ADL Assistance: Independent  Homemaking Responsibilities: Yes  Meal Prep Responsibility: Secondary  Laundry Responsibility: Secondary(Family helps carry laundry)  Cleaning Responsibility: Secondary(Light chores)  Shopping Responsibility: No(Daughter performs)  Ambulation Assistance: Independent(Prn cane use)  Transfer Assistance: Independent  Active : No    Objective     RLE AROM: WFL  LLE AROM : WFL  Strength RLE  Comment: Grossly 4/5 throughout  Strength LLE  Comment: Grossly 4/5 throughout     Sensation  Overall Sensation Status: Impaired(chronic numbness in hands and feet)     Bed mobility  Supine to Sit: Moderate assistance;2 Person assistance  Sit to Supine: Maximum assistance;2 Person assistance  Comment: Cues to maintain sternal precautions with bed mobility. Transfers  Sit to Stand: Moderate Assistance;2 Person Assistance  Stand to sit: Moderate Assistance;2 Person Assistance  Comment: Cues for posture with standing including increased hip extension and forward gaze     Ambulation  Ambulation?: Yes  Ambulation 1  Surface: level tile  Device: Hand-Held Assist  Assistance: Moderate assistance;2 Person assistance  Quality of Gait: Cues for posture and step sequencing. Ax2 for balance/posture. Forward flexed posture  Gait Deviations: Slow Malaika; Shuffles;Decreased step length;Decreased step height  Distance: 3 ft x 2     Balance  Sitting - Static: Good;-  Sitting - Dynamic: Fair;+  Standing - Static: Fair  Standing - Dynamic: Poor  Comments: CGA-min A to sit unsupported. Mod A x 2 for standing     Exercises:  Glut Sets: x 10 BLE  Quad Sets: x 10 BLE  Ankle Pumps: x 10 BLE  Cues for technique.  Increased time to complete        Plan   Plan  Times per week: 5-7x/wk  Times per day: Daily  Current Treatment Recommendations: Strengthening, Transfer Training, Endurance Training, Patient/Caregiver Education & Training, Balance Training, Gait Training, Home Exercise Program, Functional Mobility Training, Stair training, Safety Education & Training, Neuromuscular Re-education, Equipment Evaluation, Education, & procurement  Safety Devices  Type of devices: All fall risk precautions in place, Gait belt, Patient at risk for falls, Nurse notified, Call light within reach, Left in bed(RN present upon therapist exit)  Restraints  Initially in place: No                   AM-PAC Score  AM-PAC Inpatient Mobility Raw Score : 10 (10/22/20 1457)  AM-PAC Inpatient T-Scale Score : 32.29 (10/22/20 1457)  Mobility Inpatient CMS 0-100% Score: 76.75 (10/22/20 1457)  Mobility Inpatient CMS G-Code Modifier : CL (10/22/20 1457)          Goals  Short term goals  Time Frame for Short term goals: 1 week (10/29) unless otherwise specified  Short term goal 1: pt will be SBA for bed mobility. Short term goal 2: Pt will be SBA for transfers with LRAD. Short term goal 3: Pt will ambulate 50 ft with SBA and LRAD. Short term goal 4: Pt will negotiate 1 stair with SBA. Short term goal 5: 10/25: Pt will participate in 12-15 reps of BLE exercises to promote strength and activity tolerance. Patient Goals   Patient goals : \"To go home after surgery. \"       Therapy Time   Individual Concurrent Group Co-treatment   Time In 5357         Time Out 1217         Minutes 55         Timed Code Treatment Minutes: [address], PT, DPT

## 2020-10-22 NOTE — CARE COORDINATION
Writer spoke to patient at bedside to discuss post op needs. Patient in a bit of pain as RN just repositioned her. Has [address] set up for when she discharges. CM will follow for therapy assessment today and discuss plans if need be.  Cynthia Black RN

## 2020-10-23 NOTE — CONSULTS
 Smokeless tobacco: Never Used    Tobacco comment: quit 25 years ago   Substance and Sexual Activity    Alcohol use: No    Drug use: No    Sexual activity: Not Currently   Lifestyle    Physical activity     Days per week: Not on file     Minutes per session: Not on file    Stress: Not on file   Relationships    Social connections     Talks on phone: Not on file     Gets together: Not on file     Attends Zoroastrian service: Not on file     Active member of club or organization: Not on file     Attends meetings of clubs or organizations: Not on file     Relationship status: Not on file    Intimate partner violence     Fear of current or ex partner: Not on file     Emotionally abused: Not on file     Physically abused: Not on file     Forced sexual activity: Not on file   Other Topics Concern    Not on file   Social History Narrative    Not on file     SURGICAL HISTORY     Past Surgical History:   Procedure Laterality Date    [address]      pt had bladder cancer    CATARACT REMOVAL WITH IMPLANT  8/26/11    RIGHT    CHOLECYSTECTOMY  03/06/2018    COLONOSCOPY      CORONARY ARTERY BYPASS GRAFT N/A 10/21/2020    CORONARY ARTERY BYPASS GRAFTING X3, INTERNAL MAMMARY ARTERY, SAPHENOUS VEIN GRAFT, LEFT ATRIAL APPENDAGE CLIP, ON PUMP, WITH BILATERAL 5 LEVEL INTERCOSTAL NERVE BLOCK performed by Ibeth Capone MD at [facility] 143 IR NONTUNNELED VASCULAR CATHETER  10/1/2020    IR NONTUNNELED VASCULAR CATHETER 10/1/2020 [address] [place] [address]    IR TUNNELED [address] 5 YEARS  10/8/2020    IR TUNNELED CATHETER PLACEMENT GREATER THAN 5 YEARS 10/8/2020 [facility] 122 PROCEDURES    OTHER SURGICAL HISTORY  05/08/2015    phacemilsification of cataract with intraocular lens implant left eye     CURRENT MEDICATIONS   (This list may include medications prescribed during this encounter as epic can not insert only the list prior to this encounter.)     ALLERGIES     Allergies   Allergen Reactions    Ciprofloxacin Hives       REVIEW OF SYSTEMS   12 point ROS done and negative except as mentioned in the HPI.     PHYSICAL EXAM   BP (!) 117/47   Pulse 88   Temp 98.1 °F (36.7 °C) (Oral)   Resp 20   Ht 4' 11\" (1.499 m)   Wt 128 lb (58.1 kg)   SpO2 (!) 89%   BMI 25.85 kg/m²   Wt Readings from Last 3 Encounters:   10/23/20 128 lb (58.1 kg)   10/14/20 118 lb 13.3 oz (53.9 kg)   10/09/20 135 lb 3.2 oz (61.3 kg)     AAO3, sitting up in a chair  Pallor+, no icterus  AE decreased LL, chest tubes, recent staples from CABG  S1S2 RRR  GI: right LQ ileal conduit with urine bag on with scant yellow urine, BS+, non tender    Recent Labs     10/21/20  0353  10/21/20  1225 10/21/20  1817 10/21/20  1855 10/21/20  2226 10/22/20  0416 10/22/20  1100 10/23/20  0430   WBC 3.7*  --  11.6*  --  13.0* 13.9* 13.8*  --  11.3*   HGB 8.8*   < > 9.3* 9.5* 9.4* 8.9* 7.7* 9.2* 8.7*   MCV 92.0  --  92.4  --  90.9 91.3 91.8  --  90.7     --  160  --  164 142 134*  --  132*   INR 1.00  --  1.20*  --   --   --   --   --   --    *  --  133*  --  139  --  139  --  134*   K 4.7  --  6.1*  --  3.4* 3.9 3.5 3.7 4.1   CL 98*  --  99  --  102  --  101  --  98*   CO2 27  --  21  --  24  --  25  --  21   BUN 15  --  14  --  10  --  14  --  29*   CREATININE 3.3*  --  3.0*  --  2.0*  --  2.8*  --  4.3*   GLUCOSE 99  --  189*  --  149*  --  95  --  118*   CALCIUM 8.8  --  9.0  --  8.2*  --  8.5  --  8.6   PROT 5.7*  --   --   --   --   --   --   --   --    LABALBU 3.5  --   --   --   --   --   --   --   --    AST 19  --   --   --   --   --   --   --   --    ALT 9*  --   --   --   --   --   --   --   --    ALKPHOS 97  --   --   --   --   --   --   --   --    BILITOT <0.2  --   --   --   --   --   --   --   --    BILIDIR <0.2  --   --   --   --   --   --   --   --    MG  --   --   --   --  2.30  --  2.20  --  2.30    < > = values in this interval not displayed. FINAL IMPRESSION     History of NSTEMI/CAD s/p CABG x3 10/21/2020, s/p MARLA clip 10/21/2020, Acute on chronic diastolic CHF, pulmonary HTN, renal failure, multiple other comorbidites as listed below. Has a history of bladder cancer post surgery and ileal conduit (done in ?1985)  GI consulted for watery diarrhea, no overt GI bleeding. Recheck stool C diff. Can add cholestyramine 4 gm bid to see if it helps change stool consistency and redue the watery diarrhea.   [facility] to follow  Consider flex sig at some point if symptoms continue

## 2020-10-23 NOTE — PROGRESS NOTES
CVTS Cardiothoracic Progress Note:                                CC:  Post op follow up     Surgery: 10/21/20 CORONARY ARTERY BYPASS GRAFTING X3, INTERNAL MAMMARY ARTERY, SAPHENOUS VEIN GRAFT, LEFT ATRIAL APPENDAGE CLIP, ON PUMP, WITH BILATERAL 5 LEVEL INTERCOSTAL NERVE BLOCK     Hospital course:   10/22 sitting up in bed, in NAD. Remains SR 80's  10/23 pt has diarrhea, reports feeling somewhat weak.      Subjective:   Dietary Intake: minimal    no Nausea   Pain Control: controlled  Complaints: post op pain  Bowels: have moved, +guiac 10/21    Past Medical History:   Diagnosis Date    Acid reflux     Arthritis     CAD in native artery 10/14/2020    Cancer [facility])     bladder cancer    CHF (congestive heart failure) (HCC)     Chronic kidney disease     [facility]     Hx of blood clots     Hyperlipidemia     Hypertension     Kidney stone         Past Surgical History:   Procedure Laterality Date    ABDOMEN SURGERY      APPENDECTOMY      BLADDER STONE REMOVAL      BLADDER SURGERY      pt had bladder cancer    CATARACT REMOVAL WITH IMPLANT  8/26/11    RIGHT    CHOLECYSTECTOMY  03/06/2018    COLONOSCOPY      CORONARY ARTERY BYPASS GRAFT N/A 10/21/2020    CORONARY ARTERY BYPASS GRAFTING X3, INTERNAL MAMMARY ARTERY, SAPHENOUS VEIN GRAFT, LEFT ATRIAL APPENDAGE CLIP, ON PUMP, WITH BILATERAL 5 LEVEL INTERCOSTAL NERVE BLOCK performed by Valarie Ardon MD at [place] [address] IR NONTUNNELED VASCULAR CATHETER  10/1/2020    IR NONTUNNELED VASCULAR CATHETER 10/1/2020 [facility]    IR TUNNELED CATHETER PLACEMENT GREATER THAN 5 YEARS  10/8/2020    IR TUNNELED CATHETER PLACEMENT GREATER THAN 5 YEARS 10/8/2020 [facility] SPECIAL PROCEDURES    OTHER SURGICAL HISTORY  05/08/2015    phacemilsification of cataract with intraocular lens implant left eye        Allergies as of 10/14/2020 - Review Complete 10/14/2020   Allergen Reaction Noted    Ciprofloxacin Hives 08/24/2011        Patient Active Problem List   Diagnosis    Hematuria    Acute renal failure (ARF) ([address]    Chronic calculous cholecystitis    Hydronephrosis    Acute pyelonephritis    DELMER (acute kidney injury) ([address]    Complicated UTI (urinary tract infection)    GERD (gastroesophageal reflux disease)    Hyperkalemia    Essential hypertension    Chronic kidney disease, stage 3    Chest pain    Anxiety    Acute diastolic CHF (congestive heart failure) (HCC)    Acute respiratory failure with hypoxia (HCC)    Chronic diastolic [facility]est[facility] failure) [facility]    Increased anion gap metabolic acidosis    Anemia, normocytic normochromic    Acute kidney injury superimposed on CKD ([address]    Acute on chronic diastolic CHF (congestive heart failure) ([facility]    Mild protein-calorie malnutrition ([address]    CHF (congestive heart failure), NYHA class I, acute on chronic, combined ([address]    CHF with unknown LVEF ([address]    Heart failure exacerbated by sotalol ([facility]    Acute on chronic congestive heart failure ([address]    ESRD (end stage renal disease) ([address]    Acute respiratory failure with hypoxia and hypercapnia ([facility]    NSTEMI (non-ST elevated myocardial infarction) ([address]    CAD in native artery    Watery diarrhea        Vital Signs: BP (!) 117/47   Pulse 88   Temp 98.1 °F (36.7 °C) (Oral)   Resp 20   Ht 4' 11\" (1.499 m)   Wt 128 lb (58.1 kg)   SpO2 (!) 89%   BMI 25.85 kg/m²  O2 Flow Rate (L/min): 2 L/min     Admission Weight: Weight: 129 lb 10.1 oz (58.8 kg)    Weight on 10/21 (56 kg) Pre-op   Weight on 10/22 (58.8 kg)  10/23  58.1 kg    Intake/Output:     Intake/Output Summary (Last 24 hours) at 10/23/2020 1133  Last data filed at 10/23/2020 0953  Gross per 24 hour   Intake 1292 ml   Output 575 ml   Net 717 ml      Extubation Time: 10/21 @ 19:07   Transition Time: 0/22 @ 07:30    LABORATORY DATA:     CBC:   Recent Labs     10/21/20  2226 10/22/20  0416 10/22/20  1100 10/23/20  0430   WBC 13.9* 13.8*  --  11.3*   HGB 8.9* 7.7* 9.2* 8.7*   HCT 27.8* 24.1* 28.1* 26.7*   MCV 91.3 91.8  --  90.7    134*  --  132*     BMP:   Recent Labs     10/21/20  0353  10/21/20  1855  10/22/20  0416 10/22/20  1100 10/23/20  0430   *   < > 139  --  139  --  134*   K 4.7   < > 3.4*   < > 3.5 3.7 4.1   CL 98*   < > 102  --  101  --  98*   CO2 27   < > 24  --  25  --  21   PHOS 4.0  --   --   --   --   --   --    BUN 15   < > 10  --  14  --  29*   CREATININE 3.3*   < > 2.0*  --  2.8*  --  4.3*    < > = values in this interval not displayed. MG:    Recent Labs     10/21/20  1855 10/22/20  0416 10/23/20  0430   MG 2.30 2.20 2.30      PT/INR:   Recent Labs     10/21/20  0353 10/21/20  1225   PROTIME 11.6 14.0*   INR 1.00 1.20*     APTT:   Recent Labs     10/21/20  1225   APTT 35.6     CXR:10/22/20   Impression    Stable appropriate life support device positioning.         Ongoing small bilateral effusions and moderate interstitial pulmonary edema.    ____________________________________________________    Objective:   General appearance: awake, in NAD  Lungs: diminished in bases  Heart: S1S2 RRR; SR on monitor  Chest: symmetrical expansion with inspiration and expirations; no rocking of sternum noted   Abdomen: soft, non-tender  Bowel sounds: normoactive  Kidneys: UOP 25 ml (CKD on HD) Cr 4.3   Wound/Incisions: Midsternal incision with dressing CDI; RLE incisions with dressings CDI; Pacing wires taped and secured  Extremities: BLE pulses palpable; minimal tibial swelling noted   Neurological: intact, non focal exam   Chest tubes/Drains: Chest tube # 1 with 190-20-60= 270 ml serosanguinous drainage in 24 hours overnight; Chest tube # 2 with 120-= 320 ml serosanguinous drainage in 24 hours overnight; no airleaks noted in either tube     Assessment:   Post-op: 2 days. Condition: In stable condition. Plan:   1. Cardiovascular: s/p CABG- BB, ASA, Statin  After her HD today plan to wean Vaso.     2.

## 2020-10-23 NOTE — CONSULTS
intraocular lens implant left eye       Current Medications:   Current Facility-Administered Medications: cholestyramine (QUESTRAN) packet 4 g, 1 packet, Oral, BID  niCARdipine (CARDENE) 25 mg in dextrose 5 % 250 mL infusion, 5 mg/hr, Intravenous, Continuous  sodium chloride flush 0.9 % injection 10 mL, 10 mL, Intravenous, 2 times per day  sodium chloride flush 0.9 % injection 10 mL, 10 mL, Intravenous, PRN  acetaminophen (TYLENOL) tablet 650 mg, 650 mg, Oral, 4 times per day  acetaminophen (TYLENOL) suppository 650 mg, 650 mg, Rectal, Q4H PRN  oxyCODONE (ROXICODONE) immediate release tablet 5 mg, 5 mg, Oral, Q4H PRN **OR** oxyCODONE (ROXICODONE) immediate release tablet 10 mg, 10 mg, Oral, Q4H PRN  morphine (PF) injection 2 mg, 2 mg, Intravenous, Q2H PRN **OR** morphine (PF) injection 4 mg, 4 mg, Intravenous, Q2H PRN  polyethylene glycol (GLYCOLAX) packet 17 g, 17 g, Oral, Daily  ondansetron (ZOFRAN) injection 4 mg, 4 mg, Intravenous, Q8H PRN  metoprolol tartrate (LOPRESSOR) tablet 12.5 mg, 12.5 mg, Oral, BID  chlorhexidine (PERIDEX) 0.12 % solution 15 mL, 15 mL, Mouth/Throat, BID  metoprolol (LOPRESSOR) injection 2.5 mg, 2.5 mg, Intravenous, Q10 Min PRN  mupirocin (BACTROBAN) 2 % ointment, , Nasal, BID  atorvastatin (LIPITOR) tablet 40 mg, 40 mg, Oral, Nightly  aspirin EC tablet 81 mg, 81 mg, Oral, Daily  clopidogrel (PLAVIX) tablet 75 mg, 75 mg, Oral, Daily  insulin glargine (LANTUS) injection vial 14 Units, 0.25 Units/kg, Subcutaneous, Nightly  insulin lispro (HUMALOG) injection vial 0-6 Units, 0-6 Units, Subcutaneous, TID WC  insulin lispro (HUMALOG) injection vial 0-3 Units, 0-3 Units, Subcutaneous, Nightly  glucose (GLUTOSE) 40 % oral gel 15 g, 15 g, Oral, PRN  dextrose 50 % IV solution, 12.5 g, Intravenous, PRN  glucagon (rDNA) injection 1 mg, 1 mg, Intramuscular, PRN  dextrose 5 % solution, 100 mL/hr, Intravenous, PRN  vasopressin 20 Units in dextrose 5 % 100 mL infusion, 0.01 Units/min, Intravenous, Continuous  heparin (porcine) injection 3,800 Units, 3,800 Units, Intracatheter, PRN  albuterol (PROVENTIL) nebulizer solution 2.5 mg, 2.5 mg, Nebulization, Q4H PRN  albuterol (PROVENTIL) nebulizer solution 2.5 mg, 2.5 mg, Nebulization, Q4H WA  famotidine (PEPCID) tablet 20 mg, 20 mg, Oral, Daily  Prior to Admission medications    Medication Sig Start Date End Date Taking? Authorizing Provider   hydrALAZINE (APRESOLINE) 100 MG tablet Take 1 tablet by mouth 3 times daily 10/9/20  Yes Erwni Holt MD   carvedilol (COREG) 12.5 MG tablet Take 1 tablet by mouth 2 times daily (with meals) 10/9/20  Yes Erwin Holt MD   isosorbide mononitrate (IMDUR) 30 MG extended release tablet Take 1 tablet by mouth daily 10/10/20  Yes Erwin Holt MD   PARoxetine (PAXIL) 20 MG tablet Take 2 tablets by mouth every morning 10/9/20  Yes Erwin Holt MD   cloNIDine (CATAPRES) 0.2 MG/24HR PTWK Place 1 patch onto the skin once a week 10/10/20   Erwin Holt MD   lamoTRIgine (LAMICTAL) 25 MG tablet Take 25 mg by mouth 3 times daily    Historical Provider, MD   doxazosin (CARDURA) 4 MG tablet Take 4 mg by mouth 2 times daily    Historical Provider, MD   primidone (MYSOLINE) 50 MG tablet Take 100 mg by mouth daily    Historical Provider, MD   [facility]) 8.6-50 MG per tablet Take 2 tablets by mouth daily 3/6/18   Craig Pickard MD   pantoprazole (PROTONIX) 40 MG tablet Take 40 mg by mouth daily    Historical Provider, MD   B Complex Vitamins (B-COMPLEX/B-12) TABS Take 100 mg by mouth daily    Historical Provider, MD   gemfibrozil (LOPID) 600 MG tablet Take 600 mg by mouth 2 times daily (before meals). Historical Provider, MD        Allergies:  Ciprofloxacin    Social History:   TOBACCO:   reports that she has quit smoking. She has never used smokeless tobacco.  ETOH:   reports no history of alcohol use. DRUGS:   reports no history of drug use.   OCCUPATION:    Patient currently lives with family      Family History:        Problem Relation Age of Onset    Heart Disease Mother        REVIEW OF SYSTEMS:  CONSTITUTIONAL:  positive for  malaise and anorexia  HEENT:  negative  RESPIRATORY:  negative  CARDIOVASCULAR:  negative  GASTROINTESTINAL:  negative except for nausea and abdominal pain  GENITOURINARY:  negative  HEMATOLOGIC/LYMPHATIC:  negative  NEUROLOGICAL:  Negative  * All other ROS reviewed and negative. PHYSICAL EXAM:  VITALS:  BP (!) 145/64   Pulse 106   Temp 98 °F (36.7 °C) (Oral)   Resp 28   Ht 4' 11\" (1.499 m)   Wt 129 lb 10.1 oz (58.8 kg)   SpO2 98%   BMI 26.18 kg/m²   24HR INTAKE/OUTPUT:    I/O last 3 completed shifts: In: 597 [P.O.:400; I.V.:491]  Out: 340 [Urine:20; Chest Tube:320]  No intake/output data recorded.     CONSTITUTIONAL:  alert, severe distress and normal weight  EYES:  PERRL, sclera clear  ENT:  Normocephalic,atraumatic, without obvious abnormality  NECK:  supple, symmetrical, trachea midline  LUNGS: Resp effort easy and unlabored, clear to auscultation  CARDIOVASCULAR:  NO JVD,   and    ABDOMEN:  scars noted large midline scar, firm, distended, tenderness noted diffusely, involuntary guarding present, no masses palpated and ileal conduit in RLQ  MUSCULOSKELETAL: No clubbing or cyanosis, 0+ pitting edema lower extremities  NEUROLOGIC:  Mental Status Exam:  Level of Alertness:   awake  PSYCHIATRIC:   person, place, time  SKIN:  normal skin color, texture, turgor    DATA:    CBC:   Recent Labs     10/21/20  2226 10/22/20  0416 10/22/20  1100 10/23/20  0430   WBC 13.9* 13.8*  --  11.3*   HGB 8.9* 7.7* 9.2* 8.7*   HCT 27.8* 24.1* 28.1* 26.7*    134*  --  132*     BMP:    Recent Labs     10/21/20  1855  10/22/20  0416 10/22/20  1100 10/23/20  0430     --  139  --  134*   K 3.4*   < > 3.5 3.7 4.1     --  101  --  98*   CO2 24  --  25  --  21   BUN 10  --  14  --  29*   CREATININE 2.0*  --  2.8*  --  4.3*   GLUCOSE 149*  --  95  --  118* < > = values in this interval not displayed. Hepatic:   Recent Labs     10/21/20  0353   AST 19   ALT 9*   BILITOT <0.2   ALKPHOS 97     Mag:      Recent Labs     10/21/20  1855 10/22/20  0416 10/23/20  0430   MG 2.30 2.20 2.30      Phos:     Recent Labs     10/21/20  0353   PHOS 4.0      INR:   Recent Labs     10/21/20  0353 10/21/20  1225   INR 1.00 1.20*       Radiology Review: Images personally reviewed by me. IMPRESSION/RECOMMENDATIONS:    Acute abdomen of unclear etiology at this point.   Based on nature of her symptoms, I would be concerned about possible perforated viscus, such as a perforated duodenal ulcer.   - will get stat CT abd/pel (IV contrast if ok w/ Renal)   - further recs to follow    Electronically signed by Bettina Lockhart MD     [address]

## 2020-10-23 NOTE — PROGRESS NOTES
[address]  Cardiology  Progress Note    Admission date:  10/14/2020    Reason for follow up visit: CAD, CHF    HPI/CC: Patricia Pérez is a 66 y.o. female who was admitted 10/14/2020 for shortness of breath. Troponin elevated. LHC/RHC showed multi vessel CAD, moderate pulmonary HTN, referred for CABG. On 10/21/2020 she had CABG x3 MARLA clip. Rhythm has been sinus. Subjective: Post op day 2, feels more fatigued. No worsened chest pain or shortness of breath. Vitals:  Blood pressure (!) 118/52, pulse 87, temperature 97.2 °F (36.2 °C), resp. rate 25, height 4' 11\" (1.499 m), weight 129 lb 10.1 oz (58.8 kg), SpO2 93 %, not currently breastfeeding.   Temp  Av.3 °F (36.8 °C)  Min: 97.2 °F (36.2 °C)  Max: 99.2 °F (37.3 °C)  Pulse  Av.9  Min: 74  Max: 88  BP  Min: 91/38  Max: 129/64  SpO2  Av.4 %  Min: 89 %  Max: 100 %    24 hour I/O    Intake/Output Summary (Last 24 hours) at 10/23/2020 1323  Last data filed at 10/23/2020 0733  Gross per 24 hour   Intake 1215 ml   Output 305 ml   Net 910 ml     Current Facility-Administered Medications   Medication Dose Route Frequency Provider Last Rate Last Dose    cholestyramine (QUESTRAN) packet 4 g  1 packet Oral BID Sanjuanita Kapadia MD        sodium chloride flush 0.9 % injection 10 mL  10 mL Intravenous 2 times per day Yolanda Hurt MD   10 mL at 10/23/20 0901    sodium chloride flush 0.9 % injection 10 mL  10 mL Intravenous PRN Venus Menchaca MD        acetaminophen (TYLENOL) tablet 650 mg  650 mg Oral 4 times per day Yoladna Hurt MD   650 mg at 10/23/20 0635    acetaminophen (TYLENOL) suppository 650 mg  650 mg Rectal Q4H PRN Venus Menchaca MD        oxyCODONE (ROXICODONE) immediate release tablet 5 mg  5 mg Oral Q4H PRN Venus Moreno MD   5 mg at 10/22/20 1014    Or    oxyCODONE (ROXICODONE) immediate release tablet 10 mg  10 mg Oral Q4H PRN Venus Moreno MD   10 mg at 10/23/20 0415    morphine (PF) injection 2 mg  2 mg Intracatheter PRN Papi Ennis MD   3,800 Units at 10/21/20 1745    albuterol (PROVENTIL) nebulizer solution 2.5 mg  2.5 mg Nebulization Q4H PRN Venus Kirkland MD        albuterol (PROVENTIL) nebulizer solution 2.5 mg  2.5 mg Nebulization Q4H WA Venus Moreno MD   2.5 mg at 10/23/20 1138    famotidine (PEPCID) tablet 20 mg  20 mg Oral Daily Venus Moreno MD   20 mg at 10/23/20 0900     Review of Systems   Constitutional: Positive for fatigue. Respiratory: Negative. Cardiovascular: Negative. Gastrointestinal: Negative. Neurological: Negative. Objective:     Telemetry monitor: SR- ST, iLBBB    Physical Exam:  Constitutional:  Comfortable and alert, NAD, appears older than stated age  Eyes: PERRL, sclera nonicteric  Neck:  Supple, no masses, no thyroidmegaly, no JVD  Skin:  Warm and dry; no rash or lesions  Heart: Regular, normal apex, S1 and S2 normal, no M/G/R  Lungs:  Normal respiratory effort; clear; no wheezing/rhonchi/rales  Abdomen: soft, non tender, + bowel sounds  Extremities:  No edema or cyanosis; no clubbing  Neuro: alert and oriented, moves legs and arms equally, normal mood and affect    Data Reviewed:    CABG 10/21/2020:  CORONARY ARTERY BYPASS GRAFTING X3, INTERNAL MAMMARY ARTERY, SAPHENOUS VEIN GRAFT, LEFT ATRIAL APPENDAGE CLIP, ON PUMP, WITH BILATERAL 5 LEVEL INTERCOSTAL NERVE BLOCK    Coronary angiogram 10/14/2020:  1. Left heart catheterization  2. Selective left and right coronary angiogram  3. Right heart catheterization  Procedure Findings:  1. Severe multi vessel coronary artery diease  2. Normal left heart hemodynamics  3. Decompensated right heart hemodynamics with moderate pulmonary hypertension  Details:              Salomón Woodruff was brought to the cardiac catheterization lab in a fasting state after informed consent was obtained. If the patient was able to provide written consent, it was obtained.               The patient's vitals were monitored through out the procedure. The patient was sedated using the appropriate levels of sedation and ASA guidelines. The appropriate access site area was prepped and drapped in a sterile fashion. The area was anesthetized with 2% lidocaine. Using the modified Seldinger technique, an arterial sheath was introduced into the arterial access site using a single anterior wall puncture. The sheath was flushed and prepped in usual fashion. Catheters used during the procedure included 5 Grenadian TIG 4.0 catheter. The catheters were advanced and removed over a .035\" wire, into the appropriate positions. Multiple angiographic views were obtained. An LV gram was not obtained. Findings:  1. Left main coronary artery is diseased. There is 50% distal stenosis. It gave off the left anterior descending artery and left circumflex. 2. Left anterior descending artery has severe atherosclerotic disease. It was moderate in size. It gave off septal perforators and a moderate sized diagonal branch. The LAD covered the entire apex of the left ventricle.               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 3. Left circumflex has severe atherosclerotic disease. It was moderate in size. There was a moderate sized obtuse marginal branch.              ~70-90%  4. Right coronary artery has severe 70-90% atherosclerotic disease. It was moderate in size and was the dominant artery. 5. Left ventriculogram was not performed. Left ventricular end diastolic pressure is 18. Right heart catheterization  Right atrial pressure of 10  Right ventricular pressure of 44/10  Pulmonary artery pressure of 48/19 with a mean of 36  Pulmonary artery pressure wedge pressure of 25 with large V wave  Cardiac output 5.78  Cardiac index 3.92  SVR of 1052  PVR of 152  PA saturation 66%  RA saturation 69%  Aortic saturation 97%  CONCLUSIONS:  1. Severe multi-vessel coronary artery disease  ASSESSMENT/RECOMMENDATIONS:  1.   Admit to the hospital for medical stabilization and cardiac surgery consultation for coronary artery bypass grafting surgery. Echo 1/24/7189:  LV systolic function is normal with EF estimated at 55%. No regional wall motion abnormalities are noted. There is mild concentric left ventricular hypertrophy. Grade II diastolic dysfunction with elevated filing pressure. The left atrium is mildly dilated. Mild mitral annular calcification is present. Mild mitral, aortic, tricuspid, and pulmonic regurgitation. Systolic pulmonary artery pressure (SPAP) estimated at 41 mmHg (RA pressure   8 mmHg) c/w mild pulm HTN.     Lab Reviewed:     Renal Profile:  Lab Results   Component Value Date    CREATININE 4.3 10/23/2020    BUN 29 10/23/2020     10/23/2020    K 4.1 10/23/2020    K 3.4 10/16/2020    CL 98 10/23/2020    CO2 21 10/23/2020     CBC:    Lab Results   Component Value Date    WBC 11.3 10/23/2020    RBC 2.94 10/23/2020    HGB 8.7 10/23/2020    HCT 26.7 10/23/2020    MCV 90.7 10/23/2020    RDW 16.0 10/23/2020     10/23/2020     BNP:    Lab Results   Component Value Date    PROBNP 34,575 10/12/2020    PROBNP 23,650 09/30/2020    PROBNP 5,200 09/20/2020    PROBNP 1,463 12/30/2017     Fasting Lipid Panel:    Lab Results   Component Value Date    CHOL 104 10/21/2020    HDL 44 10/21/2020    TRIG 142 10/21/2020     Cardiac Enzymes:  CK/MbTroponin  Lab Results   Component Value Date    TROPONINI 0.54 10/13/2020     PT/ INR   Lab Results   Component Value Date    INR 1.20 10/21/2020    INR 1.00 10/21/2020    INR 1.09 10/08/2020    PROTIME 14.0 10/21/2020    PROTIME 11.6 10/21/2020    PROTIME 12.7 10/08/2020     PTT No results found for: PTT   Lab Results   Component Value Date    MG 2.30 10/23/2020      Lab Results   Component Value Date    TSH 1.86 10/12/2020       All labs and imaging reviewed today    Assessment:  Shortness of breath  NSTEMI/CAD: s/p CABG x3 10/21/2020  S/p MARLA clip 10/21/2020  Acute on chronic diastolic CHF: ongoing, fluid management per HD  Pulmonary HTN: moderate on cath 10/14/2020  Tahcycardia  iLBBB  HTN: labile  HLD  ESRD  Bladder cancer  Anemia    Plan:   1. Continue aspirin, statin, plavix  2. Beta blocker as hemodynamics allow  3. HD today  4. Post op care per CT surgery  5. Cardiology will follow peripherally, please call with questions.      ANNELIESE Beal-CNP  [facility]  [phone]

## 2020-10-23 NOTE — PROGRESS NOTES
HD ended and pt became agitated, nauseous, and hypertensive. C/o abd pain of 9/10. Administered 2 mg IV morphine and Zofran. Dr. Kailey Yadav notified within 30 minutes that she is hypertensive (vaso stopped at 1200) and ordered 5 mg hydralazine and cardine gtt. 2 mg IV morphine given again and pt still nauseous and painful. Will monitor.

## 2020-10-23 NOTE — DISCHARGE SUMMARY
Name:  Carin Gutierrez  Room:  3007/3007-01  MRN:    [id number]    Discharge Summary      This discharge summary is in conjunction with a complete physical exam done on the day of discharge. Attending Physician: RUIZ Sprague Discharging Physician: WILLY Sprague. Admit: 10/12/2020  Discharge:  10/14/2020    Diagnoses this Admission    Active Problems:    Essential hypertension    CHF (congestive heart failure), NYHA class I, acute on chronic, combined ([address]    CHF with unknown LVEF (HCC)    Heart failure exacerbated by sotalol ([address]    Acute on chronic congestive heart failure ([address]    ESRD (end stage renal disease) ([address]    Acute respiratory failure with hypoxia and hypercapnia (HCC)  Resolved Problems:    * No resolved hospital problems. *          Procedures (Please Review Full Report for Details)       XR CHEST PORTABLE   Final Result   Cardiomegaly with interstitial changes, likely interstitial edema and CHF. Left basilar atelectasis.             Consults    Cards   nephro    HPI:     66 y.o. female who presented to [facility] with past medical history of bladder cancer status post remote urostomy, hypertension, hyperlipidemia, right nephrectomy status post non-tunneled HD catheter placement on 10/1/2020 on dialysis Tuesday Thursday and Saturday presented to the ED for worsening shortness of breath. Patient reported that she has been having worsening progressively shortness of breath the past few days with orthopnea and has to stay up to be able to breathe better, patient has not been able to sleep last few days otherwise denied coughing sputum production chest pain abdominal pain diarrhea nausea vomiting. Patient does report that she drinks a lot of water but no salt.   Shortness of breath is worsened on exertion and minimally alleviated on rest. CODE STATUS was discussed and the patient is a full code        Hospital Course  Acute Hypoxic and Hypercapnic Respiratory Failure  - 2/2 CHF  - CXR with cardiomegaly with interstitial changes  - Admitted to ICU, tele  - started on BiPAP, mgmt as below  Now on 3 L of O2  - critical care consulted     Acute on Chronic Diastolic CHF  Acute Pulmonary Edema  - last echo 9/20/20: normal EF, grade II DD  - currently NPO, daily weights, I&Os  - Lasix 40 mg BID  - cards involved as below  Transfer to [facility] for cath      ESRD on HD  Hx of Bladder Cancer s/p cystectomy w/ ileal conduit  - Cr on admission: 3.4  - previously 1.5 on 10/9; baseline ~ 1.1; hx of bladder removal and non-functioning left kidney  - on prior admission, pt was started on HD, has tunneled HD catheter  - [address]: T, Th, S; last HD on 10/10  - nephro consulted     Hyperkalemia - Resolved  - 5.6 on admission   - Ca Gluconate given  - repeat 4.3      Elevated troponin  Likely secondary to ESRD and ddemand ischemia  - unclear but suspect type II in setting of CHF AE  - trop: 0.38 >  0.32  - trend troponin  - start ASA, heparin gtt, on Imdur  - cardiology consulted     Normocytic Anemia  - no signs/symptoms of bleeding; Hgb 8.7 on admission  - reviewed iron panel showing ferritin 34.6, Iron sat 13 - consistent with iron deficiency; baseline Hgb ~ 9.5  - check Hemoccult - pending  - given 1 dose Venofer     HTN  - controlled  - cont Clonidine, Cardura, Imdur  - monitor     HLD  - cont Lopid     GERD  - cont Protonix         Discharge Medications     Medication List      ASK your doctor about these medications    B-Complex/B-12 Tabs     carvedilol 12.5 MG tablet  Commonly known as:  COREG  Take 1 tablet by mouth 2 times daily (with meals)     cloNIDine 0.2 MG/24HR Ptwk  Commonly known as:  CATAPRES  Place 1 patch onto the skin once a week     doxazosin 4 MG tablet  Commonly known as:  CARDURA     gemfibrozil 600 MG tablet  Commonly known as:  LOPID     hydrALAZINE 100 MG tablet  Commonly known as:  APRESOLINE  Take 1 tablet by mouth 3 times daily     isosorbide mononitrate 30 MG extended release tablet  Commonly known as:  IMDUR  Take 1 tablet by mouth daily     lamoTRIgine 25 MG tablet  Commonly known as:  LAMICTAL     pantoprazole 40 MG tablet  Commonly known as:  PROTONIX     PARoxetine 20 MG tablet  Commonly known as:  PAXIL  Take 2 tablets by mouth every morning     primidone 50 MG tablet  Commonly known as:   MYSOLINE     senna-docusate 8.6-50 MG per tablet  Commonly known as:  Senokot S  Take 2 tablets by mouth daily              Discharge Condition/Location: transfer to [facility] for cath in stable condition      [facility] 10/23/2020 2:34 PM

## 2020-10-23 NOTE — PROGRESS NOTES
Patient with minimal chest tube output until standing for weight, then increased output, see flowsheet for output [id number]

## 2020-10-23 NOTE — PROGRESS NOTES
Nephrology Progress Note   [email or web address]      This patient is a 66year old female whom we are following for DELMER, HD dependent. Subjective:    Sitting up in chair. On room air. She is in pain. Not feeling well. Family History: No family at bedside  ROS: No nausea or vomiting, oliguric       Vitals:  BP (!) 121/46   Pulse 83   Temp 97.9 °F (36.6 °C) (Oral)   Resp 22   Ht 4' 11\" (1.499 m)   Wt 129 lb 10.1 oz (58.8 kg)   SpO2 93%   BMI 26.18 kg/m²   I/O last 3 completed shifts: In: 805 [P.O.:400; I.V.:491]  Out: 340 [Urine:20; Chest Tube:320]  No intake/output data recorded. Physical Exam:  Vitals signs reviewed. Constitutional:       General: She is not in acute distress. Intubated      Appearance: Normal appearance. HENT:      Head: Normocephalic and atraumatic. Mouth/Throat:      Mouth: Mucous membranes are moist.   Eyes:      General: No scleral icterus. Conjunctiva/sclera: Conjunctivae normal.   Cardiovascular:      Rate and Rhythm: Normal rate. Rhythm irregular. Heart sounds: No friction rub. Pulmonary:      Effort: Pulmonary effort is normal. No respiratory distress. Breath sounds: No wheezing. Abdominal:      General: Bowel sounds are normal. There is no distension. Tenderness: There is no abdominal tenderness. Musculoskeletal:      Right lower leg: No edema. Left lower leg: No edema. Skin:     General: Skin is warm.    Neurological:      Mental Status: She is somnolent on vent     Access: RIJ TDC      Medications:   cholestyramine  1 packet Oral BID    sodium chloride flush  10 mL Intravenous 2 times per day    acetaminophen  650 mg Oral 4 times per day    polyethylene glycol  17 g Oral Daily    metoprolol tartrate  12.5 mg Oral BID    chlorhexidine  15 mL Mouth/Throat BID    mupirocin   Nasal BID    atorvastatin  40 mg Oral Nightly    aspirin  81 mg Oral Daily    clopidogrel  75 mg Oral Daily    insulin glargine  0.25 Units/kg Subcutaneous Nightly    insulin lispro  0-6 Units Subcutaneous TID     insulin lispro  0-3 Units Subcutaneous Nightly    albuterol  2.5 mg Nebulization Q4H WA    famotidine  20 mg Oral Daily         Labs:  Recent Labs     10/21/20  2226 10/22/20  0416 10/22/20  1100 10/23/20  0430   WBC 13.9* 13.8*  --  11.3*   HGB 8.9* 7.7* 9.2* 8.7*   HCT 27.8* 24.1* 28.1* 26.7*   MCV 91.3 91.8  --  90.7    134*  --  132*     Recent Labs     10/21/20  0353  10/21/20  1855  10/22/20  0416 10/22/20  1100 10/23/20  0430   *   < > 139  --  139  --  134*   K 4.7   < > 3.4*   < > 3.5 3.7 4.1   CL 98*   < > 102  --  101  --  98*   CO2 27   < > 24  --  25  --  21   GLUCOSE 99   < > 149*  --  95  --  118*   PHOS 4.0  --   --   --   --   --   --    MG  --   --  2.30  --  2.20  --  2.30   BUN 15   < > 10  --  14  --  29*   CREATININE 3.3*   < > 2.0*  --  2.8*  --  4.3*   LABGLOM 14*   < > 24*  --  16*  --  10*   GFRAA 16*   < > 29*  --  20*  --  12*    < > = values in this interval not displayed. Assessment/Plan:    DELMER on CKD Stage 3.  - DELMER from ATN, currently HD dependent Tuesday Thursday Saturday at [facility]. ATN had developed early this month in the setting of severe diarrhea[facility] - Chronic kidney disease stage III from obstructive nephropathy. Follows with Dr. Denzel Cheema in the office.  - No signs of renal recovery.  Remains dialysis dependent          NSTEMI. - [facility] on 10/14 with multi-vessel CAD (Wedge pressure of 25, LVEDP 18)  - S/p CABG          Acute on chronic CHF.     History of bladder cancer status post ileal conduit.     Hyponatremia.  - Stable on dialysis.     Anemia.    - Weekly FLAQUITO with HD qThursday     Hypertension.  - BP medications on hold post op     PLAN:  - HD today, no UF  - Follow labs  - Monitor volume status     Clark MD Marco Antonio  10/23/2020  [facility]

## 2020-10-23 NOTE — PROGRESS NOTES
Patient up to scale with 2 nurse assist, max assist, patient not able to stand long enough to pivot to chair, legs weak and patient attempting to sit down quickly, patient returned to bed. Patient with immediate incontinence of green loose stool upon standing.  Patient bathed and repositioned Guillermina

## 2020-10-23 NOTE — PROGRESS NOTES
Patients blood sugar 93, Insulin gtt stopped, will recheck in 1 hour, Lantus given at 2000 Guillermina

## 2020-10-24 NOTE — ANESTHESIA PRE PROCEDURE
Department of Anesthesiology  Preprocedure Note       Name:  Maykel Mancera   Age:  66 y.o.  :  1945                                          MRN:  [id number]         Date:  10/24/2020      Surgeon: Arnold Palma):  Apurva Alejo MD    Procedure: Procedure(s):  LAPAROTOMY EXPLORATORY, POSSIBLE BOWEL RESECTION    Medications prior to admission:   Prior to Admission medications    Medication Sig Start Date End Date Taking? Authorizing Provider   hydrALAZINE (APRESOLINE) 100 MG tablet Take 1 tablet by mouth 3 times daily 10/9/20  Yes Margie Billings MD   carvedilol (COREG) 12.5 MG tablet Take 1 tablet by mouth 2 times daily (with meals) 10/9/20  Yes Margie Billings MD   isosorbide mononitrate (IMDUR) 30 MG extended release tablet Take 1 tablet by mouth daily 10/10/20  Yes Margie Billings MD   PARoxetine (PAXIL) 20 MG tablet Take 2 tablets by mouth every morning 10/9/20  Yes Margie Billings MD   cloNIDine (CATAPRES) 0.2 MG/24HR PTWK Place 1 patch onto the skin once a week 10/10/20   Margie Billings MD   lamoTRIgine (LAMICTAL) 25 MG tablet Take 25 mg by mouth 3 times daily    Historical Provider, MD   doxazosin (CARDURA) 4 MG tablet Take 4 mg by mouth 2 times daily    Historical Provider, MD   primidone (MYSOLINE) 50 MG tablet Take 100 mg by mouth daily    Historical Provider, MD rosenberg-docusate Apurva Halim S) 8.6-50 MG per tablet Take 2 tablets by mouth daily 3/6/18   Apurva Alejo MD   pantoprazole (PROTONIX) 40 MG tablet Take 40 mg by mouth daily    Historical Provider, MD   B Complex Vitamins (B-COMPLEX/B-12) TABS Take 100 mg by mouth daily    Historical Provider, MD   gemfibrozil (LOPID) 600 MG tablet Take 600 mg by mouth 2 times daily (before meals).       Historical Provider, MD       Current medications:    Current Facility-Administered Medications   Medication Dose Route Frequency Provider Last Rate Last Dose    niCARdipine (CARDENE) 25 mg in dextrose 5 % 250 mL infusion 5 mg/hr Intravenous Continuous Venus Patrick MD   Stopped at 10/23/20 2010    metronidazole (FLAGYL) 500 mg in NaCl 100 mL IVPB premix  500 mg Intravenous Ashli West MD   Stopped at 10/23/20 2340    0.9 % sodium chloride infusion   Intravenous Continuous Pily Singh MD 75 mL/hr at 10/23/20 2235      meropenem (MERREM) 500 mg IVPB (mini-bag)  500 mg Intravenous Q24H Pily Singh MD   Stopped at 10/23/20 2340    acetaminophen (TYLENOL) suppository 650 mg  650 mg Rectal Q6H PRN Pily Singh MD        famotidine (PEPCID) injection 20 mg  20 mg Intravenous BID Pily Singh MD   20 mg at 10/24/20 0012    sodium chloride flush 0.9 % injection 10 mL  10 mL Intravenous 2 times per day Valarie Ardon MD   10 mL at 10/23/20 2255    sodium chloride flush 0.9 % injection 10 mL  10 mL Intravenous PRN Venus Patrick MD        acetaminophen (TYLENOL) suppository 650 mg  650 mg Rectal Q4H PRN Venus Patrick MD        morphine (PF) injection 2 mg  2 mg Intravenous Q2H PRN Venus Patrick MD   2 mg at 10/23/20 1922    Or    morphine (PF) injection 4 mg  4 mg Intravenous Q2H PRN Veuns Moreno MD   4 mg at 10/24/20 0011    ondansetron (ZOFRAN) injection 4 mg  4 mg Intravenous Q8H PRN Venus Moreno MD   4 mg at 10/23/20 1526    metoprolol tartrate (LOPRESSOR) tablet 12.5 mg  12.5 mg Oral BID Valarie Ardon MD   12.5 mg at 10/23/20 2258    chlorhexidine (PERIDEX) 0.12 % solution 15 mL  15 mL Mouth/Throat BID Venus Moreno MD   15 mL at 10/23/20 0901    metoprolol (LOPRESSOR) injection 2.5 mg  2.5 mg Intravenous Q10 Min PRN Venus Moreno MD        mupirocin (BACTROBAN) 2 % ointment   Nasal BID Venus Patrick MD        aspirin EC tablet 81 mg  81 mg Oral Daily Venus Moreno MD   81 mg at 10/23/20 0901    clopidogrel (PLAVIX) tablet 75 mg  75 mg Oral Daily Venus Moreno MD   75 mg at 10/23/20 0900    insulin glargine (LANTUS) injection vial 14 Units  0.25 Units/kg Subcutaneous Nightly Venus Alfaro MD   14 Units at 10/23/20 2309    insulin lispro (HUMALOG) injection vial 0-6 Units  0-6 Units Subcutaneous TID WC Venus Alfaro MD   1 Units at 10/23/20 1315    insulin lispro (HUMALOG) injection vial 0-3 Units  0-3 Units Subcutaneous Nightly Venus Moreno MD   1 Units at 10/23/20 2310    glucose (GLUTOSE) 40 % oral gel 15 g  15 g Oral PRN Venus Moreno MD        dextrose 50 % IV solution  12.5 g Intravenous PRN Venus Alfaro MD        glucagon (rDNA) injection 1 mg  1 mg Intramuscular PRN Venus Moreno MD        dextrose 5 % solution  100 mL/hr Intravenous PRN Venus Alfaro MD        vasopressin 20 Units in dextrose 5 % 100 mL infusion  0.01 Units/min Intravenous Continuous Venus Moreno MD 3 mL/hr at 10/24/20 0401 0.01 Units/min at 10/24/20 0401    heparin (porcine) injection 3,800 Units  3,800 Units Intracatheter PRN Jostin Ghotra MD   3,800 Units at 10/21/20 1745    albuterol (PROVENTIL) nebulizer solution 2.5 mg  2.5 mg Nebulization Q4H PRN Venus Alfaro MD        albuterol (PROVENTIL) nebulizer solution 2.5 mg  2.5 mg Nebulization Q4H WA Venus Moreno MD   2.5 mg at 10/23/20 2145       Allergies:     Allergies   Allergen Reactions    Ciprofloxacin Hives       Problem List:    Patient Active Problem List   Diagnosis Code    Hematuria R31.9    Acute renal failure (ARF) ([facility]) N17.9    Chronic calculous cholecystitis K80.10    Hydronephrosis N13.30    Acute pyelonephritis N10    DELMER (acute kidney injury) ([facility] [address] K53.3    Complicated UTI (urinary tract infection) N39.0    GERD (gastroesophageal reflux disease) K21.9    Hyperkalemia E87.5    Essential hypertension I10    Chronic kidney disease, stage 3 N18.30    Chest pain R07.9    Anxiety F41.9    Acute diastolic CHF (congestive heart failure) ([facility]) I50.31    Acute respiratory failure with hypoxia ([facility]) J96.01    Chronic diastolic CHF (congestive heart failure) ([facility]) I50.32    Increased anion gap metabolic acidosis E46.9    Anemia, normocytic normochromic D64.9    Acute kidney injury superimposed on CKD ([facility]) N17.9, N18.9    Acute on chronic diastolic CHF (congestive heart failure) ([facility]) I50.33    Mild protein-calorie malnutrition ([facility]) E44.1    CHF (congestive heart failure), NYHA class I, acute on chronic, combined ([facility]) I50.43    CHF with unknown LVEF ([facility]) I50.9    Heart failure exacerbated by sotalol (HCC) I50.9    Acute on chronic congestive heart failure ([facility]) I50.9    ESRD (end stage renal disease) ([facility]) N18.6    Acute respiratory failure with hypoxia and hypercapnia ([facility]) J96.01, J96.02    NSTEMI (non-ST elevated myocardial infarction) ([facility]) I21.4    CAD in native artery I25.10    Watery diarrhea R19.7    Pneumatosis coli K63.89       Past Medical History:        Diagnosis Date    Acid reflux     Arthritis     CAD in native artery 10/14/2020    Cancer [facility])     bladder cancer    CHF (congestive heart failure) ([facility])     Chronic kidney disease     Hemodialysis patient ([facility] [address]     Hx of blood clots     Hyperlipidemia     Hypertension     Kidney stone        Past Surgical History:        Procedure Laterality Date    ABDOMEN SURGERY      APPENDECTOMY      BLADDER STONE REMOVAL      BLADDER SURGERY      pt had bladder cancer    CATARACT REMOVAL WITH IMPLANT  8/26/11    RIGHT    CHOLECYSTECTOMY  03/06/2018    COLONOSCOPY      CORONARY ARTERY BYPASS GRAFT N/A 10/21/2020    CORONARY ARTERY BYPASS GRAFTING X3, INTERNAL MAMMARY ARTERY, SAPHENOUS VEIN GRAFT, LEFT ATRIAL APPENDAGE CLIP, ON PUMP, WITH BILATERAL 5 LEVEL INTERCOSTAL NERVE BLOCK performed by Valarie Ardon MD at [address] [facility]      IR NONTUNNELED VASCULAR CATHETER  10/1/2020    IR NONTUNNELED VASCULAR CATHETER 10/1/2020 [facility]    IR TUNNELED CATHETER PLACEMENT GREATER THAN 5 YEARS  10/8/2020    IR TUNNELED CATHETER PLACEMENT GREATER THAN 5 YEARS 10/8/2020 [facility]    OTHER SURGICAL HISTORY  05/08/2015    phacemilsification of cataract with intraocular lens implant left eye       Social History:    Social History     Tobacco Use    Smoking status: Former Smoker    Smokeless tobacco: Never Used    Tobacco comment: quit 25 years ago   Substance Use Topics    Alcohol use: No                                Counseling given: Not Answered  Comment: quit 25 years ago      Vital Signs (Current):   Vitals:    10/24/20 0000 10/24/20 0100 10/24/20 0200 10/24/20 0300   BP: 114/65 (!) 105/56 (!) 108/54 (!) 95/55   Pulse: 108 109 110 107   Resp: (!) 34 30 (!) 40 30   Temp: 97.8 °F (36.6 °C)      TempSrc: Oral      SpO2: 95% 94% 95% 94%   Weight:       Height:                                                  BP Readings from Last 3 Encounters:   10/24/20 (!) 95/55   10/21/20 (!) 126/103   10/14/20 (!) 164/80       NPO Status:                                                                                 BMI:   Wt Readings from Last 3 Encounters:   10/23/20 129 lb 10.1 oz (58.8 kg)   10/14/20 118 lb 13.3 oz (53.9 kg)   10/09/20 135 lb 3.2 oz (61.3 kg)     Body mass index is 26.18 kg/m².     CBC:   Lab Results   Component Value Date    WBC 7.2 10/24/2020    RBC 3.07 10/24/2020    HGB 9.1 10/24/2020    HCT 27.8 10/24/2020    MCV 90.8 10/24/2020    RDW 15.6 10/24/2020     10/24/2020       CMP:   Lab Results   Component Value Date     10/24/2020    K 4.2 10/24/2020    K 3.4 10/16/2020    CL 95 10/24/2020    CO2 21 10/24/2020    BUN 22 10/24/2020    CREATININE 2.8 10/24/2020    GFRAA 20 10/24/2020    GFRAA 41 11/19/2011    AGRATIO 1.4 10/12/2020    LABGLOM 16 10/24/2020    GLUCOSE 89 10/24/2020    PROT 5.7 10/21/2020    PROT 8.0 11/19/2011    CALCIUM 9.3 10/24/2020    BILITOT <0.2 10/21/2020    ALKPHOS 97 10/21/2020    AST 19 10/21/2020    ALT 9 10/21/2020       POC Tests:   Recent Labs     10/21/20  1217  10/21/20  1817  10/23/20  1639   POCGLU 186*   < > 130* < > 138*   POCNA 131*  --   --   --   --    POCK 6.0*  --  2.8*  --   --    POCHCT 27.0*  --  28.0*  --   --     < > = values in this interval not displayed. Coags:   Lab Results   Component Value Date    PROTIME 14.0 10/21/2020    INR 1.20 10/21/2020    APTT 35.6 10/21/2020       HCG (If Applicable): No results found for: PREGTESTUR, PREGSERUM, HCG, HCGQUANT     ABGs:   Lab Results   Component Value Date    PHART 7.385 10/24/2020    PO2ART 72.9 10/24/2020    GRI2PTT 35.2 10/24/2020    JQI9BLV 20.6 10/24/2020    BEART -3.9 10/24/2020    F8XFQOVZ 94.4 10/24/2020        Type & Screen (If Applicable):  No results found for: LABABO, LABRH    Drug/Infectious Status (If Applicable):  No results found for: HIV, HEPCAB    COVID-19 Screening (If Applicable):   Lab Results   Component Value Date    COVID19 Not Detected 10/13/2020         Anesthesia Evaluation  Patient summary reviewed and Nursing notes reviewed no history of anesthetic complications:   Airway: Mallampati: II     Neck ROM: limited   Dental:          Pulmonary:                              Cardiovascular:    (+) hypertension:, past MI:, CAD:, CABG/stent (10/21/2020):, CHF:,                   Neuro/Psych:               GI/Hepatic/Renal:   (+) GERD:,           Endo/Other:                     Abdominal:           Vascular:                                        Anesthesia Plan      general     ASA 5 - emergent     (Medications & allergies reviewed  All available lab & EKG data reviewed  Prognosis poor  Severe metabolic acidosis, hypotension SBP 60s  Pt nearly obtunded)  Induction: intravenous and rapid sequence. MIPS: Postoperative ventilation. Anesthetic plan and risks discussed with Unable to obtain due to emergent nature (Pt near obtundation).                     Indio Frank MD   10/24/2020

## 2020-10-24 NOTE — OP NOTE
[address] [facility]                                OPERATIVE REPORT    PATIENT NAME: Noe Brewer                     :        1945  MED REC NO:   [id number]                          ROOM:       3817  ACCOUNT NO:   [de-identified]                           ADMIT DATE: 10/14/2020  PROVIDER:     Martell Vale MD    DATE OF PROCEDURE:  10/24/2020    LOCATION:  [facility]. OPERATION PERFORMED:  Exploratory laparotomy with lysis of adhesions. PREOPERATIVE DIAGNOSIS:  Pneumatosis intestinalis. 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 SURGEON:  Martell Vale MD    ANESTHESIA:  General.    COMPLICATIONS:  None. ESTIMATED BLOOD  LOSS:  Less than 100 mL. INDICATIONS FOR THE OPERATION:  A 47-year-old female, critically ill,  who required urgent coronary artery bypass grafting three days ago. Yesterday she had hemodialysis and developed acute abdominal pain and a  tender abdomen on examination after dialysis. CT imaging revealed  critical findings of intrahepatic gas and pneumatosis intestinalis. The  patient's abdominal exam was tender without overt peritoneal signs. She  was awake and alert. Vital signs were stable. She was not on blood  pressure support. The patient had a complicated past surgical history  including radiation treatments for previous history of bladder cancer. She had an urostomy in place. The patient's acid base status was stable  as well. A long discussion was had with the patient and her family and we elected  to initiate aggressive IV fluids and antibiotics and follow trends of  labs and examine clinical condition. Over the next three hours, her  clinical condition deteriorated as well as her labs. At this point, the  only option for survival was operative intervention.   The patient  clearly understood from the first discussion that there was a high risk  of mortality with both nonoperative and operative interventions. Given  that the circumstances changed and the family wanted everything possible  done, we elected to proceed with laparotomy. DESCRIPTION OF THE OPERATION:  The patient was brought to the operating  room. I was not in the room, but Anesthesia reported a massive  aspiration during induction despite preventive measures. The patient  was intubated. She required blood pressure support throughout the case. She was prepped and draped in the usual surgical sterile fashion. A midline incision was made. There was difficulty entering the abdomen  due to marked adhesions and scar tissue from previous radiation  treatments and a major abdominal surgery. Once I got into the abdomen,  there was irreversible ischemia identified in the majority of the  intestine. It was very difficult to even separate loops. The integrity  of the tissue was not compatible with any kind of manipulation or  dissection. Loops tore with simple palpation. The amount of scar  tissue and dense adhesions were incapable of me continuing dissection. Despite trying to find any loop of bowel that was seemingly viable, I  was unsuccessful. After lysing some adhesions and unable to identify viable bowel and  having significant issues with the integrity of the bowel, I elected to  go talk to the family intraoperatively. I talked to two daughters and a  son and explained the intraoperative findings. I explained that her  current situation was not survivable. We had a good discussion and a  decision was made to not pursue any kind of CPR or defibrillation. We  continued intubation and blood pressure support. I closed her abdomen with a #2 nylon and tied it in the middle. This  was a basic closure to allow her to get over to the ICU where family  could gather for final visitation.     DISPOSITION:  The patient was transferred to the ICU in terminal  condition to have family gather prior to withdrawing support.         Leobardo Hickman MD    D: 10/24/2020 8:25:54       T: 10/24/2020 12:34:14     [email or web address]  Job#: [id number]     Doc#: [id number]    CC:  Khanh Gatica _____

## 2020-10-24 NOTE — PROGRESS NOTES
Assumed care of pt. Received report via ALEC, RN. VSS. 4 eye skin assessment complete. MAR handoff complete, cardene started to be weaned d/t stable SBP in the 110's. Lines verified. Awaiting general surgeon to be bedside to discuss CT findings and treatment options. Family coming bedside. Pt resting in bed, complaining of abdominal pain. Shift assessment to follow. Will continue to monitor.

## 2020-10-24 NOTE — PROGRESS NOTES
Pt noted to have asystole on bedside monitor. Physical assessment confirmed absence of a pulse and breathing. Dr. Crispin Campbell on unit and notified of same. Castro Bryan notified of same and reference number [id number] received. House  notified.

## 2020-10-24 NOTE — PROGRESS NOTES
Pt's BP starting to trend downward. SBP currently in the low 80's. Pt has very labored breathing and having difficulty having a conversation with staff. Will continue to monitor.

## 2020-10-24 NOTE — PLAN OF CARE
Problem: Skin Integrity:  Goal: Will show no infection signs and symptoms  Description: Will show no infection signs and symptoms  Outcome: Ongoing  Goal: Absence of new skin breakdown  Description: Absence of new skin breakdown  Outcome: Ongoing     Problem: Falls - Risk of:  Goal: Will remain free from falls  Description: Will remain free from falls  Outcome: Ongoing  Goal: Absence of physical injury  Description: Absence of physical injury  Outcome: Ongoing     Problem: Pain:  Goal: Pain level will decrease  Description: Pain level will decrease  Outcome: Ongoing  Goal: Control of acute pain  Description: Control of acute pain  Outcome: Ongoing  Goal: Control of chronic pain  Description: Control of chronic pain  Outcome: Ongoing     Problem: Nutrition  Goal: Optimal nutrition therapy  Outcome: Ongoing     Problem: Discharge Planning:  Goal: Discharged to appropriate level of care  Description: Discharged to appropriate level of care  Outcome: Ongoing     Problem:  Activity Intolerance:  Goal: Able to perform prescribed physical activity  Description: Able to perform prescribed physical activity  Outcome: Ongoing  Goal: Ability to tolerate increased activity will improve  Description: Ability to tolerate increased activity will improve  Outcome: Ongoing     Problem: Anxiety:  Goal: Level of anxiety will decrease  Description: Level of anxiety will decrease  Outcome: Ongoing     Problem: Cardiac Output - Decreased:  Goal: Cardiac output within specified parameters  Description: Cardiac output within specified parameters  Outcome: Ongoing  Goal: Hemodynamic stability will improve  Description: Hemodynamic stability will improve  Outcome: Ongoing     Problem: Fluid Volume - Imbalance:  Goal: Ability to achieve a balanced intake and output will improve  Description: Ability to achieve a balanced intake and output will improve  Outcome: Ongoing  Goal: Chest tube drainage is within specified parameters  Description: Chest tube drainage is within specified parameters  Outcome: Ongoing     Problem: Gas Exchange - Impaired:  Goal: Levels of oxygenation will improve  Description: Levels of oxygenation will improve  Outcome: Ongoing  Goal: Ability to maintain adequate ventilation will improve  Description: Ability to maintain adequate ventilation will improve  Outcome: Ongoing     Problem: Pain:  Goal: Pain level will decrease  Description: Pain level will decrease  Outcome: Ongoing  Goal: Control of acute pain  Description: Control of acute pain  Outcome: Ongoing  Goal: Control of chronic pain  Description: Control of chronic pain  Outcome: Ongoing     Problem: Tissue Perfusion - Cardiopulmonary, Altered:  Goal: Absence of angina  Description: Absence of angina  Outcome: Ongoing  Goal: Hemodynamic stability will improve  Description: Hemodynamic stability will improve  Outcome: Ongoing  Goal: Will show no evidence of cardiac arrhythmias  Description: Will show no evidence of cardiac arrhythmias  Outcome: Ongoing     Problem: Tobacco Use:  Goal: Will participate in inpatient tobacco-use cessation counseling  Description: Will participate in inpatient tobacco-use cessation counseling  Outcome: Ongoing

## 2020-10-24 NOTE — PROGRESS NOTES
[employer] notified of pt's passing. Pt ok'd to be released to  home per [place] with [employer] office.

## 2020-10-24 NOTE — PROGRESS NOTES
Patient with increase in respiratory rate, persistent pain, BP trending down and lactic acid trending up. She is awake and alert. Abdomen remains tender R side with anasarca. Discussed the situation with the patient and her son. Only option for survival is abdominal exploration understanding the huge risk profile associated with surgical intervention. Emphasized again to the patient's son the critical nature of this patient's illness and the high associated risk of death. Plan to proceed with laparotomy, bowel resection and possible ostomy. 45 minutes spent at the bedside, reviewing imaging and labs and formulating a treatment plan with the nurses and physicians for this patient with life threatening illness.       Greater than 50% visit spent counseling about diagnosis, treatment plan and expected post operative course.

## 2020-10-24 NOTE — PROGRESS NOTES
Consent signed via eldest son after Dr Joesph Sanford spoke with him and pt. Will continue to monitor.

## 2020-10-24 NOTE — PROGRESS NOTES
Pt started complaining of shortness of breath. Stating that she is having trouble taking a deep breath. Pt repositioned and sat in high romero position. O2 increased. Pt states those interventions listed above seem to be helping. Will continue to monitor.

## 2020-10-24 NOTE — PROGRESS NOTES
Intra operative findings were irreversible diffuse intestinal ischemia with massive scar tissue from previous major abdominal surgery and radiation treatments. Tissue integrity was incompatible with dissection and resection. Patient had massive aspiration during anesthesia induction despite preventive measures. I talked to the son and two daughters intra operatively and explained the situation was not survivable. They were agreeable to no CPR, no defib. Will continue BP support and intubation to get the patient to the ICU and allow them to gather family. Withdraw of support was discussed and will evolve as the family comes together through the morning. Dr. Virginia Dubin updated.

## 2020-10-24 NOTE — PROGRESS NOTES
Pt complaining of severe abdominal pain. Stated that she feels like she is taking too much pain medication. Education completed. Writer initiated alternative pain relief methods, focusing on control breathing. Pt seemed to calm down when she would take deep breathes. Will continue to monitor.

## 2020-10-24 NOTE — PROGRESS NOTES
Dr Thomas Rodriguez returned page. Discussed with MD lactic acid trends and currently sitting at 4.9, SBP in the low 90s and started vaso, ABG results (compensating with increased respirations), and pain control. MD is calling son Harinder Reed to discuss surgery. Will continue to monitor.

## 2020-10-24 NOTE — BRIEF OP NOTE
Brief Postoperative Note      Patient: Maykel Mancera  YOB: 1945  MRN: [id number]    Date of Procedure: 10/24/2020    Pre-Op Diagnosis: Pneumatosis intestinalis    Post-Op Diagnosis: Same with irreversible intestinal ischemia       Procedure(s):  LAPAROTOMY EXPLORATORY with LISETTE    Surgeon(s):  Apurva Alejo MD    Assistant:  Surgical Assistant: Hector Mora    Anesthesia: General    Estimated Blood Loss (mL): less than 246     Complications: None    Specimens:   * No specimens in log *    Implants:  * No implants in log *      Drains:   Chest Tube 1 Mediastinal 24 Israeli (Active)   Suction To water seal 10/23/20 0010   Chest Tube Airleak No 10/24/20 0400   Drainage Description Serosanguinous 10/24/20 0400   Dressing Status Changed 10/23/20 1436   Dressing Type Dry dressing 10/24/20 0400   Dressing Change Due 10/24/20 10/24/20 0400   Site Assessment Clean;Dry; Intact 10/24/20 0400   Surrounding Skin Dry 10/22/20 2000   Patency Intervention Tip/Tilt 10/23/20 0010   Output (ml) 30 ml 10/23/20 2300       Chest Tube 2 Left Pleural 24 Israeli (Active)   Suction To water seal 10/23/20 0010   Chest Tube Airleak No 10/24/20 0400   Drainage Description Serosanguinous 10/24/20 0400   Dressing Status Changed 10/23/20 1436   Dressing Type Dry dressing 10/24/20 0400   Dressing Change Due 10/24/20 10/24/20 0400   Site Assessment Clean;Dry; Intact 10/24/20 0400   Surrounding Skin Dry 10/24/20 0400   Patency Intervention Tip/Tilt 10/23/20 0010   Output (ml) 30 ml 10/23/20 2300       NG/OG/NJ/NE Tube Nasogastric 18 fr Right nostril (Active)       Urostomy Ileal conduit RLQ (Active)       Urostomy (Active)   Stomal Appliance Clean;Dry; Intact 10/23/20 0010   Stoma  Assessment Pink 10/24/20 0400   Mucocutaneous Junction Intact 10/22/20 2000   Peristomal Assessment Intact 10/22/20 2000   Urine Color Yellow 10/24/20 0400   Urine Appearance Clear 10/24/20 0400   Urine Odor Other (Comment) 10/19/20 0600   Output (ml) 35 ml 10/23/20 2300       [REMOVED] Urostomy Ileal conduit RLQ (Removed)   Stomal Appliance 1 piece;Clean;Dry; Intact 09/22/20 2028   Stoma  Assessment Pink 09/22/20 2028   Mucocutaneous Junction Intact 09/22/20 2028   Peristomal Assessment Clean; Intact; Pink 09/22/20 2028   Urine Color Yellow 09/22/20 2028   Urine Appearance Clear 09/30/20 2049   Urine Odor Malodorous 09/22/20 2028   Output (ml) 50 ml 09/30/20 1830       Findings: As above    Electronically signed by Ghanshyam Lucas MD on 10/24/2020 at 7:54 AM

## 2020-10-24 NOTE — ANESTHESIA POSTPROCEDURE EVALUATION
Department of Anesthesiology  Postprocedure Note    Patient: Melanie Guillory  MRN: [id number]  YOB: 1945  Date of evaluation: 10/24/2020  Time:  8:59 AM     Procedure Summary     Date:  10/24/20 Room / Location:  [address]    Anesthesia Start:  0602 Anesthesia Stop:  0124    Procedure:  LAPAROTOMY EXPLORATORY (N/A Abdomen) Diagnosis:  (BOWEL OBSTRUCTION)    Surgeon:  Dank Lenz MD Responsible Provider:  Ailyn Pettit MD    Anesthesia Type:  general ASA Status:  5 - Emergent          Anesthesia Type: general    Will Phase I:      Will Phase II:      Last vitals: Reviewed and per EMR flowsheets.        Anesthesia Post Evaluation    Patient location during evaluation: bedside  Patient participation: complete - patient cannot participate ()  Complications: yes ()  Cardiovascular status: hemodynamically unstable

## 2020-10-24 NOTE — PROGRESS NOTES
Pt received from OR manually ventilated with BVD. Pt receiving levophed and vasopressin. Talking to Jewelene Slates as pt's family at bedside and not wanting to do any more lifesaving measures. Pt's code status confirmed with CT surgery as being DNR.

## 2020-10-27 LAB
DLCO %PRED: 47 %
DLCO PRED: NORMAL
DLCO/VA %PRED: NORMAL
DLCO/VA PRED: NORMAL
DLCO/VA: NORMAL
DLCO: NORMAL
EXPIRATORY TIME-POST: NORMAL
EXPIRATORY TIME: NORMAL
FEF 25-75% %CHNG: NORMAL
FEF 25-75% %PRED-POST: NORMAL
FEF 25-75% %PRED-PRE: NORMAL
FEF 25-75% PRED: NORMAL
FEF 25-75%-POST: NORMAL
FEF 25-75%-PRE: NORMAL
FEV1 %PRED-POST: 55 %
FEV1 %PRED-PRE: 52 %
FEV1 PRED: NORMAL
FEV1-POST: NORMAL
FEV1-PRE: NORMAL
FEV1/FVC %PRED-POST: NORMAL
FEV1/FVC %PRED-PRE: NORMAL
FEV1/FVC PRED: NORMAL
FEV1/FVC-POST: 66 %
FEV1/FVC-PRE: 67 %
FVC %PRED-POST: NORMAL
FVC %PRED-PRE: NORMAL
FVC PRED: NORMAL
FVC-POST: NORMAL
FVC-PRE: NORMAL
GAW %PRED: NORMAL
GAW PRED: NORMAL
GAW: NORMAL
IC %PRED: NORMAL
IC PRED: NORMAL
IC: NORMAL
MEP: NORMAL
MIP: NORMAL
MVV %PRED-PRE: NORMAL
MVV PRED: NORMAL
MVV-PRE: NORMAL
PEF %PRED-POST: NORMAL
PEF %PRED-PRE: NORMAL
PEF PRED: NORMAL
PEF%CHNG: NORMAL
PEF-POST: NORMAL
PEF-PRE: NORMAL
RAW %PRED: NORMAL
RAW PRED: NORMAL
RAW: NORMAL
RV %PRED: NORMAL
RV PRED: NORMAL
RV: NORMAL
SVC %PRED: NORMAL
SVC PRED: NORMAL
SVC: NORMAL
TLC %PRED: 88 %
TLC PRED: NORMAL
TLC: NORMAL
VA %PRED: NORMAL
VA PRED: NORMAL
VA: NORMAL
VTG %PRED: NORMAL
VTG PRED: NORMAL
VTG: NORMAL

## 2020-10-27 ASSESSMENT — PULMONARY FUNCTION TESTS
FEV1/FVC_PRE: 67
FEV1_PERCENT_PREDICTED_PRE: 52
FEV1/FVC_POST: 66
FEV1_PERCENT_PREDICTED_POST: 55

## 2020-10-31 NOTE — OP NOTE
[facility]                 [facility]                                 OPERATIVE REPORT    PATIENT NAME: Tram Pop                     :        1945  MED REC NO:   [id number]                          ROOM:       3696  ACCOUNT NO:   [de-identified]                           ADMIT DATE: 10/14/2020  PROVIDER:     Eben Parnell MD    DATE OF PROCEDURE:  10/21/2020    PREOPERATIVE DIAGNOSES:  1. Complex coronary artery disease. 2.  Acute diastolic congestive heart failure. 3.  End-stage renal disease on dialysis. 4.  Non-ST MI.  5.  Anxiety. 6.  GERD. 7.  Acute pyelonephritis. 8.  Chronic calculous cholecystitis. POSTOPERATIVE DIAGNOSES:  1. Complex coronary artery disease. 2.  Acute diastolic congestive heart failure. 3.  End-stage renal disease on dialysis. 4.  Non-ST MI.  5.  Anxiety. 6.  GERD. 7.  Acute pyelonephritis. 8.  Chronic calculous cholecystitis. PROCEDURE PERFORMED:  1. Coronary artery bypass x3, LIMA to LAD, reverse saphenous vein to  RCA, reverse saphenous vein to OM1.  2.  Endo vein harvest.  3.  Left appendage clip. 4.  Transesophageal echo. 5.  Bilateral five-level intercostal nerve block. STAFF SURGEON:  Eben Parnell MD    ANESTHESIA:  General.    FIRST ASSISTANT:  Duane Rakers    ESTIMATED BLOOD LOSS:  500 mL on bypass. COMPLICATIONS:  None immediate. PROCEDURE DESCRIPTION:  The patient was taken to the operating room. After informed written consent was obtained, lying supine under general  anesthesia. ET tube was placed with no difficulty. Ewing-Adalgisa catheter  and arterial line were placed with no complication. The patient was  prepped and draped in a standard fashion. Ports for the course were  done in a standard manner. Transesophageal echo was performed, showed  good ventricular function with no valvular disease. Next, two-team  approach was started.   Endo vein harvest was obtained through the right  leg. Tunnel was developed. Greater saphenous vein was identified. All  side branches were transected and cauterized. Vein was delivered  through an encounter incision. A midline incision was made. Bovie was  used to cut through the subcutaneous tissue and fat. Sternotomy was  performed. Mammary was harvested. Good quality mammary was seen. The  patient was heparinized on optimum ACT, placed on cardiopulmonary bypass  through an ascending aortic cannula and three-stage arteriovenous  cannula. Cardioplegia was administered in an antegrade fashion. Good  diastolic arrest was achieved. Reverse saphenous vein was used to reconstruct the anastomosis to OM1. Good flow with no leakage was seen. Appendage was measured to 40-mm. Square shaped clip was placed into the base of the appendage and  deployed. At end of the procedure, transesophageal echo was performed,  showed no flow through the appendage with complete obliteration. Next,  proximal was performed to ascending aorta with no complication. Next,  reverse saphenous vein was used to reconstruct the anastomosis to the  RCA. Good flow with no leakage was seen. Proximal was performed to the  ascending aorta by using 6-0 Prolene. Next, LIMA was used to  reconstruct the anastomosis to the LAD. Good flow with no leakage was  seen. Hotshot was administered and tolerated well by the patient. Spontaneous rhythm was regained. Two V-wire and two arterial wires were  placed. One mediastinal tube and one chest tube were placed. The  patient was weaned off cardiopulmonary bypass. After administration of  protamine, ascending aortic cannula and arterial cannula were removed,  oversewn by 4-0 Prolene. The patient's pericardium was closed. Hemostasis was secured. No active bleeding was seen. Sternotomy was  closed by stainless steel wire in a standard fashion.   Five-level  intercostal block by using Exparel and Marcaine

## 2020-11-11 NOTE — DISCHARGE SUMMARY
Death SUMMARY    Admission Date:  10/14/2020  6:49 AM     Discharge Date:  10/24/2020    Principle Diagnosis:   1. Complex coronary artery disease. 2.  Acute diastolic congestive heart failure. 3.  End-stage renal disease on dialysis. 4.  Non-ST MI.  5.  Anxiety. 6.  GERD. 7.  Acute pyelonephritis. 8.  Chronic calculous cholecystitis.       Past Medical History:  Past Medical History:   Diagnosis Date    Acid reflux     Arthritis     CAD in native artery 10/14/2020    Cancer [facility])     bladder cancer    CHF (congestive heart failure) (HCC)     Chronic kidney disease     Hemodialysis patient ([facility] [address]     Hx of blood clots     Hyperlipidemia     Hypertension     Kidney stone        Past Surgical History:  Past Surgical History:   Procedure Laterality Date    ABDOMEN SURGERY      APPENDECTOMY      BLADDER STONE REMOVAL      BLADDER SURGERY      pt had bladder cancer    CATARACT REMOVAL WITH IMPLANT  8/26/11    RIGHT    CHOLECYSTECTOMY  03/06/2018    COLONOSCOPY      CORONARY ARTERY BYPASS GRAFT N/A 10/21/2020    CORONARY ARTERY BYPASS GRAFTING X3, INTERNAL MAMMARY ARTERY, SAPHENOUS VEIN GRAFT, LEFT ATRIAL APPENDAGE CLIP, ON PUMP, WITH BILATERAL 5 LEVEL INTERCOSTAL NERVE BLOCK performed by [facility], MD at [facility]      IR NONTUNNELED VASCULAR CATHETER  10/1/2020    IR NONTUNNELED VASCULAR CATHETER 10/1/2020 [facility] PROCEDURES    IR TUNNELED CATHETER PLACEMENT GREATER THAN 5 YEARS  10/8/2020    IR TUNNELED CATHETER PLACEMENT GREATER THAN 5 YEARS 10/8/2020 [facility] PROCEDURES    LAPAROTOMY N/A 10/24/2020    LAPAROTOMY EXPLORATORY performed by Gina Betancourt MD at [facility]  05/08/2015    phacemilsification of cataract with intraocular lens implant left eye       Procedure:  Procedure(s):  LAPAROTOMY EXPLORATORY       History:  The patient 66 y.o. patient who presents to the hospital for invasive cardiac testing. The patient underwent clinical evaluation, and it was determined that the patient needed to undergo cardiac catheterization for further diagnostic evaluation. Hospital Course: The patient underwent Procedure(s):PROCEDURE PERFORMED:  1. Coronary artery bypass x3, LIMA to LAD, reverse saphenous vein to  RCA, reverse saphenous vein to OM1.  2.  Endo vein harvest.  3.  Left appendage clip. 4.  Transesophageal echo. 5.  Bilateral five-level intercostal nerve block    POSTOPERATIVE DIAGNOSES:  Pneumatosis intestinalis with diffuse  irreversible intestinal ischemia. Hospital course:   10/22 sitting up in bed, in NAD. Remains SR 80's  10/23 pt has diarrhea, reports feeling somewhat weak.   10/24 patient have acute severe abdominal pain night of 23rd general surgery was involved CT scan was performed showed ischemic gut, detailed discussion with the family was performed family was at the bedside no aerobic area effort was done patient passed at 8:43 AM            Disposition: death   time of death [address] [place] [address] MD Doreen Barrett MD [facility]  11/11/2020  9:39 AM

## 2020-11-12 NOTE — DISCHARGE SUMMARY
Name:  Christopher Berman  Room:  /9911-08  MRN:    [id number]    IM Discharge Summary    Discharging Physician:  Lissette Duggan MD    Admit: 9/30/2020    Discharge:  10/9/2020    PCP      Megan Byrne MD    Diagnoses and hospital course  this Admission      DELMER on CKD Stage III  - Cr 6.9 on admission  - baseline: ~ 1.1 with hx of bladder removal and non functioning left kidney  - Renal US noted chronic features   - started HD this admit. Placed tunneled HD catheter 10/8, tolerating HD  - nephro managing  - UOP slowly recovering. F/w outpt         Klebsiella UTI    Received Rocephin IV for 5 days  No fevers     Hx of Bladder Cancer S/p Radical Cystectomy with Ileal Conduit  Non-Functioning Left Kidney        HTN - uncontrolled. Started clonidine patch. Added coreg, cardura and hydralazine. Nifedipine and imdur  improving     Anemia   hG 8.9>9   Monitor         Depression   paxil/lamictal   Decreased paxil with renal disease  Prn klonopin         Diarrhea. Check WBC - negative  Diarrhea now resolved.            HPI   66 y.o. female with hypertension, hyperlipidemia, GERD, h/o bladder cancer, and CHF who presents to [facility] with c/o having abnormal labs. She was admitted to [facility] 9/20-9/24. She was diuresed with IV lasix for acute diastolic CHF. Now presenting with DELMER. She reports poor PO intake, diarrhea the last 2 days, nausea, fatigue, and weakness. She has not felt well since she was admitted the last time    Physical Exam at Discharge:    General: elderly female in bed  Benign head tremor noted    Awake, alert and oriented.  Appears to be not in any distress  Right IJ tunneled HD catheter  Mucous Membranes:  Pink , anicteric  Neck: No JVD, no carotid bruit, no thyromegaly  Chest:  Clear to auscultation bilaterally, diminished in bases   Cardiovascular:  RRR S1S2 heard, no murmurs or gallops  Abdomen:  Soft, undistended, non tender, no organomegaly, BS present  Ileal conduit in lower abd  Extremities: resolving 2+ Skyler LE edema  Distal pulses well felt  Neurological : grossly normal  Benign head tremor noted        1. nephrology  2. radiology                  No results for input(s): WBC, HGB, HCT, MCV, PLT in the last 72 hours. No results for input(s): NA, K, CL, CO2, PHOS, BUN, CREATININE in the last 72 hours. Invalid input(s): CA    CBC:  Lab Results   Component Value Date    WBC 7.2 10/24/2020    HGB 9.3 10/24/2020    HCT 28.6 10/24/2020    MCV 91.0 10/24/2020     10/24/2020    NEUTOPHILPCT 28.0 10/24/2020    LYMPHOPCT 8.0 10/24/2020    MONOPCT 4.0 10/24/2020    EOSPCT 1.4 11/19/2011    BASOPCT 0.0 10/24/2020    NEUTROABS 6.3 10/24/2020    LYMPHSABS 0.6 10/24/2020    MONOSABS 0.3 10/24/2020    EOSABS 0.1 10/24/2020    BASOSABS 0.0 10/24/2020     BNP:   Lab Results   Component Value Date     10/24/2020    K 4.4 10/24/2020    K 3.4 10/16/2020    CO2 19 10/24/2020    BUN 23 10/24/2020    CREATININE 3.1 10/24/2020    CALCIUM 9.5 10/24/2020                Medication List      START taking these medications    carvedilol 12.5 MG tablet  Commonly known as:  COREG  Take 1 tablet by mouth 2 times daily (with meals)  Notes to patient:  Use: treat heart failure, prevent future heart attacks, lower blood pressure and heart rate, treat chest pain   Side effects: dizziness, fatigue, diarrhea, and depression     cloNIDine 0.2 MG/24HR Ptwk  Commonly known as:  CATAPRES  Place 1 patch onto the skin once a week  Notes to patient:  Use: treat hypertension  Side Effects:Dry mouth, constipation, dizziness, feeling sleepy, headache, upset stomach, feeling tired or weak.     isosorbide mononitrate 30 MG extended release tablet  Commonly known as:  IMDUR  Take 1 tablet by mouth daily  Notes to patient:  Isosorbide Mononitrate (Imdur®)  Use: treat heart failure, prevent and treat chest pain. Side effects: headache, flushing, and dizziness.         CHANGE how you take these medications hydrALAZINE 100 MG tablet  Commonly known as:  APRESOLINE  Take 1 tablet by mouth 3 times daily  What changed:    · medication strength  · how much to take  · when to take this     lamoTRIgine 25 MG tablet  Commonly known as:  LAMICTAL  What changed:  Another medication with the same name was removed. Continue taking this medication, and follow the directions you see here. PARoxetine 20 MG tablet  Commonly known as:  PAXIL  Take 2 tablets by mouth every morning  What changed:  how much to take        CONTINUE taking these medications    B-Complex/B-12 Tabs     doxazosin 4 MG tablet  Commonly known as:  CARDURA     gemfibrozil 600 MG tablet  Commonly known as:  LOPID     pantoprazole 40 MG tablet  Commonly known as:  PROTONIX     primidone 50 MG tablet  Commonly known as: MYSOLINE     senna-docusate 8.6-50 MG per tablet  Commonly known as:  Senokot S  Take 2 tablets by mouth daily        STOP taking these medications    amLODIPine 10 MG tablet  Commonly known as:  NORVASC     atenolol 50 MG tablet  Commonly known as:  TENORMIN     cefdinir 300 MG capsule  Commonly known as:  OMNICEF     sodium bicarbonate 650 MG tablet           Where to Get Your Medications      These medications were sent to [address] [place] [address], [address] [place] [address] [phone]  [place] [address]    Phone:  [phone]   · carvedilol 12.5 MG tablet  · cloNIDine 0.2 MG/24HR Ptwk  · hydrALAZINE 100 MG tablet  · isosorbide mononitrate 30 MG extended release tablet     Information about where to get these medications is not yet available    Ask your nurse or doctor about these medications  · PARoxetine 20 MG tablet           Discharge Condition/Location: stable/home     Follow Up:    PCP in 1 weeks  HD per schedule      Time Spent on discharge is more than 30 minutes in the examination, evaluation, counseling and review of medications and discharge plan.       Adrian Marino,

## 2020-11-18 NOTE — DISCHARGE INSTR - COC
Continuity of Care Form    Patient Name: Melnaie Guillory   :  1945  MRN:  [id number]    Admit date:  10/14/2020  Discharge date:  ***    Code Status Order: Full Code   Advance Directives:   Advance Care Flowsheet Documentation     Date/Time Healthcare Directive Type of Healthcare Directive Copy in [address] Agent's Name Healthcare Agent's Phone Number    10/14/20 8692  Yes, patient has an advance directive for healthcare treatment  Living will -- -- -- --          Admitting Physician:  Grayson Williamson MD  PCP: Victorino Simental MD    Discharging Nurse: [facility] Unit/Room#: 9517/5259-90  Discharging Unit Phone Number: ***    Emergency Contact:   Extended Emergency Contact Information  Primary Emergency Contact: [facility]  Address: [address] [place] [address] Phone: [phone]  Relation: Spouse  Secondary Emergency Contact: Alexus Samuel  Address: [address] [place] [address], [address] [place] [address] Phone: [phone]  Relation: Child    Past Surgical History:  Past Surgical History:   Procedure Laterality Date    ABDOMEN SURGERY      APPENDECTOMY      BLADDER STONE REMOVAL      BLADDER SURGERY      pt had bladder cancer    CATARACT REMOVAL WITH IMPLANT  11    RIGHT    CHOLECYSTECTOMY  2018    COLONOSCOPY      EYE SURGERY      HYSTERECTOMY      IR NONTUNNELED VASCULAR CATHETER  10/1/2020    IR NONTUNNELED VASCULAR CATHETER 10/1/2020 [facility] PROCEDURES    IR TUNNELED CATHETER PLACEMENT GREATER THAN 5 YEARS  10/8/2020    IR TUNNELED CATHETER PLACEMENT GREATER THAN 5 YEARS 10/8/2020 [address] SPECIAL PROCEDURES    OTHER SURGICAL HISTORY  2015    phacemilsification of cataract with intraocular lens implant left eye       Immunization History:   Immunization History   Administered Date(s) Administered    Influenza Virus Vaccine 10/08/2014, 2020    C-diff Rule Out 20 Clostridium difficile toxin/antigen (Ordered)   20 Rule-Out Test Resulted          Nurse Assessment:  Last Vital Signs: BP (!) 143/69   Pulse 107   Temp 98.6 °F (37 °C) (Oral)   Resp 15   Ht 4' 11\" (1.499 m)   Wt 129 lb 10.1 oz (58.8 kg)   SpO2 94%   BMI 26.18 kg/m²     Last documented pain score (0-10 scale): Pain Level: 6  Last Weight:   Wt Readings from Last 1 Encounters:   10/15/20 129 lb 10.1 oz (58.8 kg)     Mental Status:  {IP PT MENTAL STATUS:}    IV Access:  { DARRIAN IV ACCESS:275050876}    Nursing Mobility/ADLs:  Walking   {CHP DME RZZE:834110121}  Transfer  {CHP DME ANTO:419800630}  Bathing  {CHP DME SVOK:357519542}  Dressing  {CHP DME JPGE:777047838}  Toileting  {CHP DME BWC}  Feeding  {CHP DME QWJO:345703124}  Med Admin  {P DME CDC}  Med Delivery   { DARRIAN MED Delivery:494026830}    Wound Care Documentation and Therapy:        Elimination:  Continence:   · Bowel: {YES / KH:99244}  · Bladder: {YES / W}  Urinary Catheter: {Urinary Catheter:991766001}   Colostomy/Ileostomy/Ileal Conduit: {YES / L}       Date of Last BM: ***    Intake/Output Summary (Last 24 hours) at 10/15/2020 1022  Last data filed at 10/15/2020 0457  Gross per 24 hour   Intake 640 ml   Output 1625 ml   Net -985 ml     I/O last 3 completed shifts:   In: 0 [P.O.:240]  Out: 1625 [Urine:225]    Safety Concerns:     [address] Safety Concerns:676477102}    Impairments/Disabilities:      [address] Impairments/Disabilities:437701693}    Nutrition Therapy:  Current Nutrition Therapy:   [address] Diet List:280388275}    Routes of Feeding: {CHP DME Other Feedings:083051084}  Liquids: {Slp liquid thickness:80584}  Daily Fluid Restriction: {CHP DME Yes amt example:894053150}  Last Modified Barium Swallow with Video (Video Swallowing Test): {Done Not Done WAEV:830339592}    Treatments at the Time of Hospital Discharge:   Respiratory Treatments: ***  Oxygen Therapy:  {Therapy; copd oxygen:19393}  Ventilator:    { CC Vent AYWL:335945618}    Rehab Therapies: {THERAPEUTIC INTERVENTION:9241095649}  Weight Bearing Status/Restrictions: { CC Weight Bearin}  Other Medical Equipment (for information only, NOT a DME order):  {EQUIPMENT:837590525}  Other Treatments: ***    Patient's personal belongings (please select all that are sent with patient):  {P DME Belongings:974128670}    RN SIGNATURE:  {Esignature:453170597}    CASE MANAGEMENT/SOCIAL WORK SECTION    Inpatient Status Date: ***    Readmission Risk Assessment Score:  Readmission Risk              Risk of Unplanned Readmission:        32           Discharging to Facility/ Agency   · Name:   · Address:  · Phone:  · Fax:    Dialysis Facility (if applicable)   · Name:  · Address:  · Dialysis Schedule:  · Phone:  · Fax:    / signature: {Esignature:239027827}    PHYSICIAN SECTION    Prognosis: {Prognosis:5401050042}    Condition at Discharge: [address] [place] [address] Patient Condition:044049083}    Rehab Potential (if transferring to Rehab): {Prognosis:3635557943}    Recommended Labs or Other Treatments After Discharge: ***  Recommended Follow-up, Labs or Other Treatments After Discharge:    ***             Physician Certification: I certify the above information and transfer of Vannesa Knight  is necessary for the continuing treatment of the diagnosis listed and that she requires {Admit to Appropriate Level of Care:68257} for {GREATER/LESS:972046459} 30 days.      Update Admission H&P: {CHP DME Changes in ANBKP:465032042}    PHYSICIAN SIGNATURE:  {Esignature:834760134} Poor

## 2023-03-23 NOTE — PROGRESS NOTES
.  Patient sitting on edge of bed complaints of restlessness, anxiety, irritability and inability to rest.  She requested and was given clonopin as ordered. Sent message did not receive the form

## 2024-11-26 NOTE — PROGRESS NOTES
Patient seen and examined. POD 2 CABG  HDU today and severe, diffuse abdominal pain starting after HDU. She is awake, alert and oriented x 3. LE mottling not present. Feet are warm bilaterally. Abdomen with some anasarca. Urostomy in place. She has R side abdominal tenderness with some distention. No peritoneal signs. Lactate 3.5  Base deficit zero on ABG      HR 100s    CT reviewed and shows gas in liver as well as pneumatosis cecum, R colon and some SB loops. Case discussed with Dr. Vida Chu, patient, son and daughter in law. Explained the critical life threatening nature of her illness and the recent CT findings were explained. She is extremely high risk for abdominal exploration given her recent cardiac ischemia and very recent CABG. She has a complicated past surgical history with urostomy in place. She is, currently, hemodynamically stable. I feel that there is significant risk of mortality with both operative and non operative treatment plans. My recommendation based on her current clinical appearance and review of her current status is to implement IV antibiotics, IVF and closely monitor vital signs, clinical condition, abdominal exam and acid/base status in an effort to try and treat the situation without emergent surgery. If there are any deteriorations in the aforementioned, then emergent surgery would be the only option. Family and patient are aware that the treatment plan could change at any time. They clearly understand the risk of death is high with operative or non operative treatment plans. All of their questions were answered, and they are in agreement with the current plan. 55 minutes so far today spent at the bedside with the patient and family, reviewing imaging and labs and formulating a treatment plan with the nurses and physicians for this patient with life threatening illness.     Greater than 50% visit spent counseling about diagnosis, treatment plan and expected course. Shameem

## 2025-03-20 NOTE — PROGRESS NOTES
Patient returned from OR with Dr. Nakul Villanueva for abdominal exploration. Diffuse dead bowel. Returned to room for comfort care.  8:43 am  Family at bedside. Spoke with , son and family.     Poli Jaffe MD  FACS bilateral upper extremity Active ROM was WFL (within functional limits)/bilateral  lower extremity Active ROM was WFL (within functional limits) bilateral upper extremity Active ROM was WFL (within functional limits)/bilateral  lower extremity Active ROM was WFL (within functional limits) bilateral upper extremity Active ROM was WFL (within functional limits)/bilateral  lower extremity Active ROM was WFL (within functional limits)

## 2025-04-03 NOTE — PLAN OF CARE
Patient's EF (Ejection Fraction) is greater than 40%    Heart Failure Medications:  Diuretics[de-identified] Furosemide    (One of the following REQUIRED for EF <40%/SYSTOLIC FAILURE but MAY be used in EF% >40%/DIASTOLIC FAILURE)        ACE[de-identified] None        ARB[de-identified] None         ARNI[de-identified] None    (Beta Blockers)  NON- Evidenced Based Beta Blocker (for EF% >40%/DIASTOLIC FAILURE): None    Evidenced Based Beta Blocker::(REQUIRED for EF% <40%/SYSTOLIC FAILURE) Carvedilol- Coreg  . .................................................................................................................................................. Patient's Last Weight: 127 lbs obtained by bed scale. Difference in weight is 3 pounds less than last documented weight. Intake/Output Summary (Last 24 hours) at 10/13/2020 1301  Last data filed at 10/13/2020 1200  Gross per 24 hour   Intake 500 ml   Output 3020 ml   Net -2520 ml       Comorbidities Reviewed Yes  Patient has a past medical history of Acid reflux, Arthritis, Cancer ([facility] [address] CHF (congestive heart failure) ([facility] [address] Chronic kidney disease, Hemodialysis patient ([facility] [address] Hx of blood clots, Hyperlipidemia, Hypertension, and Kidney stone.     >> For CHF and Comorbidity Education Time and Topics, please see Education Tab. Progressive Mobility Assessment:  What is this patient's Current Level of Mobility?: Ambulatory- with Assistance  How was this patient Mobilized today?: Edge of Bed                 With Whom? Nurse and PCA                 Level of Difficulty/Assistance: 1x Assist     Pt is currently on 2 L O2. Pt with pitting lower extremity edema.  Patient's weights and intake/output reviewed:      Patient and/or Family's stated Goal of Care this Admission: reduce shortness of breath, increase activity tolerance, better understand heart failure and disease management, be more comfortable, and reduce lower extremity edema prior to discharge    [address]

## (undated) DEVICE — DRAIN SURG 24FR L5/16IN DIA8MM SIL RND HUBLESS FULL FLUT

## (undated) DEVICE — SPONGE,LAP,18"X18",DLX,XR,ST,5/PK,40/PK: Brand: MEDLINE

## (undated) DEVICE — GOWN SIRUS NONREIN XL W/TWL: Brand: MEDLINE INDUSTRIES, INC.

## (undated) DEVICE — CANNULA 1 PC TRI STG VEN RET POLYVI CHL S STL FLEX 29 FR SGL

## (undated) DEVICE — SUTURE ETHLN SZ 2-0 L20IN NONABSORBABLE BLK LR L75MM 3/8 470G

## (undated) DEVICE — PATCH SURG FIBRIN SEAL 4.8X4.8 CM ABSORBABLE TACHOSIL

## (undated) DEVICE — SUTURE VCRL + SZ 3-0 L18IN ABSRB UD SH 1/2 CIR TAPERCUT NDL VCP864D

## (undated) DEVICE — Device

## (undated) DEVICE — GLOVE SURG SZ 85 L12IN THK104MIL CRM LTX SMOOTH PWD ST

## (undated) DEVICE — KIT BLWR MISTER 5P 15L W/ TBNG SET IRRIG MIST TO IMPROVE

## (undated) DEVICE — 3M™ STERI-STRIP™ REINFORCED ADHESIVE SKIN CLOSURES, R1549, 1/2 IN X 2 IN (12 MM X 50 MM), 6 STRIPS/ENVELOPE: Brand: 3M™ STERI-STRIP™

## (undated) DEVICE — CANNULA PERF AD 20FR L8.5IN ART 3/8IN CONN NVENT MTL TIP

## (undated) DEVICE — 3M™ TEGADERM™ TRANSPARENT FILM DRESSING FRAME STYLE, 1624W, 2-3/8 IN X 2-3/4 IN (6 CM X 7 CM), 100/CT 4CT/CASE: Brand: 3M™ TEGADERM™

## (undated) DEVICE — WAX SURG 2.5GM HEMSTAT BNE BEESWAX PARAFFIN ISO PALMITATE

## (undated) DEVICE — SUTURE NONABSORBABLE MONOFILAMENT 7-0 BV-1 1X24 IN PROLENE 8702H

## (undated) DEVICE — DRAIN SURG SGL COLL PT TB FOR ATS BG OASIS

## (undated) DEVICE — PUNCH AORT DIA4MM LNG HNDL

## (undated) DEVICE — ADHESIVE SKIN CLSR 0.7ML TOP DERMBND ADV

## (undated) DEVICE — SUTURE VCRL SZ 3-0 L27IN ABSRB UD L26MM SH 1/2 CIR J416H

## (undated) DEVICE — PACK PROCEDURE SURG OPN HRT A BASIC

## (undated) DEVICE — SUTURE ETHBND EXCEL SZ 0 L18IN NONABSORBABLE GRN L36MM CT-1 CX21D

## (undated) DEVICE — DRESSING FOAM W4XL12IN AG SIL ADH ANTIMIC POSTOP OPTIFOAM

## (undated) DEVICE — CHLORAPREP 26ML ORANGE

## (undated) DEVICE — SUTURE MCRYL + SZ 4-0 L18IN ABSRB UD L19MM PS-2 3/8 CIR MCP496G

## (undated) DEVICE — SUTURE PROL SZ 1 L30IN NONABSORBABLE BLU CTX L48MM 1/2 CIR 8455H

## (undated) DEVICE — DRESSING FOAM W4XL10IN AG SIL ADH ANTIMIC POSTOP OPTIFOAM

## (undated) DEVICE — 3M™ TEGADERM™ TRANSPARENT FILM DRESSING FRAME STYLE, 1626W, 4 IN X 4-3/4 IN (10 CM X 12 CM), 50/CT 4CT/CASE: Brand: 3M™ TEGADERM™

## (undated) DEVICE — GAUZE,SPONGE,4"X4",8PLY,STRL,LF,10/TRAY: Brand: MEDLINE

## (undated) DEVICE — Z INACTIVE USE 2641838 CLIP INT L ORNG TI TRNSVRS GRV CHEVRON SHP W/ PRECIS TIP TO

## (undated) DEVICE — SPONGE LAP W18XL18IN WHT COT 4 PLY FLD STRUNG RADPQ DISP ST

## (undated) DEVICE — SUTURE NONABSORBABLE MONOFILAMENT 4-0 RB-1 36 IN BLU PROLENE 8557H

## (undated) DEVICE — BLADE SAW W10XL54MM FOR PRI REPEAT STRNOTMY

## (undated) DEVICE — Z DISCONTINUED USE 2516375 APPLICATOR MEDICATED 3 CC CLR STRL CHLORAPREP

## (undated) DEVICE — SUTURE ETHBND EXCEL SZ 5 L30IN NONABSORBABLE GRN L40MM V-37 MB66G

## (undated) DEVICE — TAPE RETEN W6INXL11YD POLY SECUR DSG GATROSTOMY TB

## (undated) DEVICE — SUTURE PDS II SZ 0 L36IN ABSRB VLT L36MM CT-1 1/2 CIR Z346H

## (undated) DEVICE — STAPLER SKIN H3.9MM WIRE DIA0.58MM CRWN 6.9MM 35 STPL FIX

## (undated) DEVICE — GAUZE,SPONGE,2"X2",8PLY,STERILE,LF,2'S: Brand: MEDLINE

## (undated) DEVICE — CONNECTOR PERF W0.25XH3/8IN BASE Y SHP REDUC W/O LUERLOCK

## (undated) DEVICE — CANNULA PERF 7FR L5.5IN AORT ROOT RADPQ STD TIP W/ VENT LN

## (undated) DEVICE — SUTURE PROL SZ 6-0 L24IN NONABSORBABLE BLU L13MM C-1 3/8 8726H

## (undated) DEVICE — SUTURE SZ 7 L18IN NONABSORBABLE SIL CCS L48MM 1/2 CIR STRNM M655G

## (undated) DEVICE — ABDOMINAL BINDER: Brand: DEROYAL

## (undated) DEVICE — MEDI-VAC NON-CONDUCTIVE SUCTION TUBING: Brand: CARDINAL HEALTH

## (undated) DEVICE — GOWN,REINF,POLY,AURORA,XLNG/XXL,STRL: Brand: MEDLINE

## (undated) DEVICE — BLADE ES L6IN ELASTOMERIC COAT EXT DURABLE BEND UPTO 90DEG

## (undated) DEVICE — SOLUTION IV IRRIG POUR BRL 0.9% SODIUM CHL 2F7124

## (undated) DEVICE — BANDAGE STRTCH NONWOVEN 4INX2YD COVER ROLL

## (undated) DEVICE — BINDER ABD H12IN COT FOR 45-62IN WAIST UNIV PREM 4 PNL DSGN

## (undated) DEVICE — SUTURE ETHBND EXCEL SZ 2-0 L36IN NONABSORBABLE GRN SH-2 X559H

## (undated) DEVICE — STERILE LATEX POWDER-FREE SURGICAL GLOVESWITH NITRILE COATING: Brand: PROTEXIS

## (undated) DEVICE — SUTURE PERMAHAND SZ 2-0 L30IN NONABSORBABLE BLK SILK W/O A305H

## (undated) DEVICE — Z INACTIVE USE 2540311 LEAD PACE L475MM CHN A OR V MYOCARDIAL STEROID ELUT SIL

## (undated) DEVICE — GLOVE,SURG,SENSICARE SLT,LF,PF,8.5: Brand: MEDLINE

## (undated) DEVICE — SUTURE PROL SZ 1 L30IN NONABSORBABLE BLU L36MM CT-1 1/2 CIR 8425H